# Patient Record
Sex: FEMALE | Race: WHITE | HISPANIC OR LATINO | Employment: UNEMPLOYED | URBAN - METROPOLITAN AREA
[De-identification: names, ages, dates, MRNs, and addresses within clinical notes are randomized per-mention and may not be internally consistent; named-entity substitution may affect disease eponyms.]

---

## 2018-02-27 ENCOUNTER — NON-PROVIDER VISIT (OUTPATIENT)
Dept: URGENT CARE | Facility: CLINIC | Age: 48
End: 2018-02-27

## 2018-02-27 DIAGNOSIS — Z11.1 PPD SCREENING TEST: ICD-10-CM

## 2018-02-27 PROCEDURE — 86580 TB INTRADERMAL TEST: CPT | Performed by: PHYSICIAN ASSISTANT

## 2018-03-01 ENCOUNTER — APPOINTMENT (OUTPATIENT)
Dept: RADIOLOGY | Facility: IMAGING CENTER | Age: 48
End: 2018-03-01
Attending: PHYSICIAN ASSISTANT
Payer: COMMERCIAL

## 2018-03-01 ENCOUNTER — NON-PROVIDER VISIT (OUTPATIENT)
Dept: URGENT CARE | Facility: CLINIC | Age: 48
End: 2018-03-01

## 2018-03-01 DIAGNOSIS — R76.11 POSITIVE PPD: ICD-10-CM

## 2018-03-01 DIAGNOSIS — R76.11 POSITIVE PPD: Primary | ICD-10-CM

## 2018-03-01 LAB — TB WHEAL 3D P 5 TU DIAM: NORMAL MM

## 2018-03-01 PROCEDURE — 71045 X-RAY EXAM CHEST 1 VIEW: CPT | Mod: TC | Performed by: PHYSICIAN ASSISTANT

## 2018-03-02 NOTE — PROGRESS NOTES
Noelle Hobbs is a 48 y.o. female here for a non-provider visit for PPD reading -- Step 1 of 1.  1.  Resulted in Epic under enter/edit results? Yes   2.  TB evaluation questionnaire scanned into chart and original given to patient?Yes    3. Was induration greater than 0 mm? Yes, verified by Provider: Seth Romo PA-C.    Routed to PCP? No     Xray needed to rule out TB, which came back negative.

## 2019-01-31 ENCOUNTER — GYNECOLOGY VISIT (OUTPATIENT)
Dept: OBGYN | Facility: CLINIC | Age: 49
End: 2019-01-31
Payer: COMMERCIAL

## 2019-01-31 ENCOUNTER — HOSPITAL ENCOUNTER (OUTPATIENT)
Facility: MEDICAL CENTER | Age: 49
End: 2019-01-31
Attending: OBSTETRICS & GYNECOLOGY
Payer: COMMERCIAL

## 2019-01-31 VITALS
HEIGHT: 62 IN | DIASTOLIC BLOOD PRESSURE: 68 MMHG | BODY MASS INDEX: 29.44 KG/M2 | WEIGHT: 160 LBS | SYSTOLIC BLOOD PRESSURE: 118 MMHG

## 2019-01-31 DIAGNOSIS — Z01.419 WELL WOMAN EXAM: ICD-10-CM

## 2019-01-31 DIAGNOSIS — Z12.4 ROUTINE CERVICAL SMEAR: ICD-10-CM

## 2019-01-31 PROCEDURE — 99386 PREV VISIT NEW AGE 40-64: CPT | Performed by: OBSTETRICS & GYNECOLOGY

## 2019-01-31 PROCEDURE — 87624 HPV HI-RISK TYP POOLED RSLT: CPT

## 2019-01-31 PROCEDURE — 88175 CYTOPATH C/V AUTO FLUID REDO: CPT

## 2019-01-31 NOTE — PROGRESS NOTES
"Annual examination; new patient  This patient is a 48 y.o. female     Last mammogram-many years ago    /68   Ht 1.575 m (5' 2\")   Wt 72.6 kg (160 lb)   BMI 29.26 kg/m²     Physical examination;  Breast examination- No dominant masses, No skin retraction, No axillary adenopathy    Pelvic examination;  External genitalia-No visible lesions  Vagina-No blood or discharge  Cervix-No gross lesions, Pap smear taken  Uterus-Normal size and shape,  No tenderness  Adnexa No mass or tenderness    Impression;  Normal annual    Plan;    Check PAP  Mammogram ordered    "

## 2019-02-01 ENCOUNTER — HOSPITAL ENCOUNTER (OUTPATIENT)
Dept: RADIOLOGY | Facility: MEDICAL CENTER | Age: 49
End: 2019-02-01
Attending: OBSTETRICS & GYNECOLOGY
Payer: COMMERCIAL

## 2019-02-01 DIAGNOSIS — Z01.419 WELL WOMAN EXAM: ICD-10-CM

## 2019-02-01 LAB
CYTOLOGY REG CYTOL: NORMAL
HPV HR 12 DNA CVX QL NAA+PROBE: NEGATIVE
HPV16 DNA SPEC QL NAA+PROBE: NEGATIVE
HPV18 DNA SPEC QL NAA+PROBE: NEGATIVE
SPECIMEN SOURCE: NORMAL

## 2019-02-01 PROCEDURE — 77067 SCR MAMMO BI INCL CAD: CPT

## 2019-02-04 ENCOUNTER — HOSPITAL ENCOUNTER (OUTPATIENT)
Dept: RADIOLOGY | Facility: MEDICAL CENTER | Age: 49
End: 2019-02-04

## 2019-02-12 ENCOUNTER — OFFICE VISIT (OUTPATIENT)
Dept: MEDICAL GROUP | Facility: PHYSICIAN GROUP | Age: 49
End: 2019-02-12
Payer: COMMERCIAL

## 2019-02-12 VITALS
RESPIRATION RATE: 12 BRPM | TEMPERATURE: 98.6 F | SYSTOLIC BLOOD PRESSURE: 118 MMHG | OXYGEN SATURATION: 99 % | HEART RATE: 74 BPM | WEIGHT: 158 LBS | BODY MASS INDEX: 29.08 KG/M2 | HEIGHT: 62 IN | DIASTOLIC BLOOD PRESSURE: 76 MMHG

## 2019-02-12 DIAGNOSIS — E78.5 HYPERLIPIDEMIA, UNSPECIFIED HYPERLIPIDEMIA TYPE: ICD-10-CM

## 2019-02-12 DIAGNOSIS — Z23 NEED FOR VACCINATION: ICD-10-CM

## 2019-02-12 DIAGNOSIS — R22.1 LOCALIZED SWELLING, MASS OR LUMP OF NECK: ICD-10-CM

## 2019-02-12 DIAGNOSIS — R53.1 RIGHT SIDED WEAKNESS: ICD-10-CM

## 2019-02-12 DIAGNOSIS — Z23 NEED FOR DIPHTHERIA-TETANUS-PERTUSSIS (TDAP) VACCINE: ICD-10-CM

## 2019-02-12 DIAGNOSIS — R47.81 SLURRED SPEECH: ICD-10-CM

## 2019-02-12 DIAGNOSIS — M54.2 NECK PAIN: ICD-10-CM

## 2019-02-12 DIAGNOSIS — N93.8 DUB (DYSFUNCTIONAL UTERINE BLEEDING): ICD-10-CM

## 2019-02-12 DIAGNOSIS — Z51.81 MEDICATION MONITORING ENCOUNTER: ICD-10-CM

## 2019-02-12 DIAGNOSIS — R53.83 FATIGUE, UNSPECIFIED TYPE: ICD-10-CM

## 2019-02-12 DIAGNOSIS — R73.9 HYPERGLYCEMIA: ICD-10-CM

## 2019-02-12 PROCEDURE — 90686 IIV4 VACC NO PRSV 0.5 ML IM: CPT | Performed by: FAMILY MEDICINE

## 2019-02-12 PROCEDURE — 90472 IMMUNIZATION ADMIN EACH ADD: CPT | Performed by: FAMILY MEDICINE

## 2019-02-12 PROCEDURE — 90715 TDAP VACCINE 7 YRS/> IM: CPT | Performed by: FAMILY MEDICINE

## 2019-02-12 PROCEDURE — 99205 OFFICE O/P NEW HI 60 MIN: CPT | Mod: 25 | Performed by: FAMILY MEDICINE

## 2019-02-12 PROCEDURE — 90471 IMMUNIZATION ADMIN: CPT | Performed by: FAMILY MEDICINE

## 2019-02-12 RX ORDER — IBUPROFEN 600 MG/1
600 TABLET ORAL EVERY 8 HOURS PRN
Qty: 60 TAB | Refills: 1 | Status: ON HOLD | OUTPATIENT
Start: 2019-02-12 | End: 2021-10-21

## 2019-02-12 ASSESSMENT — PATIENT HEALTH QUESTIONNAIRE - PHQ9: CLINICAL INTERPRETATION OF PHQ2 SCORE: 0
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
## 2019-02-13 ENCOUNTER — HOSPITAL ENCOUNTER (OUTPATIENT)
Dept: RADIOLOGY | Facility: MEDICAL CENTER | Age: 49
End: 2019-02-13
Attending: FAMILY MEDICINE
Payer: COMMERCIAL

## 2019-02-13 DIAGNOSIS — R47.81 SLURRED SPEECH: ICD-10-CM

## 2019-02-13 DIAGNOSIS — R22.1 LOCALIZED SWELLING, MASS OR LUMP OF NECK: ICD-10-CM

## 2019-02-13 DIAGNOSIS — R53.1 RIGHT SIDED WEAKNESS: ICD-10-CM

## 2019-02-13 PROCEDURE — 70450 CT HEAD/BRAIN W/O DYE: CPT

## 2019-03-07 ENCOUNTER — PATIENT OUTREACH (OUTPATIENT)
Dept: HEALTH INFORMATION MANAGEMENT | Facility: OTHER | Age: 49
End: 2019-03-07

## 2019-03-07 ENCOUNTER — HOSPITAL ENCOUNTER (OUTPATIENT)
Dept: RADIOLOGY | Facility: MEDICAL CENTER | Age: 49
End: 2019-03-07
Attending: FAMILY MEDICINE
Payer: COMMERCIAL

## 2019-03-07 DIAGNOSIS — R22.1 LOCALIZED SWELLING, MASS OR LUMP OF NECK: ICD-10-CM

## 2019-03-07 PROCEDURE — 70490 CT SOFT TISSUE NECK W/O DYE: CPT

## 2019-03-08 ENCOUNTER — TELEPHONE (OUTPATIENT)
Dept: MEDICAL GROUP | Facility: PHYSICIAN GROUP | Age: 49
End: 2019-03-08

## 2019-03-08 LAB
ALBUMIN SERPL-MCNC: 4 G/DL (ref 3.5–5.5)
ALBUMIN/GLOB SERPL: 1.2 {RATIO} (ref 1.2–2.2)
ALP SERPL-CCNC: 120 IU/L (ref 39–117)
ALT SERPL-CCNC: 14 IU/L (ref 0–32)
AST SERPL-CCNC: 13 IU/L (ref 0–40)
BASOPHILS # BLD AUTO: 0.1 X10E3/UL (ref 0–0.2)
BASOPHILS NFR BLD AUTO: 2 %
BILIRUB SERPL-MCNC: 0.2 MG/DL (ref 0–1.2)
BUN SERPL-MCNC: 11 MG/DL (ref 6–24)
BUN/CREAT SERPL: 18 (ref 9–23)
CALCIUM SERPL-MCNC: 9 MG/DL (ref 8.7–10.2)
CHLORIDE SERPL-SCNC: 107 MMOL/L (ref 96–106)
CHOLEST SERPL-MCNC: 163 MG/DL (ref 100–199)
CO2 SERPL-SCNC: 19 MMOL/L (ref 20–29)
CREAT SERPL-MCNC: 0.6 MG/DL (ref 0.57–1)
EOSINOPHIL # BLD AUTO: 0.2 X10E3/UL (ref 0–0.4)
EOSINOPHIL NFR BLD AUTO: 3 %
ERYTHROCYTE [DISTWIDTH] IN BLOOD BY AUTOMATED COUNT: 15.6 % (ref 12.3–15.4)
GLOBULIN SER CALC-MCNC: 3.3 G/DL (ref 1.5–4.5)
GLUCOSE SERPL-MCNC: 101 MG/DL (ref 65–99)
HBA1C MFR BLD: 5.7 % (ref 4.8–5.6)
HCT VFR BLD AUTO: 30.5 % (ref 34–46.6)
HDLC SERPL-MCNC: 53 MG/DL
HGB BLD-MCNC: 9.5 G/DL (ref 11.1–15.9)
IMM GRANULOCYTES # BLD AUTO: 0 X10E3/UL (ref 0–0.1)
IMM GRANULOCYTES NFR BLD AUTO: 0 %
IMMATURE CELLS  115398: ABNORMAL
LABORATORY COMMENT REPORT: NORMAL
LDLC SERPL CALC-MCNC: 90 MG/DL (ref 0–99)
LYMPHOCYTES # BLD AUTO: 1.5 X10E3/UL (ref 0.7–3.1)
LYMPHOCYTES NFR BLD AUTO: 20 %
MCH RBC QN AUTO: 20 PG (ref 26.6–33)
MCHC RBC AUTO-ENTMCNC: 31.1 G/DL (ref 31.5–35.7)
MCV RBC AUTO: 64 FL (ref 79–97)
MONOCYTES # BLD AUTO: 0.6 X10E3/UL (ref 0.1–0.9)
MONOCYTES NFR BLD AUTO: 7 %
MORPHOLOGY BLD-IMP: ABNORMAL
NEUTROPHILS # BLD AUTO: 5.1 X10E3/UL (ref 1.4–7)
NEUTROPHILS NFR BLD AUTO: 68 %
NRBC BLD AUTO-RTO: ABNORMAL %
PLATELET # BLD AUTO: 338 X10E3/UL (ref 150–379)
POTASSIUM SERPL-SCNC: 4.4 MMOL/L (ref 3.5–5.2)
PROT SERPL-MCNC: 7.3 G/DL (ref 6–8.5)
RBC # BLD AUTO: 4.76 X10E6/UL (ref 3.77–5.28)
SODIUM SERPL-SCNC: 140 MMOL/L (ref 134–144)
TRIGL SERPL-MCNC: 101 MG/DL (ref 0–149)
TSH SERPL DL<=0.005 MIU/L-ACNC: 1.49 UIU/ML (ref 0.45–4.5)
VLDLC SERPL CALC-MCNC: 20 MG/DL (ref 5–40)
WBC # BLD AUTO: 7.5 X10E3/UL (ref 3.4–10.8)

## 2019-03-08 NOTE — TELEPHONE ENCOUNTER
----- Message from Angel Ochoa sent at 3/7/2019  7:32 PM PST -----  Please let patient know that her CT of her neck was normal and no mass or concerns was found.  Please keep her follow-up appointment on 4/1/19 to go over her other concerns and follow-up with labs, thank you.

## 2019-04-01 ENCOUNTER — OFFICE VISIT (OUTPATIENT)
Dept: MEDICAL GROUP | Facility: PHYSICIAN GROUP | Age: 49
End: 2019-04-01
Payer: COMMERCIAL

## 2019-04-01 VITALS
WEIGHT: 159 LBS | DIASTOLIC BLOOD PRESSURE: 82 MMHG | BODY MASS INDEX: 28.17 KG/M2 | TEMPERATURE: 98.5 F | RESPIRATION RATE: 14 BRPM | SYSTOLIC BLOOD PRESSURE: 118 MMHG | HEIGHT: 63 IN | OXYGEN SATURATION: 99 % | HEART RATE: 91 BPM

## 2019-04-01 DIAGNOSIS — Z11.59 ENCOUNTER FOR HEPATITIS C SCREENING TEST FOR LOW RISK PATIENT: ICD-10-CM

## 2019-04-01 DIAGNOSIS — E55.9 VITAMIN D DEFICIENCY: ICD-10-CM

## 2019-04-01 DIAGNOSIS — F17.200 SMOKER: ICD-10-CM

## 2019-04-01 DIAGNOSIS — E53.8 VITAMIN B12 DEFICIENCY: ICD-10-CM

## 2019-04-01 DIAGNOSIS — R73.03 PREDIABETES: ICD-10-CM

## 2019-04-01 DIAGNOSIS — D64.9 ANEMIA, UNSPECIFIED TYPE: ICD-10-CM

## 2019-04-01 PROCEDURE — 99214 OFFICE O/P EST MOD 30 MIN: CPT | Performed by: FAMILY MEDICINE

## 2019-04-01 RX ORDER — LANOLIN ALCOHOL/MO/W.PET/CERES
325 CREAM (GRAM) TOPICAL
Qty: 90 TAB | Refills: 2 | Status: ON HOLD | OUTPATIENT
Start: 2019-04-01 | End: 2021-09-09

## 2019-04-01 NOTE — PATIENT INSTRUCTIONS
Complete blood count with hemoglobin 9.5 and platelet count within normal limits.  Complete metabolic panel with borderline glucose at 101 and borderline chloride at 107 and borderline CO2 at 19 but nothing concerning and CMP.  Lipid panel within normal limits with total cholesterol 163 and LDL 90.  A1c at 5.7.  Thyroid TSH within normal limits.  Last complete blood count was in January 2012 which was low at 10.6.  Her iron was low then.  She was prescribed iron 3 times a day in 2012 and her vitamin D was low than too.  Also for concern for right-sided weakness and speech disturbance we got a CT head was within normal limits and no hemorrhage or abnormal concern that he had.  CT of neck was also done due to her neck swelling sometimes and CT of the neck soft tissues without contrast was within normal limits and no bony or thyroid or lymph node issues.     Prediabetes  (Prediabetes)  ¿QUÉ ES LA PREDIABETES?  La prediabetes es la enfermedad que presenta un nivel de azúcar en la lorri (glucemia) más alto de lo normal, abbey no lo suficientemente alto mert para que le diagnostiquen diabetes tipo 2. El hecho de ser prediabético lo pone en riesgo de desarrollar diabetes tipo 2 (diabetes mellitus tipo 2). La prediabetes también se puede llamar intolerancia a la glucosa o glucosa alterada en ayunas.  Generalmente, la prediabetes no causa síntomas. El médico puede diagnosticar esta enfermedad por los análisis de lorri. Los análisis para detectar la prediabetes se pueden realizar si usted tiene sobrepeso y si presenta al menos un factor de riesgo más de prediabetes.  Entre los factores de riesgo de prediabetes, se incluyen los siguientes:  · Tener un familiar con diabetes tipo 2.  · Sobrepeso u obesidad.  · Tener más de 45 años.  · Ser descendiente de indígenas norteamericanos, afroamericanos, hispanos o latinos, o asiáticos o isleños del Pac[place].  · Tener un estilo de antoni inactivo (sedentario).  · Tener antecedentes de  diabetes gestacional o síndrome de ovario poliquístico (SOP).  · Tener niveles bajos del colesterol fabian (HDL-C) o niveles altos de grasas en la lorri (triglicéridos).  · Tener hipertensión arterial.  ¿QUÉ ES LA GLUCEMIA Y CÓMO SE MIDE?  La glucemia hace referencia a la cantidad de glucosa que tiene en el torrente sanguíneo. La glucosa proviene de los alimentos que contienen azúcar y almidón (carbohidratos) que el organismo descompone para formar glucosa. El nivel de glucemia se puede medir en mg/dl (miligramos por decilitro) o mmol/l (milimoles por litro). La glucemia puede controlarse con brooke o más de los siguientes análisis de lorri:  · Medición de la glucemia en ayunas. No se le permitirá comer (tendrá que hacer ayuno) sandie al menos 8 horas antes de que se tome ck muestra de lorri.  ¨ Un rango normal de glucemia en ayunas es de 70 a 100 mg/dl (de 3,9 a 5,6 mmol/l).  · Un análisis de lorri de A1c (hemoglobina A1c). Janki análisis proporciona información sobre el control de la glucemia sandie los últimos 2 o 3 meses.  · Prueba de tolerancia a la glucosa oral (PTGO). Esta prueba mide la glucemia dos veces:  ¨ Después del ayuno. Janki es el valor inicial.  ¨ Dos horas después de ingerir ck bebida que contiene glucosa.  Pueden diagnosticarle prediabetes en los siguientes casos:  · Si la glucemia en ayunas es de 100 a 125 mg/dl (de 5,6 a 6,9 mmol/l).  · Si el nivel de A1c es del 5,7 % al 6,4 %.  · Si el resultado de la PTGO es de 140 a 199 mg/dl (de 7,8 a 11 mmol/l).  Estos análisis de lorri se pueden repetir para confirmar el diagnóstico.  ¿QUÉ SUCEDE SI LA GLUCEMIA ES DEMASIADO SHAYNE?  El páncreas produce ck hormona (insulina) que ayuda a  la glucosa desde el torrente sanguíneo hacia las células. Cuando las células no responden de forma adecuada a la insulina que el organismo produce (resistencia a la insulina), el exceso de glucosa se acumula en la lorri en vez de dirigirse hacia las células. [place]  consecuencia, se puede desarrollar glucemia shayne (hiperglucemia), que puede causar muchas complicaciones. Janki es brooke de los síntomas de la prediabetes.  ¿QUÉ PUEDE SUCEDER SI LA GLUCEMIA PERMANECE MÁS SHAYNE DE LO NORMAL MAAME MUCHO TIEMPO?  Es peligroso tener la glucemia shayne maame mucho tiempo. Demasiada glucosa en la lorri puede dañar los nervios y los vasos sanguíneos. El daño a joslyn plazo puede provocar complicaciones de la diabetes, por ejemplo:  · Cardiopatía.  · Ictus.  · Ceguera.  · Enfermedad renal.  · Depresión.  · Sharon circulación en los pies y en las piernas, que podría llevar a la extracción quirúrgica (amputación) en casos graves.  ¿CÓMO SE PUEDE EVITAR QUE LA PREDIABETES SE CONVIERTA EN DIABETES TIPO 2?  Para prevenir la diabetes tipo 2, tome las siguientes medidas:  · Nathen actividad física.  ¨ Nathen actividad física de intensidad moderada maame al menos 30 minutos mert mínimo 5 días por semana, o tanto mert le haya indicado el médico. Podría hacer caminatas dinámicas, ciclismo o gimnasia acuática.  ¨ Pregúntele al médico qué actividades son seguras para usted. Monisha combinación de actividades puede ser la mejor opción, por ejemplo, caminar, practicar natación, andar en bicicleta y hacer entrenamiento de fuerza.  · Baje de peso mert se lo haya indicado el médico.  ¨ Bajar entre el 5 % y el 7 % del peso corporal puede revertir la resistencia a la insulina.  ¨ El médico puede determinar cuántos kilos tiene que bajar y ayudarlo a que adelgace de manera lyn.  · Siga un plan de alimentación saludable. Janki incluye consumir proteínas magras, hidratos de carbono complejos, frutas y verduras frescas, productos lácteos con bajo contenido de grasa y grasas saludables.  ¨ Siga las indicaciones del médico respecto de las restricciones para las comidas o las bebidas.  ¨ Programe monisha pedro pablo con un especialista en alimentación y nutrición (nutricionista certificado) para que lo ayude a armar un plan de  alimentación saludable adecuado para usted.  · No fume ni consuma ningún producto que contenga tabaco, lo que incluye cigarrillos, tabaco de mascar y cigarrillos electrónicos. Si necesita ayuda para dejar de fumar, consulte al médico.  · [place] los medicamentos de venta shefali y los recetados mert se lo haya indicado el médico. Es posible que le receten medicamentos que ayuden a disminuir el riesgo de tener diabetes tipo 2.  Esta información no tiene mert fin reemplazar el consejo del médico. Asegúrese de hacerle al médico cualquier pregunta que tenga.  Document Released: 02/07/2017 Document Revised: 02/07/2017 Document Reviewed: 02/07/2017  [employer] Patient Education © 2017 [employer]  Prevención de la diabetes mellitus tipo 2  (Preventing Type 2 Diabetes Mellitus)  La diabetes tipo 2 (diabetes mellitus tipo 2) es ck enfermedad a joslyn plazo (crónica) que afecta los niveles de azúcar en la lorri (glucosa). Normalmente, kc hormona llamada insulina estimula el ingreso de la glucosa en las células del cuerpo. Las células usan la glucosa para obtener energía. En la diabetes tipo 2, puede presentarse brooke de los siguientes problemas, o ambos:  · El organismo no produce la cantidad suficiente de insulina.  · El organismo no responde de manera adecuada a la insulina que produce (resistencia a la insulina).  La resistencia a la insulina o la falta de esta hormona hace que el exceso de glucosa se acumule en la lorri, en lugar de ir a las células. [place] consecuencia, se desarrolla glucemia kassidy (hiperglucemia), que puede causar muchas complicaciones. El sobrepeso o la obesidad, y llevar un estilo de antoni inactivo (sedentario) pueden aumentar el riesgo de tener diabetes. La diabetes tipo 2 se puede retardar o evitar al realizar ciertos cambios en la alimentación y en el estilo de antoni.  ¿QUÉ CAMBIOS EN LA ALIMENTACIÓN SE PUEDEN HACER?  · Consuma comidas y colaciones saludables regularmente. Lleve con usted ck  colación saludable para cuando tenga hambre entre las comidas, por ejemplo, ck fruta o un puñado de dori secos.  · Coma carne magra y proteínas con bajo contenido de grasas saturadas, mert kartik, pescado, huevos blancos y frijoles. Evite las devon procesadas.  · Coma mucha fruta y verdura, y ck cantidad importante de cereales no procesados (cereales integrales). Se recomienda que consuma lo siguiente:  ¨ De 1 ½ a 2 tazas de frutas todos los días.  ¨ De 2 ½ a 3 tazas de verduras todos los días.  ¨ De 6 onzas (170 g) a 8 onzas (227 g) de cereales integrales todos los días, mert gumaro, salvado, ruddy bulgur, arroz integral, quinua y mijo.  · Productos lácteos con bajo contenido de grasa, mert leche, yogur y queso.  · Alimentos que contengan grasas saludables, mert dori secos, aguacate, aceite de rivas y aceite de canola.  · [place] agua sandie todo el día. Evite bebidas que contengan más azúcar, mert gaseosas y té elsa.  · Siga las indicaciones del médico con respecto a las restricciones específicas para las comidas o bebidas.  · Controle la cantidad de comida que consume en un momento dado (tamaño de la porción).  ¨ Revise las etiquetas de los alimentos para conocer el tamaño de la porción.  ¨ Utilice ck balanza de cocina para pesar las cantidades de alimentos.  · [place] o cocine al vapor los alimentos en vez de freírlos. Cocine con agua o caldo en vez de aceite o manteca.  · Limite la ingesta de lo siguiente:  ¨ Sal (sodio). No consuma más de 1 cucharadita (2400 mg) de sodio por día. Si tiene alguna cardiopatía o hipertensión arterial, consuma menos de ½ o ¾ de cucharadita (1500 mg) de sodio por día.  ¨ Grasas saturadas. Es la grasa que se encuentra en estado sólido a temperatura ambiente, mert la manteca o la grasa de la carne.  ¿QUÉ CAMBIOS EN EL ESTILO DE SHAJI SE PUEDEN HACER?  Actividad   · Nathen actividad física de intensidad moderada sandie al menos 30 minutos mert mínimo 5 días por semana, o tanto mert  le haya indicado el médico.  · Pregúntele al médico qué actividades son seguras para usted. Ck combinación de actividades puede ser la mejor opción, por ejemplo, caminar, practicar natación, andar en bicicleta y hacer entrenamiento de fuerza.  · [place] de agregar la actividad física a ariza día. Por ejemplo:  ¨ Estacione en lugares que estén más alejados de lo habitual para poder caminar más. Por ejemplo, estacione en ck esquina alejada del estacionamiento cuando vaya a la oficina o a la avani de comestibles.  ¨ Dé ck caminata sandie ariza hora de almuerzo.  ¨ Utilice las escaleras en lugar del ascensor o de las escaleras mecánicas.  Pérdida de peso   · Baje de peso según se le indique. El médico puede determinar cuántos kilos tiene que bajar y ayudarlo a que adelgace de manera lyn.  · Si tiene sobrepeso u obesidad, es posible que se le indique bajar, por lo menos del 5 % al 7 % del peso corporal.  Alcohol y tabaco   · Limite el consumo de alcohol a no más de 1 medida por día si es marjan y no está embarazada, y 2 medidas por día si es hombre. Ck medida equivale a 12 onzas de cerveza, 5 onzas de vino o 1½ onzas de bebidas alcohólicas de kassidy graduación.  · No consuma ningún producto que contenga tabaco, lo que incluye cigarrillos, tabaco de mascar y cigarrillos electrónicos. Si necesita ayuda para dejar de fumar, consulte al médico.  [place] con el médico   · Contrólese el nivel sanguíneo de glucosa con frecuencia mert se lo haya indicado el médico.  · Analice los factores de riesgo y cómo puede reducir el riesgo de tener diabetes.  · Hágase las pruebas de detección mert se lo haya indicado el médico. Puede hacerse pruebas de detección de forma periódica, especialmente si presenta ciertos factores de riesgo para la diabetes tipo 2.  · Nathen ck pedro pablo con un especialista en alimentación y nutrición (nutricionista certificado). Un nutricionista certificado puede ayudarlo a preparar un plan de alimentación saludable, y  a comprender los tamaños de las porciones y las etiquetas de los alimentos.  ¿POR QUÉ ESTOS CAMBIOS SON IMPORTANTES?  · Al hacer cambios en el estilo de antoni y la alimentación, es posible prevenir o retardar la diabetes tipo 2 y los problemas de aureliano relacionados.  · Puede ser difícil reconocer los signos de la diabetes tipo 2. La mejor manera de evitar los posibles daños al organismo es arthur medidas para prevenir la enfermedad antes de presentar síntomas.  ¿QUÉ PUEDE SUCEDER SI NO SE REALIZAN CAMBIOS?  · Los niveles sanguíneos de glucosa pueden seguir aumentando. Es peligroso tener la glucemia kassidy sandie mucho tiempo. Demasiada glucosa en la lorri puede dañar los vasos sanguíneos, el corazón, los riñones, los nervios y los ojos.  · Puede desarrollar prediabetes o diabetes tipo 2. La diabetes tipo 2 puede producir muchos problemas de aureliano crónicos y complicaciones, por ejemplo:  ¨ Cardiopatía.  ¨ Ictus.  ¨ Ceguera.  ¨ Enfermedad renal.  ¨ Depresión.  ¨ Sharon circulación en los pies y en las piernas, que podría llevar a la extracción quirúrgica (amputación) en casos graves.  DÓNDE ENCONTRAR ASISTENCIA:  · Pídale al médico que le recomiende a un nutricionista certificado, a un instructor para el cuidado de la diabetes o un programa para bajar de peso.  · Busque grupos para bajar de peso locales o en línea.  · Inscríbase en un gimnasio, club de preparación física o harjeet de actividades al aire shefali, mert un club para salir a caminar.  DÓNDE ENCONTRAR MÁS INFORMACIÓN:  Para obtener más información sobre la diabetes y la prevención de la diabetes, visite los siguientes sitios web:  · Asociación Americana de la Diabetes (American Diabetes Association, ADA): www.diabetes.org  · Instituto Nacional de la Diabetes y las Enfermedades Digestivas y Renales (National [place] of Diabetes and Digestive and Kidney Diseases): www.niddk.nih.gov/health-information/diabetes  Para obtener más información sobre ck alimentación  saludable, visite los siguientes sitios web:  · Choose My Plate (MiPlato), Departamento de Agricultura de . U. (U.S. Department of Agriculture, USDA): www.choosemyplate.gov/food-groups  · Sección Dietary Guidelines (Pautas de alimentación) de la Oficina de Prevención de Enfermedades y Promoción de la [place] (Office of Disease Prevention and Health Promotion, ODPHP): www.health.gov/dietaryguidelines  Resumen  · Puede reducir el riesgo de desarrollar diabetes tipo 2 al aumentar la actividad física, comer alimentos saludables y bajar de peso, según se le indique.  · Hable con el médico sobre el riesgo de desarrollar diabetes tipo 2. Pregúntele sobre los análisis de lorri o las pruebas de detección que deba hacerse.  Esta información no tiene mert fin reemplazar el consejo del médico. Asegúrese de hacerle al médico cualquier pregunta que tenga.

## 2019-04-01 NOTE — PROGRESS NOTES
cc: Vitamin D deficiency, anemia, prediabetes, BMI 28, smoking 5 cigarettes a day    Subjective:     Noelle Hobbs is a 49 y.o. female presenting Vitamin D deficiency, anemia.    Complete blood count with hemoglobin 9.5 and platelet count within normal limits.  Complete metabolic panel with borderline glucose at 101 and borderline chloride at 107 and borderline CO2 at 19 but nothing concerning and CMP.  Lipid panel within normal limits with total cholesterol 163 and LDL 90.  A1c at 5.7.  Thyroid TSH within normal limits.  Last complete blood count was in January 2012 which was low at 10.6.  Her iron was low then.  She was prescribed iron 3 times a day in 2012 and her vitamin D was low than too.  Also for concern for right-sided weakness and speech disturbance we got a CT head was within normal limits and no hemorrhage or abnormal concern that he had.  CT of neck was also done due to her neck swelling sometimes and CT of the neck soft tissues without contrast was within normal limits and no bony or thyroid or lymph node issues.  Is not taking any iron right now but she was taking iron in the past because she had heavy menstrual cycle.  She reports her periods are now irregular as she is premenopausal.  She denies any constipation or rectal bleeding.  She takes ibuprofen about twice a week right now for her neck tension.  She denies any right-sided weakness in the body or speech issue right now.  She reports that she is putting heating pad and ice pack on her neck and it helps.  She does report that she smokes about 5 cigarettes a day and she does want to quit and is willing to try the nicotine patch.  Review of systems:     Constitutional: Negative for fever, chills and positive fatigue.   Respiratory: Negative for cough and shortness of breath, negative for exertional shortness of breath  Cardiovascular: Negative for leg swelling, negative for palpitations, negative for chest pain  Gastrointestinal: Negative for  "nausea, vomiting, abdominal pain, constipation and diarrhea.  Genitourinary: Negative for dysuria and hematuria.   Neurological: Negative for dizziness, focal weakness and headaches.   Endo/Heme/Allergies: Denies bleeding, bruising, and recurrent allergies.  Psychiatric/Behavioral: Negative for depression and anxiety.        Current Outpatient Prescriptions:   •  ferrous sulfate 325 (65 Fe) MG EC tablet, Take 1 Tab by mouth 3 times a day, with meals., Disp: 90 Tab, Rfl: 2  •  nicotine (NICODERM) 7 MG/24HR PATCH 24 HR, Apply 1 Patch to skin as directed every 24 hours., Disp: 30 Patch, Rfl: 1  •  ibuprofen (MOTRIN) 600 MG Tab, Take 1 Tab by mouth every 8 hours as needed., Disp: 60 Tab, Rfl: 1    Allergies, past medical history, past surgical history, family history, social history reviewed and updated    Objective:     Vitals: /82 (BP Location: Right arm, Patient Position: Sitting, BP Cuff Size: Adult)   Pulse 91   Temp 36.9 °C (98.5 °F) (Temporal)   Resp 14   Ht 1.588 m (5' 2.5\")   Wt 72.1 kg (159 lb)   LMP 02/25/2019 (Approximate)   SpO2 99%   BMI 28.62 kg/m²   General: Alert, pleasant, NAD  HEENT: Normocephalic.  Nontraumatic. EOMI, no icterus or pallor.  Conjunctivae and lids normal. External ears normal. Oropharynx non-erythematous, mucous membranes moist.  Neck supple.  No thyromegaly or masses palpated. No cervical or supraclavicular lymphadenopathy.  Heart: Regular rate and rhythm.  S1 and S2 normal.  No murmurs appreciated.  Respiratory: Normal respiratory effort.  Clear to auscultation bilaterally.  Musculoskeletal: Gait is normal.  Moves all extremities well.  Extremities: No leg edema.  Pedal pulses 2+ symmetric.   Psych:  Affect/mood is normal, judgement is good, memory is intact, grooming is appropriate.    Assessment/Plan:     Diagnoses and all orders for this visit:    Vitamin D deficiency  -     VITAMIN D,25 HYDROXY; Future    Anemia, unspecified type  -     FERRITIN; Future  -     " IRON/TOTAL IRON BIND; Future  -     CBC WITH DIFFERENTIAL; Future  -     ferrous sulfate 325 (65 Fe) MG EC tablet; Take 1 Tab by mouth 3 times a day, with meals.    Vitamin B12 deficiency  -     VITAMIN B12; Future    Prediabetes  -     REFERRAL TO [facility] (HIP) Services Requested: Registered Dietitian for Medical Nutrition Therapy; Reason for Visit: Overweight/Obesity    Smoker  -     nicotine (NICODERM) 7 MG/24HR PATCH 24 HR; Apply 1 Patch to skin as directed every 24 hours.    Encounter for hepatitis C screening test for low risk patient  -     HEPATITIS PANEL ACUTE(4 COMPONENTS); Future    BMI 28.0-28.9,adult  -     REFERRAL TO [facility] (HIP) Services Requested: Registered Dietitian for Medical Nutrition Therapy; Reason for Visit: Overweight/Obesity    -Over lab work Complete blood count with hemoglobin 9.5 and platelet count within normal limits.  Complete metabolic panel with borderline glucose at 101 and borderline chloride at 107 and borderline CO2 at 19 but nothing concerning and CMP.  Lipid panel within normal limits with total cholesterol 163 and LDL 90.  A1c at 5.7.  Thyroid TSH within normal limits.  Last complete blood count was in January 2012 which was low at 10.6.  Her iron was low then.  She was prescribed iron 3 times a day in 2012 and her vitamin D was low than too.  Also for concern for right-sided weakness and speech disturbance we got a CT head was within normal limits and no hemorrhage or abnormal concern that he had.  CT of neck was also done due to her neck swelling sometimes and CT of the neck soft tissues without contrast was within normal limits and no bony or thyroid or lymph node issues.  Ports she is having irregular bleeding now less often and did have heavy menstrual cycles before and is no longer taking iron so we will restart her on iron 3 times a day with meals and vitamin C.  We will get other nonfasting lab work 1-2 weeks  before your next appointment.  Will start on nicotine patches 7 mg every 24 hours and stop smoking as she smokes about 5 cigarettes a day.  Try these patches for 2-6 weeks and try to quit within this time.  We will also refer her for dietitian for her prediabetes and she is going to work on her weight and exercise and for her neck her imaging was normal and she will continue with warm or cold compress and ibuprofen as needed as she is only needing every 2 times a week and not more than that.  Never seen GI and if hemoglobin still low then will consider getting this done.  Will work on quitting smoking, her diet and exercise for prediabetes and her anemia and do lab work and return in 5 weeks.    Return in about 5 weeks (around 5/9/2019), or Prediabetes/labs/anemia/smoking, for Long, 40 min appnt.

## 2019-04-03 ENCOUNTER — TELEPHONE (OUTPATIENT)
Dept: MEDICAL GROUP | Facility: PHYSICIAN GROUP | Age: 49
End: 2019-04-03

## 2019-04-03 NOTE — TELEPHONE ENCOUNTER
1. Caller Name: Noelle                                          Call Back Number: [phone] (home)        Patient approves a detailed voicemail message: N\A    pt called and was concerned because she couldnt get Rx from her pharmacy. Called pts pharmacy and they said pt could just  Rx over the counter. pt notified

## 2019-06-13 ENCOUNTER — OFFICE VISIT (OUTPATIENT)
Dept: MEDICAL GROUP | Facility: PHYSICIAN GROUP | Age: 49
End: 2019-06-13

## 2019-06-13 VITALS
WEIGHT: 157 LBS | BODY MASS INDEX: 27.82 KG/M2 | SYSTOLIC BLOOD PRESSURE: 112 MMHG | RESPIRATION RATE: 12 BRPM | HEIGHT: 63 IN | TEMPERATURE: 98.5 F | DIASTOLIC BLOOD PRESSURE: 70 MMHG | OXYGEN SATURATION: 96 % | HEART RATE: 78 BPM

## 2019-06-13 DIAGNOSIS — Z02.9 ADMINISTRATIVE ENCOUNTER: ICD-10-CM

## 2019-06-13 DIAGNOSIS — D64.9 ANEMIA, UNSPECIFIED TYPE: ICD-10-CM

## 2019-06-13 DIAGNOSIS — F17.200 SMOKER: ICD-10-CM

## 2019-06-13 DIAGNOSIS — R73.03 PREDIABETES: ICD-10-CM

## 2019-06-13 PROCEDURE — 99214 OFFICE O/P EST MOD 30 MIN: CPT | Performed by: FAMILY MEDICINE

## 2019-06-13 NOTE — PROGRESS NOTES
cc: Fill out paperwork, BMI 28, anemia, smoking down to 2 to 3 cigarettes a day, prediabetes    Subjective:     Noelle Hobbs is a 49 y.o. female presenting she is here today to help get paperwork that she needs to adopt for children.  She is quite independent and she still has to get some lab work done for some anemia.  She does have prediabetes and we will recheck this 1 in the future and work on her with this and her weight.  She is smoking about 2 to 3 cigarettes a day.  She is sometimes using the nicotine patches.  She is taking iron 3 times a day with meals.  She is not bleeding from anywhere.  Reports she does not have medical insurance right now so she will do the lab work later on.  She is no longer getting any of the speech issues she was having before or any problems with weakness and is staying hydrated and is quite active.  She is mentally stable and physically can exert herself without any chest pain or problems breathing.    Review of systems:     Constitutional: Negative for fever, chills and negative fatigue.   HENT: Negative for sinus pressure, negative for ear pain or hearing loss  Eyes: Negative for blurriness, negative for double vision  Respiratory: Negative for cough and shortness of breath, negative for exertional shortness of breath  Cardiovascular: Negative for leg swelling, negative for palpitations, negative for chest pain  Gastrointestinal: Negative for nausea, vomiting, abdominal pain, constipation and diarrhea.  Genitourinary: Negative for dysuria and hematuria.   Skin: Negative for rash.   Neurological: Negative for dizziness, focal weakness and headaches.   Endo/Heme/Allergies: Denies bleeding, bruising, and recurrent allergies.  Psychiatric/Behavioral: Negative for depression and anxiety.      Current Outpatient Prescriptions:   •  ferrous sulfate 325 (65 Fe) MG EC tablet, Take 1 Tab by mouth 3 times a day, with meals., Disp: 90 Tab, Rfl: 2  •  nicotine (NICODERM) 7 MG/24HR  "PATCH 24 HR, Apply 1 Patch to skin as directed every 24 hours., Disp: 30 Patch, Rfl: 1  •  ibuprofen (MOTRIN) 600 MG Tab, Take 1 Tab by mouth every 8 hours as needed., Disp: 60 Tab, Rfl: 1    Allergies, past medical history, past surgical history, family history, social history reviewed and updated    Objective:     Vitals: /70 (BP Location: Left arm, Patient Position: Sitting, BP Cuff Size: Adult)   Pulse 78   Temp 36.9 °C (98.5 °F) (Temporal)   Resp 12   Ht 1.588 m (5' 2.5\")   Wt 71.2 kg (157 lb)   LMP 06/13/2019 (Exact Date)   SpO2 96%   BMI 28.26 kg/m²   General: Alert, pleasant, NAD  HEENT: Normocephalic.  Nontraumatic. EOMI, no icterus or pallor.  Conjunctivae and lids normal. External ears normal. Oropharynx non-erythematous, mucous membranes moist.  Neck supple.  No thyromegaly or masses palpated. No cervical or supraclavicular lymphadenopathy.  Heart: Regular rate and rhythm.  S1 and S2 normal.  No murmurs appreciated.  Respiratory: Normal respiratory effort.  Clear to auscultation bilaterally.  Abdomen: Non-distended, soft, non tender in all 4 quadrants.  Skin: Warm, dry, no rashes.  Musculoskeletal: Gait is normal.  Moves all extremities well.  Extremities: No leg edema.  Pedal pulses 2+ symmetric.   Psych:  Affect/mood is normal, judgement is good, memory is intact, grooming is appropriate.    Assessment/Plan:     Diagnoses and all orders for this visit:    Prediabetes    Smoker    Anemia, unspecified type    BMI 28.0-28.9,adult    Administrative encounter    -She has had all of her vaccines except the pneumonia vaccine which she will get done later when she has her health insurance back.  When she gets her health insurance back and she will go for lab work and come for her follow-up appointment.  Please look at patient instruction sheet to work on the prediabetes and take her iron 3 times a day and good job on weaning down to 2 to 3 cigarettes a day and she has 7 mg of nicotine patches as " needed to take for smoking sensation.  Lab work is in the computer and she can get this done 1 week before she sees me in the future.  She can work on weight loss about 5 pounds a month as her body mass index is 28 and we would like to be at 25 and to prevent diabetes she could use apps such as lose it, my fitness tracker or weight watchers and lose about 5 pounds a month with decreasing carbohydrates, fast food or fried food or sugary drinks and portion control and meal planning and increasing protein and fiber.  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  She is going be adopting all 4 of her daughters kids.  Her daughter has leukemia and so she is getting treatment for that and is unable to care for the kids so she is going to adopt the 4 children of her daughter.  She has good social support system and would encourage her to get additional resources from the community for respect care and work on her diet and exercise and quit smoking.    Return in about 2 months (around 8/13/2019), or labs/prediabetes/anemia/smoking cessation/BMI.

## 2020-05-13 ENCOUNTER — HOSPITAL ENCOUNTER (OUTPATIENT)
Dept: RADIOLOGY | Facility: MEDICAL CENTER | Age: 50
End: 2020-05-13
Attending: FAMILY MEDICINE

## 2020-05-13 DIAGNOSIS — D64.9 ANEMIA, UNSPECIFIED TYPE: ICD-10-CM

## 2020-05-13 DIAGNOSIS — N92.1 METRORRHAGIA: ICD-10-CM

## 2021-07-22 ENCOUNTER — HOSPITAL ENCOUNTER (OUTPATIENT)
Dept: RADIOLOGY | Facility: MEDICAL CENTER | Age: 51
End: 2021-07-22
Attending: FAMILY MEDICINE
Payer: COMMERCIAL

## 2021-07-22 DIAGNOSIS — M62.81 MUSCLE WEAKNESS (GENERALIZED): ICD-10-CM

## 2021-08-28 ENCOUNTER — APPOINTMENT (OUTPATIENT)
Dept: RADIOLOGY | Facility: MEDICAL CENTER | Age: 51
End: 2021-08-28
Attending: EMERGENCY MEDICINE
Payer: COMMERCIAL

## 2021-08-28 ENCOUNTER — HOSPITAL ENCOUNTER (EMERGENCY)
Facility: MEDICAL CENTER | Age: 51
End: 2021-08-29
Attending: EMERGENCY MEDICINE
Payer: COMMERCIAL

## 2021-08-28 DIAGNOSIS — N28.89 RENAL MASS, RIGHT: ICD-10-CM

## 2021-08-28 DIAGNOSIS — S82.831A CLOSED FRACTURE OF DISTAL END OF RIGHT FIBULA, UNSPECIFIED FRACTURE MORPHOLOGY, INITIAL ENCOUNTER: ICD-10-CM

## 2021-08-28 LAB
ALBUMIN SERPL BCP-MCNC: 3.7 G/DL (ref 3.2–4.9)
ALBUMIN/GLOB SERPL: 1.1 G/DL
ALP SERPL-CCNC: 123 U/L (ref 30–99)
ALT SERPL-CCNC: 36 U/L (ref 2–50)
ANION GAP SERPL CALC-SCNC: 12 MMOL/L (ref 7–16)
AST SERPL-CCNC: 26 U/L (ref 12–45)
BASOPHILS # BLD AUTO: 1.3 % (ref 0–1.8)
BASOPHILS # BLD: 0.12 K/UL (ref 0–0.12)
BILIRUB SERPL-MCNC: 0.3 MG/DL (ref 0.1–1.5)
BUN SERPL-MCNC: 15 MG/DL (ref 8–22)
CA-I SERPL-SCNC: 1.2 MMOL/L (ref 1.1–1.3)
CALCIUM SERPL-MCNC: 8.8 MG/DL (ref 8.5–10.5)
CHLORIDE SERPL-SCNC: 104 MMOL/L (ref 96–112)
CO2 SERPL-SCNC: 23 MMOL/L (ref 20–33)
CREAT SERPL-MCNC: 0.71 MG/DL (ref 0.5–1.4)
EOSINOPHIL # BLD AUTO: 0.19 K/UL (ref 0–0.51)
EOSINOPHIL NFR BLD: 2 % (ref 0–6.9)
ERYTHROCYTE [DISTWIDTH] IN BLOOD BY AUTOMATED COUNT: 46.4 FL (ref 35.9–50)
GLOBULIN SER CALC-MCNC: 3.3 G/DL (ref 1.9–3.5)
GLUCOSE SERPL-MCNC: 107 MG/DL (ref 65–99)
HCT VFR BLD AUTO: 41.4 % (ref 37–47)
HGB BLD-MCNC: 13.4 G/DL (ref 12–16)
IMM GRANULOCYTES # BLD AUTO: 0.02 K/UL (ref 0–0.11)
IMM GRANULOCYTES NFR BLD AUTO: 0.2 % (ref 0–0.9)
LYMPHOCYTES # BLD AUTO: 1.78 K/UL (ref 1–4.8)
LYMPHOCYTES NFR BLD: 18.7 % (ref 22–41)
MCH RBC QN AUTO: 25.7 PG (ref 27–33)
MCHC RBC AUTO-ENTMCNC: 32.4 G/DL (ref 33.6–35)
MCV RBC AUTO: 79.3 FL (ref 81.4–97.8)
MONOCYTES # BLD AUTO: 0.66 K/UL (ref 0–0.85)
MONOCYTES NFR BLD AUTO: 6.9 % (ref 0–13.4)
NEUTROPHILS # BLD AUTO: 6.77 K/UL (ref 2–7.15)
NEUTROPHILS NFR BLD: 70.9 % (ref 44–72)
NRBC # BLD AUTO: 0 K/UL
NRBC BLD-RTO: 0 /100 WBC
PLATELET # BLD AUTO: 274 K/UL (ref 164–446)
PMV BLD AUTO: 11 FL (ref 9–12.9)
POTASSIUM SERPL-SCNC: 4 MMOL/L (ref 3.6–5.5)
PROT SERPL-MCNC: 7 G/DL (ref 6–8.2)
RBC # BLD AUTO: 5.22 M/UL (ref 4.2–5.4)
SODIUM SERPL-SCNC: 139 MMOL/L (ref 135–145)
WBC # BLD AUTO: 9.5 K/UL (ref 4.8–10.8)

## 2021-08-28 PROCEDURE — 72128 CT CHEST SPINE W/O DYE: CPT

## 2021-08-28 PROCEDURE — 70450 CT HEAD/BRAIN W/O DYE: CPT

## 2021-08-28 PROCEDURE — 85025 COMPLETE CBC W/AUTO DIFF WBC: CPT

## 2021-08-28 PROCEDURE — 96374 THER/PROPH/DIAG INJ IV PUSH: CPT

## 2021-08-28 PROCEDURE — 72125 CT NECK SPINE W/O DYE: CPT

## 2021-08-28 PROCEDURE — 82330 ASSAY OF CALCIUM: CPT

## 2021-08-28 PROCEDURE — 29515 APPLICATION SHORT LEG SPLINT: CPT

## 2021-08-28 PROCEDURE — 302875 HCHG BANDAGE ACE 4 OR 6""

## 2021-08-28 PROCEDURE — 700111 HCHG RX REV CODE 636 W/ 250 OVERRIDE (IP): Performed by: EMERGENCY MEDICINE

## 2021-08-28 PROCEDURE — 72131 CT LUMBAR SPINE W/O DYE: CPT

## 2021-08-28 PROCEDURE — 73620 X-RAY EXAM OF FOOT: CPT | Mod: RT

## 2021-08-28 PROCEDURE — 73590 X-RAY EXAM OF LOWER LEG: CPT | Mod: RT

## 2021-08-28 PROCEDURE — 80053 COMPREHEN METABOLIC PANEL: CPT

## 2021-08-28 PROCEDURE — 99285 EMERGENCY DEPT VISIT HI MDM: CPT

## 2021-08-28 RX ORDER — HYDROCODONE BITARTRATE AND ACETAMINOPHEN 5; 325 MG/1; MG/1
1 TABLET ORAL EVERY 4 HOURS PRN
Qty: 20 TABLET | Refills: 0 | Status: SHIPPED | OUTPATIENT
Start: 2021-08-28 | End: 2021-08-31

## 2021-08-28 RX ADMIN — FENTANYL CITRATE 50 MCG: 50 INJECTION, SOLUTION INTRAMUSCULAR; INTRAVENOUS at 21:35

## 2021-08-28 ASSESSMENT — LIFESTYLE VARIABLES
DO YOU DRINK ALCOHOL: NO
DOES PATIENT WANT TO STOP DRINKING: NO

## 2021-08-29 VITALS
HEART RATE: 86 BPM | RESPIRATION RATE: 20 BRPM | SYSTOLIC BLOOD PRESSURE: 135 MMHG | OXYGEN SATURATION: 97 % | TEMPERATURE: 96.8 F | HEIGHT: 62 IN | WEIGHT: 150 LBS | BODY MASS INDEX: 27.6 KG/M2 | DIASTOLIC BLOOD PRESSURE: 70 MMHG

## 2021-08-29 NOTE — ED TRIAGE NOTES
Chief Complaint   Patient presents with   • Ankle Injury   • Fall     Pt via EMS for mechanical fall. States she has recently been evaluated by PCP for extremity numbness and tingling, and today was walking outside, states she couldn't tell she was on uneven surface. States she rolled onto her R ankle with pain and deformity noted on scene. Denies hitting head and LOC. Splinted in field.

## 2021-08-29 NOTE — ED NOTES
Pt discharged home with daughter and . Pt is A/O x 4, pt WC to  and assisted to car. Pt refusing to stay for PT eval, ERP is aware and spoke to patient. Crutches provided to patient. IV discontinued and gauze placed, pt in possession of belongings. Pt provided discharge education and information pertaining to medications and follow up appointments. Discussed signs and symptoms to return to there ER, patient verbalized understanding. Pt received copy of discharge instructions and verbalized understanding.

## 2021-08-29 NOTE — ED PROVIDER NOTES
ED Provider Note    CHIEF COMPLAINT  Chief Complaint   Patient presents with   • Ankle Injury   • Fall       HPI  Noelle Campoverde is a 51 y.o. female who presents to the emergency department with right leg ankle and foot pain. Past medical history significant for chronic back pain. More recently she has had evaluation due to weakness of all of her extremities as well as generalized fatigue. Unfortunately two days ago CT imaging showed a right renal mass concerning for renal cell carcinoma. No further workup has yet to be completed.    Today patient was walking with her  to the car when she felt weak as typical however she then attempted to step over a curb and then her right leg gave out causing a fall. Now with pain from the mid leg distally to the ankle and foot. She states that the entire leg does feel somewhat numb and weak. Movement however does aggravate her discomfort which is currently moderate but more severe with movement. Denies hitting head. She additionally endorses some mid neck and upper back discomfort. Denies any changes in bowel or bladder. States that she is otherwise been well. No other recent illness.    REVIEW OF SYSTEMS  See HPI for further details. All other systems are negative.     PAST MEDICAL HISTORY   has a past medical history of Chronic low back pain, Myopia, and Weight gain.    SOCIAL HISTORY  Social History     Tobacco Use   • Smoking status: Current Every Day Smoker   • Smokeless tobacco: Never Used   • Tobacco comment: 3 cigarettes/day   Substance and Sexual Activity   • Alcohol use: No   • Drug use: No   • Sexual activity: Yes     Partners: Male     Comment:        SURGICAL HISTORY   has a past surgical history that includes tubal ligation.    CURRENT MEDICATIONS  Home Medications    **Home medications have not yet been reviewed for this encounter**         ALLERGIES  Allergies   Allergen Reactions   • Latex        PHYSICAL EXAM  VITAL SIGNS: BP  "136/75   Pulse 81   Temp 36.6 °C (97.8 °F) (Temporal)   Resp 19   Ht 1.575 m (5' 2\")   Wt 68 kg (150 lb)   SpO2 90%   BMI 27.44 kg/m²  @[id number]::@   Pulse ox interpretation: I interpret this pulse ox as normal.  Constitutional: Alert in no apparent distress.  HENT: No signs of trauma, Bilateral external ears normal, Nose normal.   Eyes: Pupils are equal and reactive  Neck: Normal range of motion, No tenderness, Supple  Cardiovascular: Regular rate and rhythm, no murmurs.   Thorax & Lungs: Normal breath sounds, No respiratory distress, No wheezing, No chest tenderness.   Abdomen: Bowel sounds normal, Soft  Skin: Warm, Dry, No erythema, No rash.   Extremities: Intact distal pulses.   Right lower extremity: nontender hip, thigh, knee. Diffuse mild tenderness from mid leg to ankle and foot. Increasing pain with Jackson plantar flexion which is limited secondary to current splints which includes Ugo splint and curlex. dpp present  Musculoskeletal: Good range of motion in all major joints. No tenderness to palpation or major deformities noted.   Neurologic: Alert , Normal motor function, Normal sensory function, No focal deficits noted.   Psychiatric: Affect normal, Judgment normal, Mood normal.       DIAGNOSTIC STUDIES / PROCEDURES      LABS  Results for orders placed or performed during the hospital encounter of 08/28/21   CBC WITH DIFFERENTIAL   Result Value Ref Range    WBC 9.5 4.8 - 10.8 K/uL    RBC 5.22 4.20 - 5.40 M/uL    Hemoglobin 13.4 12.0 - 16.0 g/dL    Hematocrit 41.4 37.0 - 47.0 %    MCV 79.3 (L) 81.4 - 97.8 fL    MCH 25.7 (L) 27.0 - 33.0 pg    MCHC 32.4 (L) 33.6 - 35.0 g/dL    RDW 46.4 35.9 - 50.0 fL    Platelet Count 274 164 - 446 K/uL    MPV 11.0 9.0 - 12.9 fL    Neutrophils-Polys 70.90 44.00 - 72.00 %    Lymphocytes 18.70 (L) 22.00 - 41.00 %    Monocytes 6.90 0.00 - 13.40 %    Eosinophils 2.00 0.00 - 6.90 %    Basophils 1.30 0.00 - 1.80 %    Immature Granulocytes 0.20 0.00 - 0.90 %    Nucleated " RBC 0.00 /100 WBC    Neutrophils (Absolute) 6.77 2.00 - 7.15 K/uL    Lymphs (Absolute) 1.78 1.00 - 4.80 K/uL    Monos (Absolute) 0.66 0.00 - 0.85 K/uL    Eos (Absolute) 0.19 0.00 - 0.51 K/uL    Baso (Absolute) 0.12 0.00 - 0.12 K/uL    Immature Granulocytes (abs) 0.02 0.00 - 0.11 K/uL    NRBC (Absolute) 0.00 K/uL   COMP METABOLIC PANEL   Result Value Ref Range    Sodium 139 135 - 145 mmol/L    Potassium 4.0 3.6 - 5.5 mmol/L    Chloride 104 96 - 112 mmol/L    Co2 23 20 - 33 mmol/L    Anion Gap 12.0 7.0 - 16.0    Glucose 107 (H) 65 - 99 mg/dL    Bun 15 8 - 22 mg/dL    Creatinine 0.71 0.50 - 1.40 mg/dL    Calcium 8.8 8.5 - 10.5 mg/dL    AST(SGOT) 26 12 - 45 U/L    ALT(SGPT) 36 2 - 50 U/L    Alkaline Phosphatase 123 (H) 30 - 99 U/L    Total Bilirubin 0.3 0.1 - 1.5 mg/dL    Albumin 3.7 3.2 - 4.9 g/dL    Total Protein 7.0 6.0 - 8.2 g/dL    Globulin 3.3 1.9 - 3.5 g/dL    A-G Ratio 1.1 g/dL   IONIZED CALCIUM   Result Value Ref Range    Ionized Calcium 1.2 1.1 - 1.3 mmol/L   ESTIMATED GFR   Result Value Ref Range    GFR If African American >60 >60 mL/min/1.73 m 2    GFR If Non African American >60 >60 mL/min/1.73 m 2       RADIOLOGY  DX-FOOT-2- RIGHT   Final Result      1.  No fracture or dislocation of RIGHT foot.   2.  Oblique fracture of distal fibula with overlying soft tissue swelling.      DX-TIBIA AND FIBULA RIGHT   Final Result      1.  Mildly displaced oblique fracture distal RIGHT fibula with overlying soft tissue swelling.   2.  Slight apparent widening of medial mortise.      CT-TSPINE W/O PLUS RECONS   Final Result      1.  Mild multilevel degenerative change of thoracic spine with dextroconvex curvature.   2.  No fracture or subluxation.      CT-LSPINE W/O PLUS RECONS   Final Result      1.  No lumbar spine fracture or subluxation.   2.  Mild degenerative changes.   3.  Transitional vertebral anatomy.   4.  Probable RIGHT upper pole kidney cyst.  Recommend confirmation with follow-up nonemergent renal  ultrasound.      CT-HEAD W/O   Final Result      No acute intracranial abnormality.      CT-CSPINE WITHOUT PLUS RECONS   Final Result      1.  Straightening of cervical spine.   2.  Mild multilevel facet hypertrophy.   3.  No fracture or subluxation.          In prescribing controlled substances to this patient, I certify that I have obtained and reviewed the medical history of Noelle Campoverde. I have also made a good elena effort to obtain applicable records from other providers who have treated the patient and records did not demonstrate any increased risk of substance abuse that would prevent me from prescribing controlled substances.     I have conducted a physical exam and documented it. I have reviewed Ms. Anjel Campoverde’s prescription history as maintained by the [place] Prescription Monitoring Program.     I have assessed the patient’s risk for abuse, dependency, and addiction using the validated Opioid Risk Tool available at https://www.mdcalc.com/jfilge-gmqu-pjxh-ort-narcotic-abuse.     Given the above, I believe the benefits of controlled substance therapy outweigh the risks. The reasons for prescribing controlled substances include non-narcotic, oral analgesic alternatives have been inadequate for pain control. Accordingly, I have discussed the risk and benefits, treatment plan, and alternative therapies with the patient.         COURSE & MEDICAL DECISION MAKING  Pertinent Labs & Imaging studies reviewed. (See chart for details)  51-year-old female presented the emergency department after mechanical fall. History as above. Currently undergoing oncologic worker for renal cell cancer has most likely diagnosis of her newly found renal mass. Now with increasing weakness. Electrolytes and neural imaging as completed today is negative. Patient is been placed in a stirrup ortho glass splint and provided with crutches. She will follow-up with ortho as well as her primary care physician  oncology is currently planned      The patient will return for worsening symptoms and is stable at the time of discharge. The patient verbalizes understanding and will comply.    FINAL IMPRESSION  1. Closed fracture of distal end of right fibula, unspecified fracture morphology, initial encounter    2. Renal mass, right            Electronically signed by: Edilson James M.D., 8/28/2021 9:22 PM

## 2021-09-01 PROBLEM — S82.63XA ANKLE FRACTURE, LATERAL MALLEOLUS, CLOSED: Status: ACTIVE | Noted: 2021-09-01

## 2021-09-08 ENCOUNTER — PRE-ADMISSION TESTING (OUTPATIENT)
Dept: ADMISSIONS | Facility: MEDICAL CENTER | Age: 51
End: 2021-09-08
Payer: COMMERCIAL

## 2021-09-08 RX ORDER — HYDROCODONE BITARTRATE AND ACETAMINOPHEN 5; 325 MG/1; MG/1
1 TABLET ORAL EVERY 4 HOURS PRN
COMMUNITY
End: 2021-09-11

## 2021-09-09 ENCOUNTER — HOSPITAL ENCOUNTER (OUTPATIENT)
Facility: MEDICAL CENTER | Age: 51
End: 2021-09-09
Attending: ORTHOPAEDIC SURGERY | Admitting: ORTHOPAEDIC SURGERY
Payer: COMMERCIAL

## 2021-09-09 ENCOUNTER — ANESTHESIA EVENT (OUTPATIENT)
Dept: SURGERY | Facility: MEDICAL CENTER | Age: 51
End: 2021-09-09
Payer: COMMERCIAL

## 2021-09-09 ENCOUNTER — APPOINTMENT (OUTPATIENT)
Dept: RADIOLOGY | Facility: MEDICAL CENTER | Age: 51
End: 2021-09-09
Attending: ORTHOPAEDIC SURGERY
Payer: COMMERCIAL

## 2021-09-09 ENCOUNTER — ANESTHESIA (OUTPATIENT)
Dept: SURGERY | Facility: MEDICAL CENTER | Age: 51
End: 2021-09-09
Payer: COMMERCIAL

## 2021-09-09 VITALS
HEART RATE: 74 BPM | BODY MASS INDEX: 28.76 KG/M2 | RESPIRATION RATE: 16 BRPM | OXYGEN SATURATION: 92 % | HEIGHT: 62 IN | SYSTOLIC BLOOD PRESSURE: 141 MMHG | DIASTOLIC BLOOD PRESSURE: 87 MMHG | TEMPERATURE: 97.2 F | WEIGHT: 156.31 LBS

## 2021-09-09 DIAGNOSIS — S82.61XA CLOSED DISPLACED FRACTURE OF LATERAL MALLEOLUS OF RIGHT FIBULA, INITIAL ENCOUNTER: ICD-10-CM

## 2021-09-09 PROCEDURE — 160009 HCHG ANES TIME/MIN: Performed by: ORTHOPAEDIC SURGERY

## 2021-09-09 PROCEDURE — 160041 HCHG SURGERY MINUTES - EA ADDL 1 MIN LEVEL 4: Performed by: ORTHOPAEDIC SURGERY

## 2021-09-09 PROCEDURE — 500054 HCHG BANDAGE, ELASTIC 6: Performed by: ORTHOPAEDIC SURGERY

## 2021-09-09 PROCEDURE — C1713 ANCHOR/SCREW BN/BN,TIS/BN: HCPCS | Performed by: ORTHOPAEDIC SURGERY

## 2021-09-09 PROCEDURE — 700101 HCHG RX REV CODE 250: Performed by: ORTHOPAEDIC SURGERY

## 2021-09-09 PROCEDURE — 700111 HCHG RX REV CODE 636 W/ 250 OVERRIDE (IP): Performed by: ORTHOPAEDIC SURGERY

## 2021-09-09 PROCEDURE — 700111 HCHG RX REV CODE 636 W/ 250 OVERRIDE (IP): Performed by: ANESTHESIOLOGY

## 2021-09-09 PROCEDURE — 27792 TREATMENT OF ANKLE FRACTURE: CPT | Mod: RT | Performed by: ORTHOPAEDIC SURGERY

## 2021-09-09 PROCEDURE — 160036 HCHG PACU - EA ADDL 30 MINS PHASE I: Performed by: ORTHOPAEDIC SURGERY

## 2021-09-09 PROCEDURE — 501838 HCHG SUTURE GENERAL: Performed by: ORTHOPAEDIC SURGERY

## 2021-09-09 PROCEDURE — A6454 SELF-ADHER BAND W>=3" <5"/YD: HCPCS | Performed by: ORTHOPAEDIC SURGERY

## 2021-09-09 PROCEDURE — C9803 HOPD COVID-19 SPEC COLLECT: HCPCS

## 2021-09-09 PROCEDURE — 160002 HCHG RECOVERY MINUTES (STAT): Performed by: ORTHOPAEDIC SURGERY

## 2021-09-09 PROCEDURE — 160035 HCHG PACU - 1ST 60 MINS PHASE I: Performed by: ORTHOPAEDIC SURGERY

## 2021-09-09 PROCEDURE — 160025 RECOVERY II MINUTES (STATS): Performed by: ORTHOPAEDIC SURGERY

## 2021-09-09 PROCEDURE — 160029 HCHG SURGERY MINUTES - 1ST 30 MINS LEVEL 4: Performed by: ORTHOPAEDIC SURGERY

## 2021-09-09 PROCEDURE — A9270 NON-COVERED ITEM OR SERVICE: HCPCS | Performed by: ANESTHESIOLOGY

## 2021-09-09 PROCEDURE — 77071 MNL APPL STRS JT RADIOGRAPHY: CPT | Performed by: ORTHOPAEDIC SURGERY

## 2021-09-09 PROCEDURE — 700105 HCHG RX REV CODE 258: Performed by: ANESTHESIOLOGY

## 2021-09-09 PROCEDURE — 73610 X-RAY EXAM OF ANKLE: CPT | Mod: RT

## 2021-09-09 PROCEDURE — 160046 HCHG PACU - 1ST 60 MINS PHASE II: Performed by: ORTHOPAEDIC SURGERY

## 2021-09-09 PROCEDURE — 700102 HCHG RX REV CODE 250 W/ 637 OVERRIDE(OP): Performed by: ANESTHESIOLOGY

## 2021-09-09 PROCEDURE — 700101 HCHG RX REV CODE 250: Performed by: ANESTHESIOLOGY

## 2021-09-09 PROCEDURE — 87426 SARSCOV CORONAVIRUS AG IA: CPT

## 2021-09-09 PROCEDURE — 160048 HCHG OR STATISTICAL LEVEL 1-5: Performed by: ORTHOPAEDIC SURGERY

## 2021-09-09 PROCEDURE — 500881 HCHG PACK, EXTREMITY: Performed by: ORTHOPAEDIC SURGERY

## 2021-09-09 PROCEDURE — 700105 HCHG RX REV CODE 258: Performed by: ORTHOPAEDIC SURGERY

## 2021-09-09 DEVICE — SCREW 2.5 MM LOCKING TI X 10MM LONG (6TX8=48): Type: IMPLANTABLE DEVICE | Site: ANKLE | Status: FUNCTIONAL

## 2021-09-09 DEVICE — SCREW 3.5 MM NON-LOCKING TI X 14MM LONG (6TX8+2TX5=58): Type: IMPLANTABLE DEVICE | Site: ANKLE | Status: FUNCTIONAL

## 2021-09-09 DEVICE — WIRE 1.25MMX150MM K-WIRE (10 PACK) (6TX10=60): Type: IMPLANTABLE DEVICE | Site: ANKLE | Status: FUNCTIONAL

## 2021-09-09 DEVICE — SCREW 2.5 MM LOCKING TI X 16MM LONG (6TX8=48): Type: IMPLANTABLE DEVICE | Site: ANKLE | Status: FUNCTIONAL

## 2021-09-09 DEVICE — SCREW 3.5 MM NON-LOCKING TI X 20MM LONG (6TX8=48): Type: IMPLANTABLE DEVICE | Site: ANKLE | Status: FUNCTIONAL

## 2021-09-09 DEVICE — SCREW 3.5 MM NON-LOCKING TI X 12MM LONG (6TX8+2TX5=58): Type: IMPLANTABLE DEVICE | Site: ANKLE | Status: FUNCTIONAL

## 2021-09-09 RX ORDER — DEXAMETHASONE SODIUM PHOSPHATE 4 MG/ML
INJECTION, SOLUTION INTRA-ARTICULAR; INTRALESIONAL; INTRAMUSCULAR; INTRAVENOUS; SOFT TISSUE PRN
Status: DISCONTINUED | OUTPATIENT
Start: 2021-09-09 | End: 2021-09-09 | Stop reason: SURG

## 2021-09-09 RX ORDER — OXYCODONE HCL 5 MG/5 ML
10 SOLUTION, ORAL ORAL
Status: COMPLETED | OUTPATIENT
Start: 2021-09-09 | End: 2021-09-09

## 2021-09-09 RX ORDER — SODIUM CHLORIDE, SODIUM LACTATE, POTASSIUM CHLORIDE, CALCIUM CHLORIDE 600; 310; 30; 20 MG/100ML; MG/100ML; MG/100ML; MG/100ML
INJECTION, SOLUTION INTRAVENOUS CONTINUOUS
Status: ACTIVE | OUTPATIENT
Start: 2021-09-09 | End: 2021-09-09

## 2021-09-09 RX ORDER — DIPHENHYDRAMINE HYDROCHLORIDE 50 MG/ML
12.5 INJECTION INTRAMUSCULAR; INTRAVENOUS
Status: DISCONTINUED | OUTPATIENT
Start: 2021-09-09 | End: 2021-09-09 | Stop reason: HOSPADM

## 2021-09-09 RX ORDER — SODIUM CHLORIDE, SODIUM LACTATE, POTASSIUM CHLORIDE, CALCIUM CHLORIDE 600; 310; 30; 20 MG/100ML; MG/100ML; MG/100ML; MG/100ML
INJECTION, SOLUTION INTRAVENOUS
Status: DISCONTINUED | OUTPATIENT
Start: 2021-09-09 | End: 2021-09-09 | Stop reason: SURG

## 2021-09-09 RX ORDER — OXYCODONE AND ACETAMINOPHEN 10; 325 MG/1; MG/1
1-2 TABLET ORAL EVERY 4 HOURS PRN
Qty: 15 TABLET | Refills: 0 | Status: SHIPPED | OUTPATIENT
Start: 2021-09-09 | End: 2021-09-11

## 2021-09-09 RX ORDER — LIDOCAINE HYDROCHLORIDE 20 MG/ML
INJECTION, SOLUTION EPIDURAL; INFILTRATION; INTRACAUDAL; PERINEURAL PRN
Status: DISCONTINUED | OUTPATIENT
Start: 2021-09-09 | End: 2021-09-09 | Stop reason: SURG

## 2021-09-09 RX ORDER — SODIUM CHLORIDE 9 MG/ML
500 INJECTION, SOLUTION INTRAVENOUS
Status: DISCONTINUED | OUTPATIENT
Start: 2021-09-09 | End: 2021-09-09 | Stop reason: HOSPADM

## 2021-09-09 RX ORDER — ROPIVACAINE HYDROCHLORIDE 5 MG/ML
INJECTION, SOLUTION EPIDURAL; INFILTRATION; PERINEURAL
Status: DISCONTINUED | OUTPATIENT
Start: 2021-09-09 | End: 2021-09-09 | Stop reason: HOSPADM

## 2021-09-09 RX ORDER — ONDANSETRON 2 MG/ML
INJECTION INTRAMUSCULAR; INTRAVENOUS PRN
Status: DISCONTINUED | OUTPATIENT
Start: 2021-09-09 | End: 2021-09-09 | Stop reason: SURG

## 2021-09-09 RX ORDER — ONDANSETRON 2 MG/ML
4 INJECTION INTRAMUSCULAR; INTRAVENOUS
Status: DISCONTINUED | OUTPATIENT
Start: 2021-09-09 | End: 2021-09-09 | Stop reason: HOSPADM

## 2021-09-09 RX ORDER — HYDRALAZINE HYDROCHLORIDE 20 MG/ML
5 INJECTION INTRAMUSCULAR; INTRAVENOUS
Status: DISCONTINUED | OUTPATIENT
Start: 2021-09-09 | End: 2021-09-09 | Stop reason: HOSPADM

## 2021-09-09 RX ORDER — HYDROMORPHONE HYDROCHLORIDE 1 MG/ML
0.1 INJECTION, SOLUTION INTRAMUSCULAR; INTRAVENOUS; SUBCUTANEOUS
Status: DISCONTINUED | OUTPATIENT
Start: 2021-09-09 | End: 2021-09-09 | Stop reason: HOSPADM

## 2021-09-09 RX ORDER — MEPERIDINE HYDROCHLORIDE 25 MG/ML
6.25 INJECTION INTRAMUSCULAR; INTRAVENOUS; SUBCUTANEOUS
Status: DISCONTINUED | OUTPATIENT
Start: 2021-09-09 | End: 2021-09-09 | Stop reason: HOSPADM

## 2021-09-09 RX ORDER — LABETALOL HYDROCHLORIDE 5 MG/ML
5 INJECTION, SOLUTION INTRAVENOUS
Status: DISCONTINUED | OUTPATIENT
Start: 2021-09-09 | End: 2021-09-09 | Stop reason: HOSPADM

## 2021-09-09 RX ORDER — HYDROMORPHONE HYDROCHLORIDE 2 MG/ML
INJECTION, SOLUTION INTRAMUSCULAR; INTRAVENOUS; SUBCUTANEOUS PRN
Status: DISCONTINUED | OUTPATIENT
Start: 2021-09-09 | End: 2021-09-09 | Stop reason: SURG

## 2021-09-09 RX ORDER — HALOPERIDOL 5 MG/ML
1 INJECTION INTRAMUSCULAR
Status: DISCONTINUED | OUTPATIENT
Start: 2021-09-09 | End: 2021-09-09 | Stop reason: HOSPADM

## 2021-09-09 RX ORDER — HYDROMORPHONE HYDROCHLORIDE 1 MG/ML
0.4 INJECTION, SOLUTION INTRAMUSCULAR; INTRAVENOUS; SUBCUTANEOUS
Status: DISCONTINUED | OUTPATIENT
Start: 2021-09-09 | End: 2021-09-09 | Stop reason: HOSPADM

## 2021-09-09 RX ORDER — PHENYLEPHRINE HCL IN 0.9% NACL 0.5 MG/5ML
SYRINGE (ML) INTRAVENOUS PRN
Status: DISCONTINUED | OUTPATIENT
Start: 2021-09-09 | End: 2021-09-09 | Stop reason: SURG

## 2021-09-09 RX ORDER — LORAZEPAM 2 MG/ML
0.5 INJECTION INTRAMUSCULAR
Status: DISCONTINUED | OUTPATIENT
Start: 2021-09-09 | End: 2021-09-09 | Stop reason: HOSPADM

## 2021-09-09 RX ORDER — SODIUM CHLORIDE, SODIUM LACTATE, POTASSIUM CHLORIDE, CALCIUM CHLORIDE 600; 310; 30; 20 MG/100ML; MG/100ML; MG/100ML; MG/100ML
INJECTION, SOLUTION INTRAVENOUS CONTINUOUS
Status: DISCONTINUED | OUTPATIENT
Start: 2021-09-09 | End: 2021-09-09 | Stop reason: HOSPADM

## 2021-09-09 RX ORDER — METOPROLOL TARTRATE 1 MG/ML
1 INJECTION, SOLUTION INTRAVENOUS
Status: DISCONTINUED | OUTPATIENT
Start: 2021-09-09 | End: 2021-09-09 | Stop reason: HOSPADM

## 2021-09-09 RX ORDER — HYDROMORPHONE HYDROCHLORIDE 1 MG/ML
0.2 INJECTION, SOLUTION INTRAMUSCULAR; INTRAVENOUS; SUBCUTANEOUS
Status: DISCONTINUED | OUTPATIENT
Start: 2021-09-09 | End: 2021-09-09 | Stop reason: HOSPADM

## 2021-09-09 RX ORDER — OXYCODONE HCL 5 MG/5 ML
5 SOLUTION, ORAL ORAL
Status: COMPLETED | OUTPATIENT
Start: 2021-09-09 | End: 2021-09-09

## 2021-09-09 RX ORDER — KETOROLAC TROMETHAMINE 30 MG/ML
30 INJECTION, SOLUTION INTRAMUSCULAR; INTRAVENOUS ONCE
Status: COMPLETED | OUTPATIENT
Start: 2021-09-09 | End: 2021-09-09

## 2021-09-09 RX ORDER — CEFAZOLIN SODIUM 1 G/3ML
INJECTION, POWDER, FOR SOLUTION INTRAMUSCULAR; INTRAVENOUS PRN
Status: DISCONTINUED | OUTPATIENT
Start: 2021-09-09 | End: 2021-09-09 | Stop reason: SURG

## 2021-09-09 RX ADMIN — CEFAZOLIN 2 G: 330 INJECTION, POWDER, FOR SOLUTION INTRAMUSCULAR; INTRAVENOUS at 12:56

## 2021-09-09 RX ADMIN — LIDOCAINE HYDROCHLORIDE 40 MG: 20 INJECTION, SOLUTION EPIDURAL; INFILTRATION; INTRACAUDAL at 12:46

## 2021-09-09 RX ADMIN — Medication 100 MCG: at 12:44

## 2021-09-09 RX ADMIN — HYDROMORPHONE HYDROCHLORIDE 0.4 MG: 2 INJECTION, SOLUTION INTRAMUSCULAR; INTRAVENOUS; SUBCUTANEOUS at 12:58

## 2021-09-09 RX ADMIN — FENTANYL CITRATE 50 MCG: 50 INJECTION, SOLUTION INTRAMUSCULAR; INTRAVENOUS at 13:54

## 2021-09-09 RX ADMIN — OXYCODONE HYDROCHLORIDE 10 MG: 5 SOLUTION ORAL at 13:54

## 2021-09-09 RX ADMIN — DEXAMETHASONE SODIUM PHOSPHATE 4 MG: 4 INJECTION, SOLUTION INTRA-ARTICULAR; INTRALESIONAL; INTRAMUSCULAR; INTRAVENOUS; SOFT TISSUE at 12:53

## 2021-09-09 RX ADMIN — HYDROMORPHONE HYDROCHLORIDE 0.2 MG: 2 INJECTION, SOLUTION INTRAMUSCULAR; INTRAVENOUS; SUBCUTANEOUS at 13:04

## 2021-09-09 RX ADMIN — SODIUM CHLORIDE, POTASSIUM CHLORIDE, SODIUM LACTATE AND CALCIUM CHLORIDE: 600; 310; 30; 20 INJECTION, SOLUTION INTRAVENOUS at 11:59

## 2021-09-09 RX ADMIN — PROPOFOL 50 MG: 10 INJECTION, EMULSION INTRAVENOUS at 12:46

## 2021-09-09 RX ADMIN — FENTANYL CITRATE 50 MCG: 50 INJECTION, SOLUTION INTRAMUSCULAR; INTRAVENOUS at 14:42

## 2021-09-09 RX ADMIN — SODIUM CHLORIDE, POTASSIUM CHLORIDE, SODIUM LACTATE AND CALCIUM CHLORIDE: 600; 310; 30; 20 INJECTION, SOLUTION INTRAVENOUS at 12:40

## 2021-09-09 RX ADMIN — FENTANYL CITRATE 100 MCG: 50 INJECTION, SOLUTION INTRAMUSCULAR; INTRAVENOUS at 12:44

## 2021-09-09 RX ADMIN — ONDANSETRON 4 MG: 2 INJECTION INTRAMUSCULAR; INTRAVENOUS at 13:28

## 2021-09-09 RX ADMIN — PROPOFOL 150 MG: 10 INJECTION, EMULSION INTRAVENOUS at 12:44

## 2021-09-09 RX ADMIN — KETOROLAC TROMETHAMINE 30 MG: 30 INJECTION, SOLUTION INTRAMUSCULAR at 14:22

## 2021-09-09 RX ADMIN — LIDOCAINE HYDROCHLORIDE 0.5 ML: 10 INJECTION, SOLUTION EPIDURAL; INFILTRATION; INTRACAUDAL at 11:59

## 2021-09-09 ASSESSMENT — PAIN DESCRIPTION - PAIN TYPE
TYPE: SURGICAL PAIN
TYPE: ACUTE PAIN

## 2021-09-09 ASSESSMENT — FIBROSIS 4 INDEX: FIB4 SCORE: 0.79

## 2021-09-09 NOTE — LETTER
[facility]  [address] [place] [address]  [phone]  Patient Name: Noelle Campoverde  Surgeon Name: Alexander Castillo M.D.  [facility] [facility])  Surgery Date: 9/9/2021    SURGERY IS AT 1:00PM. PLEASE BE SURE TO CHECK IN BY 11:00AM    If you will not be at one of the below numbers please call/text the surgery scheduler at [phone]  Cell Phone: [phone]    BEFORE YOUR SURGERY  Pre Registration and/or Lab Work must be done within and no earlier than 28 days prior to your surgery date. Please call [phone] for an appointment as soon as possible.    Please refrain from smoking any substance after midnight prior to surgery. Smoking may interfere with the anesthetic and frequently produces nausea during the recovery period.    Continue taking all lifesaving medications. Including the morning of your surgery with small sip of water.    Please read the MEDICATION INSTRUCTIONS below completely.    DAY OF YOUR SURGERY  Nothing to eat or drink after midnight     Please arrive at the hospital/surgery center at the check-in time provided.     An adult will need to bring you and take you home after your surgery.     AFTER YOUR SURGERY  Post op Appointment:   Date: 9/22/2021   Time: 1:30PM   With: DR CASTILLO   Location: [address] [facility] [phone]            TIME OFF WORK  FMLA or Disability forms can be faxed directly to: [phone] or you may drop them off at [address] [facility] [phone]. Our office charges a $35.00 fee per form. Forms will be completed within 10 business days of drop off and payment received. For the status of your forms you may contact our disability office directly [phone].    MEDICATION INSTRUCTIONS  The following medications should be stopped a minimum of 10 days prior to surgery:  All over the counter, Supplements & Herbal medications    Anorectics: Phentermine (Adipex-P,  Lomaira and Suprenza), Phentermine-topiramate (Qsymia), Bupropion-naltrexone (Contrave)    Opiod Partial Agonists/Opioid Antagonists: Buprenorphine (Subocone, Belbuca, Butrans, Probuphine Implant, Sublocade), Naltrexone (ReVia, Vivitrol), Naloxone    Amphetamines: Dextroamphetamine/Amphetamine (Adderall, Mydayis), Methylphenidate Hydrochloride (Concerta, Metadate, Methylin, Ritalin)    The following medications should be stopped 5 days prior to surgery:  Blood Thinners: Any Aspirin, Aspirin products, anti-inflammatories such as ibuprofen and any blood thinners such as Coumadin and Plavix. Please consult your prescribing physician if you are on life saving blood thinners, in regards to when to stop medications prior to surgery.     The following medications should be stopped a minimum of 3 days prior to surgery:  PDE-5 inhibitors: Sildenafil (Viagra), Tadalafil (Cialis), Vardenafil (Levitra), Avanafil (Stendra)    MAO Inhibitors: Rasagiline (Azilect), Selegiline (Eldepryl, Emsam, Selapar), Isocarboxazid (Marplan), Phenelzine (Nardil)

## 2021-09-09 NOTE — OP REPORT
Date of the operation 9/9/2021    Preoperative diagnosis distal fibular fracture postop diagnosis same    Surgeon Jonathan Hazel    Complications none    Tourniquet time 1 hour    Blood loss minimal    Implants utilized OIC fibular plate and screws    Operation performed #1 open reduction internal fixation fibular fracture #2 stress radiography under anesthesia with utilization of fluoroscopy    Operation    Patient brought the operating room awake alert placed the operative table supine position the right lower extremity she is prepped and draped normal sterile routine fashion timeout appropriate extremity identified.    The tourniquet was utilized her incision over the fibula subcutaneous tissue dissected down the fracture interface using small curette in an reduction clamps were able to anatomically reduce the fracture we held it out to length with a 1.2 mm K wire into the talus.    This point we placed a posterior to anterior lag screw and then neutralized the construct with a 5 hole OIC locking plate.  6 cortices above and for 2.7 mm locking screws distal for an anatomic reduction.    This point we performed stress radiography using utilizing fluoroscopy lateral subluxation anterior posterior were unable to open the mortise I actually explored the syndesmosis which was completely intact.    Final x-rays demonstrate X alignment of the reduction and the fixation we are satisfied with the screw length and position the closure began.    Copiously irrigated with antibiotic irrigant delayed Betadine solution then we closed the is deep layers with 0 Vicryl and then a 2 oh subcu and then 3-0 nylon we injected with local sterile compression dressing and the patient was taken to the care of in stable condition no intraoperative postop complications patient tolerated procedure well and dictation

## 2021-09-09 NOTE — OR SURGEON
Immediate Post OP Note    PreOp Diagnosis: fibula fracture      PostOp Diagnosis: same      Procedure(s):  ORIF, ANKLE - Wound Class: Clean  Intraoperative stress X-rays    Surgeon(s):  Alexander Castillo M.D.  assist  Giana Hazel    Anesthesiologist/Type of Anesthesia:  Anesthesiologist: Magali Darby M.D./General    Surgical Staff:  Circulator: Sabrina Cantu, R.N.  Scrub Person: Ilya Rivera  Radiology Technologist: Teodora Lux    Specimens removed if any:  * No specimens in log *    Estimated Blood Loss: minimal    Findings: as described    Complications: none        9/9/2021 1:37 PM Alexander Castillo M.D.

## 2021-09-09 NOTE — ANESTHESIA PREPROCEDURE EVALUATION
Relevant Problems   No relevant active problems       Physical Exam    Airway   Mallampati: II  TM distance: >3 FB  Neck ROM: full       Cardiovascular - normal exam  Rhythm: regular  Rate: normal  (-) murmur     Dental - normal exam           Pulmonary - normal exam  Breath sounds clear to auscultation     Abdominal    Neurological - normal exam                 Anesthesia Plan    ASA 2       Plan - general       Airway plan will be LMA    (50 yo with new renal mass and ankle fracture - scheduled for ORIF ankle.  Surgeon to place LA around incision, )      Induction: intravenous    Postoperative Plan: Postoperative administration of opioids is intended.        Informed Consent:    Anesthetic plan and risks discussed with patient.    Use of blood products discussed with: patient whom consented to blood products.

## 2021-09-09 NOTE — OR NURSING
Received from pacu at 1523 and home at 1546.  Vss. Alert and oriented times four.  Cms checks good.  Taking po well. Ice pack in place.  Verbalized understanding of dc instructions and home.

## 2021-09-09 NOTE — LETTER
[facility]  [address] [place] [address]  [phone]    Patient Name: Noelle Campoverde  Surgeon Name: Surgeon(s):  RYAN Aldana  Surgery Facility: {[facility] OR Locations:44976}  Surgery Date: 9/9/2021    The time of your surgery is not final and may change up to and until the day of your surgery. You will be contacted 24-48 hours prior to your surgery date with your check-in and surgery time.    If you will not be at one of the below numbers please call/text the surgery scheduler at {SurgerySchedulerNumber:[id number]}  Cell Phone: [phone]    BEFORE YOUR SURGERY  Pre Registration and/or Lab Work must be done within and no earlier than 28 days prior to your surgery date. Please call {Surgery Scheduler Phone Numbers:68527} for an appointment as soon as possible.    Pre op Appointment:   Date: ***   Time: ***   Provider: ***   Location: {[facility] Pre/Post-op appointment locations:45544}  Instructions: Bring a list of all medications you are taking including the dosing and frequency.    {BEFORE YOUR SURGERY (Optional):50276}    Please refrain from smoking any substance after midnight prior to surgery. Smoking may interfere with the anesthetic and frequently produces nausea during the recovery period.    Continue taking all lifesaving medications. Including the morning of your surgery with small sip of water.    Please read the MEDICATION INSTRUCTIONS below completely.    DAY OF YOUR SURGERY  Nothing to eat or drink after midnight     Please arrive at the hospital/surgery center at the check-in time provided.     An adult will need to bring you and take you home after your surgery.     AFTER YOUR SURGERY  Post op Appointment:   Date: ***   Time: ***   With: ***   Location: {[facility] Pre/Post-op appointment locations:13066}    {AFTER YOUR SURGERY (Optional):48634}    TIME OFF WORK  FMLA or Disability forms can be faxed directly to: [phone] or you may drop them off  at {[facility] Drop [facility] paperwork at:02989}. Our office charges a $20.00 fee per form. Forms will be completed within 10 business days of drop off and payment received. For the status of your forms you may contact our disability office directly [phone].    MEDICATION INSTRUCTIONS  The following medications should be stopped a minimum of 10 days prior to surgery:  All over the counter, Supplements & Herbal medications    Anorectics: Phentermine (Adipex-P, Lomaira and Suprenza), Phentermine-topiramate (Qsymia), Bupropion-naltrexone (Contrave)    Opiod Partial Agonists/Opioid Antagonists: Buprenorphine (Subocone, Belbuca, Butrans, Probuphine Implant, Sublocade), Naltrexone (ReVia, Vivitrol), Naloxone    Amphetamines: Dextroamphetamine/Amphetamine (Adderall, Mydayis), Methylphenidate Hydrochloride (Concerta, Metadate, Methylin, Ritalin)    The following medications should be stopped 5 days prior to surgery:  Blood Thinners: Any Aspirin, Aspirin products, anti-inflammatories such as ibuprofen and any blood thinners such as Coumadin and Plavix. Please consult your prescribing physician if you are on life saving blood thinners, in regards to when to stop medications prior to surgery.     The following medications should be stopped a minimum of 3 days prior to surgery:  PDE-5 inhibitors: Sildenafil (Viagra), Tadalafil (Cialis), Vardenafil (Levitra), Avanafil (Stendra)    MAO Inhibitors: Rasagiline (Azilect), Selegiline (Eldepryl, Emsam, Selapar), Isocarboxazid (Marplan), Phenelzine (Nardil)

## 2021-09-09 NOTE — ANESTHESIA PROCEDURE NOTES
Airway    Date/Time: 9/9/2021 12:48 PM  Performed by: Magali Darby M.D.  Authorized by: Magali Darby M.D.     Location:  OR  Urgency:  Elective  Indications for Airway Management:  Anesthesia      Spontaneous Ventilation: absent    Sedation Level:  Deep  Preoxygenated: Yes    Final Airway Type:  Supraglottic airway  Final Supraglottic Airway:  Standard LMA    SGA Size:  4  Number of Attempts at Approach:  1  Ventilation Between Attempts:  BVM  Number of Other Approaches Attempted:  2   1st attempt too light - successful on 2nd attempt when anesthesia deepened

## 2021-09-09 NOTE — DISCHARGE INSTRUCTIONS
Instrucciones Para [place]  (Home Care Instructions)    ACTIVIDAD: Descanse y tome todo con mucha calma las primeras 24 horas después de ariza cirugía.  Ck persona adulta responsable debe permanecer con usted sandie ignacio periodo de tiempo.  Es normal sentirse sonoliento o sonolienta sandie esas primeras horas.  Le recomendamos que no bimal nada que requiera equilibrio, ron decisiones a mucha coordinación de ariza parte.    NO BIMAL ESTO PURANTE LAS PRIMERAS 24 HORAS:   Manejar o conducir algún vehiculo, operar maquinarias o utilizar electrodomesticos.   Beber cerveza o algún otro tipo de bebida alcohólica.   Ron decisiones importantes o firmar documentos legales.    INSTRUCCIONES ESPECIALES:    DIETA: Para evitar las nauseas, prosiga despacito con ariza dieta a medida que pueda ir tolerándola mejor, evite comidas muy condimentadas o grasosas sandie ignacio primer día.  Vaya agregando comidas más substanciadas a ariza dieta a medida que asi lo indique ariza médica.  Los bebés pueden beber leche preparada o formula, ásl janelle también leche del seno de la madre a medida que vayan teniendo hambre.  SIGA AGREGANDO LIQUIDOS Y COMIDAS CON FIBRA PARA EVITAR ESTREÑIMIENTO.    JANELLE BAÑARSE Y CAMBIAR LOS VENDAJES DE LA CIRUGIA:     MEDICAMENTOS/MEDICINAS:  Vuelva a ron zachery medicamentos diarios.  [place] los medicamentos que se le prescribe con un poco de comida.  Si no le prescribe ningún tipo de medicamento, entonces puede ron medicinas para el dolor que no contienen aspirina, si las necesita.  LAS MEDICINAS PARA EL DOLOR PUEDEN ESTREÑIRLE MUCHO.  [place] un suavizante para el excremento o materia fecal (stool softener) o un laxativo janelle por ejemplo: senokot, pericolase, o leche de magnesia, si lo necesita.    La prescripción la administro .  La ultima sosis de medicina para el dolor fue administrada  .     Se debe hacer ck consulta medica con el doctor en  , [place] para hacer la pedro pablo.    Usted debe LIAMAR A ARIZA MEDICO si tiene los siguientes  síntomas:   -   Monisha fiebre más kassidy de 101 grados Fahrenheit.   -   Un dolor incesante aún con los medicamentos, o nauseas y vómito persistente.   -   Un sangrado excesivo (lorri que traspasa los vendajes o gasas) o algúln tipo de drenaje inesperado que proviene de la henda.     -   Un color crawford exagerado o hinchazón alrededor del área en donde se le hizo incisión o missy, o un drenaje de pus o con olor agnieszka proveniente de la henda.   -    La inhabilidad de orinar o vaciar ariza vejiga en 8 horas.   -    Problemas con a respiración o brown en el pecho.    Usted debe llamar al 911 si se presentan problemas con el dolor al respirar o el pecho.  Si no se puede ponnoer en comunicación con un medica o con el centro de cirugía, usted debe ir a la estación de emergencia (emergency room) más cercana o a un centro de atención de urgencia (urgent care center).  El teléfono del medico es:     LOS SÍNTOMAS DE UN LEVE RESFRIO SON MUY NORMALES.  ADEMÁS USTED PUEDE LLEGAR A SENTIR BROWN GENERALES DE MÚSCULOS, IRRITACIÓN EN LA GARGANTA, BROWN DE BRADFORD Y/O UN POCO DE NAUSEAS.    Sie tiene alguna pregunta, llame a ariza médico.  Si ariza médico no se encuentra disponible, por favor llame al Centro de Cirugía at [phone].  el Centro está abierto de Lunes a Viernes desde las 7:00 de la manana hasta las 5:00 de la noche.  Usted también puede llamar al CENTRO DE LLAMADAS SOBRE LA ERIN o HEALTH HOTLINE.  Janki está abierto viente y cuatro horas por gina, siete batres por semana, allí podrá hablar con monisha enfermera.  Llame al [phone], o al número slime 2 [phone].    Mi firma a continuación indica que he recibido y entiendco estas instrucciones acera de los cuidados en la casa (Home Care Instructions)    Usted recibirá monisha encuesta en la correspondencia en las siguientes semanas y le pedimos que por favor tome un momento para completar ailyn encuesta y regresaría a hosotros.  Nuestro objetivó es brindarle un cuidado muy  fabian y par lo tanto apreciamos zachery coméntanos.  Muchas renu por elizabeth escogido el [facility].

## 2021-09-09 NOTE — ANESTHESIA POSTPROCEDURE EVALUATION
Patient: Noelle Campoverde    Procedure Summary     Date: 09/09/21 Room / Location: [place] [address] / SURGERY [facility]    Anesthesia Start: 1240 Anesthesia Stop: 1400    Procedure: ORIF, ANKLE (Right Ankle) Diagnosis:       Ankle fracture, lateral malleolus, closed      (right fibula fracture)    Surgeons: Alexander Castillo M.D. Responsible Provider: Magali Darby M.D.    Anesthesia Type: general ASA Status: 2          Final Anesthesia Type: general  Last vitals  BP   Blood Pressure: 136/77    Temp   36.4 °C (97.5 °F)    Pulse   78   Resp   14    SpO2   97 %      Anesthesia Post Evaluation    Patient location during evaluation: PACU  Patient participation: complete - patient participated  Level of consciousness: awake and alert    Airway patency: patent  Anesthetic complications: no  Cardiovascular status: hemodynamically stable  Respiratory status: acceptable  Hydration status: euvolemic    PONV: none          No complications documented.     Nurse Pain Score: 8 (NPRS)

## 2021-09-09 NOTE — OR NURSING
Patient AAOx4, recovering well post op. C/o right ankle pain 8/10 post op, down to 3/10 with oral and IV pain medication. VSS, afebrile. Right toes pink, warm, brisk cap refill. Splint/surgical dressing CDI. Tolerating sips of water and sprite without nausea.  updated on status and plan of care.

## 2021-09-09 NOTE — ANESTHESIA TIME REPORT
Anesthesia Start and Stop Event Times     Date Time Event    9/9/2021 1219 Ready for Procedure     1240 Anesthesia Start     1400 Anesthesia Stop        Responsible Staff  09/09/21    Name Role Begin End    Magali Darby M.D. Anesth 1240 1404        Preop Diagnosis (Free Text):  Pre-op Diagnosis     Ankle fracture, lateral malleolus, closed [S82.63XA]        Preop Diagnosis (Codes):  Diagnosis Information     Diagnosis Code(s): Ankle fracture, lateral malleolus, closed [S82.63XA]        Post op Diagnosis  Ankle fracture      Premium Reason  Non-Premium    Comments:

## 2021-09-09 NOTE — PROGRESS NOTES
Orthopaedics  Patient seen and examined  ORIF ankle  RBA all reviewed again  Answered all questions  Jonathan

## 2021-09-11 ENCOUNTER — APPOINTMENT (OUTPATIENT)
Dept: RADIOLOGY | Facility: MEDICAL CENTER | Age: 51
End: 2021-09-11
Attending: EMERGENCY MEDICINE
Payer: COMMERCIAL

## 2021-09-11 ENCOUNTER — HOSPITAL ENCOUNTER (EMERGENCY)
Facility: MEDICAL CENTER | Age: 51
End: 2021-09-11
Attending: EMERGENCY MEDICINE
Payer: COMMERCIAL

## 2021-09-11 VITALS
DIASTOLIC BLOOD PRESSURE: 85 MMHG | HEIGHT: 62 IN | TEMPERATURE: 97.7 F | RESPIRATION RATE: 17 BRPM | BODY MASS INDEX: 28.71 KG/M2 | OXYGEN SATURATION: 92 % | SYSTOLIC BLOOD PRESSURE: 141 MMHG | WEIGHT: 156 LBS | HEART RATE: 74 BPM

## 2021-09-11 DIAGNOSIS — N28.89 RENAL MASS: ICD-10-CM

## 2021-09-11 DIAGNOSIS — R31.0 GROSS HEMATURIA: ICD-10-CM

## 2021-09-11 LAB
ALBUMIN SERPL BCP-MCNC: 3.8 G/DL (ref 3.2–4.9)
ALBUMIN/GLOB SERPL: 1 G/DL
ALP SERPL-CCNC: 127 U/L (ref 30–99)
ALT SERPL-CCNC: 34 U/L (ref 2–50)
ANION GAP SERPL CALC-SCNC: 11 MMOL/L (ref 7–16)
APPEARANCE UR: CLEAR
AST SERPL-CCNC: 19 U/L (ref 12–45)
BACTERIA #/AREA URNS HPF: NEGATIVE /HPF
BASOPHILS # BLD AUTO: 1 % (ref 0–1.8)
BASOPHILS # BLD: 0.11 K/UL (ref 0–0.12)
BILIRUB SERPL-MCNC: 0.5 MG/DL (ref 0.1–1.5)
BILIRUB UR QL STRIP.AUTO: NEGATIVE
BUN SERPL-MCNC: 11 MG/DL (ref 8–22)
CALCIUM SERPL-MCNC: 8.8 MG/DL (ref 8.5–10.5)
CHLORIDE SERPL-SCNC: 105 MMOL/L (ref 96–112)
CO2 SERPL-SCNC: 23 MMOL/L (ref 20–33)
COLOR UR: ABNORMAL
CREAT SERPL-MCNC: 0.42 MG/DL (ref 0.5–1.4)
EOSINOPHIL # BLD AUTO: 0.21 K/UL (ref 0–0.51)
EOSINOPHIL NFR BLD: 1.9 % (ref 0–6.9)
EPI CELLS #/AREA URNS HPF: ABNORMAL /HPF
ERYTHROCYTE [DISTWIDTH] IN BLOOD BY AUTOMATED COUNT: 44.8 FL (ref 35.9–50)
GLOBULIN SER CALC-MCNC: 3.8 G/DL (ref 1.9–3.5)
GLUCOSE SERPL-MCNC: 90 MG/DL (ref 65–99)
GLUCOSE UR STRIP.AUTO-MCNC: NEGATIVE MG/DL
HCT VFR BLD AUTO: 45.4 % (ref 37–47)
HGB BLD-MCNC: 14.7 G/DL (ref 12–16)
HYALINE CASTS #/AREA URNS LPF: ABNORMAL /LPF
IMM GRANULOCYTES # BLD AUTO: 0.05 K/UL (ref 0–0.11)
IMM GRANULOCYTES NFR BLD AUTO: 0.4 % (ref 0–0.9)
KETONES UR STRIP.AUTO-MCNC: NEGATIVE MG/DL
LEUKOCYTE ESTERASE UR QL STRIP.AUTO: ABNORMAL
LIPASE SERPL-CCNC: 27 U/L (ref 11–82)
LYMPHOCYTES # BLD AUTO: 1.44 K/UL (ref 1–4.8)
LYMPHOCYTES NFR BLD: 12.7 % (ref 22–41)
MCH RBC QN AUTO: 25.9 PG (ref 27–33)
MCHC RBC AUTO-ENTMCNC: 32.4 G/DL (ref 33.6–35)
MCV RBC AUTO: 80.1 FL (ref 81.4–97.8)
MICRO URNS: ABNORMAL
MONOCYTES # BLD AUTO: 0.76 K/UL (ref 0–0.85)
MONOCYTES NFR BLD AUTO: 6.7 % (ref 0–13.4)
NEUTROPHILS # BLD AUTO: 8.75 K/UL (ref 2–7.15)
NEUTROPHILS NFR BLD: 77.3 % (ref 44–72)
NITRITE UR QL STRIP.AUTO: NEGATIVE
NRBC # BLD AUTO: 0 K/UL
NRBC BLD-RTO: 0 /100 WBC
PH UR STRIP.AUTO: 5.5 [PH] (ref 5–8)
PLATELET # BLD AUTO: 281 K/UL (ref 164–446)
PMV BLD AUTO: 10.6 FL (ref 9–12.9)
POTASSIUM SERPL-SCNC: 3.8 MMOL/L (ref 3.6–5.5)
PROT SERPL-MCNC: 7.6 G/DL (ref 6–8.2)
PROT UR QL STRIP: NEGATIVE MG/DL
RBC # BLD AUTO: 5.67 M/UL (ref 4.2–5.4)
RBC # URNS HPF: >150 /HPF
RBC UR QL AUTO: ABNORMAL
SODIUM SERPL-SCNC: 139 MMOL/L (ref 135–145)
SP GR UR STRIP.AUTO: 1.01
UROBILINOGEN UR STRIP.AUTO-MCNC: 0.2 MG/DL
WBC # BLD AUTO: 11.3 K/UL (ref 4.8–10.8)
WBC #/AREA URNS HPF: ABNORMAL /HPF

## 2021-09-11 PROCEDURE — 99285 EMERGENCY DEPT VISIT HI MDM: CPT

## 2021-09-11 PROCEDURE — 83690 ASSAY OF LIPASE: CPT

## 2021-09-11 PROCEDURE — 700111 HCHG RX REV CODE 636 W/ 250 OVERRIDE (IP): Performed by: EMERGENCY MEDICINE

## 2021-09-11 PROCEDURE — 81001 URINALYSIS AUTO W/SCOPE: CPT

## 2021-09-11 PROCEDURE — 85025 COMPLETE CBC W/AUTO DIFF WBC: CPT

## 2021-09-11 PROCEDURE — 74178 CT ABD&PLV WO CNTR FLWD CNTR: CPT

## 2021-09-11 PROCEDURE — 96375 TX/PRO/DX INJ NEW DRUG ADDON: CPT

## 2021-09-11 PROCEDURE — 96374 THER/PROPH/DIAG INJ IV PUSH: CPT

## 2021-09-11 PROCEDURE — 80053 COMPREHEN METABOLIC PANEL: CPT

## 2021-09-11 PROCEDURE — 700117 HCHG RX CONTRAST REV CODE 255: Performed by: EMERGENCY MEDICINE

## 2021-09-11 PROCEDURE — 74176 CT ABD & PELVIS W/O CONTRAST: CPT

## 2021-09-11 PROCEDURE — A9270 NON-COVERED ITEM OR SERVICE: HCPCS | Performed by: EMERGENCY MEDICINE

## 2021-09-11 PROCEDURE — 700102 HCHG RX REV CODE 250 W/ 637 OVERRIDE(OP): Performed by: EMERGENCY MEDICINE

## 2021-09-11 RX ORDER — OXYCODONE HYDROCHLORIDE 5 MG/1
5 TABLET ORAL ONCE
Status: COMPLETED | OUTPATIENT
Start: 2021-09-11 | End: 2021-09-11

## 2021-09-11 RX ORDER — ONDANSETRON 2 MG/ML
4 INJECTION INTRAMUSCULAR; INTRAVENOUS ONCE
Status: COMPLETED | OUTPATIENT
Start: 2021-09-11 | End: 2021-09-11

## 2021-09-11 RX ORDER — HYDROMORPHONE HYDROCHLORIDE 1 MG/ML
0.5 INJECTION, SOLUTION INTRAMUSCULAR; INTRAVENOUS; SUBCUTANEOUS
Status: CANCELLED | OUTPATIENT
Start: 2021-09-11

## 2021-09-11 RX ORDER — KETOROLAC TROMETHAMINE 30 MG/ML
30 INJECTION, SOLUTION INTRAMUSCULAR; INTRAVENOUS ONCE
Status: COMPLETED | OUTPATIENT
Start: 2021-09-11 | End: 2021-09-11

## 2021-09-11 RX ORDER — OXYCODONE HYDROCHLORIDE 5 MG/1
5 TABLET ORAL EVERY 4 HOURS PRN
Qty: 15 TABLET | Refills: 0 | Status: SHIPPED | OUTPATIENT
Start: 2021-09-11 | End: 2021-09-14

## 2021-09-11 RX ADMIN — KETOROLAC TROMETHAMINE 30 MG: 30 INJECTION, SOLUTION INTRAMUSCULAR; INTRAVENOUS at 10:09

## 2021-09-11 RX ADMIN — ONDANSETRON 4 MG: 2 INJECTION INTRAMUSCULAR; INTRAVENOUS at 10:09

## 2021-09-11 RX ADMIN — IOHEXOL 100 ML: 350 INJECTION, SOLUTION INTRAVENOUS at 11:17

## 2021-09-11 RX ADMIN — OXYCODONE 5 MG: 5 TABLET ORAL at 13:07

## 2021-09-11 ASSESSMENT — FIBROSIS 4 INDEX: FIB4 SCORE: 0.79

## 2021-09-11 NOTE — ED NOTES
Discharge instructions and follow up care discussed with patient. Patient given time to ask questions and verbalized understanding. PIV removed. Patient given 1 new prescription. Pt has scheduled f/u with urology this Wed. Patient AOx4 at discharge and ambulatory to lobby with steady gait to meet  who will drive her home.

## 2021-09-11 NOTE — ED NOTES
Pt arrives to room by wheelchair. Pt changed into gown, on monitor. Chart up for ERP. Call light in reach.

## 2021-09-11 NOTE — ED PROVIDER NOTES
"ED Provider Note    Scribed for Ole Blanco M.D. by Juventino Mayfield. 9/11/2021  9:39 AM    Primary care provider: Damaris Matias M.D.  Means of arrival: Walk-in  History obtained from: Patient  History limited by: None    CHIEF COMPLAINT  Chief Complaint   Patient presents with    Abdominal Pain     left upper [id number]    Blood in Urine     pt had ct scan showed \"right renal mass with carcinoma\"       AKI Campoverde is a 51 y.o. female who presents to the Emergency Department for evaluation of moderate left flank pain onset yesterday. She has associated nausea, hematuria, vomiting, and weakness in the upper and lower extremities. She denies any fever, or chest pain. She notes that walking greatly exacerbates her pain. She adds that she had a kidney stone a few weeks ago.    REVIEW OF SYSTEMS  Pertinent positives include left flank pain, nausea, hematuria, vomiting, and weakness in the upper and lower extremities. Pertinent negatives include no fever or chest pain.  All other systems reviewed and negative.    PAST MEDICAL HISTORY   has a past medical history of Chronic low back pain, Myopia, and Weight gain.    SURGICAL HISTORY   has a past surgical history that includes tubal ligation and ankle orif (Right, 9/9/2021).    SOCIAL HISTORY  Social History     Tobacco Use    Smoking status: Current Every Day Smoker    Smokeless tobacco: Never Used    Tobacco comment: 3 cigarettes/day   Substance Use Topics    Alcohol use: No    Drug use: No      Social History     Substance and Sexual Activity   Drug Use No       FAMILY HISTORY  Family History   Problem Relation Age of Onset    Osteoporosis Mother     Diabetes Father     Heart Disease Father        CURRENT MEDICATIONS  Home Medications       Reviewed by Cathy Zamora R.N. (Registered Nurse) on 09/11/21 at 8139  Med List Status: Complete     Medication Last Dose Status   ibuprofen (MOTRIN) 600 MG Tab 9/11/2021 Active              " "      ALLERGIES  Allergies   Allergen Reactions    Latex Rash     hives       PHYSICAL EXAM  VITAL SIGNS: /102   Pulse 73   Temp 36.4 °C (97.5 °F) (Temporal)   Resp 14   Ht 1.575 m (5' 2\")   Wt 70.8 kg (156 lb)   LMP 03/08/2021   SpO2 98%   BMI 28.53 kg/m²     Constitutional: Well developed, Well nourished, moderate distress, Non-toxic appearance. Patient complains of diffuse weakness throughout.  HENT: Normocephalic, Atraumatic, Bilateral external ears normal, Oropharynx moist, No oral exudates.   Eyes: PERRLA, EOMI, Conjunctiva normal, No discharge.   Neck: No tenderness, Supple, No stridor.   Lymphatic: No lymphadenopathy noted.   Cardiovascular: Normal heart rate, Normal rhythm.   Thorax & Lungs: Clear to auscultation bilaterally, No respiratory distress, No wheezing, No crackles.   Abdomen: Soft, No tenderness, No masses, No pulsatile masses.   Skin: Warm, Dry, No erythema, No rash.   Extremities:, No edema No cyanosis.   Musculoskeletal: Slight left CVA tenderness.  Intact distal pulses  Neurologic: Awake, alert. Moves all extremities spontaneously. Can raise her arms and legs against gravity. Has 3-4/5 muscle strength equally throughout.  Psychiatric: Affect normal, Judgment normal, Mood normal.    LABS  Results for orders placed or performed during the hospital encounter of 09/11/21   CBC WITH DIFFERENTIAL   Result Value Ref Range    WBC 11.3 (H) 4.8 - 10.8 K/uL    RBC 5.67 (H) 4.20 - 5.40 M/uL    Hemoglobin 14.7 12.0 - 16.0 g/dL    Hematocrit 45.4 37.0 - 47.0 %    MCV 80.1 (L) 81.4 - 97.8 fL    MCH 25.9 (L) 27.0 - 33.0 pg    MCHC 32.4 (L) 33.6 - 35.0 g/dL    RDW 44.8 35.9 - 50.0 fL    Platelet Count 281 164 - 446 K/uL    MPV 10.6 9.0 - 12.9 fL    Neutrophils-Polys 77.30 (H) 44.00 - 72.00 %    Lymphocytes 12.70 (L) 22.00 - 41.00 %    Monocytes 6.70 0.00 - 13.40 %    Eosinophils 1.90 0.00 - 6.90 %    Basophils 1.00 0.00 - 1.80 %    Immature Granulocytes 0.40 0.00 - 0.90 %    Nucleated RBC 0.00 " /100 WBC    Neutrophils (Absolute) 8.75 (H) 2.00 - 7.15 K/uL    Lymphs (Absolute) 1.44 1.00 - 4.80 K/uL    Monos (Absolute) 0.76 0.00 - 0.85 K/uL    Eos (Absolute) 0.21 0.00 - 0.51 K/uL    Baso (Absolute) 0.11 0.00 - 0.12 K/uL    Immature Granulocytes (abs) 0.05 0.00 - 0.11 K/uL    NRBC (Absolute) 0.00 K/uL   COMP METABOLIC PANEL   Result Value Ref Range    Sodium 139 135 - 145 mmol/L    Potassium 3.8 3.6 - 5.5 mmol/L    Chloride 105 96 - 112 mmol/L    Co2 23 20 - 33 mmol/L    Anion Gap 11.0 7.0 - 16.0    Glucose 90 65 - 99 mg/dL    Bun 11 8 - 22 mg/dL    Creatinine 0.42 (L) 0.50 - 1.40 mg/dL    Calcium 8.8 8.5 - 10.5 mg/dL    AST(SGOT) 19 12 - 45 U/L    ALT(SGPT) 34 2 - 50 U/L    Alkaline Phosphatase 127 (H) 30 - 99 U/L    Total Bilirubin 0.5 0.1 - 1.5 mg/dL    Albumin 3.8 3.2 - 4.9 g/dL    Total Protein 7.6 6.0 - 8.2 g/dL    Globulin 3.8 (H) 1.9 - 3.5 g/dL    A-G Ratio 1.0 g/dL   LIPASE   Result Value Ref Range    Lipase 27 11 - 82 U/L   URINALYSIS    Specimen: Urine   Result Value Ref Range    Color Red (A)     Character Clear     Specific Gravity 1.011 <1.035    Ph 5.5 5.0 - 8.0    Glucose Negative Negative mg/dL    Ketones Negative Negative mg/dL    Protein Negative Negative mg/dL    Bilirubin Negative Negative    Urobilinogen, Urine 0.2 Negative    Nitrite Negative Negative    Leukocyte Esterase Small (A) Negative    Occult Blood Large (A) Negative    Micro Urine Req Microscopic    ESTIMATED GFR   Result Value Ref Range    GFR If African American >60 >60 mL/min/1.73 m 2    GFR If Non African American >60 >60 mL/min/1.73 m 2   URINE MICROSCOPIC (W/UA)   Result Value Ref Range    WBC 5-10 (A) /hpf    RBC >150 (A) /hpf    Bacteria Negative None /hpf    Epithelial Cells Few /hpf    Hyaline Cast 0-2 /lpf        RADIOLOGY  CT-ABDOMEN & PELVIS UROGRAM   Final Result      1.  5.5 x 5.4 x 5.1 cm complex mass in the superior pole of the right kidney is most suggestive of a cystic renal cell carcinoma.   2.  No renal  stones or hydronephrosis.   3.  No evidence of metastatic disease in abdomen or pelvis.   4.  Hepatic steatosis.   5.  Colonic diverticulosis.            Bosniak classification:      CT-RENAL COLIC EVALUATION(A/P W/O)   Final Result      1.  No urolithiasis or hydronephrosis      2.  Colonic diverticulosis without evidence of diverticulitis      3.  Unchanged simple right renal cyst which does not have specific features requiring follow-up        The radiologist's interpretation of all radiological studies have been reviewed by me.      COURSE & MEDICAL DECISION MAKING  Pertinent Labs & Imaging studies reviewed. (See chart for details)    I reviewed the patient's medical records which showed the patient had ankle surgery 2 days ago.    8:30 AM - Ordered CBC with diff., CMP, Lipase and Urinalysis to evaluate her symptoms.    9:39 AM - Patient seen and examined at bedside. Patient will be treated with Toradol 30 mg, and Zofran 4 mg. Ordered CT-Renal colic evaluation w/o to evaluate her symptoms. The differential diagnoses include but are not limited to: Kidney stone, Renal mass, Electrolyte abnormality.    10:40 AM - Ordered CT-Abdomen and pelvis to evaluate her symptoms.    12:08 PM - Paged Urology.    12:21 PM - I discussed the patient's case and the above findings with Dr. Acuna (Urology) who advised that the patient have an outpatient follow up.    12:55 PM - Patient will be treated with Roxicodone 5 mg. I discussed my consult with Dr. Acuna and instructed her to follow up with him. Discussed discharge instructions and return precautions with the patient and they were cleared for discharge. Patient was given the opportunity to ask any further questions. She is comfortable with discharge at this time.      Decision Making:  Patient with left-sided flank pain and gross hematuria.  A stone study that did not show a stone, therefore I got a CT urogram showed a large mass on the right kidney.  Discussed case with  Dr. Acuna who will follow up with the patient as an outpatient, discussed the case with the patient, she is aware of the results, I will give the patient a prescription for some pain medicines, have the patient return with worsening symptoms.    I reviewed prescription monitoring program for patient's narcotic use before prescribing a scheduled drug.The patient will not drink alcohol nor drive with prescribed medications. The patient will return for new or worsening symptoms and is stable at the time of discharge.    The patient is referred to a primary physician for blood pressure management, diabetic screening, and for all other preventative health concerns.    In prescribing controlled substances to this patient, I certify that I have obtained and reviewed the medical history of Noelle Campoverde. I have also made a good elena effort to obtain applicable records from other providers who have treated the patient and records did not demonstrate any increased risk of substance abuse that would prevent me from prescribing controlled substances.     I have conducted a physical exam and documented it. I have reviewed Ms. Anjel Campoverde’s prescription history as maintained by the [place] Prescription Monitoring Program.     I have assessed the patient’s risk for abuse, dependency, and addiction using the validated Opioid Risk Tool available at https://www.mdcalc.com/ltfqqs-oufo-otiu-ort-narcotic-abuse.     Given the above, I believe the benefits of controlled substance therapy outweigh the risks. The reasons for prescribing controlled substances include non-narcotic, oral analgesic alternatives have been inadequate for pain control. Accordingly, I have discussed the risk and benefits, treatment plan, and alternative therapies with the patient.       DISPOSITION:  Patient will be discharged home in stable condition.    FOLLOW UP:  Jordin Acuna M.D.  [id number] [place]  [id number]  [phone]          OUTPATIENT MEDICATIONS:  Discharge Medication List as of 9/11/2021  1:36 PM        START taking these medications    Details   oxyCODONE immediate-release (ROXICODONE) 5 MG Tab Take 1 Tablet by mouth every four hours as needed for Severe Pain for up to 3 days., Disp-15 Tablet, R-0, Normal             FINAL IMPRESSION  1. Gross hematuria    2. Renal mass          IJuventino (Scribe), am scribing for, and in the presence of, Ole Blanco M.D..    Electronically signed by: Juventino Mayfield (Scribe), 9/11/2021    IOle M.D. personally performed the services described in this documentation, as scribed by Juventino Mayfield in my presence, and it is both accurate and complete.    The note accurately reflects work and decisions made by me.  Ole Blanco M.D.  9/11/2021  3:53 PM      C

## 2021-09-11 NOTE — ED TRIAGE NOTES
"Chief Complaint   Patient presents with   • Abdominal Pain     left upper [id number]   • Blood in Urine     pt had ct scan showed \"right renal mass with carcinoma\"     Pt wheeled to triage with above complaints [id number]. +nausea. Pt also recently had right lower leg surgery 9/9. Denies sob.   "
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
## 2021-09-20 ENCOUNTER — HOSPITAL ENCOUNTER (OUTPATIENT)
Facility: MEDICAL CENTER | Age: 51
End: 2021-09-20
Attending: UROLOGY | Admitting: UROLOGY
Payer: MEDICAID

## 2021-10-14 ENCOUNTER — PRE-ADMISSION TESTING (OUTPATIENT)
Dept: ADMISSIONS | Facility: MEDICAL CENTER | Age: 51
DRG: 658 | End: 2021-10-14
Attending: UROLOGY
Payer: MEDICAID

## 2021-10-14 DIAGNOSIS — Z01.812 PRE-OPERATIVE LABORATORY EXAMINATION: ICD-10-CM

## 2021-10-14 LAB
ANION GAP SERPL CALC-SCNC: 16 MMOL/L (ref 7–16)
APPEARANCE UR: ABNORMAL
APTT PPP: 28 SEC (ref 24.7–36)
BACTERIA #/AREA URNS HPF: ABNORMAL /HPF
BILIRUB UR QL STRIP.AUTO: NEGATIVE
BUN SERPL-MCNC: 15 MG/DL (ref 8–22)
CALCIUM SERPL-MCNC: 9.1 MG/DL (ref 8.4–10.2)
CHLORIDE SERPL-SCNC: 105 MMOL/L (ref 96–112)
CO2 SERPL-SCNC: 19 MMOL/L (ref 20–33)
COLOR UR: YELLOW
CREAT SERPL-MCNC: 0.37 MG/DL (ref 0.5–1.4)
EPI CELLS #/AREA URNS HPF: ABNORMAL /HPF
ERYTHROCYTE [DISTWIDTH] IN BLOOD BY AUTOMATED COUNT: 40.4 FL (ref 35.9–50)
GLUCOSE SERPL-MCNC: 99 MG/DL (ref 65–99)
GLUCOSE UR STRIP.AUTO-MCNC: NEGATIVE MG/DL
HCT VFR BLD AUTO: 47.4 % (ref 37–47)
HGB BLD-MCNC: 15.2 G/DL (ref 12–16)
KETONES UR STRIP.AUTO-MCNC: ABNORMAL MG/DL
LEUKOCYTE ESTERASE UR QL STRIP.AUTO: ABNORMAL
MCH RBC QN AUTO: 26.3 PG (ref 27–33)
MCHC RBC AUTO-ENTMCNC: 32.1 G/DL (ref 33.6–35)
MCV RBC AUTO: 82 FL (ref 81.4–97.8)
MICRO URNS: ABNORMAL
MUCOUS THREADS #/AREA URNS HPF: ABNORMAL /HPF
NITRITE UR QL STRIP.AUTO: NEGATIVE
PH UR STRIP.AUTO: 5.5 [PH] (ref 5–8)
PLATELET # BLD AUTO: 299 K/UL (ref 164–446)
PMV BLD AUTO: 11.1 FL (ref 9–12.9)
POTASSIUM SERPL-SCNC: 4.2 MMOL/L (ref 3.6–5.5)
PROT UR QL STRIP: NEGATIVE MG/DL
RBC # BLD AUTO: 5.78 M/UL (ref 4.2–5.4)
RBC UR QL AUTO: NEGATIVE
SODIUM SERPL-SCNC: 140 MMOL/L (ref 135–145)
SP GR UR STRIP.AUTO: 1.02
UNIDENT CRYS URNS QL MICRO: ABNORMAL /HPF
WBC # BLD AUTO: 10.5 K/UL (ref 4.8–10.8)
WBC #/AREA URNS HPF: ABNORMAL /HPF

## 2021-10-14 PROCEDURE — C9803 HOPD COVID-19 SPEC COLLECT: HCPCS

## 2021-10-14 PROCEDURE — U0005 INFEC AGEN DETEC AMPLI PROBE: HCPCS

## 2021-10-14 PROCEDURE — 81001 URINALYSIS AUTO W/SCOPE: CPT

## 2021-10-14 PROCEDURE — 80048 BASIC METABOLIC PNL TOTAL CA: CPT

## 2021-10-14 PROCEDURE — 36415 COLL VENOUS BLD VENIPUNCTURE: CPT

## 2021-10-14 PROCEDURE — 85730 THROMBOPLASTIN TIME PARTIAL: CPT

## 2021-10-14 PROCEDURE — 87086 URINE CULTURE/COLONY COUNT: CPT

## 2021-10-14 PROCEDURE — U0003 INFECTIOUS AGENT DETECTION BY NUCLEIC ACID (DNA OR RNA); SEVERE ACUTE RESPIRATORY SYNDROME CORONAVIRUS 2 (SARS-COV-2) (CORONAVIRUS DISEASE [COVID-19]), AMPLIFIED PROBE TECHNIQUE, MAKING USE OF HIGH THROUGHPUT TECHNOLOGIES AS DESCRIBED BY CMS-2020-01-R: HCPCS

## 2021-10-14 PROCEDURE — 85027 COMPLETE CBC AUTOMATED: CPT

## 2021-10-14 RX ORDER — ACETAMINOPHEN 325 MG/1
650 TABLET ORAL EVERY 4 HOURS PRN
Status: ON HOLD | COMMUNITY
End: 2021-11-01

## 2021-10-14 ASSESSMENT — FIBROSIS 4 INDEX: FIB4 SCORE: 0.58

## 2021-10-14 NOTE — PREPROCEDURE INSTRUCTIONS
Hx and meds reviewed, pre-op instructions given, handouts reviewed, questions answered.  Patient instructed to continue regularly prescribed medication s through day before surgery.  Instructed to discontinue all vitamins and supplements 2 week prior to DOS.  Per anesthesia protocol patient instructed to take these mediations with a sip of water on the day of surgery:  Tylenol  Anesthesia fasting guidelines reviewed with patient.  Covid testing scheduled 10/14/21.  Patient has been vaccinated for Covid.

## 2021-10-15 LAB
SARS-COV-2 RNA RESP QL NAA+PROBE: NOTDETECTED
SPECIMEN SOURCE: NORMAL

## 2021-10-17 LAB
BACTERIA UR CULT: NORMAL
SIGNIFICANT IND 70042: NORMAL
SITE SITE: NORMAL
SOURCE SOURCE: NORMAL

## 2021-10-20 ENCOUNTER — ANESTHESIA EVENT (OUTPATIENT)
Dept: SURGERY | Facility: MEDICAL CENTER | Age: 51
DRG: 658 | End: 2021-10-20
Payer: MEDICAID

## 2021-10-20 ENCOUNTER — HOSPITAL ENCOUNTER (INPATIENT)
Facility: MEDICAL CENTER | Age: 51
LOS: 1 days | DRG: 658 | End: 2021-10-21
Attending: UROLOGY | Admitting: UROLOGY
Payer: MEDICAID

## 2021-10-20 ENCOUNTER — ANESTHESIA (OUTPATIENT)
Dept: SURGERY | Facility: MEDICAL CENTER | Age: 51
DRG: 658 | End: 2021-10-20
Payer: MEDICAID

## 2021-10-20 LAB — PATHOLOGY CONSULT NOTE: NORMAL

## 2021-10-20 PROCEDURE — A9270 NON-COVERED ITEM OR SERVICE: HCPCS | Performed by: UROLOGY

## 2021-10-20 PROCEDURE — 700111 HCHG RX REV CODE 636 W/ 250 OVERRIDE (IP): Performed by: ANESTHESIOLOGY

## 2021-10-20 PROCEDURE — 160031 HCHG SURGERY MINUTES - 1ST 30 MINS LEVEL 5: Performed by: UROLOGY

## 2021-10-20 PROCEDURE — 700105 HCHG RX REV CODE 258: Performed by: UROLOGY

## 2021-10-20 PROCEDURE — 700101 HCHG RX REV CODE 250: Performed by: ANESTHESIOLOGY

## 2021-10-20 PROCEDURE — 0TT04ZZ RESECTION OF RIGHT KIDNEY, PERCUTANEOUS ENDOSCOPIC APPROACH: ICD-10-PCS | Performed by: UROLOGY

## 2021-10-20 PROCEDURE — 501838 HCHG SUTURE GENERAL: Performed by: UROLOGY

## 2021-10-20 PROCEDURE — 501571 HCHG TROCAR, SEPARATOR 12X100: Performed by: UROLOGY

## 2021-10-20 PROCEDURE — 501664 HCHG TUBING, FILTER STRYKER: Performed by: UROLOGY

## 2021-10-20 PROCEDURE — 501399 HCHG SPECIMAN BAG, ENDO CATC: Performed by: UROLOGY

## 2021-10-20 PROCEDURE — 160002 HCHG RECOVERY MINUTES (STAT): Performed by: UROLOGY

## 2021-10-20 PROCEDURE — 500002 HCHG ADHESIVE, DERMABOND: Performed by: UROLOGY

## 2021-10-20 PROCEDURE — 700102 HCHG RX REV CODE 250 W/ 637 OVERRIDE(OP): Performed by: ANESTHESIOLOGY

## 2021-10-20 PROCEDURE — 160042 HCHG SURGERY MINUTES - EA ADDL 1 MIN LEVEL 5: Performed by: UROLOGY

## 2021-10-20 PROCEDURE — 500868 HCHG NEEDLE, SURGI(VARES): Performed by: UROLOGY

## 2021-10-20 PROCEDURE — A9270 NON-COVERED ITEM OR SERVICE: HCPCS | Performed by: ANESTHESIOLOGY

## 2021-10-20 PROCEDURE — 700102 HCHG RX REV CODE 250 W/ 637 OVERRIDE(OP): Performed by: UROLOGY

## 2021-10-20 PROCEDURE — 160009 HCHG ANES TIME/MIN: Performed by: UROLOGY

## 2021-10-20 PROCEDURE — G0378 HOSPITAL OBSERVATION PER HR: HCPCS

## 2021-10-20 PROCEDURE — 88307 TISSUE EXAM BY PATHOLOGIST: CPT

## 2021-10-20 PROCEDURE — 160048 HCHG OR STATISTICAL LEVEL 1-5: Performed by: UROLOGY

## 2021-10-20 PROCEDURE — 96375 TX/PRO/DX INJ NEW DRUG ADDON: CPT

## 2021-10-20 PROCEDURE — 502714 HCHG ROBOTIC SURGERY SERVICES: Performed by: UROLOGY

## 2021-10-20 PROCEDURE — 8E0W4CZ ROBOTIC ASSISTED PROCEDURE OF TRUNK REGION, PERCUTANEOUS ENDOSCOPIC APPROACH: ICD-10-PCS | Performed by: UROLOGY

## 2021-10-20 PROCEDURE — 96374 THER/PROPH/DIAG INJ IV PUSH: CPT

## 2021-10-20 PROCEDURE — 700101 HCHG RX REV CODE 250: Performed by: UROLOGY

## 2021-10-20 PROCEDURE — 501450 HCHG STAPLES, ENDO MULTIFIRE: Performed by: UROLOGY

## 2021-10-20 PROCEDURE — 160035 HCHG PACU - 1ST 60 MINS PHASE I: Performed by: UROLOGY

## 2021-10-20 PROCEDURE — 501570 HCHG TROCAR, SEPARATOR: Performed by: UROLOGY

## 2021-10-20 RX ORDER — ACETAMINOPHEN 500 MG
1000 TABLET ORAL EVERY 6 HOURS PRN
Status: DISCONTINUED | OUTPATIENT
Start: 2021-10-25 | End: 2021-10-21 | Stop reason: HOSPADM

## 2021-10-20 RX ORDER — HYDROMORPHONE HYDROCHLORIDE 1 MG/ML
0.1 INJECTION, SOLUTION INTRAMUSCULAR; INTRAVENOUS; SUBCUTANEOUS
Status: DISCONTINUED | OUTPATIENT
Start: 2021-10-20 | End: 2021-10-20 | Stop reason: HOSPADM

## 2021-10-20 RX ORDER — HALOPERIDOL 5 MG/ML
1 INJECTION INTRAMUSCULAR EVERY 6 HOURS PRN
Status: DISCONTINUED | OUTPATIENT
Start: 2021-10-20 | End: 2021-10-21 | Stop reason: HOSPADM

## 2021-10-20 RX ORDER — PHENYLEPHRINE HYDROCHLORIDE 10 MG/ML
INJECTION, SOLUTION INTRAMUSCULAR; INTRAVENOUS; SUBCUTANEOUS PRN
Status: DISCONTINUED | OUTPATIENT
Start: 2021-10-20 | End: 2021-10-20 | Stop reason: SURG

## 2021-10-20 RX ORDER — ROCURONIUM BROMIDE 10 MG/ML
INJECTION, SOLUTION INTRAVENOUS PRN
Status: DISCONTINUED | OUTPATIENT
Start: 2021-10-20 | End: 2021-10-20 | Stop reason: SURG

## 2021-10-20 RX ORDER — BUPIVACAINE HYDROCHLORIDE AND EPINEPHRINE 5; 5 MG/ML; UG/ML
INJECTION, SOLUTION EPIDURAL; INTRACAUDAL; PERINEURAL
Status: DISCONTINUED | OUTPATIENT
Start: 2021-10-20 | End: 2021-10-20 | Stop reason: HOSPADM

## 2021-10-20 RX ORDER — OXYCODONE HCL 5 MG/5 ML
10 SOLUTION, ORAL ORAL
Status: COMPLETED | OUTPATIENT
Start: 2021-10-20 | End: 2021-10-20

## 2021-10-20 RX ORDER — OXYCODONE HYDROCHLORIDE 5 MG/1
2.5 TABLET ORAL
Status: DISCONTINUED | OUTPATIENT
Start: 2021-10-20 | End: 2021-10-21 | Stop reason: HOSPADM

## 2021-10-20 RX ORDER — OXYCODONE HYDROCHLORIDE 5 MG/1
5 TABLET ORAL
Status: DISCONTINUED | OUTPATIENT
Start: 2021-10-20 | End: 2021-10-21 | Stop reason: HOSPADM

## 2021-10-20 RX ORDER — SODIUM CHLORIDE, SODIUM LACTATE, POTASSIUM CHLORIDE, CALCIUM CHLORIDE 600; 310; 30; 20 MG/100ML; MG/100ML; MG/100ML; MG/100ML
INJECTION, SOLUTION INTRAVENOUS CONTINUOUS
Status: ACTIVE | OUTPATIENT
Start: 2021-10-20 | End: 2021-10-20

## 2021-10-20 RX ORDER — HYDROMORPHONE HYDROCHLORIDE 1 MG/ML
0.25 INJECTION, SOLUTION INTRAMUSCULAR; INTRAVENOUS; SUBCUTANEOUS
Status: DISCONTINUED | OUTPATIENT
Start: 2021-10-20 | End: 2021-10-21 | Stop reason: HOSPADM

## 2021-10-20 RX ORDER — ONDANSETRON 2 MG/ML
4 INJECTION INTRAMUSCULAR; INTRAVENOUS
Status: COMPLETED | OUTPATIENT
Start: 2021-10-20 | End: 2021-10-20

## 2021-10-20 RX ORDER — HYDROMORPHONE HYDROCHLORIDE 1 MG/ML
0.4 INJECTION, SOLUTION INTRAMUSCULAR; INTRAVENOUS; SUBCUTANEOUS
Status: DISCONTINUED | OUTPATIENT
Start: 2021-10-20 | End: 2021-10-20 | Stop reason: HOSPADM

## 2021-10-20 RX ORDER — SCOLOPAMINE TRANSDERMAL SYSTEM 1 MG/1
1 PATCH, EXTENDED RELEASE TRANSDERMAL
Status: DISCONTINUED | OUTPATIENT
Start: 2021-10-20 | End: 2021-10-21 | Stop reason: HOSPADM

## 2021-10-20 RX ORDER — DIPHENHYDRAMINE HYDROCHLORIDE 50 MG/ML
25 INJECTION INTRAMUSCULAR; INTRAVENOUS EVERY 6 HOURS PRN
Status: DISCONTINUED | OUTPATIENT
Start: 2021-10-20 | End: 2021-10-21 | Stop reason: HOSPADM

## 2021-10-20 RX ORDER — DIPHENHYDRAMINE HYDROCHLORIDE 50 MG/ML
12.5 INJECTION INTRAMUSCULAR; INTRAVENOUS
Status: DISCONTINUED | OUTPATIENT
Start: 2021-10-20 | End: 2021-10-20 | Stop reason: HOSPADM

## 2021-10-20 RX ORDER — DEXAMETHASONE SODIUM PHOSPHATE 4 MG/ML
INJECTION, SOLUTION INTRA-ARTICULAR; INTRALESIONAL; INTRAMUSCULAR; INTRAVENOUS; SOFT TISSUE PRN
Status: DISCONTINUED | OUTPATIENT
Start: 2021-10-20 | End: 2021-10-20 | Stop reason: SURG

## 2021-10-20 RX ORDER — MIDAZOLAM HYDROCHLORIDE 1 MG/ML
INJECTION INTRAMUSCULAR; INTRAVENOUS PRN
Status: DISCONTINUED | OUTPATIENT
Start: 2021-10-20 | End: 2021-10-20 | Stop reason: SURG

## 2021-10-20 RX ORDER — ACETAMINOPHEN 500 MG
1000 TABLET ORAL ONCE
Status: DISCONTINUED | OUTPATIENT
Start: 2021-10-20 | End: 2021-10-20 | Stop reason: HOSPADM

## 2021-10-20 RX ORDER — OXYCODONE HCL 5 MG/5 ML
5 SOLUTION, ORAL ORAL
Status: COMPLETED | OUTPATIENT
Start: 2021-10-20 | End: 2021-10-20

## 2021-10-20 RX ORDER — ONDANSETRON 2 MG/ML
4 INJECTION INTRAMUSCULAR; INTRAVENOUS EVERY 4 HOURS PRN
Status: DISCONTINUED | OUTPATIENT
Start: 2021-10-20 | End: 2021-10-21 | Stop reason: HOSPADM

## 2021-10-20 RX ORDER — CEFAZOLIN SODIUM 1 G/3ML
INJECTION, POWDER, FOR SOLUTION INTRAMUSCULAR; INTRAVENOUS PRN
Status: DISCONTINUED | OUTPATIENT
Start: 2021-10-20 | End: 2021-10-20 | Stop reason: SURG

## 2021-10-20 RX ORDER — DEXAMETHASONE SODIUM PHOSPHATE 4 MG/ML
4 INJECTION, SOLUTION INTRA-ARTICULAR; INTRALESIONAL; INTRAMUSCULAR; INTRAVENOUS; SOFT TISSUE
Status: DISCONTINUED | OUTPATIENT
Start: 2021-10-20 | End: 2021-10-21 | Stop reason: HOSPADM

## 2021-10-20 RX ORDER — HALOPERIDOL 5 MG/ML
1 INJECTION INTRAMUSCULAR
Status: DISCONTINUED | OUTPATIENT
Start: 2021-10-20 | End: 2021-10-20 | Stop reason: HOSPADM

## 2021-10-20 RX ORDER — ACETAMINOPHEN 500 MG
1000 TABLET ORAL EVERY 6 HOURS
Status: DISCONTINUED | OUTPATIENT
Start: 2021-10-20 | End: 2021-10-21 | Stop reason: HOSPADM

## 2021-10-20 RX ORDER — LIDOCAINE HYDROCHLORIDE 20 MG/ML
INJECTION, SOLUTION EPIDURAL; INFILTRATION; INTRACAUDAL; PERINEURAL PRN
Status: DISCONTINUED | OUTPATIENT
Start: 2021-10-20 | End: 2021-10-20 | Stop reason: SURG

## 2021-10-20 RX ORDER — HYDROMORPHONE HYDROCHLORIDE 1 MG/ML
0.2 INJECTION, SOLUTION INTRAMUSCULAR; INTRAVENOUS; SUBCUTANEOUS
Status: DISCONTINUED | OUTPATIENT
Start: 2021-10-20 | End: 2021-10-20 | Stop reason: HOSPADM

## 2021-10-20 RX ORDER — ONDANSETRON 2 MG/ML
INJECTION INTRAMUSCULAR; INTRAVENOUS PRN
Status: DISCONTINUED | OUTPATIENT
Start: 2021-10-20 | End: 2021-10-20 | Stop reason: SURG

## 2021-10-20 RX ORDER — DEXTROSE MONOHYDRATE, SODIUM CHLORIDE, AND POTASSIUM CHLORIDE 50; 1.49; 4.5 G/1000ML; G/1000ML; G/1000ML
INJECTION, SOLUTION INTRAVENOUS CONTINUOUS
Status: DISPENSED | OUTPATIENT
Start: 2021-10-20 | End: 2021-10-20

## 2021-10-20 RX ORDER — SODIUM CHLORIDE, SODIUM LACTATE, POTASSIUM CHLORIDE, CALCIUM CHLORIDE 600; 310; 30; 20 MG/100ML; MG/100ML; MG/100ML; MG/100ML
INJECTION, SOLUTION INTRAVENOUS CONTINUOUS
Status: DISCONTINUED | OUTPATIENT
Start: 2021-10-20 | End: 2021-10-20 | Stop reason: HOSPADM

## 2021-10-20 RX ADMIN — EPHEDRINE SULFATE 10 MG: 50 INJECTION INTRAMUSCULAR; INTRAVENOUS; SUBCUTANEOUS at 11:50

## 2021-10-20 RX ADMIN — FENTANYL CITRATE 50 MCG: 50 INJECTION, SOLUTION INTRAMUSCULAR; INTRAVENOUS at 13:19

## 2021-10-20 RX ADMIN — PROPOFOL 130 MG: 10 INJECTION, EMULSION INTRAVENOUS at 11:32

## 2021-10-20 RX ADMIN — ACETAMINOPHEN 1000 MG: 500 TABLET ORAL at 17:33

## 2021-10-20 RX ADMIN — FENTANYL CITRATE 100 MCG: 50 INJECTION, SOLUTION INTRAMUSCULAR; INTRAVENOUS at 12:17

## 2021-10-20 RX ADMIN — FENTANYL CITRATE 100 MCG: 50 INJECTION, SOLUTION INTRAMUSCULAR; INTRAVENOUS at 11:32

## 2021-10-20 RX ADMIN — DEXTROSE MONOHYDRATE, SODIUM CHLORIDE, AND POTASSIUM CHLORIDE: 50; 4.5; 1.49 INJECTION, SOLUTION INTRAVENOUS at 15:36

## 2021-10-20 RX ADMIN — SODIUM CHLORIDE, POTASSIUM CHLORIDE, SODIUM LACTATE AND CALCIUM CHLORIDE: 600; 310; 30; 20 INJECTION, SOLUTION INTRAVENOUS at 11:23

## 2021-10-20 RX ADMIN — OXYCODONE HYDROCHLORIDE 2.5 MG: 5 TABLET ORAL at 20:02

## 2021-10-20 RX ADMIN — ROCURONIUM BROMIDE 10 MG: 10 INJECTION, SOLUTION INTRAVENOUS at 12:17

## 2021-10-20 RX ADMIN — CEFAZOLIN 2 G: 330 INJECTION, POWDER, FOR SOLUTION INTRAMUSCULAR; INTRAVENOUS at 11:23

## 2021-10-20 RX ADMIN — EPHEDRINE SULFATE 20 MG: 50 INJECTION INTRAMUSCULAR; INTRAVENOUS; SUBCUTANEOUS at 12:13

## 2021-10-20 RX ADMIN — OXYCODONE HYDROCHLORIDE 2.5 MG: 5 TABLET ORAL at 23:30

## 2021-10-20 RX ADMIN — EPHEDRINE SULFATE 10 MG: 50 INJECTION INTRAMUSCULAR; INTRAVENOUS; SUBCUTANEOUS at 11:55

## 2021-10-20 RX ADMIN — FENTANYL CITRATE 50 MCG: 50 INJECTION, SOLUTION INTRAMUSCULAR; INTRAVENOUS at 14:09

## 2021-10-20 RX ADMIN — MIDAZOLAM HYDROCHLORIDE 2 MG: 1 INJECTION, SOLUTION INTRAMUSCULAR; INTRAVENOUS at 11:32

## 2021-10-20 RX ADMIN — PHENYLEPHRINE HYDROCHLORIDE 100 MCG: 10 INJECTION INTRAVENOUS at 12:05

## 2021-10-20 RX ADMIN — ROCURONIUM BROMIDE 40 MG: 10 INJECTION, SOLUTION INTRAVENOUS at 11:32

## 2021-10-20 RX ADMIN — SUGAMMADEX 200 MG: 100 INJECTION, SOLUTION INTRAVENOUS at 13:39

## 2021-10-20 RX ADMIN — ONDANSETRON HYDROCHLORIDE 4 MG: 2 SOLUTION INTRAMUSCULAR; INTRAVENOUS at 14:09

## 2021-10-20 RX ADMIN — ONDANSETRON 4 MG: 2 INJECTION INTRAMUSCULAR; INTRAVENOUS at 11:32

## 2021-10-20 RX ADMIN — LIDOCAINE HYDROCHLORIDE 100 MG: 20 INJECTION, SOLUTION EPIDURAL; INFILTRATION; INTRACAUDAL; PERINEURAL at 11:32

## 2021-10-20 RX ADMIN — OXYCODONE HYDROCHLORIDE 5 MG: 5 SOLUTION ORAL at 14:09

## 2021-10-20 RX ADMIN — DEXAMETHASONE SODIUM PHOSPHATE 4 MG: 4 INJECTION, SOLUTION INTRAMUSCULAR; INTRAVENOUS at 11:32

## 2021-10-20 ASSESSMENT — COGNITIVE AND FUNCTIONAL STATUS - GENERAL
WALKING IN HOSPITAL ROOM: TOTAL
MOVING FROM LYING ON BACK TO SITTING ON SIDE OF FLAT BED: UNABLE
DAILY ACTIVITIY SCORE: 12
SUGGESTED CMS G CODE MODIFIER MOBILITY: CM
EATING MEALS: A LOT
DRESSING REGULAR LOWER BODY CLOTHING: A LOT
MOBILITY SCORE: 9
HELP NEEDED FOR BATHING: A LOT
TOILETING: A LOT
PERSONAL GROOMING: A LOT
TURNING FROM BACK TO SIDE WHILE IN FLAT BAD: A LOT
MOVING TO AND FROM BED TO CHAIR: A LOT
CLIMB 3 TO 5 STEPS WITH RAILING: TOTAL
STANDING UP FROM CHAIR USING ARMS: A LOT
SUGGESTED CMS G CODE MODIFIER DAILY ACTIVITY: CL
DRESSING REGULAR UPPER BODY CLOTHING: A LOT

## 2021-10-20 ASSESSMENT — LIFESTYLE VARIABLES
HAVE PEOPLE ANNOYED YOU BY CRITICIZING YOUR DRINKING: NO
CONSUMPTION TOTAL: NEGATIVE
HAVE YOU EVER FELT YOU SHOULD CUT DOWN ON YOUR DRINKING: NO
TOTAL SCORE: 0
TOTAL SCORE: 0
EVER HAD A DRINK FIRST THING IN THE MORNING TO STEADY YOUR NERVES TO GET RID OF A HANGOVER: NO
TOTAL SCORE: 0
ON A TYPICAL DAY WHEN YOU DRINK ALCOHOL HOW MANY DRINKS DO YOU HAVE: 0
AVERAGE NUMBER OF DAYS PER WEEK YOU HAVE A DRINK CONTAINING ALCOHOL: 0
EVER FELT BAD OR GUILTY ABOUT YOUR DRINKING: NO
ALCOHOL_USE: NO
HOW MANY TIMES IN THE PAST YEAR HAVE YOU HAD 5 OR MORE DRINKS IN A DAY: 0

## 2021-10-20 ASSESSMENT — PAIN DESCRIPTION - PAIN TYPE
TYPE: ACUTE PAIN;SURGICAL PAIN
TYPE: ACUTE PAIN
TYPE: ACUTE PAIN;SURGICAL PAIN

## 2021-10-20 ASSESSMENT — PATIENT HEALTH QUESTIONNAIRE - PHQ9
SUM OF ALL RESPONSES TO PHQ9 QUESTIONS 1 AND 2: 0
2. FEELING DOWN, DEPRESSED, IRRITABLE, OR HOPELESS: NOT AT ALL
1. LITTLE INTEREST OR PLEASURE IN DOING THINGS: NOT AT ALL
SUM OF ALL RESPONSES TO PHQ9 QUESTIONS 1 AND 2: 0
1. LITTLE INTEREST OR PLEASURE IN DOING THINGS: NOT AT ALL
2. FEELING DOWN, DEPRESSED, IRRITABLE, OR HOPELESS: NOT AT ALL

## 2021-10-20 ASSESSMENT — PAIN SCALES - GENERAL: PAIN_LEVEL: 1

## 2021-10-20 ASSESSMENT — FIBROSIS 4 INDEX: FIB4 SCORE: 0.54

## 2021-10-20 NOTE — ANESTHESIA TIME REPORT
Anesthesia Start and Stop Event Times     Date Time Event    10/20/2021 1104 Ready for Procedure     1117 Anesthesia Start     1348 Anesthesia Stop        Responsible Staff  10/20/21    Name Role Begin End    Anderson Shaffer M.D. Anesth 1117 1348        Preop Diagnosis (Free Text):  Pre-op Diagnosis     RENAL MASS        Preop Diagnosis (Codes):    Premium Reason  Non-Premium    Comments:

## 2021-10-20 NOTE — OP REPORT
DATE OF SERVICE:  10/20/2021     NAME OF OPERATION:  Robot-assisted laparoscopic right radical nephrectomy.     PREOPERATIVE DIAGNOSIS:  A 5.5 cm right renal mass.     POSTOPERATIVE DIAGNOSIS:  A 5.5 cm right renal mass.     PRIMARY SURGEON:  Jay Painting MD     FIRST ASSISTANT:  Modesto Quintana MD     ANESTHESIOLOGIST:  Anderson Shaffer MD     FINDINGS:  Right upper pole renal mass, right adrenal gland spared.     INDICATIONS:  Briefly, the patient is a 51-year-old woman who had an   incidentally found 5.5 cm Bosniak IV cystic lesion of her right kidney, highly   concerning for renal cell carcinoma, enhancing.  Upon consideration of   options, she elected to undergo surgical management with nephrectomy.    Informed consent has been obtained.     OPERATION IN DETAIL:  The patient was taken to the operating room and placed   on the operating table in supine position.  After administration of general   anesthetic, a Trammell catheter was placed and she was positioned on the   operating room table with the right side up on gel bolsters and secured to the   table safely.  The abdomen and flank were prepped and draped sterilely.  A   Veress needle technique was used to gain insufflation of the abdomen at the   umbilicus.     First da Jose Manuel port was placed with placement of the camera. This demonstrated   atraumatic entry.  Remaining da Jose Manuel ports as well as an assist ports   including a 5 mm liver retraction port were placed under direct visual   inspection without incident.     The robot was then docked at the bedside.  The right colon was taken down from   its lateral sidewall attachments and reflected medially.  The duodenum was   kocherized..  Just below the lower pole of the kidney, the right gonadal vein   was identified as it traveled towards the vena cava.  This was deflected   downward and the remainder of the kidney was deflected upwards over the psoas   muscle.  Utilizing this plane between the posterior  aspect of the kidney and   the psoas muscle, this plane was further dissected sharply with cautery and   the use of Hem-o-Max clips.  Ultimately, the takeoff of the renal vein off the   vena cava was identified and dissected on both superior and inferior   surfaces.     The artery was found to be superior to the vein.  This was dissected free of   the vein and 3 Hem-o-Max clips were placed on the aorta side, one on the   kidney side and the artery was transected.  The vein was then dissected   clearly such that the vascular stapler was able to be passed across this and   divided without incident.     The plane between the kidney and the adrenal gland was developed sharply and   the adrenal gland was spared.  Ultimately, the superior attachments of the   kidney were then taken down with combination of sharp and blunt dissection.    The sidewall attachments were taken as well.  Finally, the inferior aspect of   Gerota's fascia was transected including the ureter between Hem-o-Max clips.    Once the specimen was freed, EndoCatch bag was passed through one of the   inferior ports and placed in an EndoCatch sac.     Each port was then inspected for evidence of intracorporeal bleeding after the   operative site was also inspected and found to be hemostatic.  No bleeding   was observed from any of the port sites.  The abdomen was desufflated and the   right lower quadrant port site was opened to accommodate removal of the   specimen in a muscle sparing technique.  The specimen was removed within the   sac without incident.  A looped #1 PDS suture was then used to close the   fascia.  A 3-0 Vicryl was used to close the deep tissues in a running manner.    A 4-0 Monocryl was used to close the skin here as well as of the 12 mm port   site, which also had its fascia closed with an EndoStitch placed at the   initiation of the case.  Dermabond was used to close the skin on the other   port sites.  The patient tolerated the  procedure well and was taken to   recovery room in stable condition.        ______________________________  Jay Painting MD MCM/MALISSA/СВЕТЛАНА    DD:  10/20/2021 13:47  DT:  10/20/2021 14:33    Job#:  [id number]

## 2021-10-20 NOTE — OR SURGEON
Immediate Post OP Note    PreOp Diagnosis: right renal tumor      PostOp Diagnosis: same      Procedure(s):  NEPHRECTOMY, ROBOT-ASSISTED, USING DA CORNELIUS XI - RADICAL - Wound Class: Clean    Surgeon(s):  RYAN Bruce M.D.    Anesthesiologist/Type of Anesthesia:  Anesthesiologist: Anderson Shaffer M.D./General    Surgical Staff:  Circulator: Yaquelin Charles R.N.; Jane Chávez R.N.  Relief Scrub: Clyde Man  Scrub Person: Chavez Steele    Specimens removed if any:  ID Type Source Tests Collected by Time Destination   A : RIGHT KIDNEY Tissue Kidney PATHOLOGY SPECIMEN Jay Painting M.D. 10/20/2021  1:09 PM        Estimated Blood Loss: <50mL    Findings: right upper pole mass    Complications: none        10/20/2021 1:38 PM Jay Painting M.D.

## 2021-10-20 NOTE — ANESTHESIA POSTPROCEDURE EVALUATION
Patient: Noelle Campoverde    Procedure Summary     Date: 10/20/21 Room / Location:  OR  / SURGERY [facility]    Anesthesia Start: 1117 Anesthesia Stop: 1348    Procedure: NEPHRECTOMY, ROBOT-ASSISTED, USING DA CORNELIUS XI - RADICAL (Right Flank) Diagnosis: (RENAL MASS)    Surgeons: Jay Painting M.D. Responsible Provider: Anderson Shaffer M.D.    Anesthesia Type: general ASA Status: 2          Final Anesthesia Type: general  Last vitals  BP   Blood Pressure: (P) 133/77    Temp   (P) 36.3 °C (97.3 °F)    Pulse   (P) 91   Resp   (P) 16    SpO2   (P) 99 %      Anesthesia Post Evaluation    Patient location during evaluation: PACU  Patient participation: complete - patient participated  Level of consciousness: awake and alert  Pain score: 1    Airway patency: patent  Anesthetic complications: no  Cardiovascular status: hemodynamically stable  Respiratory status: acceptable  Hydration status: euvolemic    PONV: none          There were no known complications for this encounter.     Nurse Pain Score: 0 (NPRS)

## 2021-10-20 NOTE — PROGRESS NOTES
4 Eyes Skin Assessment Completed by EVAN Cutler and EVAN Daniels.    Head WDL  Ears WDL  Nose WDL  Mouth WDL  Neck WDL  Breast/Chest Redness  Shoulder Blades WDL  Spine WDL  (R) Arm/Elbow/Hand WDL  (L) Arm/Elbow/Hand WDL  Abdomen Incision  Groin WDL  Scrotum/Coccyx/Buttocks WDL  (R) Leg Scar  (L) Leg WDL  (R) Heel/Foot/Toe WDL  (L) Heel/Foot/Toe WDL      Devices In Places Pulse Ox and SCD's      Interventions In Place NC W/Ear Foams and Pillows    Possible Skin Injury No    Pictures Uploaded Into Epic N/A  Wound Consult Placed N/A  RN Wound Prevention Protocol Ordered No

## 2021-10-20 NOTE — ANESTHESIA PROCEDURE NOTES
Airway    Date/Time: 10/20/2021 11:32 AM  Performed by: Anderson Shaffer M.D.  Authorized by: Anderson Shaffer M.D.     Location:  OR  Urgency:  Elective  Indications for Airway Management:  Anesthesia      Spontaneous Ventilation: absent    Sedation Level:  Deep  Preoxygenated: Yes    Patient Position:  Sniffing  Final Airway Type:  Endotracheal airway  Final Endotracheal Airway:  ETT  Cuffed: Yes    Technique Used for Successful ETT Placement:  Direct laryngoscopy    Insertion Site:  Oral  Blade Type:  Ni  Laryngoscope Blade/Videolaryngoscope Blade Size:  2  ETT Size (mm):  7.0  Measured from:  Teeth  ETT to Teeth (cm):  22  Placement Verified by: auscultation and capnometry    Cormack-Lehane Classification:  Grade I - full view of glottis  Number of Attempts at Approach:  1

## 2021-10-20 NOTE — OR NURSING
1343: To PACU from OR via ascencion, Patient is receiving supplemental oxygen at 6 lpm via mask. 6 lap stabs on abdomen, one w/ gauze    1348: OPA DC'd    1355: OR EVAN Ugarte replaces rosen catheter at bedside.    1400: Patient resting, able to doze off and remain roused to voice.     1405: Patient C/O Great pain, plan to medicate er MAR    1415: Patient resting, appears to be comfortable at this time. Patient will be going to room 216, bed 1, EVAN Cutler will call back for report. Daughter Corine updated by phone.     1430: Meets criteria to transfer to floor. Transferred on O2 tank @ 250

## 2021-10-20 NOTE — PROGRESS NOTES
Patient arrived to floor very drowsy, responds to voice, able to follow commands. Patient reports pain then falls back asleep. Per family, patient unable to move arms or legs. Patient able to move fingers and toes. Call light and belongings in reach.

## 2021-10-21 VITALS
SYSTOLIC BLOOD PRESSURE: 148 MMHG | BODY MASS INDEX: 27.99 KG/M2 | TEMPERATURE: 97.6 F | HEIGHT: 62 IN | OXYGEN SATURATION: 93 % | DIASTOLIC BLOOD PRESSURE: 75 MMHG | HEART RATE: 80 BPM | RESPIRATION RATE: 16 BRPM | WEIGHT: 152.12 LBS

## 2021-10-21 LAB
ANION GAP SERPL CALC-SCNC: 12 MMOL/L (ref 7–16)
BUN SERPL-MCNC: 11 MG/DL (ref 8–22)
CALCIUM SERPL-MCNC: 9.1 MG/DL (ref 8.4–10.2)
CHLORIDE SERPL-SCNC: 102 MMOL/L (ref 96–112)
CO2 SERPL-SCNC: 21 MMOL/L (ref 20–33)
CREAT SERPL-MCNC: 0.75 MG/DL (ref 0.5–1.4)
ERYTHROCYTE [DISTWIDTH] IN BLOOD BY AUTOMATED COUNT: 39.8 FL (ref 35.9–50)
GLUCOSE SERPL-MCNC: 112 MG/DL (ref 65–99)
HCT VFR BLD AUTO: 38.9 % (ref 37–47)
HGB BLD-MCNC: 13 G/DL (ref 12–16)
MCH RBC QN AUTO: 26.6 PG (ref 27–33)
MCHC RBC AUTO-ENTMCNC: 33.4 G/DL (ref 33.6–35)
MCV RBC AUTO: 79.6 FL (ref 81.4–97.8)
PLATELET # BLD AUTO: 279 K/UL (ref 164–446)
PMV BLD AUTO: 10.5 FL (ref 9–12.9)
POTASSIUM SERPL-SCNC: 4.3 MMOL/L (ref 3.6–5.5)
RBC # BLD AUTO: 4.89 M/UL (ref 4.2–5.4)
SODIUM SERPL-SCNC: 135 MMOL/L (ref 135–145)
WBC # BLD AUTO: 16 K/UL (ref 4.8–10.8)

## 2021-10-21 PROCEDURE — 770006 HCHG ROOM/CARE - MED/SURG/GYN SEMI*

## 2021-10-21 PROCEDURE — 700102 HCHG RX REV CODE 250 W/ 637 OVERRIDE(OP): Performed by: UROLOGY

## 2021-10-21 PROCEDURE — 85027 COMPLETE CBC AUTOMATED: CPT

## 2021-10-21 PROCEDURE — 94760 N-INVAS EAR/PLS OXIMETRY 1: CPT

## 2021-10-21 PROCEDURE — 80048 BASIC METABOLIC PNL TOTAL CA: CPT

## 2021-10-21 PROCEDURE — 36415 COLL VENOUS BLD VENIPUNCTURE: CPT

## 2021-10-21 PROCEDURE — A9270 NON-COVERED ITEM OR SERVICE: HCPCS | Performed by: UROLOGY

## 2021-10-21 PROCEDURE — 51798 US URINE CAPACITY MEASURE: CPT

## 2021-10-21 RX ADMIN — ACETAMINOPHEN 1000 MG: 500 TABLET ORAL at 11:50

## 2021-10-21 RX ADMIN — ACETAMINOPHEN 1000 MG: 500 TABLET ORAL at 00:29

## 2021-10-21 RX ADMIN — OXYCODONE HYDROCHLORIDE 2.5 MG: 5 TABLET ORAL at 16:01

## 2021-10-21 RX ADMIN — ACETAMINOPHEN 1000 MG: 500 TABLET ORAL at 06:34

## 2021-10-21 ASSESSMENT — PAIN DESCRIPTION - PAIN TYPE
TYPE: ACUTE PAIN
TYPE: ACUTE PAIN;SURGICAL PAIN
TYPE: ACUTE PAIN
TYPE: ACUTE PAIN
TYPE: ACUTE PAIN;SURGICAL PAIN
TYPE: ACUTE PAIN;SURGICAL PAIN

## 2021-10-21 NOTE — CARE PLAN
The patient is Stable - Low risk of patient condition declining or worsening    Shift Goals  Clinical Goals: Pain control    Progress made toward(s) clinical / shift goals:  Patient able to communicate pain needs.  Problem: Knowledge Deficit - Standard  Goal: Patient and family/care givers will demonstrate understanding of plan of care, disease process/condition, diagnostic tests and medications  Outcome: Progressing     Problem: Pain - Standard  Goal: Alleviation of pain or a reduction in pain to the patient’s comfort goal  Outcome: Progressing

## 2021-10-21 NOTE — PROGRESS NOTES
Received report from night shift RN. Patient A&Ox4, reporting 2/10 pain, declines medications at this time. Denies N/V, SOB at this time. Incisions to abdomen CDI. Fall precautions in place, call light, belongings in reach.     COVID-19 surge in effect.

## 2021-10-21 NOTE — PROGRESS NOTES
"Report and updates to EVAN Cutler.     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 Pt states only 2/10 pain and does not want any pain meds other than tylenol. Pt staed she \"feels better than yesterday\" and has fresh ice pack.     Universal fall and safety precautions in place, call light within reach, pt has no current needs. Care transferred.   "

## 2021-10-21 NOTE — PROGRESS NOTES
Patient noted to have 845 ml in bladder via bladder scan. MD notified. New ordered received an implemented.

## 2021-10-21 NOTE — DISCHARGE INSTRUCTIONS
ACTIVITY: Rest and take it easy for the first 24 hours.  A responsible adult is recommended to remain with you during that time.  It is normal to feel sleepy.  We encourage you to not do anything that requires balance, judgment or coordination.    MILD FLU-LIKE SYMPTOMS ARE NORMAL. YOU MAY EXPERIENCE GENERALIZED MUSCLE ACHES, THROAT IRRITATION, HEADACHE AND/OR SOME NAUSEA.    FOR 24 HOURS DO NOT:  Drive, operate machinery or run household appliances.  Drink beer or alcoholic beverages.   Make important decisions or sign legal documents.    SPECIAL INSTRUCTIONS:     DIET: To avoid nausea, slowly advance diet as tolerated, avoiding spicy or greasy foods for the first day.  Add more substantial food to your diet according to your physician's instructions.     INCREASE FLUIDS AND FIBER TO AVOID CONSTIPATION.    SURGICAL DRESSING/BATHING:     FOLLOW-UP APPOINTMENT:  A follow-up appointment should be arranged with your doctor, call to schedule. Follow up on Monday for voiding trial.    You should CALL YOUR PHYSICIAN if you develop:  Fever greater than 101 degrees F.  Pain not relieved by medication, or persistent nausea or vomiting.  Excessive bleeding (blood soaking through dressing) or unexpected drainage from the wound.  Extreme redness or swelling around the incision site, drainage of pus or foul smelling drainage.  Inability to urinate or empty your bladder within 8 hours.  Problems with breathing or chest pain.    You should call 911 if you develop problems with breathing or chest pain.  If you are unable to contact your doctor or surgical center, you should go to the nearest emergency room or urgent care center.  Physician's telephone #: [phone]    If any questions arise, call your doctor.  If your doctor is not available, please feel free to call the Surgical Center at [phone]. The Contact Center is open Monday through Friday 7AM to 5PM and may speak to a nurse at [phone], or toll free at  [phone].     A registered nurse may call you a few days after your surgery to see how you are doing after your procedure.    MEDICATIONS: Resume taking daily medication.  Take prescribed pain medication with food.  If no medication is prescribed, you may take non-aspirin pain medication if needed.  PAIN MEDICATION CAN BE VERY CONSTIPATING.  Take a stool softener or laxative such as senokot, pericolace, or milk of magnesia if needed.    Prescription given for N/A.  Last pain medication given at _______________________________________.    If your physician has prescribed pain medication that includes Acetaminophen (Tylenol), do not take additional Acetaminophen (Tylenol) while taking the prescribed medication.    Depression / Suicide Risk    As you are discharged from this [facility] facility, it is important to learn how to keep safe from harming yourself.    Recognize the warning signs:  · Abrupt changes in personality, positive or negative- including increase in energy   · Giving away possessions  · Change in eating patterns- significant weight changes-  positive or negative  · Change in sleeping patterns- unable to sleep or sleeping all the time   · Unwillingness or inability to communicate  · Depression  · Unusual sadness, discouragement and loneliness  · Talk of wanting to die  · Neglect of personal appearance   · Rebelliousness- reckless behavior  · Withdrawal from people/activities they love  · Confusion- inability to concentrate     If you or a loved one observes any of these behaviors or has concerns about self-harm, here's what you can do:  · Talk about it- your feelings and reasons for harming yourself  · Remove any means that you might use to hurt yourself (examples: pills, rope, extension cords, firearm)  · Get professional help from the community (Mental Health, Substance Abuse, psychological counseling)  · Do not be alone:Call your Safe Contact- someone whom you trust who will be there for  you.  · Call your local CRISIS HOTLINE 346-5959 or 586-002-6676  · Call your local Children's Mobile Crisis Response Team [place] (992) 189-5272 or www.[employer]  · Call the toll free National Suicide Prevention Hotlines   · National Suicide Prevention Lifeline 217-507-JRFU (7068)  · National [place] Line Network 800-SUICIDE (198-6816)        Indwelling Urinary Catheter Care, Adult  An indwelling urinary catheter is a thin tube that is put into your bladder. The tube helps to drain pee (urine) out of your body. The tube goes in through your urethra. Your urethra is where pee comes out of your body. Your pee will come out through the catheter, then it will go into a bag (drainage bag).  Take good care of your catheter so it will work well.  How to wear your catheter and bag  Supplies needed  · Sticky tape (adhesive tape) or a leg strap.  · Alcohol wipe or soap and water (if you use tape).  · A clean towel (if you use tape).  · Large overnight bag.  · Smaller bag (leg bag).  Wearing your catheter  Attach your catheter to your leg with tape or a leg strap.  · Make sure the catheter is not pulled tight.  · If a leg strap gets wet, take it off and put on a dry strap.  · If you use tape to hold the bag on your le. Use an alcohol wipe or soap and water to wash your skin where the tape made it sticky before.  2. Use a clean towel to pat-dry that skin.  3. Use new tape to make the bag stay on your leg.  Wearing your bags  You should have been given a large overnight bag.  · You may wear the overnight bag in the day or night.  · Always have the overnight bag lower than your bladder.  Do not let the bag touch the floor.  · Before you go to sleep, put a clean plastic bag in a wastebasket. Then hang the overnight bag inside the wastebasket.  You should also have a smaller leg bag that fits under your clothes.  · Always wear the leg bag below your knee.  · Do not wear your leg bag at night.  How to care for your  skin and catheter  Supplies needed  · A clean washcloth.  · Water and mild soap.  · A clean towel.  Caring for your skin and catheter         · Clean the skin around your catheter every day:  ? Wash your hands with soap and water.  ? Wet a clean washcloth in warm water and mild soap.  ? Clean the skin around your urethra.  ? If you are female:  ? Gently spread the folds of skin around your vagina (labia).  ? With the washcloth in your other hand, wipe the inner side of your labia on each side. Wipe from front to back.  ? If you are male:  ? Pull back any skin that covers the end of your penis (foreskin).  ? With the washcloth in your other hand, wipe your penis in small circles. Start wiping at the tip of your penis, then move away from the catheter.  ? Move the foreskin back in place, if needed.  ? With your free hand, hold the catheter close to where it goes into your body.  ? Keep holding the catheter during cleaning so it does not get pulled out.  ? With the washcloth in your other hand, clean the catheter.  ? Only wipe downward on the catheter.  ? Do not wipe upward toward your body. Doing this may push germs into your urethra and cause infection.  ? Use a clean towel to pat-dry the catheter and the skin around it. Make sure to wipe off all soap.  ? Wash your hands with soap and water.  · Shower every day. Do not take baths.  · Do not use cream, ointment, or lotion on the area where the catheter goes into your body, unless your doctor tells you to.  · Do not use powders, sprays, or lotions on your genital area.  · Check your skin around the catheter every day for signs of infection. Check for:  ? Redness, swelling, or pain.  ? Fluid or blood.  ? Warmth.  ? Pus or a bad smell.  How to empty the bag  Supplies needed  · Rubbing alcohol.  · Gauze pad or cotton ball.  · Tape or a leg strap.  Emptying the bag  Pour the pee out of your bag when it is ?-½ full, or at least 2-3 times a day. Do this for your overnight  bag and your leg bag.  1. Wash your hands with soap and water.  2. Separate (detach) the bag from your leg.  3. Hold the bag over the toilet or a clean pail. Keep the bag lower than your hips and bladder. This is so the pee (urine) does not go back into the tube.  4. Open the pour spout. It is at the bottom of the bag.  5. Empty the pee into the toilet or pail. Do not let the pour spout touch any surface.  6. Put rubbing alcohol on a gauze pad or cotton ball.  7. Use the gauze pad or cotton ball to clean the pour spout.  8. Close the pour spout.  9. Attach the bag to your leg with tape or a leg strap.  10. Wash your hands with soap and water.  Follow instructions for cleaning the drainage bag:  · From the product maker.  · As told by your doctor.  How to change the bag  Supplies needed  · Alcohol wipes.  · A clean bag.  · Tape or a leg strap.  Changing the bag  Replace your bag when it starts to leak, smell bad, or look dirty.  1. Wash your hands with soap and water.  2. Separate the dirty bag from your leg.  3. Pinch the catheter with your fingers so that pee does not spill out.  4. Separate the catheter tube from the bag tube where these tubes connect (at the connection valve). Do not let the tubes touch any surface.  5. Clean the end of the catheter tube with an alcohol wipe. Use a different alcohol wipe to clean the end of the bag tube.  6. Connect the catheter tube to the tube of the clean bag.  7. Attach the clean bag to your leg with tape or a leg strap. Do not make the bag tight on your leg.  8. Wash your hands with soap and water.  General rules    · Never pull on your catheter. Never try to take it out. Doing that can hurt you.  · Always wash your hands before and after you touch your catheter or bag. Use a mild, fragrance-free soap. If you do not have soap and water, use hand .  · Always make sure there are no twists or bends (kinks) in the catheter tube.  · Always make sure there are no leaks in  the catheter or bag.  · Drink enough fluid to keep your pee pale yellow.  · Do not take baths, swim, or use a hot tub.  · If you are female, wipe from front to back after you poop (have a bowel movement).  Contact a doctor if:  · Your pee is cloudy.  · Your pee smells worse than usual.  · Your catheter gets clogged.  · Your catheter leaks.  · Your bladder feels full.  Get help right away if:  · You have redness, swelling, or pain where the catheter goes into your body.  · You have fluid, blood, pus, or a bad smell coming from the area where the catheter goes into your body.  · Your skin feels warm where the catheter goes into your body.  · You have a fever.  · You have pain in your:  ? Belly (abdomen).  ? Legs.  ? Lower back.  ? Bladder.  · You see blood in the catheter.  · Your pee is pink or red.  · You feel sick to your stomach (nauseous).  · You throw up (vomit).  · You have chills.  · Your pee is not draining into the bag.  · Your catheter gets pulled out.  Summary  · An indwelling urinary catheter is a thin tube that is placed into the bladder to help drain pee (urine) out of the body.  · The catheter is placed into the part of the body that drains pee from the bladder (urethra).  · Taking good care of your catheter will keep it working properly and help prevent problems.  · Always wash your hands before and after touching your catheter or bag.  · Never pull on your catheter or try to take it out.  This information is not intended to replace advice given to you by your health care provider. Make sure you discuss any questions you have with your health care provider.  Document Released: 04/14/2014 Document Revised: 04/10/2020 Document Reviewed: 08/03/2018  Elsevier Patient Education © 2020 [employer]    Nefrectomía mínimamente invasiva  Minimally Invasive Nephrectomy  La nefrectomía mínimamente invasiva es un procedimiento quirúrgico para extirpar un riñón. Janki procedimiento se realiza a través de varias  incisiones pequeñas en el abdomen. Pueden hacerle ck cirugía para extirpar:  · El riñón completo y algunas estructuras circundantes (nefrectomía radical).  · Solo la parte dañada o enferma del riñón (nefrectomía parcial).  Puede necesitar esta cirugía si:  · Tiene un riñón con un daño grave por enfermedad renal, infección o cáncer.  · Nació con un riñón anormal.  · Está donando un riñón gale.  Informe al médico acerca de lo siguiente:  · Cualquier alergia que tenga.  · Todos los medicamentos que usa, incluidos vitaminas, hierbas, gotas oftálmicas, cremas y medicamentos de venta shefali.  · Cualquier problema previo que usted o algún miembro de ariza gilbert hayan tenido con los anestésicos.  · Cualquier trastorno de la lorri que tenga.  · Cirugías a las que se haya sometido.  · Cualquier afección médica que tenga.  · Si está embarazada o podría estarlo.  ¿Cuáles son los riesgos?  En general, se trata de un procedimiento seguro. Sin embargo, pueden ocurrir complicaciones, por ejemplo:  · Sangrado.  · Infección.  · Neumonía.  · Daño a otras estructuras orgánicas cerca del riñón.  · Reacciones alérgicas a los medicamentos.  Jeremiah vez haya que extirpar un hueso (generalmente parte de ck hermes) o más tejido para que el cirujano pueda tener acceso al riñón. Si esto ocurre, es posible que haya que cambiar el procedimiento mínimamente invasivo por un procedimiento abierto.  ¿Qué ocurre antes del procedimiento?  Mantenerse hidratado  Siga las instrucciones del médico acerca de mantenerse hidratado, las cuales pueden incluir lo siguiente:  · Hasta dos horas antes del procedimiento, puede beber líquidos transparentes, mert agua, jugos de fruta sin pulpa, café malik y té solo.    Restricciones en las comidas y bebidas  Siga las instrucciones del médico respecto de las restricciones de comidas o bebidas, las cuales pueden incluir lo siguiente:  · Ocho horas antes del procedimiento, deje de ingerir comidas o alimentos pesados,  mert carne, alimentos fritos o alimentos grasos.  · Seis horas antes del procedimiento, deje de ingerir comidas o alimentos livianos, mert tostadas o cereales.  · Seis horas antes del procedimiento, deje de beber leche o bebidas que contengan leche.  · Dos horas antes del procedimiento, deje de beber líquidos transparentes.  Medicamentos  Consulte al médico sobre:  · Cambiar o suspender los medicamentos que buck habitualmente. [place] es muy importante si buck medicamentos para la diabetes o anticoagulantes.  · Ron medicamentos mert aspirina e ibuprofeno. Estos medicamentos pueden tener un efecto anticoagulante en la lorri. No tome estos medicamentos a menos que el médico se lo indique.  · Usar medicamentos de venta shefali, vitaminas, hierbas y suplementos.  Indicaciones generales  · Nathen que alguien lo lleve a ariza casa desde el hospital o la clínica.  · No consuma ningún producto que contenga nicotina ni tabaco sandie al menos las 4 semanas anteriores al procedimiento. Estos productos incluyen cigarrillos, cigarrillos electrónicos y tabaco para mascar. Si necesita ayuda para dejar de fumar, consulte al médico.  · Pregúntele al médico:  ? Cómo se marcará el lugar de la cirugía.  ? Qué medidas se tomarán para evitar ck infección. Estas medidas pueden incluir:  § Rasurar el vello del lugar de la cirugía.  § Jonathan la piel con un jabón antiséptico.  § Ron antibióticos.  ¿Qué ocurre sandie el procedimiento?         Antes del comienzo de la cirugía  · Le colocarán ck vía intravenosa en ck vena.  · Le administrarán un medicamento para hacerlo dormir (anestesia general).  · Es posible que le coloquen medias ajustadas (medias de compresión) en las piernas para ayudar a la circulación de la lorri.  · Le colocarán un tubo pequeño y franz (catéter urinario) en la vejiga para drenar la orina.  · Le colocarán ck sonda a través de la nariz hasta el estómago (sonda nasogástrica) para drenar los líquidos  estomacales.  Cirugía  · Le realizarán ck pequeña incisión en el abdomen para introducir un tubo franz e iluminado (laparoscopio). Janki instrumento le permite al cirujano observar el riñón sandie el procedimiento.  · Le harán más incisiones pequeñas para introducir otros instrumentos quirúrgicos. Ck incisión puede ser levemente más lynette para extirpar el riñón.  ? En algunos casos, el cirujano realizará el procedimiento controlando herramientas que están unidas a un robot quirúrgico. [place] se denomina nefrectomía asistida por robot.  · Según el tipo de cirugía a la que se someta, se realizará alguno de los siguientes procedimientos:  ? Si le realizan ck nefrectomía parcial:  § Los vasos sanguíneos conectados al riñón se pinzarán.  § Le extirparán parte del riñón. Le cerrarán el riñón con puntos (suturas) y le quitarán la pinza en los vasos sanguíneos.  ? Si le realizan ck nefrectomía radical:  § Todos los vasos sanguíneos conectados al riñón se cerrarán y se cortarán.  § Le extirparán parte del tubo que transporta la orina desde el riñón hasta la vejiga (uréter).  § Le extirparán el riñón.  · Le retirarán todos los instrumentos quirúrgicos del cuerpo.  · Es posible que se coloque un tubo pequeño (drenaje) cerca de ck de las incisiones para drenar el líquido acumulado en la nadia de la cirugía.  · Es posible que las incisiones se cierren con suturas u otro tipo de cierre.  · Se colocará ck venda (vendaje) sobre la nadia de la incisión.  El procedimiento puede variar según el médico y el hospital.  ¿Qué ocurre después del procedimiento?  · Le controlarán la presión arterial, la frecuencia cardíaca, la frecuencia respiratoria y el nivel de oxígeno en la lorri hasta que le den el kassidy del hospital o la clínica.  · Le retirarán la sonda nasogástrica.  · Puede seguir teniendo:  ? Ck vía intravenosa hasta que pueda beber líquidos por ariza cuenta.  ? Un catéter urinario. Le controlarán la producción de orina.  ? Un  drenaje. Le quitarán el drenaje quirúrgico después de algunos días. El médico le dará instrucciones sobre cómo cuidar el drenaje en casa.  ? Analgésicos. Se administrarán a través de ck vía intravenosa (IV).  · Le recomendarán que sondra lo siguiente:  ? Levántese de la cama y camine lo antes posible.  ? Realice ejercicios de respiración, por ejemplo, toser y respirar profundamente. [place] ayudará a prevenir la neumonía.  Resumen  · La nefrectomía mínimamente invasiva es un procedimiento quirúrgico para extirpar un riñón. En algunos casos, solo se extirpa la parte dañada o enferma del riñón.  · Janki procedimiento se realiza a través de varias incisiones pequeñas en el abdomen.  · Siga las indicaciones del médico con respecto a los medicamentos y a los alimentos y las bebidas antes del procedimiento.  · Kymberly el procedimiento le administrarán un medicamento que la hará dormir (anestesia general).  Esta información no tiene mert fin reemplazar el consejo del médico. Asegúrese de hacerle al médico cualquier pregunta que tenga.  Document Released: 09/07/2016 Document Revised: 02/19/2020 Document Reviewed: 02/19/2020  [employer] Patient Education © 2020 [employer]  Discharge Instructions    Discharged to home by medical transportation with relative. Discharged via wheelchair, hospital escort: Yes.  Special equipment needed: Wheelchair    Be sure to schedule a follow-up appointment with your primary care doctor or any specialists as instructed.     Discharge Plan:   Diet Plan: Discussed  Activity Level: Discussed  Confirmed Follow up Appointment: Patient to Call and Schedule Appointment  Confirmed Symptoms Management: Discussed  Medication Reconciliation Updated: Yes  Influenza Vaccine Indication: Indicated: 9 to 64 years of age    I understand that a diet low in cholesterol, fat, and sodium is recommended for good health. Unless I have been given specific instructions below for another diet, I accept this instruction as  my diet prescription.   Other diet: Prior to arrival    Special Instructions: None    · Is patient discharged on Warfarin / Coumadin?   No     Depression / Suicide Risk    As you are discharged from this [facility] facility, it is important to learn how to keep safe from harming yourself.    Recognize the warning signs:  · Abrupt changes in personality, positive or negative- including increase in energy   · Giving away possessions  · Change in eating patterns- significant weight changes-  positive or negative  · Change in sleeping patterns- unable to sleep or sleeping all the time   · Unwillingness or inability to communicate  · Depression  · Unusual sadness, discouragement and loneliness  · Talk of wanting to die  · Neglect of personal appearance   · Rebelliousness- reckless behavior  · Withdrawal from people/activities they love  · Confusion- inability to concentrate     If you or a loved one observes any of these behaviors or has concerns about self-harm, here's what you can do:  · Talk about it- your feelings and reasons for harming yourself  · Remove any means that you might use to hurt yourself (examples: pills, rope, extension cords, firearm)  · Get professional help from the community (Mental Health, Substance Abuse, psychological counseling)  · Do not be alone:Call your Safe Contact- someone whom you trust who will be there for you.  · Call your local CRISIS HOTLINE 436-9957 or 127-494-1172  · Call your local Children's Mobile Crisis Response Team [place] (527) 004-9774 or www.[employer]  · Call the toll free National Suicide Prevention Hotlines   · National Suicide Prevention Lifeline 001-462-GPUW (7318)  · National Hope Line Network 800-SUICIDE (309-3877)

## 2021-10-21 NOTE — PROGRESS NOTES
Discharging patient home per MD orders. Patient demonstrated understanding of discharge instructions and educational materials, as well as, follow up appointments, medications, prescriptions and home care for surgical wound.  number [id number] used for discharge instructions. Patient is discharging with rosen catheter, pain is well controlled, tolerating oral medications. O2 is greater than 90%. All questions have been answered. Patient and family set up own transportation with wheelchair home. Patient has been discharged off the unit with a hospital escort.

## 2021-10-21 NOTE — CARE PLAN
The patient is Stable - Low risk of patient condition declining or worsening    Shift Goals  Clinical Goals: Pain management, rest  Patient Goals: rest, manage pain    Progress made toward(s) clinical / shift goals:  Patient able to tolerate PO pain medications.   Problem: Knowledge Deficit - Standard  Goal: Patient and family/care givers will demonstrate understanding of plan of care, disease process/condition, diagnostic tests and medications  Outcome: Progressing     Problem: Pain - Standard  Goal: Alleviation of pain or a reduction in pain to the patient’s comfort goal  Outcome: Progressing     Problem: Skin Integrity  Goal: Skin integrity is maintained or improved  Outcome: Progressing     Problem: Fall Risk  Goal: Patient will remain free from falls  Outcome: Progressing       Patient is not progressing towards the following goals: Patient unable to void, MD notified, new rosen placed.

## 2021-10-21 NOTE — DISCHARGE SUMMARY
Discharge Summary     CHIEF COMPLAINT ON ADMISSION  No chief complaint on file.     Reason for Admission  s/p RIGHT radical nephrectomy with Dr. Painting on 10/20/2021.     Admission Date  (Not on file)     CODE STATUS  Full Code     HPI & HOSPITAL COURSE  This is a 51 y.o. female here s/p RIGHT radical nephrectomy with Dr. Painting on 10/20/2021. Patient is doing better today and reports that pain is 6/10 and controlled. She has a rosen with clear yellow urine in tubing. She had minimal food last night and minimal breakfast this morning but no nausea or vomiting. She has weakness she has been dealing with for around 1 year and has difficulty walking due to this. She had recent ankle surgery and reports right ankle/calf pain with no post-operative changes. She has been weened off the oxygen. She denies fever, chills, chest pain, SOB, nausea, vomiting.      Patient likely to discharge home today as long as pain is controlled and she is able to tolerate more of a meal prior to leaving. She will have close outpatient f//u in 2 weeks with [facility] We will contact patient for this visit. Pain medicine prescribed to pharmacy through our office.      .Physical Exam  Constitutional:       Appearance: Normal appearance.   HENT:      Head: Normocephalic and atraumatic.   Eyes:      Extraocular Movements: Extraocular movements intact.   Pulmonary:      Effort: Pulmonary effort is normal. No respiratory distress.   Abdominal:      General: Abdomen is flat.      Palpations: Abdomen is soft.      Comments: Incisions are C/D/I, appropriate post-op TTP, no distention.    Genitourinary:     Comments: Rosen with clear yellow urine   Skin:     General: Skin is warm and dry.   Neurological:      General: No focal deficit present.      Mental Status: She is alert and oriented to person, place, and time.   Psychiatric:         Mood and Affect: Mood normal.         Behavior: Behavior normal.                     No notes on file      Therefore, she is discharged in good and stable condition to home with close outpatient follow-up.     The patient recovered much more quickly than anticipated on admission.     Discharge Date  10/21/2021     DISCHARGE DIAGNOSES  Active Problems:    * No active hospital problems. *  Resolved Problems:    * No resolved hospital problems. *        FOLLOW UP         Future Appointments   Date Time Provider Department Center   11/3/2021  1:15 PM [facility]   11/3/2021  1:45 PM Alexander Castillo M.D. [facility]      No follow-up provider specified.     MEDICATIONS ON DISCHARGE      Medication List       Notice    Cannot display discharge medications because the patient has not yet been admitted.            Allergies        Allergies   Allergen Reactions   • Latex Rash       hives         DIET  No orders of the defined types were placed in this encounter.        ACTIVITY  Light duty.  Weight bearing as tolerated        PROCEDURES  s/p RIGHT radical nephrectomy with Dr. Painting on 10/20/2021.     LABORATORY        Lab Results   Component Value Date     SODIUM 135 10/21/2021     POTASSIUM 4.3 10/21/2021     CHLORIDE 102 10/21/2021     CO2 21 10/21/2021     GLUCOSE 112 (H) 10/21/2021     BUN 11 10/21/2021     CREATININE 0.75 10/21/2021               Lab Results   Component Value Date     WBC 16.0 (H) 10/21/2021     HEMOGLOBIN 13.0 10/21/2021     HEMATOCRIT 38.9 10/21/2021     PLATELETCT 279 10/21/2021         Total time of the discharge process exceeds 35 minutes.    Placed into wrong encounter initially.

## 2021-10-21 NOTE — DISCHARGE SUMMARY
Discharge Summary    CHIEF COMPLAINT ON ADMISSION  No chief complaint on file.    Reason for Admission  s/p RIGHT radical nephrectomy with Dr. Painting on 10/20/2021.    Admission Date  (Not on file)    CODE STATUS  Full Code    HPI & HOSPITAL COURSE  This is a 51 y.o. female here s/p RIGHT radical nephrectomy with Dr. Painting on 10/20/2021. Patient is doing better today and reports that pain is 6/10 and controlled. She has a rosen with clear yellow urine in tubing. She had minimal food last night and minimal breakfast this morning but no nausea or vomiting. She has weakness she has been dealing with for around 1 year and has difficulty walking due to this. She had recent ankle surgery and reports right ankle/calf pain with no post-operative changes. She has been weened off the oxygen. She denies fever, chills, chest pain, SOB, nausea, vomiting.     Patient likely to discharge home today as long as pain is controlled and she is able to tolerate more of a meal prior to leaving. She will have close outpatient f//u in 2 weeks with [facility] We will contact patient for this visit. Pain medicine prescribed to pharmacy through our office.     .Physical Exam  Constitutional:       Appearance: Normal appearance.   HENT:      Head: Normocephalic and atraumatic.   Eyes:      Extraocular Movements: Extraocular movements intact.   Pulmonary:      Effort: Pulmonary effort is normal. No respiratory distress.   Abdominal:      General: Abdomen is flat.      Palpations: Abdomen is soft.      Comments: Incisions are C/D/I, appropriate post-op TTP, no distention.    Genitourinary:     Comments: Rosen with clear yellow urine   Skin:     General: Skin is warm and dry.   Neurological:      General: No focal deficit present.      Mental Status: She is alert and oriented to person, place, and time.   Psychiatric:         Mood and Affect: Mood normal.         Behavior: Behavior normal.               No notes on file    Therefore, she  is discharged in good and stable condition to home with close outpatient follow-up.    The patient recovered much more quickly than anticipated on admission.    Discharge Date  10/21/2021    DISCHARGE DIAGNOSES  Active Problems:    * No active hospital problems. *  Resolved Problems:    * No resolved hospital problems. *      FOLLOW UP  Future Appointments   Date Time Provider Department Center   11/3/2021  1:15 PM [facility]   11/3/2021  1:45 PM Alexander Castillo M.D. [facility]     No follow-up provider specified.    MEDICATIONS ON DISCHARGE     Medication List      Notice    Cannot display discharge medications because the patient has not yet been admitted.         Allergies  Allergies   Allergen Reactions   • Latex Rash     hives       DIET  No orders of the defined types were placed in this encounter.      ACTIVITY  Light duty.  Weight bearing as tolerated      PROCEDURES  s/p RIGHT radical nephrectomy with Dr. Painting on 10/20/2021.    LABORATORY  Lab Results   Component Value Date    SODIUM 135 10/21/2021    POTASSIUM 4.3 10/21/2021    CHLORIDE 102 10/21/2021    CO2 21 10/21/2021    GLUCOSE 112 (H) 10/21/2021    BUN 11 10/21/2021    CREATININE 0.75 10/21/2021        Lab Results   Component Value Date    WBC 16.0 (H) 10/21/2021    HEMOGLOBIN 13.0 10/21/2021    HEMATOCRIT 38.9 10/21/2021    PLATELETCT 279 10/21/2021        Total time of the discharge process exceeds 35 minutes.

## 2021-10-22 ENCOUNTER — HOSPITAL ENCOUNTER (INPATIENT)
Facility: MEDICAL CENTER | Age: 51
LOS: 8 days | DRG: 394 | End: 2021-11-01
Attending: EMERGENCY MEDICINE | Admitting: INTERNAL MEDICINE
Payer: MEDICAID

## 2021-10-22 ENCOUNTER — APPOINTMENT (OUTPATIENT)
Dept: RADIOLOGY | Facility: MEDICAL CENTER | Age: 51
DRG: 394 | End: 2021-10-22
Attending: EMERGENCY MEDICINE
Payer: MEDICAID

## 2021-10-22 DIAGNOSIS — G89.18 POST-OP PAIN: ICD-10-CM

## 2021-10-22 DIAGNOSIS — K56.7 ILEUS (HCC): ICD-10-CM

## 2021-10-22 DIAGNOSIS — G63 POLYNEUROPATHY DUE TO MEDICAL CONDITION (HCC): ICD-10-CM

## 2021-10-22 DIAGNOSIS — R11.2 NAUSEA AND VOMITING, INTRACTABILITY OF VOMITING NOT SPECIFIED, UNSPECIFIED VOMITING TYPE: ICD-10-CM

## 2021-10-22 DIAGNOSIS — K59.00 CONSTIPATION, UNSPECIFIED CONSTIPATION TYPE: ICD-10-CM

## 2021-10-22 DIAGNOSIS — C64.1 CLEAR CELL RENAL CELL CARCINOMA, RIGHT (HCC): ICD-10-CM

## 2021-10-22 LAB
ALBUMIN SERPL BCP-MCNC: 4.1 G/DL (ref 3.2–4.9)
ALBUMIN/GLOB SERPL: 1 G/DL
ALP SERPL-CCNC: 248 U/L (ref 30–99)
ALT SERPL-CCNC: 272 U/L (ref 2–50)
ANION GAP SERPL CALC-SCNC: 13 MMOL/L (ref 7–16)
APPEARANCE UR: CLEAR
AST SERPL-CCNC: 177 U/L (ref 12–45)
BACTERIA #/AREA URNS HPF: NEGATIVE /HPF
BASOPHILS # BLD AUTO: 0.3 % (ref 0–1.8)
BASOPHILS # BLD: 0.04 K/UL (ref 0–0.12)
BILIRUB SERPL-MCNC: 0.7 MG/DL (ref 0.1–1.5)
BILIRUB UR QL STRIP.AUTO: NEGATIVE
BUN SERPL-MCNC: 11 MG/DL (ref 8–22)
CALCIUM SERPL-MCNC: 9.5 MG/DL (ref 8.5–10.5)
CHLORIDE SERPL-SCNC: 98 MMOL/L (ref 96–112)
CO2 SERPL-SCNC: 22 MMOL/L (ref 20–33)
COLOR UR: YELLOW
CREAT SERPL-MCNC: 0.64 MG/DL (ref 0.5–1.4)
EOSINOPHIL # BLD AUTO: 0.27 K/UL (ref 0–0.51)
EOSINOPHIL NFR BLD: 1.8 % (ref 0–6.9)
EPI CELLS #/AREA URNS HPF: NEGATIVE /HPF
ERYTHROCYTE [DISTWIDTH] IN BLOOD BY AUTOMATED COUNT: 39.4 FL (ref 35.9–50)
GLOBULIN SER CALC-MCNC: 4.1 G/DL (ref 1.9–3.5)
GLUCOSE SERPL-MCNC: 146 MG/DL (ref 65–99)
GLUCOSE UR STRIP.AUTO-MCNC: NEGATIVE MG/DL
HCT VFR BLD AUTO: 44 % (ref 37–47)
HGB BLD-MCNC: 15.1 G/DL (ref 12–16)
HYALINE CASTS #/AREA URNS LPF: ABNORMAL /LPF
IMM GRANULOCYTES # BLD AUTO: 0.1 K/UL (ref 0–0.11)
IMM GRANULOCYTES NFR BLD AUTO: 0.7 % (ref 0–0.9)
KETONES UR STRIP.AUTO-MCNC: ABNORMAL MG/DL
LEUKOCYTE ESTERASE UR QL STRIP.AUTO: NEGATIVE
LIPASE SERPL-CCNC: 18 U/L (ref 11–82)
LYMPHOCYTES # BLD AUTO: 1 K/UL (ref 1–4.8)
LYMPHOCYTES NFR BLD: 6.8 % (ref 22–41)
MCH RBC QN AUTO: 27.2 PG (ref 27–33)
MCHC RBC AUTO-ENTMCNC: 34.3 G/DL (ref 33.6–35)
MCV RBC AUTO: 79.3 FL (ref 81.4–97.8)
MICRO URNS: ABNORMAL
MONOCYTES # BLD AUTO: 0.67 K/UL (ref 0–0.85)
MONOCYTES NFR BLD AUTO: 4.6 % (ref 0–13.4)
NEUTROPHILS # BLD AUTO: 12.53 K/UL (ref 2–7.15)
NEUTROPHILS NFR BLD: 85.8 % (ref 44–72)
NITRITE UR QL STRIP.AUTO: NEGATIVE
NRBC # BLD AUTO: 0 K/UL
NRBC BLD-RTO: 0 /100 WBC
PH UR STRIP.AUTO: 6.5 [PH] (ref 5–8)
PLATELET # BLD AUTO: 298 K/UL (ref 164–446)
PMV BLD AUTO: 10.4 FL (ref 9–12.9)
POTASSIUM SERPL-SCNC: 3.8 MMOL/L (ref 3.6–5.5)
PROT SERPL-MCNC: 8.2 G/DL (ref 6–8.2)
PROT UR QL STRIP: 30 MG/DL
RBC # BLD AUTO: 5.55 M/UL (ref 4.2–5.4)
RBC # URNS HPF: ABNORMAL /HPF
RBC UR QL AUTO: ABNORMAL
SODIUM SERPL-SCNC: 133 MMOL/L (ref 135–145)
SP GR UR STRIP.AUTO: 1.01
UROBILINOGEN UR STRIP.AUTO-MCNC: 0.2 MG/DL
WBC # BLD AUTO: 14.6 K/UL (ref 4.8–10.8)
WBC #/AREA URNS HPF: ABNORMAL /HPF

## 2021-10-22 PROCEDURE — 96374 THER/PROPH/DIAG INJ IV PUSH: CPT

## 2021-10-22 PROCEDURE — 80053 COMPREHEN METABOLIC PANEL: CPT

## 2021-10-22 PROCEDURE — 86335 IMMUNFIX E-PHORSIS/URINE/CSF: CPT

## 2021-10-22 PROCEDURE — 81001 URINALYSIS AUTO W/SCOPE: CPT

## 2021-10-22 PROCEDURE — 84156 ASSAY OF PROTEIN URINE: CPT

## 2021-10-22 PROCEDURE — 83690 ASSAY OF LIPASE: CPT

## 2021-10-22 PROCEDURE — 84166 PROTEIN E-PHORESIS/URINE/CSF: CPT

## 2021-10-22 PROCEDURE — 700111 HCHG RX REV CODE 636 W/ 250 OVERRIDE (IP): Performed by: EMERGENCY MEDICINE

## 2021-10-22 PROCEDURE — 96375 TX/PRO/DX INJ NEW DRUG ADDON: CPT

## 2021-10-22 PROCEDURE — 85025 COMPLETE CBC W/AUTO DIFF WBC: CPT

## 2021-10-22 PROCEDURE — 99285 EMERGENCY DEPT VISIT HI MDM: CPT

## 2021-10-22 PROCEDURE — 83735 ASSAY OF MAGNESIUM: CPT

## 2021-10-22 PROCEDURE — 700105 HCHG RX REV CODE 258: Performed by: EMERGENCY MEDICINE

## 2021-10-22 RX ORDER — ONDANSETRON 2 MG/ML
4 INJECTION INTRAMUSCULAR; INTRAVENOUS ONCE
Status: COMPLETED | OUTPATIENT
Start: 2021-10-22 | End: 2021-10-22

## 2021-10-22 RX ORDER — SODIUM CHLORIDE, SODIUM LACTATE, POTASSIUM CHLORIDE, CALCIUM CHLORIDE 600; 310; 30; 20 MG/100ML; MG/100ML; MG/100ML; MG/100ML
1000 INJECTION, SOLUTION INTRAVENOUS ONCE
Status: COMPLETED | OUTPATIENT
Start: 2021-10-22 | End: 2021-10-23

## 2021-10-22 RX ORDER — MORPHINE SULFATE 4 MG/ML
4 INJECTION, SOLUTION INTRAMUSCULAR; INTRAVENOUS ONCE
Status: COMPLETED | OUTPATIENT
Start: 2021-10-22 | End: 2021-10-22

## 2021-10-22 RX ADMIN — SODIUM CHLORIDE, POTASSIUM CHLORIDE, SODIUM LACTATE AND CALCIUM CHLORIDE 1000 ML: 600; 310; 30; 20 INJECTION, SOLUTION INTRAVENOUS at 23:00

## 2021-10-22 RX ADMIN — ONDANSETRON 4 MG: 2 INJECTION INTRAMUSCULAR; INTRAVENOUS at 23:08

## 2021-10-22 RX ADMIN — MORPHINE SULFATE 4 MG: 4 INJECTION INTRAVENOUS at 23:08

## 2021-10-22 ASSESSMENT — LIFESTYLE VARIABLES
HAVE YOU EVER FELT YOU SHOULD CUT DOWN ON YOUR DRINKING: NO
HAVE PEOPLE ANNOYED YOU BY CRITICIZING YOUR DRINKING: NO
ON A TYPICAL DAY WHEN YOU DRINK ALCOHOL HOW MANY DRINKS DO YOU HAVE: 0
TOTAL SCORE: 0
CONSUMPTION TOTAL: NEGATIVE
EVER HAD A DRINK FIRST THING IN THE MORNING TO STEADY YOUR NERVES TO GET RID OF A HANGOVER: NO
HOW MANY TIMES IN THE PAST YEAR HAVE YOU HAD 5 OR MORE DRINKS IN A DAY: 0
TOTAL SCORE: 0
TOTAL SCORE: 0
DO YOU DRINK ALCOHOL: NO
AVERAGE NUMBER OF DAYS PER WEEK YOU HAVE A DRINK CONTAINING ALCOHOL: 0
EVER FELT BAD OR GUILTY ABOUT YOUR DRINKING: NO

## 2021-10-22 ASSESSMENT — FIBROSIS 4 INDEX: FIB4 SCORE: 0.58

## 2021-10-22 ASSESSMENT — PAIN DESCRIPTION - PAIN TYPE: TYPE: ACUTE PAIN

## 2021-10-23 ENCOUNTER — APPOINTMENT (OUTPATIENT)
Dept: RADIOLOGY | Facility: MEDICAL CENTER | Age: 51
DRG: 394 | End: 2021-10-23
Attending: HOSPITALIST
Payer: MEDICAID

## 2021-10-23 ENCOUNTER — APPOINTMENT (OUTPATIENT)
Dept: RADIOLOGY | Facility: MEDICAL CENTER | Age: 51
DRG: 394 | End: 2021-10-23
Attending: EMERGENCY MEDICINE
Payer: MEDICAID

## 2021-10-23 PROBLEM — K56.7 ILEUS (HCC): Status: ACTIVE | Noted: 2021-10-23

## 2021-10-23 PROBLEM — R79.89 LFT ELEVATION: Status: ACTIVE | Noted: 2021-10-23

## 2021-10-23 PROBLEM — Z90.5 H/O RIGHT RADICAL NEPHRECTOMY: Status: ACTIVE | Noted: 2021-10-23

## 2021-10-23 LAB
CRP SERPL HS-MCNC: 4.94 MG/DL (ref 0–0.75)
ERYTHROCYTE [SEDIMENTATION RATE] IN BLOOD BY WESTERGREN METHOD: 28 MM/HOUR (ref 0–25)
HAV IGM SERPL QL IA: NORMAL
HBV CORE IGM SER QL: NORMAL
HBV SURFACE AG SER QL: NORMAL
HCV AB SER QL: NORMAL
HIV 1+2 AB+HIV1 P24 AG SERPL QL IA: NORMAL
MAGNESIUM SERPL-MCNC: 2.3 MG/DL (ref 1.5–2.5)
TREPONEMA PALLIDUM IGG+IGM AB [PRESENCE] IN SERUM OR PLASMA BY IMMUNOASSAY: NORMAL
TSH SERPL DL<=0.005 MIU/L-ACNC: 0.9 UIU/ML (ref 0.38–5.33)
VIT B12 SERPL-MCNC: 1197 PG/ML (ref 211–911)

## 2021-10-23 PROCEDURE — 96376 TX/PRO/DX INJ SAME DRUG ADON: CPT

## 2021-10-23 PROCEDURE — 72156 MRI NECK SPINE W/O & W/DYE: CPT

## 2021-10-23 PROCEDURE — 700102 HCHG RX REV CODE 250 W/ 637 OVERRIDE(OP): Performed by: HOSPITALIST

## 2021-10-23 PROCEDURE — 86780 TREPONEMA PALLIDUM: CPT

## 2021-10-23 PROCEDURE — 96366 THER/PROPH/DIAG IV INF ADDON: CPT

## 2021-10-23 PROCEDURE — 700102 HCHG RX REV CODE 250 W/ 637 OVERRIDE(OP): Performed by: INTERNAL MEDICINE

## 2021-10-23 PROCEDURE — 72158 MRI LUMBAR SPINE W/O & W/DYE: CPT

## 2021-10-23 PROCEDURE — 99245 OFF/OP CONSLTJ NEW/EST HI 55: CPT | Performed by: STUDENT IN AN ORGANIZED HEALTH CARE EDUCATION/TRAINING PROGRAM

## 2021-10-23 PROCEDURE — G0378 HOSPITAL OBSERVATION PER HR: HCPCS

## 2021-10-23 PROCEDURE — 700111 HCHG RX REV CODE 636 W/ 250 OVERRIDE (IP): Performed by: STUDENT IN AN ORGANIZED HEALTH CARE EDUCATION/TRAINING PROGRAM

## 2021-10-23 PROCEDURE — A9270 NON-COVERED ITEM OR SERVICE: HCPCS | Performed by: INTERNAL MEDICINE

## 2021-10-23 PROCEDURE — A9576 INJ PROHANCE MULTIPACK: HCPCS | Performed by: HOSPITALIST

## 2021-10-23 PROCEDURE — 96372 THER/PROPH/DIAG INJ SC/IM: CPT

## 2021-10-23 PROCEDURE — 82784 ASSAY IGA/IGD/IGG/IGM EACH: CPT

## 2021-10-23 PROCEDURE — 72157 MRI CHEST SPINE W/O & W/DYE: CPT

## 2021-10-23 PROCEDURE — 700105 HCHG RX REV CODE 258: Performed by: INTERNAL MEDICINE

## 2021-10-23 PROCEDURE — A9270 NON-COVERED ITEM OR SERVICE: HCPCS | Performed by: HOSPITALIST

## 2021-10-23 PROCEDURE — 86140 C-REACTIVE PROTEIN: CPT

## 2021-10-23 PROCEDURE — 70553 MRI BRAIN STEM W/O & W/DYE: CPT

## 2021-10-23 PROCEDURE — 84443 ASSAY THYROID STIM HORMONE: CPT

## 2021-10-23 PROCEDURE — 74018 RADEX ABDOMEN 1 VIEW: CPT

## 2021-10-23 PROCEDURE — 87389 HIV-1 AG W/HIV-1&-2 AB AG IA: CPT

## 2021-10-23 PROCEDURE — 80074 ACUTE HEPATITIS PANEL: CPT

## 2021-10-23 PROCEDURE — 700117 HCHG RX CONTRAST REV CODE 255: Performed by: HOSPITALIST

## 2021-10-23 PROCEDURE — 82607 VITAMIN B-12: CPT

## 2021-10-23 PROCEDURE — 99220 PR INITIAL OBSERVATION CARE,LEVL III: CPT | Performed by: INTERNAL MEDICINE

## 2021-10-23 PROCEDURE — 700111 HCHG RX REV CODE 636 W/ 250 OVERRIDE (IP): Performed by: INTERNAL MEDICINE

## 2021-10-23 PROCEDURE — 85652 RBC SED RATE AUTOMATED: CPT

## 2021-10-23 PROCEDURE — 96365 THER/PROPH/DIAG IV INF INIT: CPT

## 2021-10-23 RX ORDER — PROCHLORPERAZINE EDISYLATE 5 MG/ML
5-10 INJECTION INTRAMUSCULAR; INTRAVENOUS EVERY 4 HOURS PRN
Status: DISCONTINUED | OUTPATIENT
Start: 2021-10-23 | End: 2021-11-01 | Stop reason: HOSPADM

## 2021-10-23 RX ORDER — PROMETHAZINE HYDROCHLORIDE 25 MG/1
12.5-25 TABLET ORAL EVERY 4 HOURS PRN
Status: DISCONTINUED | OUTPATIENT
Start: 2021-10-23 | End: 2021-11-01 | Stop reason: HOSPADM

## 2021-10-23 RX ORDER — HEPARIN SODIUM 5000 [USP'U]/ML
5000 INJECTION, SOLUTION INTRAVENOUS; SUBCUTANEOUS EVERY 8 HOURS
Status: DISCONTINUED | OUTPATIENT
Start: 2021-10-23 | End: 2021-10-25

## 2021-10-23 RX ORDER — BISACODYL 10 MG
10 SUPPOSITORY, RECTAL RECTAL
Status: DISCONTINUED | OUTPATIENT
Start: 2021-10-23 | End: 2021-10-25

## 2021-10-23 RX ORDER — LABETALOL HYDROCHLORIDE 5 MG/ML
10 INJECTION, SOLUTION INTRAVENOUS EVERY 4 HOURS PRN
Status: DISCONTINUED | OUTPATIENT
Start: 2021-10-23 | End: 2021-11-01 | Stop reason: HOSPADM

## 2021-10-23 RX ORDER — ONDANSETRON 4 MG/1
4 TABLET, ORALLY DISINTEGRATING ORAL EVERY 6 HOURS PRN
Qty: 10 TABLET | Refills: 0 | Status: SHIPPED | OUTPATIENT
Start: 2021-10-23 | End: 2022-03-24

## 2021-10-23 RX ORDER — SODIUM CHLORIDE 9 MG/ML
INJECTION, SOLUTION INTRAVENOUS CONTINUOUS
Status: ACTIVE | OUTPATIENT
Start: 2021-10-23 | End: 2021-10-23

## 2021-10-23 RX ORDER — AMOXICILLIN 250 MG
2 CAPSULE ORAL 2 TIMES DAILY
Status: DISCONTINUED | OUTPATIENT
Start: 2021-10-23 | End: 2021-10-25

## 2021-10-23 RX ORDER — PROMETHAZINE HYDROCHLORIDE 25 MG/1
12.5-25 SUPPOSITORY RECTAL EVERY 4 HOURS PRN
Status: DISCONTINUED | OUTPATIENT
Start: 2021-10-23 | End: 2021-11-01 | Stop reason: HOSPADM

## 2021-10-23 RX ORDER — ENALAPRILAT 1.25 MG/ML
1.25 INJECTION INTRAVENOUS EVERY 6 HOURS PRN
Status: DISCONTINUED | OUTPATIENT
Start: 2021-10-23 | End: 2021-11-01 | Stop reason: HOSPADM

## 2021-10-23 RX ORDER — LACTULOSE 10 G/15ML
300 SOLUTION ORAL ONCE
Status: COMPLETED | OUTPATIENT
Start: 2021-10-23 | End: 2021-10-23

## 2021-10-23 RX ORDER — ONDANSETRON 4 MG/1
4 TABLET, ORALLY DISINTEGRATING ORAL EVERY 4 HOURS PRN
Status: DISCONTINUED | OUTPATIENT
Start: 2021-10-23 | End: 2021-11-01 | Stop reason: HOSPADM

## 2021-10-23 RX ORDER — ONDANSETRON 2 MG/ML
4 INJECTION INTRAMUSCULAR; INTRAVENOUS EVERY 4 HOURS PRN
Status: DISCONTINUED | OUTPATIENT
Start: 2021-10-23 | End: 2021-11-01 | Stop reason: HOSPADM

## 2021-10-23 RX ORDER — POLYETHYLENE GLYCOL 3350 17 G/17G
1 POWDER, FOR SOLUTION ORAL
Status: DISCONTINUED | OUTPATIENT
Start: 2021-10-23 | End: 2021-10-25

## 2021-10-23 RX ORDER — CLONIDINE HYDROCHLORIDE 0.1 MG/1
0.1 TABLET ORAL EVERY 6 HOURS PRN
Status: DISCONTINUED | OUTPATIENT
Start: 2021-10-23 | End: 2021-11-01 | Stop reason: HOSPADM

## 2021-10-23 RX ORDER — ACETAMINOPHEN 325 MG/1
650 TABLET ORAL EVERY 4 HOURS PRN
Status: DISCONTINUED | OUTPATIENT
Start: 2021-10-23 | End: 2021-11-01

## 2021-10-23 RX ADMIN — HEPARIN SODIUM 5000 UNITS: 5000 INJECTION, SOLUTION INTRAVENOUS; SUBCUTANEOUS at 06:02

## 2021-10-23 RX ADMIN — ACETAMINOPHEN 650 MG: 325 TABLET ORAL at 19:41

## 2021-10-23 RX ADMIN — ONDANSETRON 4 MG: 2 INJECTION INTRAMUSCULAR; INTRAVENOUS at 08:12

## 2021-10-23 RX ADMIN — GADOTERIDOL 15 ML: 279.3 INJECTION, SOLUTION INTRAVENOUS at 17:10

## 2021-10-23 RX ADMIN — IMMUNE GLOBULIN INFUSION (HUMAN) 35 G: 100 INJECTION, SOLUTION INTRAVENOUS; SUBCUTANEOUS at 12:34

## 2021-10-23 RX ADMIN — LACTULOSE 300 ML: 10 SOLUTION ORAL at 04:08

## 2021-10-23 RX ADMIN — HEPARIN SODIUM 5000 UNITS: 5000 INJECTION, SOLUTION INTRAVENOUS; SUBCUTANEOUS at 22:07

## 2021-10-23 RX ADMIN — SODIUM CHLORIDE: 9 INJECTION, SOLUTION INTRAVENOUS at 04:28

## 2021-10-23 RX ADMIN — HEPARIN SODIUM 5000 UNITS: 5000 INJECTION, SOLUTION INTRAVENOUS; SUBCUTANEOUS at 14:54

## 2021-10-23 ASSESSMENT — ENCOUNTER SYMPTOMS
DOUBLE VISION: 0
STRIDOR: 0
ABDOMINAL PAIN: 1
DEPRESSION: 0
DIZZINESS: 0
CONSTIPATION: 1
LOSS OF CONSCIOUSNESS: 0
VOMITING: 1
CHILLS: 0
INSOMNIA: 0
SORE THROAT: 0
FEVER: 0
NECK PAIN: 0
NAUSEA: 1
HEMOPTYSIS: 0
MYALGIAS: 0
BRUISES/BLEEDS EASILY: 0
WEIGHT LOSS: 0
BLURRED VISION: 0
COUGH: 0
PALPITATIONS: 0
SHORTNESS OF BREATH: 0
HEADACHES: 0

## 2021-10-23 ASSESSMENT — PATIENT HEALTH QUESTIONNAIRE - PHQ9
SUM OF ALL RESPONSES TO PHQ9 QUESTIONS 1 AND 2: 0
2. FEELING DOWN, DEPRESSED, IRRITABLE, OR HOPELESS: NOT AT ALL
1. LITTLE INTEREST OR PLEASURE IN DOING THINGS: NOT AT ALL

## 2021-10-23 ASSESSMENT — FIBROSIS 4 INDEX
FIB4 SCORE: 1.8
FIB4 SCORE: 1.8

## 2021-10-23 ASSESSMENT — PAIN DESCRIPTION - PAIN TYPE
TYPE: ACUTE PAIN
TYPE: ACUTE PAIN

## 2021-10-23 ASSESSMENT — PAIN SCALES - WONG BAKER: WONGBAKER_NUMERICALRESPONSE: DOESN'T HURT AT ALL

## 2021-10-23 NOTE — PROGRESS NOTES
Received from [facility], [facility],  unable to walk, uses wheelchair at home. Denies pain or sob. Call light within reach. Needs attended. Plan of care discussed and understood.

## 2021-10-23 NOTE — ED TRIAGE NOTES
Noelle Campoverde  51 y.o.  Chief Complaint   Patient presents with   • Abdominal Pain   • N/V   • Post-Op Complications     Patient to triage via EMS for above complaints. D/C from hospital yesterday after having R kidney removed on Wednesday. Reports unable to keep anything down since then, unable to keep down pain medications. Reports pain to abdomen and R flank/back. Incisions appear well healing and intact. Pt has urinary catheter in place upon arrival, draining clear urine. Pt denies recent fevers.    Given 4mg ODT zofran by EMS PTA. States did not improve sx. Emesis bag provided. Pt not vomiting at this time.    Triage process explained to patient, apologized for wait time, and returned to lobby.  Pt informed to notify staff of any change in condition. NAD at this time.

## 2021-10-23 NOTE — ASSESSMENT & PLAN NOTE
S/p  right radical nephrectomy  Failed TOV 10/21, rosen in place, repeat TOV today 10/25   follow-up urology consult Dr. Barboza.  Urology signed off.

## 2021-10-23 NOTE — PROGRESS NOTES
Assumed patient care, no nausea. Complain abdominal pain. Will keep NPO and discuss with MD plan of care. Resting well.

## 2021-10-23 NOTE — CONSULTS
Neurology Initial Consult H&P  Neurohospitalist Service, [facility]    Referring Physician: Bashir Perales M.D.    Chief Complaint   Patient presents with   • Abdominal Pain   • N/V   • Post-Op Complications       HPI: Noelle Campoverde is a 51 y.o. woman presenting for whom neurology has been consulted for progressive weakness over the past 3 to 4 months.  The patient is [place]ian-speaking and her daughter is at bedside to provide additional history and translation.  She reports that 3 to 4 months ago she began to feel some pain and cramping around her right knee.  She states that she occasionally felt weak but overall was still functioning well.  The symptoms progressed to include numbness in both feet that slowly progressed up both of her legs.  She began to have falls and needed to use a cane to get around due to ongoing weakness in her lower extremities.    Later, the symptoms progressed to include her hands, which when she complained that her hands became numb in all digits distal to the wrist.  She began to drop items and have weakness in her  strength to the point where she cannot make a fist.  This weakness then progressed up her arms and now she is weak at the elbows and shoulders as well.  Overall, she progressed from needing a cane to using a wheelchair full-time.    Her daughter initially states that she saw a neurologist and had an MRI of her entire brain and spine which led to diagnosis of renal cancer.  At that point it seems that the medical focus shifted to the cancer and she recently had a nephrectomy 3 days ago.  On additional history taking, it seems that maybe the patient's primary care ordered the studies instead of a neurologist, and they may have actually been CT scans instead of MRIs.  The patient and her family are not certain on these details.    She denies any confusion, headaches, radiating neck pains, fasciculations, muscle twitches, atrophy,  difficulty swallowing, difficulty speaking or forming words, significant fatigability of symptoms throughout the day, fluctuation of symptoms throughout day or night, family history of related symptoms, hyperalgesia of the hands or feet.        Review of systems: In addition to what is detailed in the HPI above, all other systems reviewed and are negative.    Past Medical History:    has a past medical history of Chronic low back pain, Myopia, Renal disorder, and Weight gain. She also has no past medical history of Anxiety, Breast cancer (HCC), or Depression.    FHx:  family history includes Diabetes in her father; Heart Disease in her father; Osteoporosis in her mother.    SHx:   reports that she has quit smoking. She has never used smokeless tobacco. She reports that she does not drink alcohol and does not use drugs.    Allergies:  Allergies   Allergen Reactions   • Latex Rash     hives       Medications:    Current Facility-Administered Medications:   •  senna-docusate (PERICOLACE or SENOKOT S) 8.6-50 MG per tablet 2 Tablet, 2 Tablet, Oral, BID **AND** polyethylene glycol/lytes (MIRALAX) PACKET 1 Packet, 1 Packet, Oral, QDAY PRN **AND** magnesium hydroxide (MILK OF MAGNESIA) suspension 30 mL, 30 mL, Oral, QDAY PRN **AND** bisacodyl (DULCOLAX) suppository 10 mg, 10 mg, Rectal, QDAY PRN, Modesto Kaur M.D.  •  heparin injection 5,000 Units, 5,000 Units, Subcutaneous, Q8HRS, Modesto Kaur M.D., 5,000 Units at 10/23/21 0602  •  cloNIDine (CATAPRES) tablet 0.1 mg, 0.1 mg, Oral, Q6HRS PRN, Modesto Kaur M.D.  •  enalaprilat (Vasotec) injection 1.25 mg 1 mL, 1.25 mg, Intravenous, Q6HRS PRN, Modesto Kaur M.D.  •  labetalol (NORMODYNE/TRANDATE) injection 10 mg, 10 mg, Intravenous, Q4HRS PRN, Modesto Kaur M.D.  •  ondansetron (ZOFRAN) syringe/vial injection 4 mg, 4 mg, Intravenous, Q4HRS PRN, Modesto Kuar M.D., 4 mg at 10/23/21 0812  •  ondansetron (ZOFRAN ODT) dispertab 4 mg, 4 mg, Oral, Q4HRS PRN, Modesto ROSE  "RYAN Kaur  •  promethazine (PHENERGAN) tablet 12.5-25 mg, 12.5-25 mg, Oral, Q4HRS PRN, Modesto Kaur M.D.  •  promethazine (PHENERGAN) suppository 12.5-25 mg, 12.5-25 mg, Rectal, Q4HRS PRN, Modesto Kaur M.D.  •  prochlorperazine (COMPAZINE) injection 5-10 mg, 5-10 mg, Intravenous, Q4HRS PRN, Modesto Kaur M.D.    Physical Examination:     General: Patient is awake and in no acute distress  Eye: Examination of optic disks not indicated at this time given acuity of consult  Neck: There is normal range of motion  CV: regular rate   Extremities:  clear, dry, intact, without peripheral edema    NEUROLOGICAL EXAM:     /86   Pulse 80   Temp 37.1 °C (98.7 °F) (Temporal)   Resp 18   Ht 1.575 m (5' 2\")   Wt 69.1 kg (152 lb 5.4 oz)   LMP 03/08/2021   SpO2 94%   BMI 27.86 kg/m²       Mental status: Awake, alert and fully oriented  Speech and language: Speech is clear and fluent. The patient is able to name and repeat, and follow commands  Cranial nerve exam: Pupils are equal, round and reactive to light bilaterally. Visual fields are full. There is no nystagmus. Extraocular muscles are intact, but she reports some mild difficulty keeping her eyes open when she looks straight up.  No significant lid lag is noted. Face is symmetric. Sensation in the face is reduced to pinprick on the right. Palate elevates symmetrically. Tongue is midline without obvious atrophy or fasciculations.  Motor exam: Power 4 -/5 at the bilateral shoulders, 2/5 at bilateral elbows with flexion and extension, and 2/5 at bilateral wrist and  strength.  2/5 at bilateral hips and knees, 1/5 at bilateral ankles.  Tone is normal without adventitious movement, fasciculations, or obvious atrophy.    Sensory exam: Globally reduced to all sensory modalities.  There does appear to be a sensory level about 1 inch above the hip bilaterally to pinprick.  Deep tendon reflexes: Reflexes uniformly absent with the exception of bilateral " brachioradialis 1/4. Toes down-going bilaterally.  Coordination: No ataxia out of proportion to weakness on bilateral finger-to-nose testing  Gait: Unable to assess due to patient condition      Objective Data:    Labs:  No results found for: PROTHROMBTM, INR   Lab Results   Component Value Date/Time    WBC 14.6 (H) 10/22/2021 09:28 PM    RBC 5.55 (H) 10/22/2021 09:28 PM    HEMOGLOBIN 15.1 10/22/2021 09:28 PM    HEMATOCRIT 44.0 10/22/2021 09:28 PM    MCV 79.3 (L) 10/22/2021 09:28 PM    MCH 27.2 10/22/2021 09:28 PM    MCHC 34.3 10/22/2021 09:28 PM    MPV 10.4 10/22/2021 09:28 PM    NEUTSPOLYS 85.80 (H) 10/22/2021 09:28 PM    LYMPHOCYTES 6.80 (L) 10/22/2021 09:28 PM    MONOCYTES 4.60 10/22/2021 09:28 PM    EOSINOPHILS 1.80 10/22/2021 09:28 PM    BASOPHILS 0.30 10/22/2021 09:28 PM    HYPOCHROMIA 1+ 01/18/2012 08:48 AM      Lab Results   Component Value Date/Time    SODIUM 133 (L) 10/22/2021 09:28 PM    POTASSIUM 3.8 10/22/2021 09:28 PM    CHLORIDE 98 10/22/2021 09:28 PM    CO2 22 10/22/2021 09:28 PM    GLUCOSE 146 (H) 10/22/2021 09:28 PM    BUN 11 10/22/2021 09:28 PM    CREATININE 0.64 10/22/2021 09:28 PM    BUNCREATRAT 18 03/07/2019 07:34 AM      Lab Results   Component Value Date/Time    CHOLSTRLTOT 163 03/07/2019 07:34 AM    CHOLSTRLTOT 140 01/18/2012 08:48 AM    LDL 90 03/07/2019 07:34 AM    LDL 82 01/18/2012 08:48 AM    HDL 53 03/07/2019 07:34 AM    HDL 50 01/18/2012 08:48 AM    TRIGLYCERIDE 101 03/07/2019 07:34 AM    TRIGLYCERIDE 41 01/18/2012 08:48 AM       Lab Results   Component Value Date/Time    ALKPHOSPHAT 248 (H) 10/22/2021 09:28 PM    ASTSGOT 177 (H) 10/22/2021 09:28 PM    ALTSGPT 272 (H) 10/22/2021 09:28 PM    TBILIRUBIN 0.7 10/22/2021 09:28 PM        Imaging/Testing:    I interpreted and/or reviewed the patient's neuroimaging    [id number] VIEW   Final Result         1.  Nonspecific bowel gas pattern.      MR-CERVICAL SPINE-WITH & W/O    (Results Pending)   MR-THORACIC SPINE-WITH & W/O    (Results  Pending)   MR-LUMBAR SPINE-WITH & W/O    (Results Pending)   MR-BRAIN-WITH & W/O    (Results Pending)       Assessment and Plan:    Noelle Campoverde is a 51 y.o. presenting for whom neurology has been consulted for chronic, progressive, symmetric length dependent weakness and numbness affecting bilateral arms and legs for 3 to 4 months.  There are no obvious bulbar signs or symptoms, muscle atrophy, fasciculations, or radicular symptoms.  However, the patient does seem to have a sensory level where pinprick changes just above the level of her hip bilaterally, but otherwise nothing seems to point to spinal pathology.  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    Problem list:  1.  Progressive weakness  2.  Neuropathy  3.  Renal cancer      Plan:  -Given the suspicion for CIDP or paraneoplastic polyneuropathy, I recommend trying IVIG for 3 to 5 days.  The patient's other medical issues are expected to keep her in the hospital no more than 24 to 48 hours, so we will expedite IVIG and give 0.7 g/kg daily for 3 days, for a total of 2.1 g/kg after a normal IgA level (I have ordered this).   -The patient does need follow-up with neurology in the outpatient setting.  She will also need an EMG/NCS.  I will attempt to facilitate an inpatient EMG on Monday, but it is unlikely that our services can provide this.  -MRI brain, C-spine, T-spine, and L-spine with and without contrast is reasonable provided that the studies have not already been done.  Given the patient's sensory level and the severity of the symptoms, I think an extensive and comprehensive work-up is justified.  -I recommend some serum labs as listed below: TSH, AST/ALT, CPK, vitamin B12, thiamine, folate, lead, mercury, serum paraneoplastic panel.    The evaluation of the patient, and recommended management, was discussed with  the resident staff.     Rommel Beebe D.O.  Neurohospitalist, Acute Care Services

## 2021-10-23 NOTE — ASSESSMENT & PLAN NOTE
Improving  KUB on admission  CT Abd pelvis 10/24: diffuse wall thickening in the mid-distal small bowel in the mid abdomen as well as the cecum could relate to postop changes, infection/inflammation or ischemia, no obvious occlusion, air fluid levels throughout, likely ileus, recent R nephrectomy with small amount of fluid in the R renal fossa  - tolerating PO, CLD-->FLD, advance as tolerated  - passing gas and stool  10/26 advance to GI soft  10/27 tolerated GI soft diet well, will advance to regular diet

## 2021-10-23 NOTE — PROGRESS NOTES
Got Loose BM, did bed bath. Patient un able to help around. Weak BLE. Two person assist. Un able to ambulate, got help at home.

## 2021-10-23 NOTE — PROGRESS NOTES
Just admitted.    LFTs are elevated most likely related to the Tylenol usage as she admits to avoiding narcotics as they are making her nauseous.    She states that over the last several months she has had progressive weakness now she is wheelchair-bound    I have ordered MRI with and without contrast of the brain and entire spine at this time.    She may need a lumbar puncture.    We will check RPR HIV vitamin B12, TSH, hepatitis panel    Patient was constipated on arrival but she is had multiple bowel movements.  Her abdomen still remains distended.    PT OT has been ordered.    I have consulted neurology Dr. Carney    Transfer orders have been made as patient is not appropriate for the CDU
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
## 2021-10-23 NOTE — ED PROVIDER NOTES
ED Provider Note        Primary care provider: Damaris Matias M.D.    I verified that the patient was wearing a mask and I was wearing appropriate PPE every time I entered the room. The patient's mask was on the patient at all times during my encounter except for a brief view of the oropharynx.      CHIEF COMPLAINT  Chief Complaint   Patient presents with   • Abdominal Pain   • N/V   • Post-Op Complications       HPI  Noelle Campoverde is a 51 y.o. female who presents to the Emergency Department with chief complaint of nausea and vomiting.  Patient was recently discharged from [facility] day prior to presentation following a right nephrectomy on 10/20/2021. She was discharged with oxycodone. Per the patient, the morning of presentation began having intractable nausea with vomiting and was unable to keep any of her medications down which prompted her to come in.  Patient also had a recent ankle ORIF on 9/9/2021, shares she had been taking opiates for that pain in the past as well.  Estimates that her last bowel movement was around 4 days prior to presentation on 10/19/2021.   Patient did receive zofran with EMS, which she says improved her symptoms.  Denies fevers or chills. No hematemesis. Denies any urinary symptoms with rosen in place.     Pain is currently at a 5 out of 10 made worse by any attempted oral intake.    REVIEW OF SYSTEMS  10 systems reviewed and otherwise negative, pertinent positives and negatives listed in the history of present illness.    PAST MEDICAL HISTORY   has a past medical history of Chronic low back pain, Myopia, Renal disorder, and Weight gain.    SURGICAL HISTORY   has a past surgical history that includes tubal ligation; ankle orif (Right, 9/9/2021); and nephrectomy robotic xi (Right, 10/20/2021).    SOCIAL HISTORY  Social History     Tobacco Use   • Smoking status: Former Smoker   • Smokeless tobacco: Never Used   • Tobacco comment: 3 cigarettes/day   Vaping  "Use   • Vaping Use: Never used   Substance Use Topics   • Alcohol use: No   • Drug use: No      Social History     Substance and Sexual Activity   Drug Use No       FAMILY HISTORY  Non-Contributory    CURRENT MEDICATIONS  Home Medications     Reviewed by Shakir Butler (Pharmacy [facility]) on 10/23/21 at 0203  Med List Status: Complete   Medication Last Dose Status   acetaminophen (TYLENOL) 325 MG Tab PRN Active                ALLERGIES  Allergies   Allergen Reactions   • Latex Rash     hives       PHYSICAL EXAM  VITAL SIGNS: /80   Pulse 85   Temp 36.6 °C (97.8 °F) (Temporal)   Resp 17   Ht 1.575 m (5' 2\")   Wt 69.1 kg (152 lb 5.4 oz)   LMP 03/08/2021   SpO2 90%   BMI 27.86 kg/m²   Pulse ox interpretation: I interpret this pulse ox as normal.  Constitutional: Alert and oriented x 3, in mild distress  HEENT: Atraumatic normocephalic, pupils are equal round, extraocular movements are intact. The nares is clear, external ears are normal, mouth shows moist mucous membranes  Neck: no obvious JVD or tracheal deviation  Cardiovascular: Regular rate and rhythm no murmur rub or gallop   Thorax & Lungs: No respiratory distress, no wheezes rales or rhonchi, No chest tenderness.   GI: Diffusely tender to palpation, positive bowel sounds, appears mildly distended, multiple laparoscopic surgery wounds present which all appear to be well healing with no erythema or discharge from the wounds, no peritoneal signs  : Trammell in place, draining clear yellow urine  Rectal: Deferred  Skin: Warm dry no obvious acute rash or lesion  Musculoskeletal: Moving all extremities with normal range strength, no acute  deformity  Neurologic: Cranial nerves III through XII are grossly intact, no sensory deficit, no cerebellar dysfunction   Psychiatric: Appropriate affect for situation at this time      DIAGNOSTIC STUDIES / PROCEDURES  LABS      Results for orders placed or performed during the hospital encounter of 10/22/21   CBC " WITH DIFFERENTIAL   Result Value Ref Range    WBC 14.6 (H) 4.8 - 10.8 K/uL    RBC 5.55 (H) 4.20 - 5.40 M/uL    Hemoglobin 15.1 12.0 - 16.0 g/dL    Hematocrit 44.0 37.0 - 47.0 %    MCV 79.3 (L) 81.4 - 97.8 fL    MCH 27.2 27.0 - 33.0 pg    MCHC 34.3 33.6 - 35.0 g/dL    RDW 39.4 35.9 - 50.0 fL    Platelet Count 298 164 - 446 K/uL    MPV 10.4 9.0 - 12.9 fL    Neutrophils-Polys 85.80 (H) 44.00 - 72.00 %    Lymphocytes 6.80 (L) 22.00 - 41.00 %    Monocytes 4.60 0.00 - 13.40 %    Eosinophils 1.80 0.00 - 6.90 %    Basophils 0.30 0.00 - 1.80 %    Immature Granulocytes 0.70 0.00 - 0.90 %    Nucleated RBC 0.00 /100 WBC    Neutrophils (Absolute) 12.53 (H) 2.00 - 7.15 K/uL    Lymphs (Absolute) 1.00 1.00 - 4.80 K/uL    Monos (Absolute) 0.67 0.00 - 0.85 K/uL    Eos (Absolute) 0.27 0.00 - 0.51 K/uL    Baso (Absolute) 0.04 0.00 - 0.12 K/uL    Immature Granulocytes (abs) 0.10 0.00 - 0.11 K/uL    NRBC (Absolute) 0.00 K/uL   COMP METABOLIC PANEL   Result Value Ref Range    Sodium 133 (L) 135 - 145 mmol/L    Potassium 3.8 3.6 - 5.5 mmol/L    Chloride 98 96 - 112 mmol/L    Co2 22 20 - 33 mmol/L    Anion Gap 13.0 7.0 - 16.0    Glucose 146 (H) 65 - 99 mg/dL    Bun 11 8 - 22 mg/dL    Creatinine 0.64 0.50 - 1.40 mg/dL    Calcium 9.5 8.5 - 10.5 mg/dL    AST(SGOT) 177 (H) 12 - 45 U/L    ALT(SGPT) 272 (H) 2 - 50 U/L    Alkaline Phosphatase 248 (H) 30 - 99 U/L    Total Bilirubin 0.7 0.1 - 1.5 mg/dL    Albumin 4.1 3.2 - 4.9 g/dL    Total Protein 8.2 6.0 - 8.2 g/dL    Globulin 4.1 (H) 1.9 - 3.5 g/dL    A-G Ratio 1.0 g/dL   LIPASE   Result Value Ref Range    Lipase 18 11 - 82 U/L   URINALYSIS    Specimen: Urine, Trammell Cath   Result Value Ref Range    Color Yellow     Character Clear     Specific Gravity 1.012 <1.035    Ph 6.5 5.0 - 8.0    Glucose Negative Negative mg/dL    Ketones Trace (A) Negative mg/dL    Protein 30 (A) Negative mg/dL    Bilirubin Negative Negative    Urobilinogen, Urine 0.2 Negative    Nitrite Negative Negative    Leukocyte  Esterase Negative Negative    Occult Blood Moderate (A) Negative    Micro Urine Req Microscopic    ESTIMATED GFR   Result Value Ref Range    GFR If African American >60 >60 mL/min/1.73 m 2    GFR If Non African American >60 >60 mL/min/1.73 m 2   URINE MICROSCOPIC (W/UA)   Result Value Ref Range    WBC 0-2 /hpf    RBC 20-50 (A) /hpf    Bacteria Negative None /hpf    Epithelial Cells Negative /hpf    Hyaline Cast 0-2 /lpf   Magnesium   Result Value Ref Range    Magnesium 2.3 1.5 - 2.5 mg/dL       All labs reviewed by me.      RADIOLOGY  [id number] VIEW   Final Result         1.  Nonspecific bowel gas pattern.            COURSE & MEDICAL DECISION MAKING  Pertinent Labs & Imaging studies reviewed. (See chart for details)    10:49 PM - Patient seen and examined at bedside.     01:05 AM - Patient re-evaluated; reports nausea has improved since getting Zofran, no further episodes of emesis, reports pain is tolerable    Around 01:45 AM -Patient was offered some food for PO challenge. After eating was endorsing recurrence of her prior abdominal pain.    02:15 AM Discussed case with patient's urologist Dr. Painting, we are in agreement the patient has postoperative ileus at this time.  Will be kept in the hospital for IV hydration and for observation and return of bowel function.     Discussed case with hospitalist, Dr. Kaur, who graciously agrees to admit this patient.      Patient was given IV fluids based on clinical fluid status and history of emesis, oral hydration was not attempted due to insufficiency for hydration.    Medical Decision Making:     Patient's complaints of intractable nausea and vomiting in the context of recent abdominal surgery and recent opiate use raise concern for possible bowel obstruction. However patient also reports her last BM as 4 days prior to presentation, which was before the surgery and has bowel sounds present which indicates her symptoms may alternatively be due to ileus secondary  "to high stool burden.   Will order KUB to rule out obstruction. Ordering Zofran for nausea control, morphine for pain control in context of recent abdominal surgery. IVF for fluid repletion.     KUB indicates nonspecific bowel gas, making true obstruction less likely. Suspect her symptoms are secondary to high stool burden/ileus.    Patient has failed PO challenge even after antiemetics, will seek inpatient admission for management of her intractable nausea and vomiting as well as stool burden.       /80   Pulse 85   Temp 36.6 °C (97.8 °F) (Temporal)   Resp 17   Ht 1.575 m (5' 2\")   Wt 69.1 kg (152 lb 5.4 oz)   LMP 03/08/2021   SpO2 90%   BMI 27.86 kg/m²     Jay Painting M.D.  [address]  [facility] [address]  [phone]    Schedule an appointment as soon as possible for a visit       [facility], Emergency Dept  [address] [facility] [address] [phone]    in 12-24 hours if symptoms persist, immediately If symptoms worsen, or if you develop any other symptoms or concerns      Current Discharge Medication List      START taking these medications    Details   ondansetron (ZOFRAN ODT) 4 MG TABLET DISPERSIBLE Take 1 Tablet by mouth every 6 hours as needed.  Qty: 10 Tablet, Refills: 0    Associated Diagnoses: Nausea and vomiting, intractability of vomiting not specified, unspecified vomiting type             FINAL IMPRESSION  1. Nausea and vomiting, intractability of vomiting not specified, unspecified vomiting type Active   2. Post-op pain Active   3. Constipation, unspecified constipation type        This dictation has been created using voice recognition software and/or scribes. The accuracy of the dictation is limited by the abilities of the software and the expertise of the scribes. I expect there may be some errors of grammar and possibly content. I made every attempt to manually correct the errors within my dictation. However, errors related to voice " recognition software and/or scribes may still exist and should be interpreted within the appropriate context.

## 2021-10-23 NOTE — H&P
Hospital Medicine History & Physical Note    Date of Service  10/23/2021    Primary Care Physician  Damaris Matias M.D.    Consultants  urology    Specialist Names: Dr. Barboza    Code Status  Full Code    Chief Complaint  Chief Complaint   Patient presents with   • Abdominal Pain   • N/V   • Post-Op Complications       History of Presenting Illness  Noelle Campoverde is a 51 y.o. female who presented postop day 3 from radical right nephrectomy for renal mass 10/22/2021 with abdominal pain, distention, nausea and vomiting and intolerance of p.o.  In the emergency department she is found to have leukocytosis and ileus by abdominal x-ray.  Urologist Dr. Barboza is consulted and she is referred to the hospitalist for admission.  At bedside she denies fever chills admits nausea and vomiting as well as constipation without bowel movement for 5 days but is still passing flatus.    I discussed the plan of care with patient.    Review of Systems  Review of Systems   Constitutional: Negative for chills, fever, malaise/fatigue and weight loss.   HENT: Negative for sore throat and tinnitus.    Eyes: Negative for blurred vision and double vision.   Respiratory: Negative for cough, hemoptysis and stridor.    Cardiovascular: Negative for chest pain and palpitations.   Gastrointestinal: Positive for abdominal pain, nausea and vomiting.   Genitourinary: Negative for dysuria and urgency.   Musculoskeletal: Negative for myalgias and neck pain.   Skin: Negative for itching and rash.   Neurological: Negative for dizziness and headaches.   Endo/Heme/Allergies: Does not bruise/bleed easily.   Psychiatric/Behavioral: Negative for depression. The patient does not have insomnia.        Past Medical History   has a past medical history of Chronic low back pain, Myopia, Renal disorder, and Weight gain.    Surgical History   has a past surgical history that includes tubal ligation; ankle orif (Right, 9/9/2021); and  nephrectomy robotic xi (Right, 10/20/2021).     Family History  family history includes Diabetes in her father; Heart Disease in her father; Osteoporosis in her mother.   Family history reviewed with patient. There is no family history that is pertinent to the chief complaint.     Social History   reports that she has quit smoking. She has never used smokeless tobacco. She reports that she does not drink alcohol and does not use drugs.    Allergies  Allergies   Allergen Reactions   • Latex Rash     hives       Medications  Prior to Admission Medications   Prescriptions Last Dose Informant Patient Reported? Taking?   acetaminophen (TYLENOL) 325 MG Tab PRN at PRN  Yes No   Sig: Take 650 mg by mouth every four hours as needed.      Facility-Administered Medications: None       Physical Exam  Temp:  [37.1 °C (98.7 °F)] 37.1 °C (98.7 °F)  Pulse:  [86-93] 93  Resp:  [15-19] 17  BP: (117-143)/(74-97) 124/76  SpO2:  [89 %-96 %] 90 %  Blood Pressure: 137/88   Temperature: 37.1 °C (98.7 °F)   Pulse: 88   Respiration: 17   Pulse Oximetry: 96 %       Physical Exam  Vitals and nursing note reviewed.   Constitutional:       General: She is not in acute distress.     Appearance: Normal appearance. She is normal weight. She is not toxic-appearing.   HENT:      Head: Normocephalic and atraumatic.      Nose: Nose normal. No congestion or rhinorrhea.      Mouth/Throat:      Mouth: Mucous membranes are moist.      Pharynx: Oropharynx is clear.   Eyes:      Extraocular Movements: Extraocular movements intact.      Conjunctiva/sclera: Conjunctivae normal.      Pupils: Pupils are equal, round, and reactive to light.   Neck:      Vascular: No carotid bruit.   Cardiovascular:      Rate and Rhythm: Normal rate and regular rhythm.      Pulses: Normal pulses.      Heart sounds: Normal heart sounds. No murmur heard.   No gallop.    Pulmonary:      Effort: No respiratory distress.      Breath sounds: Normal breath sounds. No wheezing or rales.    Abdominal:      General: There is distension.      Palpations: Abdomen is soft. There is no mass.      Tenderness: There is abdominal tenderness. There is no right CVA tenderness, left CVA tenderness, guarding or rebound.      Hernia: No hernia is present.      Comments: Hypoactive bowel sounds, no superficial erythema, surgical site appears clean and dry without dehiscence   Musculoskeletal:         General: No tenderness or signs of injury.      Cervical back: Normal range of motion and neck supple. No muscular tenderness.   Lymphadenopathy:      Cervical: No cervical adenopathy.   Skin:     Capillary Refill: Capillary refill takes less than 2 seconds.      Coloration: Skin is not jaundiced or pale.      Findings: No bruising.   Neurological:      General: No focal deficit present.      Mental Status: She is alert and oriented to person, place, and time. Mental status is at baseline.      Cranial Nerves: No cranial nerve deficit.      Motor: No weakness.      Coordination: Coordination normal.   Psychiatric:         Mood and Affect: Mood normal.         Thought Content: Thought content normal.         Judgment: Judgment normal.         Laboratory:  Recent Labs     10/21/21  0401 10/22/21  2128   WBC 16.0* 14.6*   RBC 4.89 5.55*   HEMOGLOBIN 13.0 15.1   HEMATOCRIT 38.9 44.0   MCV 79.6* 79.3*   MCH 26.6* 27.2   MCHC 33.4* 34.3   RDW 39.8 39.4   PLATELETCT 279 298   MPV 10.5 10.4     Recent Labs     10/21/21  0401 10/22/21  2128   SODIUM 135 133*   POTASSIUM 4.3 3.8   CHLORIDE 102 98   CO2 21 22   GLUCOSE 112* 146*   BUN 11 11   CREATININE 0.75 0.64   CALCIUM 9.1 9.5     Recent Labs     10/21/21  0401 10/22/21  2128   ALTSGPT  --  272*   ASTSGOT  --  177*   ALKPHOSPHAT  --  248*   TBILIRUBIN  --  0.7   LIPASE  --  18   GLUCOSE 112* 146*         No results for input(s): NTPROBNP in the last 72 hours.      No results for input(s): TROPONINT in the last 72 hours.    Imaging:  [id number] VIEW   Final Result          1.  Nonspecific bowel gas pattern.          X-Ray:  I have personally reviewed the images and compared with prior images.    Assessment/Plan:  I anticipate this patient will require at least two midnights for appropriate medical management, necessitating inpatient admission.    H/O right radical nephrectomy  Assessment & Plan  Postop day 3 right radical nephrectomy, consider abdominal CT, follow-up urology consult Dr. Barboza.    Ileus (HCC)  Assessment & Plan  Postoperative ileus complicated by constipation but passing flatus.  Intolerant of p.o., symptomatic and supportive care, trial lactulose enema, follow-up magnesium      LFT elevation  Assessment & Plan  Uncertain cause, recent anesthesia?, follow-up repeat LFTs      VTE prophylaxis: SCDs/TEDs

## 2021-10-23 NOTE — ASSESSMENT & PLAN NOTE
Improving  Uncertain cause, recent anesthesia? Vs tylenol use at home  Recent CT Abd showed only hepatic steatosis  Hepatitis panel negative   follow-up repeat LFTs

## 2021-10-24 ENCOUNTER — APPOINTMENT (OUTPATIENT)
Dept: RADIOLOGY | Facility: MEDICAL CENTER | Age: 51
DRG: 394 | End: 2021-10-24
Attending: PHYSICIAN ASSISTANT
Payer: MEDICAID

## 2021-10-24 PROBLEM — E87.6 HYPOKALEMIA: Status: ACTIVE | Noted: 2021-10-24

## 2021-10-24 PROBLEM — C64.1 CLEAR CELL RENAL CELL CARCINOMA, RIGHT (HCC): Status: ACTIVE | Noted: 2021-10-24

## 2021-10-24 PROBLEM — G63 POLYNEUROPATHY DUE TO MEDICAL CONDITION (HCC): Status: ACTIVE | Noted: 2021-10-24

## 2021-10-24 LAB
ALBUMIN SERPL BCP-MCNC: 3.5 G/DL (ref 3.2–4.9)
ALBUMIN/GLOB SERPL: 0.9 G/DL
ALP SERPL-CCNC: 243 U/L (ref 30–99)
ALT SERPL-CCNC: 222 U/L (ref 2–50)
ANION GAP SERPL CALC-SCNC: 9 MMOL/L (ref 7–16)
AST SERPL-CCNC: 78 U/L (ref 12–45)
BASOPHILS # BLD AUTO: 0.4 % (ref 0–1.8)
BASOPHILS # BLD: 0.05 K/UL (ref 0–0.12)
BILIRUB SERPL-MCNC: 0.5 MG/DL (ref 0.1–1.5)
BUN SERPL-MCNC: 12 MG/DL (ref 8–22)
CALCIUM SERPL-MCNC: 9 MG/DL (ref 8.5–10.5)
CHLORIDE SERPL-SCNC: 99 MMOL/L (ref 96–112)
CO2 SERPL-SCNC: 25 MMOL/L (ref 20–33)
CREAT SERPL-MCNC: 0.7 MG/DL (ref 0.5–1.4)
EOSINOPHIL # BLD AUTO: 0.26 K/UL (ref 0–0.51)
EOSINOPHIL NFR BLD: 2.3 % (ref 0–6.9)
ERYTHROCYTE [DISTWIDTH] IN BLOOD BY AUTOMATED COUNT: 40.1 FL (ref 35.9–50)
GLOBULIN SER CALC-MCNC: 4 G/DL (ref 1.9–3.5)
GLUCOSE SERPL-MCNC: 112 MG/DL (ref 65–99)
HCT VFR BLD AUTO: 40.5 % (ref 37–47)
HGB BLD-MCNC: 13.6 G/DL (ref 12–16)
IMM GRANULOCYTES # BLD AUTO: 0.04 K/UL (ref 0–0.11)
IMM GRANULOCYTES NFR BLD AUTO: 0.4 % (ref 0–0.9)
LYMPHOCYTES # BLD AUTO: 1.01 K/UL (ref 1–4.8)
LYMPHOCYTES NFR BLD: 9.1 % (ref 22–41)
MCH RBC QN AUTO: 26.9 PG (ref 27–33)
MCHC RBC AUTO-ENTMCNC: 33.6 G/DL (ref 33.6–35)
MCV RBC AUTO: 80.2 FL (ref 81.4–97.8)
MONOCYTES # BLD AUTO: 0.63 K/UL (ref 0–0.85)
MONOCYTES NFR BLD AUTO: 5.7 % (ref 0–13.4)
NEUTROPHILS # BLD AUTO: 9.14 K/UL (ref 2–7.15)
NEUTROPHILS NFR BLD: 82.1 % (ref 44–72)
NRBC # BLD AUTO: 0 K/UL
NRBC BLD-RTO: 0 /100 WBC
PLATELET # BLD AUTO: 293 K/UL (ref 164–446)
PMV BLD AUTO: 10.6 FL (ref 9–12.9)
POTASSIUM SERPL-SCNC: 3.5 MMOL/L (ref 3.6–5.5)
PROT SERPL-MCNC: 7.5 G/DL (ref 6–8.2)
RBC # BLD AUTO: 5.05 M/UL (ref 4.2–5.4)
SODIUM SERPL-SCNC: 133 MMOL/L (ref 135–145)
WBC # BLD AUTO: 11.1 K/UL (ref 4.8–10.8)

## 2021-10-24 PROCEDURE — 36415 COLL VENOUS BLD VENIPUNCTURE: CPT

## 2021-10-24 PROCEDURE — 96372 THER/PROPH/DIAG INJ SC/IM: CPT

## 2021-10-24 PROCEDURE — 700111 HCHG RX REV CODE 636 W/ 250 OVERRIDE (IP): Performed by: STUDENT IN AN ORGANIZED HEALTH CARE EDUCATION/TRAINING PROGRAM

## 2021-10-24 PROCEDURE — 700105 HCHG RX REV CODE 258: Performed by: INTERNAL MEDICINE

## 2021-10-24 PROCEDURE — 700102 HCHG RX REV CODE 250 W/ 637 OVERRIDE(OP): Performed by: HOSPITALIST

## 2021-10-24 PROCEDURE — 700102 HCHG RX REV CODE 250 W/ 637 OVERRIDE(OP): Performed by: INTERNAL MEDICINE

## 2021-10-24 PROCEDURE — 74177 CT ABD & PELVIS W/CONTRAST: CPT

## 2021-10-24 PROCEDURE — 770001 HCHG ROOM/CARE - MED/SURG/GYN PRIV*

## 2021-10-24 PROCEDURE — 700111 HCHG RX REV CODE 636 W/ 250 OVERRIDE (IP): Performed by: INTERNAL MEDICINE

## 2021-10-24 PROCEDURE — 85025 COMPLETE CBC W/AUTO DIFF WBC: CPT

## 2021-10-24 PROCEDURE — 80053 COMPREHEN METABOLIC PANEL: CPT

## 2021-10-24 PROCEDURE — 96376 TX/PRO/DX INJ SAME DRUG ADON: CPT

## 2021-10-24 PROCEDURE — 97166 OT EVAL MOD COMPLEX 45 MIN: CPT

## 2021-10-24 PROCEDURE — A9270 NON-COVERED ITEM OR SERVICE: HCPCS | Performed by: HOSPITALIST

## 2021-10-24 PROCEDURE — A9270 NON-COVERED ITEM OR SERVICE: HCPCS | Performed by: INTERNAL MEDICINE

## 2021-10-24 PROCEDURE — 99233 SBSQ HOSP IP/OBS HIGH 50: CPT | Performed by: STUDENT IN AN ORGANIZED HEALTH CARE EDUCATION/TRAINING PROGRAM

## 2021-10-24 PROCEDURE — 99233 SBSQ HOSP IP/OBS HIGH 50: CPT | Performed by: INTERNAL MEDICINE

## 2021-10-24 PROCEDURE — 700117 HCHG RX CONTRAST REV CODE 255: Performed by: PHYSICIAN ASSISTANT

## 2021-10-24 PROCEDURE — 97163 PT EVAL HIGH COMPLEX 45 MIN: CPT

## 2021-10-24 RX ORDER — MORPHINE SULFATE 4 MG/ML
1 INJECTION, SOLUTION INTRAMUSCULAR; INTRAVENOUS
Status: DISCONTINUED | OUTPATIENT
Start: 2021-10-24 | End: 2021-11-01 | Stop reason: HOSPADM

## 2021-10-24 RX ORDER — SODIUM CHLORIDE 9 MG/ML
INJECTION, SOLUTION INTRAVENOUS CONTINUOUS
Status: ACTIVE | OUTPATIENT
Start: 2021-10-24 | End: 2021-10-25

## 2021-10-24 RX ADMIN — HEPARIN SODIUM 5000 UNITS: 5000 INJECTION, SOLUTION INTRAVENOUS; SUBCUTANEOUS at 14:38

## 2021-10-24 RX ADMIN — SODIUM CHLORIDE: 9 INJECTION, SOLUTION INTRAVENOUS at 16:47

## 2021-10-24 RX ADMIN — HEPARIN SODIUM 5000 UNITS: 5000 INJECTION, SOLUTION INTRAVENOUS; SUBCUTANEOUS at 22:04

## 2021-10-24 RX ADMIN — HEPARIN SODIUM 5000 UNITS: 5000 INJECTION, SOLUTION INTRAVENOUS; SUBCUTANEOUS at 05:56

## 2021-10-24 RX ADMIN — IOHEXOL 75 ML: 350 INJECTION, SOLUTION INTRAVENOUS at 16:05

## 2021-10-24 RX ADMIN — DOCUSATE SODIUM 50 MG AND SENNOSIDES 8.6 MG 2 TABLET: 8.6; 5 TABLET, FILM COATED ORAL at 16:46

## 2021-10-24 RX ADMIN — ACETAMINOPHEN 650 MG: 325 TABLET ORAL at 22:04

## 2021-10-24 RX ADMIN — IMMUNE GLOBULIN INFUSION (HUMAN) 35 G: 100 INJECTION, SOLUTION INTRAVENOUS; SUBCUTANEOUS at 05:42

## 2021-10-24 RX ADMIN — ACETAMINOPHEN 650 MG: 325 TABLET ORAL at 01:42

## 2021-10-24 RX ADMIN — MORPHINE SULFATE 1 MG: 4 INJECTION INTRAVENOUS at 01:53

## 2021-10-24 ASSESSMENT — COGNITIVE AND FUNCTIONAL STATUS - GENERAL
DRESSING REGULAR LOWER BODY CLOTHING: TOTAL
DRESSING REGULAR UPPER BODY CLOTHING: A LOT
TURNING FROM BACK TO SIDE WHILE IN FLAT BAD: UNABLE
MOBILITY SCORE: 6
CLIMB 3 TO 5 STEPS WITH RAILING: TOTAL
MOVING FROM LYING ON BACK TO SITTING ON SIDE OF FLAT BED: UNABLE
EATING MEALS: A LOT
PERSONAL GROOMING: A LOT
SUGGESTED CMS G CODE MODIFIER DAILY ACTIVITY: CL
HELP NEEDED FOR BATHING: TOTAL
STANDING UP FROM CHAIR USING ARMS: TOTAL
WALKING IN HOSPITAL ROOM: TOTAL
DAILY ACTIVITIY SCORE: 9
SUGGESTED CMS G CODE MODIFIER MOBILITY: CN
MOVING TO AND FROM BED TO CHAIR: UNABLE
TOILETING: TOTAL

## 2021-10-24 ASSESSMENT — PAIN DESCRIPTION - PAIN TYPE
TYPE: ACUTE PAIN

## 2021-10-24 ASSESSMENT — ENCOUNTER SYMPTOMS
ABDOMINAL PAIN: 1
DIARRHEA: 1
FEVER: 0
CHILLS: 0
BLURRED VISION: 0
NAUSEA: 0
VOMITING: 0
SHORTNESS OF BREATH: 0
WEAKNESS: 1
DOUBLE VISION: 0

## 2021-10-24 ASSESSMENT — ACTIVITIES OF DAILY LIVING (ADL): TOILETING: REQUIRES ASSIST

## 2021-10-24 ASSESSMENT — GAIT ASSESSMENTS: GAIT LEVEL OF ASSIST: UNABLE TO PARTICIPATE

## 2021-10-24 NOTE — CARE PLAN
The patient is Stable - Low risk of patient condition declining or worsening    Shift Goals  Clinical Goals: Maintain neuro status  Patient Goals: Eat  Family Goals: NA    Progress made toward(s) clinical / shift goals:  Patient family at bedside. Helping patient eat and other ADLs.     Patient is not progressing towards the following goals:      Problem: Pain - Standard  Goal: Alleviation of pain or a reduction in pain to the patient’s comfort goal  Outcome: Not Progressing  Note: Patient having constant pain to back

## 2021-10-24 NOTE — HOSPITAL COURSE
51-year-old female with a history of renal mass status post radical right nephrectomy on 10/20/2021 admitted with abdominal pain and nausea vomiting.  In the emergency department she was found to have leukocytosis and ileus by abdominal x-ray.  She was given enema with subsequent bowel movement.  Trammell was still in place and she failed TOB on 10/21.  Urology was consulted.  Patient was also noted to have progressive bilateral upper and lower extremity weakness.  Neurology was consulted.  She underwent MRI brain and spine which was negative for acute findings.  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  She was started on IVIG x3 days.  She had normal TSH and vitamin B12.  Hospital course c/b transaminitis thought to be d/t increased tylenol intake.  Lipase normal. Hepatitis panel negative.  HIV negative.

## 2021-10-24 NOTE — PROGRESS NOTES
Hospital Medicine Daily Progress Note    Date of Service  10/24/2021    Chief Complaint  Noelle Campoverde is a 51 y.o. female admitted 10/22/2021 with abdominal pain, nausea/vomiting    Hospital Course  51-year-old female with a history of renal mass status post radical right nephrectomy on 10/20/2021 admitted with abdominal pain and nausea vomiting.  In the emergency department she was found to have leukocytosis and ileus by abdominal x-ray.  She was given enema with subsequent bowel movement.  Trammell was still in place and she failed TOB on 10/21.  Urology was consulted.  Patient was also noted to have progressive bilateral upper and lower extremity weakness.  Neurology was consulted.  She underwent MRI brain and spine which was negative for acute findings.  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  She was started on IVIG x3 days.  She had normal TSH and vitamin B12.  Hospital course c/b transaminitis thought to be d/t increased tylenol intake.  Lipase normal. Hepatitis panel negative.  HIV negative.       Interval Problem Update  - No acute overnight events  - Afebrile  - Tolerating IVIG  - Having diarrhea, passing gas, tolerating CLD  - Working with PT/OT    I have personally seen and examined the patient at bedside. I discussed the plan of care with patient, family and bedside RN.    Consultants/Specialty  neurology and urology    Code Status  Full Code    Disposition  Patient is not medically cleared.   Anticipate discharge to to skilled nursing facility vs rehab  I have placed the appropriate orders for post-discharge needs.    Review of Systems  Review of Systems   Constitutional: Negative for chills and fever.   HENT: Negative for nosebleeds.    Eyes: Negative for blurred vision and double vision.   Respiratory: Negative for shortness of breath.    Cardiovascular: Negative for chest pain and leg swelling.    Gastrointestinal: Positive for abdominal pain and diarrhea. Negative for nausea and vomiting.   Genitourinary: Negative for hematuria.   Musculoskeletal: Positive for joint pain.   Skin: Negative for rash.   Neurological: Positive for weakness.        Physical Exam  Temp:  [36.6 °C (97.8 °F)-37.3 °C (99.1 °F)] 36.6 °C (97.8 °F)  Pulse:  [75-86] 76  Resp:  [16-18] 16  BP: (106-141)/(68-91) 106/68  SpO2:  [96 %-99 %] 98 %    Physical Exam  Vitals and nursing note reviewed.   Constitutional:       General: She is not in acute distress.     Appearance: She is not ill-appearing, toxic-appearing or diaphoretic.   HENT:      Head: Normocephalic and atraumatic.      Nose: Nose normal.      Mouth/Throat:      Mouth: Mucous membranes are moist.   Eyes:      General: No scleral icterus.     Conjunctiva/sclera: Conjunctivae normal.   Cardiovascular:      Rate and Rhythm: Normal rate and regular rhythm.      Heart sounds: No murmur heard.   No friction rub. No gallop.    Pulmonary:      Effort: Pulmonary effort is normal.      Breath sounds: Normal breath sounds.   Abdominal:      General: Bowel sounds are decreased. There is distension.      Palpations: Abdomen is soft.      Tenderness: There is abdominal tenderness in the right upper quadrant and right lower quadrant. There is no guarding or rebound.      Comments: Lap incision sites healing well c/d/i   Musculoskeletal:      Cervical back: Normal range of motion.      Right lower leg: No edema.      Left lower leg: No edema.   Skin:     Coloration: Skin is not jaundiced.      Findings: No rash.   Neurological:      Mental Status: She is alert and oriented to person, place, and time.      Motor: Weakness (b/l UE and LE weakness) present.   Psychiatric:         Mood and Affect: Mood normal.         Behavior: Behavior normal.         Fluids    Intake/Output Summary (Last 24 hours) at 10/24/2021 1119  Last data filed at 10/24/2021 0624  Gross per 24 hour   Intake --   Output  600 ml   Net -600 ml       Laboratory  Recent Labs     10/22/21  2128 10/24/21  0235   WBC 14.6* 11.1*   RBC 5.55* 5.05   HEMOGLOBIN 15.1 13.6   HEMATOCRIT 44.0 40.5   MCV 79.3* 80.2*   MCH 27.2 26.9*   MCHC 34.3 33.6   RDW 39.4 40.1   PLATELETCT 298 293   MPV 10.4 10.6     Recent Labs     10/22/21  2128 10/24/21  0235   SODIUM 133* 133*   POTASSIUM 3.8 3.5*   CHLORIDE 98 99   CO2 22 25   GLUCOSE 146* 112*   BUN 11 12   CREATININE 0.64 0.70   CALCIUM 9.5 9.0                   Imaging  MR-BRAIN-WITH & W/O   Final Result      1.  No acute intracranial abnormality. No abnormal enhancement to suggest a neoplastic process.   2.  Mild white matter disease likely representing microvascular ischemic change, though this can be seen with gliosis or demyelinating disease.      MR-CERVICAL SPINE-WITH & W/O   Final Result      1.  No acute abnormality of the cervical spine. No abnormal enhancement to suggest neoplastic process.   2.  Mild multilevel degenerative changes as above.      MR-LUMBAR SPINE-WITH & W/O   Final Result      Negative for significant disc disease, canal stenosis or evidence of root impingement. No abnormal enhancement visualized.      MR-THORACIC SPINE-WITH & W/O   Final Result      MRI of the thoracic spine without and with contrast within normal limits.      [id number] VIEW   Final Result         1.  Nonspecific bowel gas pattern.           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 Vs tylenol use at home  Recent CT Abd showed only hepatic steatosis  Hepatitis panel negative   follow-up repeat LFTs    H/O right radical nephrectomy  Assessment & Plan  Postop day 3 right radical nephrectomy  Failed TOV 10/21, rosen in place   follow-up urology consult Dr. Barboza.    Ileus (HCC)  Assessment & Plan  Postoperative ileus complicated by constipation but passing flatus.  Intolerant of p.o., symptomatic and supportive care, trial lactulose enema, follow-up magnesium  - tolerating PO, CLD, advance as tolerated  - passing gas         VTE prophylaxis: heparin ppx    I have performed a physical exam and reviewed and updated ROS and Plan today (10/24/2021). In review of yesterday's note (10/23/2021), there are no changes except as documented above.

## 2021-10-24 NOTE — PROGRESS NOTES
4 Eyes Skin Assessment Completed by EVAN Venegas and EVAN Watt.    Head WDL  Ears WDL  Nose WDL  Mouth WDL  Neck WDL  Breast/Chest Incision  Shoulder Blades WDL  Spine WDL  (R) Arm/Elbow/Hand WDL  (L) Arm/Elbow/Hand WDL  Abdomen Incision  Groin WDL  Scrotum/Coccyx/Buttocks Blanching  (R) Leg WDL  (L) Leg WDL  (R) Heel/Foot/Toe WDL  (L) Heel/Foot/Toe WDL          Devices In Places Nasal Cannula      Interventions In Place NC W/Ear Foams, Pillows, Q2 Turns and Barrier Cream    Possible Skin Injury No    Pictures Uploaded Into Epic N/A  Wound Consult Placed N/A  RN Wound Prevention Protocol Ordered No

## 2021-10-24 NOTE — PROGRESS NOTES
".  [facility] [place] Progress Note    Service: [facility] [place]  Patient's Name: Noelle Campoverde  MRN: [id number]  Admit Date:10/22/2021  Today's Date: 10/24/2021   Room #: S190/02      Identification:  Noelle Campoverde is a 51 y.o. female who is s/p RIGHT radical nephrectomy with Dr. Painting 10/20/2021. She failed her TOV 10/21 with PVR >800ml. She was discharged 10/21 and returned to ED 10/22 with abdominal pain, distention, nausea, vomiting. Patient with post-op ileus.    Subjective/ROS:   Her pain at rest is a 2-3/10 but with any liquids she has significant generalized abdominal pain and reports tenesmus and rectal incontinence each time. She is passing gas easily. She has a rosen draining clear yellow urine. She reports no improvement of neurologic symptoms thus far with the IVIG. She denies fever, chills, chest pain, SOB.     Physical Exam:  Current Vitals:   /68   Pulse 76   Temp 36.6 °C (97.8 °F) (Temporal)   Resp 16   Ht 1.575 m (5' 2\")   Wt 70.4 kg (155 lb 3.3 oz)   LMP 03/08/2021   SpO2 98%   BMI 28.39 kg/m²     10/22 1900 - 10/24 0659  In: 1000 [I.V.:1000]  Out: 1150 [Urine:1150]    GEN : NAD, A&O X4   RES:  no acute respiratory distress  ABD: Distention improved from yesterday, still significantly tender generally, left CVA TTP. No right CVA TTP.   :   Rosen draining clear yellow urine.     Labs:   Recent Labs     10/22/21  2128 10/24/21  0235   SODIUM 133* 133*   POTASSIUM 3.8 3.5*   CHLORIDE 98 99   CO2 22 25   GLUCOSE 146* 112*   BUN 11 12   CREATININE 0.64 0.70   CALCIUM 9.5 9.0     Recent Labs     10/22/21  2128 10/24/21  0235   WBC 14.6* 11.1*   RBC 5.55* 5.05   HEMOGLOBIN 15.1 13.6   HEMATOCRIT 44.0 40.5   MCV 79.3* 80.2*   MCH 27.2 26.9*   MCHC 34.3 33.6   RDW 39.4 40.1   PLATELETCT 298 293   MPV 10.4 10.6     Lab Results   Component Value Date/Time    GLUCOSE 112 (H) 10/24/2021 02:35 AM    GLUCOSE 146 (H) 10/22/2021 09:28 PM    GLUCOSE 112 (H) 10/21/2021 " 04:01 AM    GLUCOSE 99 10/14/2021 03:25 PM       Assessment/Plan  Noelle Campoverde is a 51 y.o. female who is s/p RIGHT radical nephrectomy with Dr. Painting 10/20/2021. She failed her TOV 10/21 with PVR >800ml. She was discharged 10/21 and returned to ED 10/22 with abdominal pain, distention, nausea, vomiting. Patient with post-op ileus.    · Appreciate hospitalist management of ileus. Patient with flatus, she is having rectal incontinence with liquid intake.   · Will keep rosen during her hospital stay, she previously failed her TOV with PVR >800 on 10/21. We will plan for another TOV prior to her discharge. Will need to PVR to see if retention has resolved, if not then replace rosen and can discharge with this in place.   · She is having flatus, enema given this AM with formed loose stool. Will monitor for increasing flatus and spontaneous BM.   · Rosen to remain at this time, will attempt another TOV Monday as inpatient. Failed TOV with PVR >800 10/21, retention may have neurologic component.   · Urology signing off. We appreciate management of ileus by hospitalist. Please let me know of any questions or issues. We will have her see Dr. Painting as outpatient for follow up and will call patient regarding this.          KADIE Seo.-C.   [id number] [place] [id number]   [phone]

## 2021-10-24 NOTE — PROGRESS NOTES
Neurology Progress Note  Neurohospitalist Service, [facility]    Referring Physician: Navya Beebe M.D.      Interval History: No acute events overnight.  I spoke with the patient and her family again at bedside.  She is just finished receiving the second dose of IVIG.  There is 1 dose remaining.  She states that she does not feel any difference or improvement.    Review of systems: In addition to what is detailed in the HPI and/or updated in the interval history, all other systems reviewed and are negative.    Past Medical History, Past Surgical History and Social History reviewed and unchanged from prior    Medications:    Current Facility-Administered Medications:   •  morphine (pf) 4 mg/mL injection 1 mg, 1 mg, Intravenous, Q3HRS PRN, Sharif Shrestha D.O., 1 mg at 10/24/21 0153  •  senna-docusate (PERICOLACE or SENOKOT S) 8.6-50 MG per tablet 2 Tablet, 2 Tablet, Oral, BID **AND** polyethylene glycol/lytes (MIRALAX) PACKET 1 Packet, 1 Packet, Oral, QDAY PRN **AND** magnesium hydroxide (MILK OF MAGNESIA) suspension 30 mL, 30 mL, Oral, QDAY PRN **AND** bisacodyl (DULCOLAX) suppository 10 mg, 10 mg, Rectal, QDAY PRN, Modesto Kaur M.D.  •  heparin injection 5,000 Units, 5,000 Units, Subcutaneous, Q8HRS, Modesto Kaur M.D., 5,000 Units at 10/24/21 0556  •  cloNIDine (CATAPRES) tablet 0.1 mg, 0.1 mg, Oral, Q6HRS PRN, Modesto Kaur M.D.  •  enalaprilat (Vasotec) injection 1.25 mg 1 mL, 1.25 mg, Intravenous, Q6HRS PRN, Modesto Kaur M.D.  •  labetalol (NORMODYNE/TRANDATE) injection 10 mg, 10 mg, Intravenous, Q4HRS PRN, Modesto Kaur M.D.  •  ondansetron (ZOFRAN) syringe/vial injection 4 mg, 4 mg, Intravenous, Q4HRS PRN, Modseto Kaur M.D., 4 mg at 10/23/21 0812  •  ondansetron (ZOFRAN ODT) dispertab 4 mg, 4 mg, Oral, Q4HRS PRN, Modesto Kaur M.D.  •  promethazine (PHENERGAN) tablet 12.5-25 mg, 12.5-25 mg, Oral, Q4HRS PRN, Modesto Kaur M.D.  •  promethazine (PHENERGAN)  "suppository 12.5-25 mg, 12.5-25 mg, Rectal, Q4HRS PRN, Modesto Kaur M.D.  •  prochlorperazine (COMPAZINE) injection 5-10 mg, 5-10 mg, Intravenous, Q4HRS PRN, Modesto Kaur M.D.  •  immune globulin (Gammagard) 35 g in empty bag 350 mL IVIG, 0.7 g/kg (Ideal), Intravenous, DAILY, Rommel Beebe D.O., Last Rate: 140 mL/hr at 10/24/21 0542, 35 g at 10/24/21 0542  •  acetaminophen (Tylenol) tablet 650 mg, 650 mg, Oral, Q4HRS PRN, Bashir Perales M.D., 650 mg at 10/24/21 0142    Physical Examination:   /68   Pulse 76   Temp 36.6 °C (97.8 °F) (Temporal)   Resp 16   Ht 1.575 m (5' 2\")   Wt 70.4 kg (155 lb 3.3 oz)   LMP 03/08/2021   SpO2 98%   BMI 28.39 kg/m²       General: Patient is awake and in no acute distress  Eye: Examination of optic disks not indicated at this time given acuity of consult  Neck: There is normal range of motion  CV: regular rate   Extremities:  clear, dry, intact, without peripheral edema     NEUROLOGICAL EXAM:      /86   Pulse 80   Temp 37.1 °C (98.7 °F) (Temporal)   Resp 18   Ht 1.575 m (5' 2\")   Wt 69.1 kg (152 lb 5.4 oz)   LMP 03/08/2021   SpO2 94%   BMI 27.86 kg/m²         Mental status: Awake, alert and fully oriented  Speech and language: Speech is clear and fluent. The patient is able to name and repeat, and follow commands  Cranial nerve exam: Pupils are equal, round and reactive to light bilaterally. Visual fields are full. There is no nystagmus. Extraocular muscles are intact, but she reports some mild difficulty keeping her eyes open when she looks straight up.  No significant lid lag is noted. Face is symmetric. Sensation in the face is reduced to pinprick on the right. Palate elevates symmetrically. Tongue is midline without obvious atrophy or fasciculations.  Motor exam: Power 4 -/5 at the bilateral shoulders, 2/5 at bilateral elbows with flexion and extension, and 2/5 at bilateral wrist and  strength.  2/5 at bilateral hips and knees, 1/5 at " bilateral ankles.  Tone is normal without adventitious movement, fasciculations, or obvious atrophy.    Sensory exam: Globally reduced to all sensory modalities.  There does appear to be a sensory level about 1 inch above the hip bilaterally to pinprick.  Deep tendon reflexes: Reflexes uniformly absent with the exception of bilateral brachioradialis 1/4. Toes down-going bilaterally.  Coordination: No ataxia out of proportion to weakness on bilateral finger-to-nose testing  Gait: Unable to assess due to patient condition    Objective Data:    Labs:  No results found for: PROTHROMBTM, INR   Lab Results   Component Value Date/Time    WBC 11.1 (H) 10/24/2021 02:35 AM    RBC 5.05 10/24/2021 02:35 AM    HEMOGLOBIN 13.6 10/24/2021 02:35 AM    HEMATOCRIT 40.5 10/24/2021 02:35 AM    MCV 80.2 (L) 10/24/2021 02:35 AM    MCH 26.9 (L) 10/24/2021 02:35 AM    MCHC 33.6 10/24/2021 02:35 AM    MPV 10.6 10/24/2021 02:35 AM    NEUTSPOLYS 82.10 (H) 10/24/2021 02:35 AM    LYMPHOCYTES 9.10 (L) 10/24/2021 02:35 AM    MONOCYTES 5.70 10/24/2021 02:35 AM    EOSINOPHILS 2.30 10/24/2021 02:35 AM    BASOPHILS 0.40 10/24/2021 02:35 AM    HYPOCHROMIA 1+ 01/18/2012 08:48 AM      Lab Results   Component Value Date/Time    SODIUM 133 (L) 10/24/2021 02:35 AM    POTASSIUM 3.5 (L) 10/24/2021 02:35 AM    CHLORIDE 99 10/24/2021 02:35 AM    CO2 25 10/24/2021 02:35 AM    GLUCOSE 112 (H) 10/24/2021 02:35 AM    BUN 12 10/24/2021 02:35 AM    CREATININE 0.70 10/24/2021 02:35 AM    BUNCREATRAT 18 03/07/2019 07:34 AM      Lab Results   Component Value Date/Time    CHOLSTRLTOT 163 03/07/2019 07:34 AM    CHOLSTRLTOT 140 01/18/2012 08:48 AM    LDL 90 03/07/2019 07:34 AM    LDL 82 01/18/2012 08:48 AM    HDL 53 03/07/2019 07:34 AM    HDL 50 01/18/2012 08:48 AM    TRIGLYCERIDE 101 03/07/2019 07:34 AM    TRIGLYCERIDE 41 01/18/2012 08:48 AM       Lab Results   Component Value Date/Time    ALKPHOSPHAT 243 (H) 10/24/2021 02:35 AM    ASTSGOT 78 (H) 10/24/2021 02:35 AM     ALTSGPT 222 (H) 10/24/2021 02:35 AM    TBILIRUBIN 0.5 10/24/2021 02:35 AM        Imaging/Testing:    I interpreted and/or reviewed the patient's neuroimaging    MR-BRAIN-WITH & W/O   Final Result      1.  No acute intracranial abnormality. No abnormal enhancement to suggest a neoplastic process.   2.  Mild white matter disease likely representing microvascular ischemic change, though this can be seen with gliosis or demyelinating disease.      MR-CERVICAL SPINE-WITH & W/O   Final Result      1.  No acute abnormality of the cervical spine. No abnormal enhancement to suggest neoplastic process.   2.  Mild multilevel degenerative changes as above.      MR-LUMBAR SPINE-WITH & W/O   Final Result      Negative for significant disc disease, canal stenosis or evidence of root impingement. No abnormal enhancement visualized.      MR-THORACIC SPINE-WITH & W/O   Final Result      MRI of the thoracic spine without and with contrast within normal limits.      [id number] VIEW   Final Result         1.  Nonspecific bowel gas pattern.          Assessment and Plan:    Noelle Campoverde is a 51 y.o. presenting for whom neurology has been consulted for chronic, progressive, symmetric length dependent weakness and numbness affecting bilateral arms and legs for 3 to 4 months.  There are no obvious bulbar signs or symptoms, muscle atrophy, fasciculations, or radicular symptoms.  However, the patient does seem to have a sensory level where pinprick changes just above the level of her hip bilaterally, but otherwise nothing seems to point to spinal pathology.  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  She is currently day 2/3 of IVIG with no significant improvement.     Problem list:  1.  Progressive weakness  2.  Neuropathy  3.  Renal cancer      Plan:-My suspicion remains for CIDP versus  paraneoplastic polyneuropathy.  Continue IVIG for 1 more dose for total of 3 days of 0.7 g/kg daily equaling 2.1 g/kg in total.  -I explained to the patient and her family at length that given the duration of her symptoms prior to this admission, it is likely that the IVIG may not help or may take several days to weeks to begin to show benefit.  -The most important components after tomorrow will be early rehabilitation with PT/OT, and timely follow-up in the neurology clinic for EMG/NCS.  -Following more diagnostic work-up in neuromuscular clinic, it may be reasonable to start immunomodulatory therapy, long-term corticosteroids, or schedule frequent infusions of IVIG or plasmapheresis.  -I reviewed the MRI brain, C-spine, T-spine, and L-spine without contrast.  The studies are within normal limits.  -Again, I would check thyroid studies, vitamin B12, CPK, thiamine, folate, lead, mercury, paraneoplastic panel in the serum, SPEP/UPEP.    I will defer to the primary team for management of her postoperative and GI issues.  If the patient is safe for discharge with her wheelchair and physical therapy, I would follow through with the above plan following day 3 of IVIG.  Please call neurology if any new questions or concerns arise.    The evaluation of the patient, and recommended management, was discussed with the resident staff. I have performed a physical exam and reviewed and updated ROS and Plan today (10/24/2021).     Rommel Beebe D.O.  Neurohospitalist, Acute Care Services

## 2021-10-24 NOTE — ASSESSMENT & PLAN NOTE
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  Physiatry consulted, patient considered not a ideal candidate for inpatient rehab.  Recommended to consider SNF placement.

## 2021-10-25 LAB
ALBUMIN SERPL BCP-MCNC: 3.5 G/DL (ref 3.2–4.9)
ALBUMIN/GLOB SERPL: 0.8 G/DL
ALP SERPL-CCNC: 210 U/L (ref 30–99)
ALT SERPL-CCNC: 149 U/L (ref 2–50)
ANION GAP SERPL CALC-SCNC: 13 MMOL/L (ref 7–16)
AST SERPL-CCNC: 43 U/L (ref 12–45)
BILIRUB SERPL-MCNC: 0.7 MG/DL (ref 0.1–1.5)
BUN SERPL-MCNC: 12 MG/DL (ref 8–22)
CALCIUM SERPL-MCNC: 8.8 MG/DL (ref 8.5–10.5)
CHLORIDE SERPL-SCNC: 101 MMOL/L (ref 96–112)
CK SERPL-CCNC: 21 U/L (ref 0–154)
CO2 SERPL-SCNC: 21 MMOL/L (ref 20–33)
CREAT SERPL-MCNC: 0.72 MG/DL (ref 0.5–1.4)
ERYTHROCYTE [DISTWIDTH] IN BLOOD BY AUTOMATED COUNT: 40 FL (ref 35.9–50)
FOLATE SERPL-MCNC: 14.3 NG/ML
GLOBULIN SER CALC-MCNC: 4.3 G/DL (ref 1.9–3.5)
GLUCOSE SERPL-MCNC: 94 MG/DL (ref 65–99)
HCT VFR BLD AUTO: 39.3 % (ref 37–47)
HGB BLD-MCNC: 13.3 G/DL (ref 12–16)
MAGNESIUM SERPL-MCNC: 2.2 MG/DL (ref 1.5–2.5)
MCH RBC QN AUTO: 26.8 PG (ref 27–33)
MCHC RBC AUTO-ENTMCNC: 33.8 G/DL (ref 33.6–35)
MCV RBC AUTO: 79.1 FL (ref 81.4–97.8)
PLATELET # BLD AUTO: 323 K/UL (ref 164–446)
PMV BLD AUTO: 10.4 FL (ref 9–12.9)
POTASSIUM SERPL-SCNC: 3.4 MMOL/L (ref 3.6–5.5)
PROT SERPL-MCNC: 7.8 G/DL (ref 6–8.2)
RBC # BLD AUTO: 4.97 M/UL (ref 4.2–5.4)
SODIUM SERPL-SCNC: 135 MMOL/L (ref 135–145)
WBC # BLD AUTO: 11.5 K/UL (ref 4.8–10.8)

## 2021-10-25 PROCEDURE — 83825 ASSAY OF MERCURY: CPT

## 2021-10-25 PROCEDURE — 99232 SBSQ HOSP IP/OBS MODERATE 35: CPT | Performed by: INTERNAL MEDICINE

## 2021-10-25 PROCEDURE — 84425 ASSAY OF VITAMIN B-1: CPT

## 2021-10-25 PROCEDURE — 86255 FLUORESCENT ANTIBODY SCREEN: CPT | Mod: 91

## 2021-10-25 PROCEDURE — 700111 HCHG RX REV CODE 636 W/ 250 OVERRIDE (IP): Performed by: INTERNAL MEDICINE

## 2021-10-25 PROCEDURE — 84182 PROTEIN WESTERN BLOT TEST: CPT

## 2021-10-25 PROCEDURE — A9270 NON-COVERED ITEM OR SERVICE: HCPCS | Performed by: HOSPITALIST

## 2021-10-25 PROCEDURE — 700102 HCHG RX REV CODE 250 W/ 637 OVERRIDE(OP): Performed by: INTERNAL MEDICINE

## 2021-10-25 PROCEDURE — 85027 COMPLETE CBC AUTOMATED: CPT

## 2021-10-25 PROCEDURE — 51798 US URINE CAPACITY MEASURE: CPT

## 2021-10-25 PROCEDURE — 700102 HCHG RX REV CODE 250 W/ 637 OVERRIDE(OP): Performed by: HOSPITALIST

## 2021-10-25 PROCEDURE — 700111 HCHG RX REV CODE 636 W/ 250 OVERRIDE (IP): Performed by: STUDENT IN AN ORGANIZED HEALTH CARE EDUCATION/TRAINING PROGRAM

## 2021-10-25 PROCEDURE — 770001 HCHG ROOM/CARE - MED/SURG/GYN PRIV*

## 2021-10-25 PROCEDURE — 83735 ASSAY OF MAGNESIUM: CPT

## 2021-10-25 PROCEDURE — 80053 COMPREHEN METABOLIC PANEL: CPT

## 2021-10-25 PROCEDURE — 84165 PROTEIN E-PHORESIS SERUM: CPT

## 2021-10-25 PROCEDURE — 84155 ASSAY OF PROTEIN SERUM: CPT

## 2021-10-25 PROCEDURE — A9270 NON-COVERED ITEM OR SERVICE: HCPCS | Performed by: INTERNAL MEDICINE

## 2021-10-25 PROCEDURE — 82746 ASSAY OF FOLIC ACID SERUM: CPT

## 2021-10-25 PROCEDURE — 82550 ASSAY OF CK (CPK): CPT

## 2021-10-25 PROCEDURE — 36415 COLL VENOUS BLD VENIPUNCTURE: CPT

## 2021-10-25 PROCEDURE — 83655 ASSAY OF LEAD: CPT

## 2021-10-25 RX ORDER — AMOXICILLIN 250 MG
2 CAPSULE ORAL 2 TIMES DAILY
Status: DISCONTINUED | OUTPATIENT
Start: 2021-10-25 | End: 2021-11-01 | Stop reason: HOSPADM

## 2021-10-25 RX ORDER — POTASSIUM CHLORIDE 20 MEQ/1
40 TABLET, EXTENDED RELEASE ORAL ONCE
Status: COMPLETED | OUTPATIENT
Start: 2021-10-25 | End: 2021-10-25

## 2021-10-25 RX ORDER — BISACODYL 10 MG
10 SUPPOSITORY, RECTAL RECTAL
Status: DISCONTINUED | OUTPATIENT
Start: 2021-10-25 | End: 2021-11-01 | Stop reason: HOSPADM

## 2021-10-25 RX ORDER — POLYETHYLENE GLYCOL 3350 17 G/17G
1 POWDER, FOR SOLUTION ORAL DAILY
Status: DISCONTINUED | OUTPATIENT
Start: 2021-10-25 | End: 2021-11-01 | Stop reason: HOSPADM

## 2021-10-25 RX ADMIN — IMMUNE GLOBULIN INFUSION (HUMAN) 35 G: 100 INJECTION, SOLUTION INTRAVENOUS; SUBCUTANEOUS at 06:06

## 2021-10-25 RX ADMIN — POTASSIUM CHLORIDE 40 MEQ: 1500 TABLET, EXTENDED RELEASE ORAL at 08:29

## 2021-10-25 RX ADMIN — ACETAMINOPHEN 650 MG: 325 TABLET ORAL at 22:49

## 2021-10-25 RX ADMIN — ACETAMINOPHEN 650 MG: 325 TABLET ORAL at 16:34

## 2021-10-25 RX ADMIN — ACETAMINOPHEN 650 MG: 325 TABLET ORAL at 04:45

## 2021-10-25 RX ADMIN — HEPARIN SODIUM 5000 UNITS: 5000 INJECTION, SOLUTION INTRAVENOUS; SUBCUTANEOUS at 04:45

## 2021-10-25 RX ADMIN — ENOXAPARIN SODIUM 40 MG: 40 INJECTION SUBCUTANEOUS at 14:13

## 2021-10-25 RX ADMIN — MINERAL OIL, PETROLATUM 1 APPLICATION: 425; 573 OINTMENT OPHTHALMIC at 09:29

## 2021-10-25 ASSESSMENT — ENCOUNTER SYMPTOMS
ABDOMINAL PAIN: 1
DIARRHEA: 1
BLURRED VISION: 0
SHORTNESS OF BREATH: 0
VOMITING: 0
FEVER: 0
NAUSEA: 0
WEAKNESS: 1
CHILLS: 0
DOUBLE VISION: 0

## 2021-10-25 ASSESSMENT — PAIN DESCRIPTION - PAIN TYPE
TYPE: ACUTE PAIN

## 2021-10-25 NOTE — THERAPY
"Occupational Therapy   Initial Evaluation     Patient Name: Noelle Campoverde  Age:  51 y.o., Sex:  female  Medical Record #: [id number]  Today's Date: 10/24/2021       Precautions: Fall Risk  Comments: R ankle fracture 9/2021, need to clarify WB precautions    Assessment  Patient is 51 y.o. female with a history of renal mass status post radical right nephrectomy on 10/20/202.  Pt was admitted 3 days later with abdominal pain and nausea, vomiting.  Patient was also noted to have progressive bilateral upper and lower extremity weakness.  Neurology was consulted.  She underwent MRI brain and spine which was negative for acute findings.  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  Pt has been started on IVIG with minimal improvement.  Pt also had a fall in Sept & sustained a right ankle fx & is s/p ORIF.  No WB precautions noted in chart.  Pt & daughter report that for the past 4-5 months pt has been progressively getting weaker.  Family carry her up/down flight of stairs into their apt.  Family has also been assisting with all ADL's as pt has very limited use of either BUE or BLE?  Unclear why pt did not seek any tx for this weakness sooner?  Pt may benefit from Post Acute services to maximize her independence with ADL's & regain strength & function in her extremities.    Plan    Recommend Occupational Therapy 3 times per week until therapy goals are met for the following treatments:  Adaptive Equipment, Neuro Re-Education / Balance, Self Care/Activities of Daily Living, Therapeutic Activities and Therapeutic Exercises.    DC Equipment Recommendations: Unable to determine at this time  Discharge Recommendations: Recommend post-acute placement for additional occupational therapy services prior to discharge home     Subjective    \"My  is wonderful, he helps me with everything\"     Objective       10/24/21 1321   Prior Living Situation "   Prior Services Continuous (24 Hour) Care Giving Family   Housing / Facility 2 Story Apartment / Condo   Steps Into Home 14   Steps In Home 0   Elevator No   Bathroom Set up Bathtub / Shower Combination   Equipment Owned Wheelchair   Lives with - Patient's Self Care Capacity Spouse;Child Less than 18 Years of Age   Comments daughter reports pt will be moving into a new home that is more accessible   Cognition    Comments Pt has an odd affect surrounding her current functional impairments & askd little to no questions on her recovery.  Pt appeared content with her family caring for her despite complete loss of her independence?   Active ROM Upper Body   Active ROM Upper Body  X   Comments Pt's AROM limited by significant weakness in BUE L>R   Strength Upper Body   Upper Body Strength  X   Rt Shoulder Flexion Strength 2+ (P+)   Rt Elbow Flexion Strength 3- (F-)   Rt Wrist Flexion Strength 2+ (P+)   Rt Wrist Extension Strength 2- (P-)   Right  Impaired   Lt Shoulder Flexion Strength 2 (P)   Lt Elbow Flexion Strength 2+ (P+)   Lt Wrist Flexion Strength 2 (P)   Lt Wrist Extension Strength 2- (P-)   Left  Impaired   Sensation Upper Body   Rt Upper Extremity Light Touch Impaired   Lt Upper Extremity Light Touch Impaired   Coordination Upper Body   Fine Motor Coordination significant impairments BUE   Gross Motor Coordination impaired BUE   Balance Assessment   Sitting Balance (Static) Fair   Sitting Balance (Dynamic) Fair -   Weight Shift Sitting Fair   Bed Mobility    Supine to Sit Maximal Assist   Sit to Supine Maximal Assist   Scooting Maximal Assist   Rolling Maximal Assist to Rt.   ADL Assessment   Eating Maximal Assist   Grooming Maximal Assist;Seated   Bathing Maximal Assist   Upper Body Dressing Maximal Assist   Lower Body Dressing Maximal Assist   Toileting Maximal Assist   Comments pt reports her family has been assisting her with all ADL for months   Functional Mobility   Sit to Stand Unable to  Participate   Bed, Chair, Wheelchair Transfer Unable to Participate   Patient / Family Goals   Patient / Family Goal #1 To have less burning pain in my legs   Short Term Goals   Short Term Goal # 1 Pt will complete AROM to BUE daily   Short Term Goal # 2 Pt will use AE to assist with self feeding & grooming tasks

## 2021-10-25 NOTE — THERAPY
Physical Therapy   Initial Evaluation     Patient Name: Noelle Campoverde  Age:  51 y.o., Sex:  female  Medical Record #: [id number]  Today's Date: 10/24/2021     Precautions  Precautions: Fall Risk  Comments: R ankle fracture 9/2021, need to clarify WB precautions    Assessment  Patient is 51 y.o. female that presented to acute with complaints of abdominal pain, distention, and nausea and vomiting. Patient also presented with distal to proximal progressive weakness over the last 3-4 months, chart indicates patient did not previously seek medical attention for this however she suffered a R ankle fracture as a result of falling due to weakness in 9/2021 and she was receiving outpatient PT following surgical intervention. She is s/p R radial nephrectomy for renal mass during current acute stay. She presented to PT with functional weakness, impaired balance and coordination, and decreased activity tolerance which are limiting her ability to safely perform mobility at PLOF. She required max A for bed mobility but was able to maintain dynamic sitting balance at EOB. Standing deferred given profound weakness and concern regarding unclear RLE WB precautions. Family was assisting patient with mobility prior to admit and per report is capable of continuing, however if she shows functional improvement she may benefit from post acute placement to progress strength and independence. Will follow.    Plan    Recommend Physical Therapy 3 times per week until therapy goals are met for the following treatments:  Bed Mobility, Community Re-integration, Equipment, Gait Training, Manual Therapy, Neuro Re-Education / Balance, Self Care/Home Evaluation, Stair Training, Therapeutic Activities and Therapeutic Exercises    DC Equipment Recommendations: Unable to determine at this time  Discharge Recommendations: Recommend post-acute placement for additional physical therapy services prior to discharge home (if showing  "improvement, family also able to provide assist)       Subjective    \"I'm on vacation.\"  \"Two people carry her up or down the stairs.\" (daughter, when asked how patient accessed 2 story apartment)     Objective       10/24/21 1344   Total Time Spent   Total Time Spent (Mins) 31   Charge Group   PT Evaluation PT Evaluation High   Initial Contact Note    Initial Contact Note Order Received and Verified, Physical Therapy Evaluation in Progress with Full Report to Follow.   Precautions   Precautions Fall Risk   Comments R ankle fracture 9/2021, need to clarify WB precautions   Vitals   O2 (LPM) 0   O2 Delivery Device None - Room Air   Pain 0 - 10 Group   Therapist Pain Assessment   (no pain complaint during session)   Prior Living Situation   Prior Services Continuous (24 Hour) Care Giving Family   Housing / Facility 2 Story Apartment / Condo   Steps Into Home   (FOS)   Elevator No   Equipment Owned Wheelchair   Lives with - Patient's Self Care Capacity Significant Other;Adult Children   Comments Patient lives with  and has adopted (?) her daughter's 4 children (8, 14, 17, 19 YO). Per daughter patient's spouse and grandson lift her for transfers and assist with self cares. Daughter reported patient will be staying at another family member's home while family moves to 1 story apartment   Prior Level of Functional Mobility   Bed Mobility Required Assist   Transfer Status Required Assist   Ambulation Dependent   Assistive Devices Used Wheelchair   Wheelchair Dependent   Stairs Dependent   Comments onset of weakness 3-4 months PTA   History of Falls   History of Falls Yes   Cognition    Cognition / Consciousness X   Level of Consciousness Alert   Comments pleasant, cooperative. strangely appears unemotional surrounding circumstances   Passive ROM Lower Body   Passive ROM Lower Body WDL   Active ROM Lower Body    Active ROM Lower Body  X   Comments no DF/PF, limited knee and hip flexion   Strength Lower Body   Lower " Body Strength  X   Comments as above, 0/5 B DF, 2/5 B knee and hip flexion   Sensation Lower Body   Lower Extremity Sensation   X   Comments patient reported diminished light touch BLE   Lower Body Muscle Tone   Lower Body Muscle Tone  Not Tested   Neurological Concerns   Neurological Concerns Yes   Comments given profound distal to proximal progressive weakness   Coordination Lower Body    Coordination Lower Body  X   Comments grossly limited   Balance Assessment   Sitting Balance (Static) Fair   Sitting Balance (Dynamic) Fair -   Weight Shift Sitting Fair   Comments no LOB sitting EOB but minimal challenges outside of midline, standing deferred given profound weakness   Gait Analysis   Gait Level Of Assist Unable to Participate   Bed Mobility    Supine to Sit Maximal Assist   Sit to Supine Maximal Assist   Scooting Maximal Assist   Rolling Maximum Assist to Lt.   Functional Mobility   Sit to Stand Unable to Participate   Bed, Chair, Wheelchair Transfer Unable to Participate   How much difficulty does the patient currently have...   Turning over in bed (including adjusting bedclothes, sheets and blankets)? 1   Sitting down on and standing up from a chair with arms (e.g., wheelchair, bedside commode, etc.) 1   Moving from lying on back to sitting on the side of the bed? 1   How much help from another person does the patient currently need...   Moving to and from a bed to a chair (including a wheelchair)? 1   Need to walk in a hospital room? 1   Climbing 3-5 steps with a railing? 1   6 clicks Mobility Score 6   Activity Tolerance   Sitting in Chair unable   Sitting Edge of Bed 10 min   Standing unable   Comments limited by weakness   Edema / Skin Assessment   Edema / Skin  Not Assessed   Patient / Family Goals    Patient / Family Goal #1 none stated   Short Term Goals    Short Term Goal # 1 Patient will move supine<>sitting EOB with min A within 6tx in order to get in/out of bed   Short Term Goal # 2 Patient will  perform squat pivot transfer with mod A within 6tx in order to achieve functional transfer   Short Term Goal # 3 Patient will self propel WC 50ft with min A within 6tx in order to access environment   Education Group   Education Provided Role of Physical Therapist   Role of Physical Therapist Patient Response Patient;Family;Acceptance;Explanation;Verbal Demonstration   Problem List    Problems Impaired Bed Mobility;Impaired Transfers;Impaired Ambulation;Functional ROM Deficit;Functional Strength Deficit;Impaired Balance;Impaired Coordination;Decreased Activity Tolerance;Safety Awareness Deficits / Cognition   Anticipated Discharge Equipment and Recommendations   DC Equipment Recommendations Unable to determine at this time   Discharge Recommendations Recommend post-acute placement for additional physical therapy services prior to discharge home  (if showing improvement, family also able to provide assist)   Interdisciplinary Plan of Care Collaboration   IDT Collaboration with  Nursing;Occupational Therapist;Family / Caregiver   Patient Position at End of Therapy In Bed;Chair Alarm On;Call Light within Reach;Tray Table within Reach;Phone within Reach;Family / Friend in Room   Collaboration Comments RN aware of visit, response   Session Information   Date / Session Number  10/24-1 (1/3, 10/30)   Priority   (.)

## 2021-10-25 NOTE — DISCHARGE PLANNING
[facility] Transitional Care Coordination    Referral from:  Dr. 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    D/C support: TBD     Physiatry consultation pended per protocol.     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  Would appreciate updated PT/OT s/p last does of IVIG today once appropriate.  Waiting on additional information to determine appropriateness for acute inpatient rehabilitation. Will continue to follow.      Thank you for the referral.

## 2021-10-25 NOTE — PROGRESS NOTES
".  [facility] Progress Note    Service: [facility]  Patient's Name: Noelle Campoverde  MRN: [id number]  Admit Date:10/22/2021  Today's Date: 10/24/2021   Room #: S190/02      Identification:  Noelle Campoverde is a 51 y.o. female who is s/p RIGHT radical nephrectomy with Dr. Painting 10/20/2021. She failed her TOV 10/21 with PVR >800ml. She was discharged 10/21 and returned to ED 10/22 with abdominal pain, distention, nausea, vomiting. Patient with post-op ileus.    Subjective/ROS:   Pt reports some pain in her legs (managed by neurology) but is otherwise doing well. Denies flank pain, fever, chills, hematuria. She has a rosen draining clear yellow urine. Awaiting rehab placement.    Physical Exam:  Current Vitals:   /88   Pulse 83   Temp 36.4 °C (97.5 °F) (Temporal)   Resp 18   Ht 1.575 m (5' 2\")   Wt 70.4 kg (155 lb 3.3 oz)   LMP 03/08/2021   SpO2 95%   BMI 28.39 kg/m²     10/23 1900 - 10/25 0659  In: -   Out: 1000 [Urine:1000]    GEN : NAD, A&O X4   RES:  no acute respiratory distress  ABD: No flank pain  :   Rosen draining clear yellow urine.     Labs:   Recent Labs     10/22/21  2128 10/24/21  0235 10/25/21  0229   SODIUM 133* 133* 135   POTASSIUM 3.8 3.5* 3.4*   CHLORIDE 98 99 101   CO2 22 25 21   GLUCOSE 146* 112* 94   BUN 11 12 12   CREATININE 0.64 0.70 0.72   CALCIUM 9.5 9.0 8.8     Recent Labs     10/22/21  2128 10/24/21  0235 10/25/21  0229   WBC 14.6* 11.1* 11.5*   RBC 5.55* 5.05 4.97   HEMOGLOBIN 15.1 13.6 13.3   HEMATOCRIT 44.0 40.5 39.3   MCV 79.3* 80.2* 79.1*   MCH 27.2 26.9* 26.8*   MCHC 34.3 33.6 33.8   RDW 39.4 40.1 40.0   PLATELETCT 298 293 323   MPV 10.4 10.6 10.4     Lab Results   Component Value Date/Time    GLUCOSE 94 10/25/2021 02:29 AM    GLUCOSE 112 (H) 10/24/2021 02:35 AM    GLUCOSE 146 (H) 10/22/2021 09:28 PM    GLUCOSE 112 (H) 10/21/2021 04:01 AM       Assessment/Plan  Noelle Campoverde is a 51 y.o. female who is s/p RIGHT " radical nephrectomy with Dr. Painting 10/20/2021. She failed her TOV 10/21 with PVR >800ml. She was discharged 10/21 and returned to ED 10/22 with abdominal pain, distention, nausea, vomiting. Patient with post-op ileus.    · Appreciate hospitalist management of ileus.   · We will plan for another TOV today. Will need to PVR to see if retention has resolved, if not then replace rosen.   · Agree with rehab placement to follow after discharge from hospital  · Urology will continue to follow. We appreciate management of ileus by hospitalist. Please let me know of any questions or issues. We will have her see Dr. Painting as outpatient for follow up and will call patient regarding this.          Oswaldo Mckinney, P.A.-C.   [address] [place] [address]   [phone]

## 2021-10-25 NOTE — PROGRESS NOTES
Trammell catheter removed at 1300. 250 mL of sterile water put in to catheter prior to removal. Pt pre void bladder scan was 319mL. Pt post void bladder scan was 0mL.

## 2021-10-25 NOTE — CARE PLAN
The patient is Stable - Low risk of patient condition declining or worsening    Shift Goals  Clinical Goals: Maintain neuro status and work on safe discharge.   Patient Goals: Eat and go home.   Family Goals: RAULITO    Progress made toward(s) clinical / shift goals:  Neuro remains intact at this time. Pt diet upgraded to full liquid and tolerated well.     Patient is not progressing towards the following goals:

## 2021-10-25 NOTE — PROGRESS NOTES
Hospital Medicine Daily Progress Note    Date of Service  10/25/2021    Chief Complaint  Noelle Campoverde is a 51 y.o. female admitted 10/22/2021 with abdominal pain, nausea/vomiting    Hospital Course  51-year-old female with a history of renal mass status post radical right nephrectomy on 10/20/2021 admitted with abdominal pain and nausea vomiting.  In the emergency department she was found to have leukocytosis and ileus by abdominal x-ray.  She was given enema with subsequent bowel movement.  Trammell was still in place and she failed TOB on 10/21.  Urology was consulted.  Patient was also noted to have progressive bilateral upper and lower extremity weakness.  Neurology was consulted.  She underwent MRI brain and spine which was negative for acute findings.  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  She was started on IVIG x3 days.  She had normal TSH and vitamin B12.  Hospital course c/b transaminitis thought to be d/t increased tylenol intake.  Lipase normal. Hepatitis panel negative.  HIV negative.       Interval Problem Update  - No acute overnight events  - continues to have loose stool, tolerating full liquid diet, no n/v  - TOV today per urology  - no change in weakness after IVIG  - rehab evaluation  - CT abd yesterday showed ileus, no obvious obstruction, post-op changes    I have personally seen and examined the patient at bedside. I discussed the plan of care with patient, family, bedside RN, charge RN,  and pharmacy.    Consultants/Specialty  neurology and urology    Code Status  Full Code    Disposition  Patient is medically cleared.   Anticipate discharge to to skilled nursing facility vs rehab   I have placed the appropriate orders for post-discharge needs.    Review of Systems  Review of Systems   Constitutional: Negative for chills and fever.   HENT: Negative for nosebleeds.    Eyes: Negative for  blurred vision and double vision.   Respiratory: Negative for shortness of breath.    Cardiovascular: Negative for chest pain and leg swelling.   Gastrointestinal: Positive for abdominal pain and diarrhea. Negative for nausea and vomiting.   Genitourinary: Negative for hematuria.   Musculoskeletal: Positive for joint pain (R ankle).   Skin: Negative for rash.   Neurological: Positive for weakness.        Physical Exam  Temp:  [36.4 °C (97.5 °F)-36.8 °C (98.3 °F)] 36.4 °C (97.5 °F)  Pulse:  [74-85] 83  Resp:  [16-18] 18  BP: (130-140)/(83-88) 131/88  SpO2:  [94 %-98 %] 95 %    Physical Exam  Vitals and nursing note reviewed.   Constitutional:       General: She is not in acute distress.     Appearance: She is not ill-appearing, toxic-appearing or diaphoretic.   HENT:      Head: Normocephalic and atraumatic.      Nose: Nose normal.      Mouth/Throat:      Mouth: Mucous membranes are moist.   Eyes:      General: No scleral icterus.     Conjunctiva/sclera: Conjunctivae normal.   Cardiovascular:      Rate and Rhythm: Normal rate and regular rhythm.      Heart sounds: No murmur heard.   No friction rub. No gallop.    Pulmonary:      Effort: Pulmonary effort is normal.      Breath sounds: Normal breath sounds.   Abdominal:      General: Bowel sounds are normal. There is distension.      Palpations: Abdomen is soft.      Tenderness: There is abdominal tenderness in the right upper quadrant and right lower quadrant. There is no guarding or rebound.      Comments: Lap incision sites healing well c/d/i   Genitourinary:     Comments: Trammell in place  Musculoskeletal:      Cervical back: Normal range of motion.      Right lower leg: No edema.      Left lower leg: No edema.   Skin:     Coloration: Skin is not jaundiced.      Findings: No rash.   Neurological:      Mental Status: She is alert and oriented to person, place, and time.      Motor: Weakness (b/l UE and LE weakness) present.   Psychiatric:         Mood and Affect: Mood  normal.         Behavior: Behavior normal.         Fluids    Intake/Output Summary (Last 24 hours) at 10/25/2021 1452  Last data filed at 10/25/2021 0400  Gross per 24 hour   Intake --   Output 400 ml   Net -400 ml       Laboratory  Recent Labs     10/22/21  2128 10/24/21  0235 10/25/21  0229   WBC 14.6* 11.1* 11.5*   RBC 5.55* 5.05 4.97   HEMOGLOBIN 15.1 13.6 13.3   HEMATOCRIT 44.0 40.5 39.3   MCV 79.3* 80.2* 79.1*   MCH 27.2 26.9* 26.8*   MCHC 34.3 33.6 33.8   RDW 39.4 40.1 40.0   PLATELETCT 298 293 323   MPV 10.4 10.6 10.4     Recent Labs     10/22/21  2128 10/24/21  0235 10/25/21  0229   SODIUM 133* 133* 135   POTASSIUM 3.8 3.5* 3.4*   CHLORIDE 98 99 101   CO2 22 25 21   GLUCOSE 146* 112* 94   BUN 11 12 12   CREATININE 0.64 0.70 0.72   CALCIUM 9.5 9.0 8.8              Recent Labs     10/22/21  2128 10/24/21  0235 10/25/21  0229   ASTSGOT 177* 78* 43   ALTSGPT 272* 222* 149*   TBILIRUBIN 0.7 0.5 0.7   GLOBULIN 4.1* 4.0* 4.3*             Imaging  CT-ABDOMEN-PELVIS WITH   Final Result         1. Diffuse wall thickening in the mid to distal small bowel in the mid abdomen as well as the cecum could relate to reactive change postoperative, infection/inflammation or ischemia. No obvious occlusion is seen.   2. Air-fluid levels throughout the mildly distended small bowel and colon, likely ileus   3. Recent right nephrectomy with small amount fluid in the right renal fossa.      MR-BRAIN-WITH & W/O   Final Result      1.  No acute intracranial abnormality. No abnormal enhancement to suggest a neoplastic process.   2.  Mild white matter disease likely representing microvascular ischemic change, though this can be seen with gliosis or demyelinating disease.      MR-CERVICAL SPINE-WITH & W/O   Final Result      1.  No acute abnormality of the cervical spine. No abnormal enhancement to suggest neoplastic process.   2.  Mild multilevel degenerative changes as above.      MR-LUMBAR SPINE-WITH & W/O   Final Result      Negative  for significant disc disease, canal stenosis or evidence of root impingement. No abnormal enhancement visualized.      MR-THORACIC SPINE-WITH & W/O   Final Result      MRI of the thoracic spine without and with contrast within normal limits.      MI-FMIDNFD-2 VIEW   Final Result         1.  Nonspecific bowel gas pattern.           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 Vs tylenol use at home  Recent CT Abd showed only hepatic steatosis  Hepatitis panel negative   follow-up repeat LFTs    H/O right radical nephrectomy  Assessment & Plan  S/p  right radical nephrectomy  Failed TOV 10/21, rosen in place, repeat TOV today 10/25   follow-up urology consult Dr. Barboza.    Ileus (HCC)  Assessment & Plan  Improving  KUB on admission  CT Abd pelvis 10/24: diffuse wall thickening in the mid-distal small bowel in the mid abdomen as well as the cecum could relate to postop changes, infection/inflammation or ischemia, no obvious occlusion, air fluid levels throughout, likely ileus, recent R nephrectomy with small amount of fluid in the R renal fossa  - tolerating PO, CLD-->FLD, advance as tolerated  - passing gas and stool         VTE prophylaxis: heparin ppx    I have performed a physical exam and reviewed and updated ROS and Plan today (10/25/2021). In review of yesterday's note (10/24/2021), there are no changes except as documented  above.

## 2021-10-25 NOTE — CARE PLAN
The patient is Stable - Low risk of patient condition declining or worsening    Shift Goals  Clinical Goals: maintain neuro status  Patient Goals: eat solid food, rest  Family Goals: RAULITO    Progress made toward(s) clinical / shift goals: Pt remains A&O x4. Q4 neuros in place. Pt offered oral intake throughout shift. Hourly rounding continues.     Patient is not progressing towards the following goals:    Problem: Pain - Standard  Goal: Alleviation of pain or a reduction in pain to the patient’s comfort goal  Outcome: Progressing  Pt experiencing pain to back and stomach. Medicated per MAR. Nonpharmalogical methods such as heat pack and repositioning used.

## 2021-10-26 LAB
ANION GAP SERPL CALC-SCNC: 10 MMOL/L (ref 7–16)
BUN SERPL-MCNC: 11 MG/DL (ref 8–22)
CALCIUM SERPL-MCNC: 8.7 MG/DL (ref 8.5–10.5)
CHLORIDE SERPL-SCNC: 102 MMOL/L (ref 96–112)
CO2 SERPL-SCNC: 21 MMOL/L (ref 20–33)
CREAT SERPL-MCNC: 0.5 MG/DL (ref 0.5–1.4)
GLUCOSE SERPL-MCNC: 99 MG/DL (ref 65–99)
IGA SERPL-MCNC: 397 MG/DL (ref 68–408)
POTASSIUM SERPL-SCNC: 3.8 MMOL/L (ref 3.6–5.5)
SODIUM SERPL-SCNC: 133 MMOL/L (ref 135–145)

## 2021-10-26 PROCEDURE — 99232 SBSQ HOSP IP/OBS MODERATE 35: CPT | Performed by: INTERNAL MEDICINE

## 2021-10-26 PROCEDURE — 700102 HCHG RX REV CODE 250 W/ 637 OVERRIDE(OP): Performed by: INTERNAL MEDICINE

## 2021-10-26 PROCEDURE — 700111 HCHG RX REV CODE 636 W/ 250 OVERRIDE (IP): Performed by: INTERNAL MEDICINE

## 2021-10-26 PROCEDURE — 97530 THERAPEUTIC ACTIVITIES: CPT | Mod: CQ

## 2021-10-26 PROCEDURE — A9270 NON-COVERED ITEM OR SERVICE: HCPCS | Performed by: HOSPITALIST

## 2021-10-26 PROCEDURE — 80048 BASIC METABOLIC PNL TOTAL CA: CPT

## 2021-10-26 PROCEDURE — A9270 NON-COVERED ITEM OR SERVICE: HCPCS | Performed by: INTERNAL MEDICINE

## 2021-10-26 PROCEDURE — 700102 HCHG RX REV CODE 250 W/ 637 OVERRIDE(OP): Performed by: HOSPITALIST

## 2021-10-26 PROCEDURE — 97535 SELF CARE MNGMENT TRAINING: CPT

## 2021-10-26 PROCEDURE — 36415 COLL VENOUS BLD VENIPUNCTURE: CPT

## 2021-10-26 PROCEDURE — 770001 HCHG ROOM/CARE - MED/SURG/GYN PRIV*

## 2021-10-26 RX ADMIN — PSYLLIUM HUSK 1 PACKET: 3.4 POWDER ORAL at 06:00

## 2021-10-26 RX ADMIN — DOCUSATE SODIUM 50 MG AND SENNOSIDES 8.6 MG 2 TABLET: 8.6; 5 TABLET, FILM COATED ORAL at 17:45

## 2021-10-26 RX ADMIN — ENOXAPARIN SODIUM 40 MG: 40 INJECTION SUBCUTANEOUS at 04:19

## 2021-10-26 RX ADMIN — ACETAMINOPHEN 650 MG: 325 TABLET ORAL at 04:18

## 2021-10-26 RX ADMIN — ACETAMINOPHEN 650 MG: 325 TABLET ORAL at 12:13

## 2021-10-26 ASSESSMENT — ENCOUNTER SYMPTOMS
SHORTNESS OF BREATH: 0
DOUBLE VISION: 0
FEVER: 0
VOMITING: 0
WEAKNESS: 1
NAUSEA: 0
DIARRHEA: 1
CHILLS: 0
BLURRED VISION: 0
ABDOMINAL PAIN: 1

## 2021-10-26 ASSESSMENT — COGNITIVE AND FUNCTIONAL STATUS - GENERAL
PERSONAL GROOMING: A LOT
MOBILITY SCORE: 6
DRESSING REGULAR LOWER BODY CLOTHING: TOTAL
CLIMB 3 TO 5 STEPS WITH RAILING: TOTAL
MOVING FROM LYING ON BACK TO SITTING ON SIDE OF FLAT BED: UNABLE
TOILETING: TOTAL
DRESSING REGULAR UPPER BODY CLOTHING: A LOT
WALKING IN HOSPITAL ROOM: TOTAL
SUGGESTED CMS G CODE MODIFIER MOBILITY: CN
STANDING UP FROM CHAIR USING ARMS: TOTAL
MOVING TO AND FROM BED TO CHAIR: UNABLE
EATING MEALS: A LOT
SUGGESTED CMS G CODE MODIFIER DAILY ACTIVITY: CL
TURNING FROM BACK TO SIDE WHILE IN FLAT BAD: UNABLE
HELP NEEDED FOR BATHING: TOTAL
DAILY ACTIVITIY SCORE: 9

## 2021-10-26 ASSESSMENT — PAIN DESCRIPTION - PAIN TYPE: TYPE: ACUTE PAIN

## 2021-10-26 ASSESSMENT — GAIT ASSESSMENTS: GAIT LEVEL OF ASSIST: UNABLE TO PARTICIPATE

## 2021-10-26 NOTE — PROGRESS NOTES
Hospital Medicine Daily Progress Note    Date of Service  10/26/2021    Chief Complaint  Noelle Campoverde is a 51 y.o. female admitted 10/22/2021 with abdominal pain, nausea/vomiting    Hospital Course  51-year-old female with a history of renal mass status post radical right nephrectomy on 10/20/2021 admitted with abdominal pain and nausea vomiting.  In the emergency department she was found to have leukocytosis and ileus by abdominal x-ray.  She was given enema with subsequent bowel movement.  Trammell was still in place and she failed TOB on 10/21.  Urology was consulted.  Patient was also noted to have progressive bilateral upper and lower extremity weakness.  Neurology was consulted.  She underwent MRI brain and spine which was negative for acute findings.  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  She was started on IVIG x3 days.  She had normal TSH and vitamin B12.  Hospital course c/b transaminitis thought to be d/t increased tylenol intake.  Lipase normal. Hepatitis panel negative.  HIV negative.       Interval Problem Update  10/25- No acute overnight events  - continues to have loose stool, tolerating full liquid diet, no n/v  - TOV today per urology  - no change in weakness after IVIG  - rehab evaluation  - CT abd yesterday showed ileus, no obvious obstruction, post-op changes  10/26  Patient tolerates full liquid diet.  Denies abdominal pain, nausea.  Requested to advance diet.  Passing gases.  Last BM yesterday.  Will advance to GI soft diet  No improvement in motor function.        I have personally seen and examined the patient at bedside. I discussed the plan of care with patient, family, bedside RN, charge RN,  and pharmacy.    Consultants/Specialty  neurology and urology    Code Status  Full Code    Disposition  Patient is medically cleared.   Anticipate discharge to to skilled nursing facility vs rehab    I have placed the appropriate orders for post-discharge needs.    Review of Systems  Review of Systems   Constitutional: Negative for chills and fever.   HENT: Negative for nosebleeds.    Eyes: Negative for blurred vision and double vision.   Respiratory: Negative for shortness of breath.    Cardiovascular: Negative for chest pain and leg swelling.   Gastrointestinal: Positive for abdominal pain and diarrhea. Negative for nausea and vomiting.   Genitourinary: Negative for hematuria.   Musculoskeletal: Positive for joint pain (R ankle).   Skin: Negative for rash.   Neurological: Positive for weakness.        Physical Exam  Temp:  [36.6 °C (97.8 °F)-37.4 °C (99.4 °F)] 36.6 °C (97.8 °F)  Pulse:  [80-90] 80  Resp:  [17-18] 17  BP: (126-152)/(82-95) 139/82  SpO2:  [94 %-95 %] 95 %    Physical Exam  Vitals and nursing note reviewed.   Constitutional:       General: She is not in acute distress.     Appearance: She is not ill-appearing, toxic-appearing or diaphoretic.   HENT:      Head: Normocephalic and atraumatic.      Nose: Nose normal.      Mouth/Throat:      Mouth: Mucous membranes are moist.   Eyes:      General: No scleral icterus.     Conjunctiva/sclera: Conjunctivae normal.   Cardiovascular:      Rate and Rhythm: Normal rate and regular rhythm.      Heart sounds: No murmur heard.   No friction rub. No gallop.    Pulmonary:      Effort: Pulmonary effort is normal.      Breath sounds: Normal breath sounds.   Abdominal:      General: Bowel sounds are normal. There is distension.      Palpations: Abdomen is soft.      Tenderness: There is abdominal tenderness in the right upper quadrant and right lower quadrant. There is no guarding or rebound.      Comments: Lap incision sites healing well c/d/i   Genitourinary:     Comments: Trammell in place  Musculoskeletal:      Cervical back: Normal range of motion.      Right lower leg: No edema.      Left lower leg: No edema.   Skin:     Coloration: Skin is not jaundiced.       Findings: No rash.   Neurological:      Mental Status: She is alert and oriented to person, place, and time.      Motor: Weakness (b/l UE and LE weakness) present.   Psychiatric:         Mood and Affect: Mood normal.         Behavior: Behavior normal.         Fluids  No intake or output data in the 24 hours ending 10/26/21 1654    Laboratory  Recent Labs     10/24/21  0235 10/25/21  0229   WBC 11.1* 11.5*   RBC 5.05 4.97   HEMOGLOBIN 13.6 13.3   HEMATOCRIT 40.5 39.3   MCV 80.2* 79.1*   MCH 26.9* 26.8*   MCHC 33.6 33.8   RDW 40.1 40.0   PLATELETCT 293 323   MPV 10.6 10.4     Recent Labs     10/24/21  0235 10/25/21  0229 10/26/21  0236   SODIUM 133* 135 133*   POTASSIUM 3.5* 3.4* 3.8   CHLORIDE 99 101 102   CO2 25 21 21   GLUCOSE 112* 94 99   BUN 12 12 11   CREATININE 0.70 0.72 0.50   CALCIUM 9.0 8.8 8.7              Recent Labs     10/22/21  2128 10/24/21  0235 10/25/21  0229   ASTSGOT 177* 78* 43   ALTSGPT 272* 222* 149*   TBILIRUBIN 0.7 0.5 0.7   GLOBULIN 4.1* 4.0* 4.3*             Imaging  CT-ABDOMEN-PELVIS WITH   Final Result         1. Diffuse wall thickening in the mid to distal small bowel in the mid abdomen as well as the cecum could relate to reactive change postoperative, infection/inflammation or ischemia. No obvious occlusion is seen.   2. Air-fluid levels throughout the mildly distended small bowel and colon, likely ileus   3. Recent right nephrectomy with small amount fluid in the right renal fossa.      MR-BRAIN-WITH & W/O   Final Result      1.  No acute intracranial abnormality. No abnormal enhancement to suggest a neoplastic process.   2.  Mild white matter disease likely representing microvascular ischemic change, though this can be seen with gliosis or demyelinating disease.      MR-CERVICAL SPINE-WITH & W/O   Final Result      1.  No acute abnormality of the cervical spine. No abnormal enhancement to suggest neoplastic process.   2.  Mild multilevel degenerative changes as above.      MR-LUMBAR  SPINE-WITH & W/O   Final Result      Negative for significant disc disease, canal stenosis or evidence of root impingement. No abnormal enhancement visualized.      MR-THORACIC SPINE-WITH & W/O   Final Result      MRI of the thoracic spine without and with contrast within normal limits.      [id number] VIEW   Final Result         1.  Nonspecific bowel gas pattern.           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  Placement for rehab anticipated.    LFT elevation  Assessment & Plan  Improving  Uncertain cause, recent anesthesia? Vs tylenol use at home  Recent CT Abd showed only hepatic steatosis  Hepatitis panel negative   follow-up repeat LFTs    H/O right radical nephrectomy  Assessment & Plan  S/p  right radical nephrectomy  Failed TOV 10/21, rosen in place, repeat TOV today 10/25   follow-up urology consult Dr. Barboza.  Urology signed off.    Ileus (HCC)  Assessment & Plan  Improving  KUB on admission  CT Abd pelvis 10/24: diffuse wall thickening in the mid-distal small bowel in the mid abdomen as well as the cecum could relate to postop changes, infection/inflammation or ischemia, no obvious occlusion, air fluid levels throughout, likely ileus, recent R nephrectomy with small amount of fluid in the R renal fossa  - tolerating PO, CLD-->FLD, advance as tolerated  - passing gas and stool  10/26 advance to GI soft       VTE prophylaxis: heparin ppx    I have performed a  physical exam and reviewed and updated ROS and Plan today (10/26/2021). In review of yesterday's note (10/25/2021), there are no changes except as documented above.

## 2021-10-26 NOTE — CARE PLAN
The patient is Stable - Low risk of patient condition declining or worsening    Shift Goals  Clinical Goals: maintain neuro status  Patient Goals: discharge  Family Goals: RAULITO    Progress made toward(s) clinical / shift goals:  Pt remains alert and oriented x4. Use of arms remains consistent, use of legs remains minimal. Pt able to express needs with no interpretation services. Pt eager to get better to go home    Patient is not progressing towards the following goals:

## 2021-10-26 NOTE — PROGRESS NOTES
".  [facility] Progress Note    Service: [facility]  Patient's Name: Noelle Campoverde  MRN: [id number]  Admit Date:10/22/2021  Today's Date: 10/24/2021   Room #: S190/02      Identification:  Noelle Campoverde is a 51 y.o. female who is s/p RIGHT radical nephrectomy with Dr. Painting 10/20/2021. She failed her TOV 10/21 with PVR >800ml. She was discharged 10/21 and returned to ED 10/22 with abdominal pain, distention, nausea, vomiting. Patient with post-op ileus.    Subjective/ROS:   Pt seen and examined on rounds this am, alert and in bed in NAD. Denies flank pain, fever, chills, hematuria. Reports she has been urinating \"great\" since her Rosen was removed yesterday, denies dysuria, urgency, GH, frequency. Awaiting rehab placement.    Physical Exam:  Current Vitals:   /82   Pulse 80   Temp 36.6 °C (97.8 °F) (Temporal)   Resp 17   Ht 1.575 m (5' 2\")   Wt 70.4 kg (155 lb 3.3 oz)   LMP 03/08/2021   SpO2 95%   BMI 28.39 kg/m²     10/24 1900 - 10/26 0659  In: 180 [P.O.:180]  Out: 400 [Urine:400]    GEN : NAD, A&O X4   RES:  no acute respiratory distress  ABD: No flank pain  :   No rosen     Labs:   Recent Labs     10/24/21  0235 10/25/21  0229 10/26/21  0236   SODIUM 133* 135 133*   POTASSIUM 3.5* 3.4* 3.8   CHLORIDE 99 101 102   CO2 25 21 21   GLUCOSE 112* 94 99   BUN 12 12 11   CREATININE 0.70 0.72 0.50   CALCIUM 9.0 8.8 8.7     Recent Labs     10/24/21  0235 10/25/21  0229   WBC 11.1* 11.5*   RBC 5.05 4.97   HEMOGLOBIN 13.6 13.3   HEMATOCRIT 40.5 39.3   MCV 80.2* 79.1*   MCH 26.9* 26.8*   MCHC 33.6 33.8   RDW 40.1 40.0   PLATELETCT 293 323   MPV 10.6 10.4     Lab Results   Component Value Date/Time    GLUCOSE 99 10/26/2021 02:36 AM    GLUCOSE 94 10/25/2021 02:29 AM    GLUCOSE 112 (H) 10/24/2021 02:35 AM    GLUCOSE 146 (H) 10/22/2021 09:28 PM       Assessment/Plan  Noelle Hobbs Campoverde is a 51 y.o. female who is s/p RIGHT radical nephrectomy with Dr." Garima 10/20/2021. She failed her TOV 10/21 with PVR >800ml. She was discharged 10/21 and returned to ED 10/22 with abdominal pain, distention, nausea, vomiting. Patient with post-op ileus. TOV successful 10/25 and catheter removed.     · Appreciate hospitalist management of ileus.   · Agree with rehab placement to follow after discharge from hospital  · [facility] will call patient to set up appointment for her to see Dr. Painting as outpatient for follow up.  · Urology signing off, please call with questions          Oswaldo Mckinney P.A.-C.   [address] [place] [id number]   [phone]      Case discussed with patient, Dr. Painting

## 2021-10-27 ENCOUNTER — HOSPITAL ENCOUNTER (INPATIENT)
Facility: REHABILITATION | Age: 51
End: 2021-10-27
Attending: PHYSICAL MEDICINE & REHABILITATION | Admitting: PHYSICAL MEDICINE & REHABILITATION
Payer: MEDICAID

## 2021-10-27 LAB
ALBUMIN SERPL BCP-MCNC: 3.2 G/DL (ref 3.2–4.9)
ALBUMIN/GLOB SERPL: 0.6 G/DL
ALP SERPL-CCNC: 207 U/L (ref 30–99)
ALT SERPL-CCNC: 129 U/L (ref 2–50)
ANION GAP SERPL CALC-SCNC: 11 MMOL/L (ref 7–16)
AST SERPL-CCNC: 70 U/L (ref 12–45)
BASOPHILS # BLD AUTO: 0.5 % (ref 0–1.8)
BASOPHILS # BLD: 0.06 K/UL (ref 0–0.12)
BILIRUB SERPL-MCNC: 1.3 MG/DL (ref 0.1–1.5)
BUN SERPL-MCNC: 11 MG/DL (ref 8–22)
CALCIUM SERPL-MCNC: 8.7 MG/DL (ref 8.5–10.5)
CHLORIDE SERPL-SCNC: 102 MMOL/L (ref 96–112)
CO2 SERPL-SCNC: 21 MMOL/L (ref 20–33)
CREAT SERPL-MCNC: 0.61 MG/DL (ref 0.5–1.4)
EOSINOPHIL # BLD AUTO: 0.51 K/UL (ref 0–0.51)
EOSINOPHIL NFR BLD: 4.1 % (ref 0–6.9)
ERYTHROCYTE [DISTWIDTH] IN BLOOD BY AUTOMATED COUNT: 38.5 FL (ref 35.9–50)
GLOBULIN SER CALC-MCNC: 5 G/DL (ref 1.9–3.5)
GLUCOSE SERPL-MCNC: 101 MG/DL (ref 65–99)
HCT VFR BLD AUTO: 35 % (ref 37–47)
HGB BLD-MCNC: 12.1 G/DL (ref 12–16)
IMM GRANULOCYTES # BLD AUTO: 0.07 K/UL (ref 0–0.11)
IMM GRANULOCYTES NFR BLD AUTO: 0.6 % (ref 0–0.9)
LEAD BLDV-MCNC: <2 UG/DL
LYMPHOCYTES # BLD AUTO: 1.22 K/UL (ref 1–4.8)
LYMPHOCYTES NFR BLD: 9.9 % (ref 22–41)
MAGNESIUM SERPL-MCNC: 1.9 MG/DL (ref 1.5–2.5)
MCH RBC QN AUTO: 26.4 PG (ref 27–33)
MCHC RBC AUTO-ENTMCNC: 34.6 G/DL (ref 33.6–35)
MCV RBC AUTO: 76.3 FL (ref 81.4–97.8)
MERCURY BLD-MCNC: <2.5 UG/L
MONOCYTES # BLD AUTO: 0.75 K/UL (ref 0–0.85)
MONOCYTES NFR BLD AUTO: 6.1 % (ref 0–13.4)
NEUTROPHILS # BLD AUTO: 9.75 K/UL (ref 2–7.15)
NEUTROPHILS NFR BLD: 78.8 % (ref 44–72)
NRBC # BLD AUTO: 0 K/UL
NRBC BLD-RTO: 0 /100 WBC
PHOSPHATE SERPL-MCNC: 3.1 MG/DL (ref 2.5–4.5)
PLATELET # BLD AUTO: 302 K/UL (ref 164–446)
PMV BLD AUTO: 10.2 FL (ref 9–12.9)
POTASSIUM SERPL-SCNC: 3.7 MMOL/L (ref 3.6–5.5)
PROT SERPL-MCNC: 8.2 G/DL (ref 6–8.2)
RBC # BLD AUTO: 4.59 M/UL (ref 4.2–5.4)
SODIUM SERPL-SCNC: 134 MMOL/L (ref 135–145)
WBC # BLD AUTO: 12.4 K/UL (ref 4.8–10.8)

## 2021-10-27 PROCEDURE — 99232 SBSQ HOSP IP/OBS MODERATE 35: CPT | Performed by: INTERNAL MEDICINE

## 2021-10-27 PROCEDURE — 36415 COLL VENOUS BLD VENIPUNCTURE: CPT

## 2021-10-27 PROCEDURE — 700102 HCHG RX REV CODE 250 W/ 637 OVERRIDE(OP): Performed by: INTERNAL MEDICINE

## 2021-10-27 PROCEDURE — 83735 ASSAY OF MAGNESIUM: CPT

## 2021-10-27 PROCEDURE — 85025 COMPLETE CBC W/AUTO DIFF WBC: CPT

## 2021-10-27 PROCEDURE — 700111 HCHG RX REV CODE 636 W/ 250 OVERRIDE (IP): Performed by: INTERNAL MEDICINE

## 2021-10-27 PROCEDURE — 700102 HCHG RX REV CODE 250 W/ 637 OVERRIDE(OP): Performed by: HOSPITALIST

## 2021-10-27 PROCEDURE — 770001 HCHG ROOM/CARE - MED/SURG/GYN PRIV*

## 2021-10-27 PROCEDURE — 84100 ASSAY OF PHOSPHORUS: CPT

## 2021-10-27 PROCEDURE — 80053 COMPREHEN METABOLIC PANEL: CPT

## 2021-10-27 PROCEDURE — A9270 NON-COVERED ITEM OR SERVICE: HCPCS | Performed by: HOSPITALIST

## 2021-10-27 PROCEDURE — A9270 NON-COVERED ITEM OR SERVICE: HCPCS | Performed by: INTERNAL MEDICINE

## 2021-10-27 RX ADMIN — ENOXAPARIN SODIUM 40 MG: 40 INJECTION SUBCUTANEOUS at 04:38

## 2021-10-27 RX ADMIN — ACETAMINOPHEN 650 MG: 325 TABLET ORAL at 01:27

## 2021-10-27 RX ADMIN — DOCUSATE SODIUM 50 MG AND SENNOSIDES 8.6 MG 2 TABLET: 8.6; 5 TABLET, FILM COATED ORAL at 18:08

## 2021-10-27 ASSESSMENT — ENCOUNTER SYMPTOMS
VOMITING: 0
BLURRED VISION: 0
FEVER: 0
WEAKNESS: 1
DOUBLE VISION: 0
CHILLS: 0
ABDOMINAL PAIN: 1
SHORTNESS OF BREATH: 0
NAUSEA: 0
DIARRHEA: 1

## 2021-10-27 ASSESSMENT — PAIN DESCRIPTION - PAIN TYPE
TYPE: ACUTE PAIN
TYPE: ACUTE PAIN

## 2021-10-27 NOTE — PROGRESS NOTES
Hospital Medicine Daily Progress Note    Date of Service  10/27/2021    Chief Complaint  Noelle Campoverde is a 51 y.o. female admitted 10/22/2021 with abdominal pain, nausea/vomiting    Hospital Course  51-year-old female with a history of renal mass status post radical right nephrectomy on 10/20/2021 admitted with abdominal pain and nausea vomiting.  In the emergency department she was found to have leukocytosis and ileus by abdominal x-ray.  She was given enema with subsequent bowel movement.  Trammell was still in place and she failed TOB on 10/21.  Urology was consulted.  Patient was also noted to have progressive bilateral upper and lower extremity weakness.  Neurology was consulted.  She underwent MRI brain and spine which was negative for acute findings.  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  She was started on IVIG x3 days.  She had normal TSH and vitamin B12.  Hospital course c/b transaminitis thought to be d/t increased tylenol intake.  Lipase normal. Hepatitis panel negative.  HIV negative.       Interval Problem Update  10/25- No acute overnight events  - continues to have loose stool, tolerating full liquid diet, no n/v  - TOV today per urology  - no change in weakness after IVIG  - rehab evaluation  - CT abd yesterday showed ileus, no obvious obstruction, post-op changes  10/26  Patient tolerates full liquid diet.  Denies abdominal pain, nausea.  Requested to advance diet.  Passing gases.  Last BM yesterday.  Will advance to GI soft diet  No improvement in motor function.    10/27  Patient tolerated GI soft diet  She states that she feels well.  No significant changes in lower extremity weakness.  I discussed POC with the patient and her family in details at bedside.  Rehab consulted and patient was deemed not a good candidate for inpatient rehab, recommended SNF.    I have personally seen and examined the patient at  bedside. I discussed the plan of care with patient, family, bedside RN, charge RN,  and pharmacy.    Consultants/Specialty  neurology and urology    Code Status  Full Code    Disposition  Patient is medically cleared.   Anticipate discharge to to skilled nursing facility vs rehab   I have placed the appropriate orders for post-discharge needs.    Review of Systems  Review of Systems   Constitutional: Negative for chills and fever.   HENT: Negative for nosebleeds.    Eyes: Negative for blurred vision and double vision.   Respiratory: Negative for shortness of breath.    Cardiovascular: Negative for chest pain and leg swelling.   Gastrointestinal: Positive for abdominal pain and diarrhea. Negative for nausea and vomiting.   Genitourinary: Negative for hematuria.   Musculoskeletal: Positive for joint pain (R ankle).   Skin: Negative for rash.   Neurological: Positive for weakness.        Physical Exam  Temp:  [36.3 °C (97.4 °F)-36.9 °C (98.4 °F)] 36.9 °C (98.4 °F)  Pulse:  [76-85] 80  Resp:  [17-18] 17  BP: (127-149)/(84-92) 127/84  SpO2:  [95 %-99 %] 99 %    Physical Exam  Vitals and nursing note reviewed.   Constitutional:       General: She is not in acute distress.     Appearance: She is not ill-appearing, toxic-appearing or diaphoretic.   HENT:      Head: Normocephalic and atraumatic.      Nose: Nose normal.      Mouth/Throat:      Mouth: Mucous membranes are moist.   Eyes:      General: No scleral icterus.     Conjunctiva/sclera: Conjunctivae normal.   Cardiovascular:      Rate and Rhythm: Normal rate and regular rhythm.      Heart sounds: No murmur heard.   No friction rub. No gallop.    Pulmonary:      Effort: Pulmonary effort is normal.      Breath sounds: Normal breath sounds.   Abdominal:      General: Bowel sounds are normal. There is distension.      Palpations: Abdomen is soft.      Tenderness: There is abdominal tenderness in the right upper quadrant and right lower quadrant. There is no  guarding or rebound.      Comments: Lap incision sites healing well c/d/i   Genitourinary:     Comments: Trammell in place  Musculoskeletal:      Cervical back: Normal range of motion.      Right lower leg: No edema.      Left lower leg: No edema.   Skin:     Coloration: Skin is not jaundiced.      Findings: No rash.   Neurological:      Mental Status: She is alert and oriented to person, place, and time.      Motor: Weakness (b/l UE and LE weakness) present.   Psychiatric:         Mood and Affect: Mood normal.         Behavior: Behavior normal.         Fluids    Intake/Output Summary (Last 24 hours) at 10/27/2021 1558  Last data filed at 10/26/2021 2046  Gross per 24 hour   Intake 120 ml   Output --   Net 120 ml       Laboratory  Recent Labs     10/25/21  0229 10/27/21  0231   WBC 11.5* 12.4*   RBC 4.97 4.59   HEMOGLOBIN 13.3 12.1   HEMATOCRIT 39.3 35.0*   MCV 79.1* 76.3*   MCH 26.8* 26.4*   MCHC 33.8 34.6   RDW 40.0 38.5   PLATELETCT 323 302   MPV 10.4 10.2     Recent Labs     10/25/21  0229 10/26/21  0236 10/27/21  0231   SODIUM 135 133* 134*   POTASSIUM 3.4* 3.8 3.7   CHLORIDE 101 102 102   CO2 21 21 21   GLUCOSE 94 99 101*   BUN 12 11 11   CREATININE 0.72 0.50 0.61   CALCIUM 8.8 8.7 8.7              Recent Labs     10/22/21  2128 10/24/21  0235 10/25/21  0229 10/27/21  0231   ASTSGOT 177* 78* 43 70*   ALTSGPT 272* 222* 149* 129*   TBILIRUBIN 0.7 0.5 0.7 1.3   GLOBULIN 4.1* 4.0* 4.3* 5.0*             Imaging  CT-ABDOMEN-PELVIS WITH   Final Result         1. Diffuse wall thickening in the mid to distal small bowel in the mid abdomen as well as the cecum could relate to reactive change postoperative, infection/inflammation or ischemia. No obvious occlusion is seen.   2. Air-fluid levels throughout the mildly distended small bowel and colon, likely ileus   3. Recent right nephrectomy with small amount fluid in the right renal fossa.      MR-BRAIN-WITH & W/O   Final Result      1.  No acute intracranial abnormality. No  abnormal enhancement to suggest a neoplastic process.   2.  Mild white matter disease likely representing microvascular ischemic change, though this can be seen with gliosis or demyelinating disease.      MR-CERVICAL SPINE-WITH & W/O   Final Result      1.  No acute abnormality of the cervical spine. No abnormal enhancement to suggest neoplastic process.   2.  Mild multilevel degenerative changes as above.      MR-LUMBAR SPINE-WITH & W/O   Final Result      Negative for significant disc disease, canal stenosis or evidence of root impingement. No abnormal enhancement visualized.      MR-THORACIC SPINE-WITH & W/O   Final Result      MRI of the thoracic spine without and with contrast within normal limits.      [id number] VIEW   Final Result         1.  Nonspecific bowel gas pattern.           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  Physiatry consulted, patient considered not a ideal candidate for inpatient rehab.  Recommended to consider SNF placement.    LFT elevation  Assessment & Plan  Improving  Uncertain cause, recent anesthesia? Vs tylenol use at home  Recent CT Abd showed only hepatic steatosis  Hepatitis panel negative   follow-up repeat LFTs    H/O right radical nephrectomy  Assessment & Plan  S/p  right radical nephrectomy  Failed TOV 10/21, rosen in place, repeat TOV today 10/25   follow-up urology consult Dr. Barboza.  Urology  signed off.    Ileus (HCC)  Assessment & Plan  Improving  KUB on admission  CT Abd pelvis 10/24: diffuse wall thickening in the mid-distal small bowel in the mid abdomen as well as the cecum could relate to postop changes, infection/inflammation or ischemia, no obvious occlusion, air fluid levels throughout, likely ileus, recent R nephrectomy with small amount of fluid in the R renal fossa  - tolerating PO, CLD-->FLD, advance as tolerated  - passing gas and stool  10/26 advance to GI soft  10/27 tolerated GI soft diet well, will advance to regular diet         VTE prophylaxis: heparin ppx    I have performed a physical exam and reviewed and updated ROS and Plan today (10/27/2021). In review of yesterday's note (10/26/2021), there are no changes except as documented above.

## 2021-10-27 NOTE — THERAPY
Physical Therapy   Daily Treatment     Patient Name: Noelle Campoverde  Age:  51 y.o., Sex:  female  Medical Record #: [id number]  Today's Date: 10/27/2021     Precautions  Precautions: Fall Risk;Toe Touch Weight Bearing Right Lower Extremity  Comments: per [facility], pt TTW on R ankle fx 9/2021.Katie discussed with dr champion assistant georgina     Assessment    Pt pleasant presents with self limiting behaviors and decreased volition for mobility. Daughter present pt understanding english but occasionally talking to daughter in [place]. Pt required maxA to reach EOB and step by step cues for sequencing. Performed stand pivot transfer 2x with maxA. Pt with poor compliance on WB at this time. Did confirm with with [facility] on phone regarding weight bearing status. Pt will benefit from post acute placement at this time.     Plan    Continue current treatment plan.    DC Equipment Recommendations: Unable to determine at this time  Discharge Recommendations: Recommend post-acute placement for additional physical therapy services prior to discharge home         10/26/21 1211   Other Treatments   Other Treatments Provided Called and discussed with josh assistant regarding pt is TTWB on RLE at this time    Balance   Sitting Balance (Static) Fair   Sitting Balance (Dynamic) Fair -   Standing Balance (Static) Trace   Standing Balance (Dynamic) Trace   Weight Shift Sitting Fair   Weight Shift Standing Absent   Comments max vc for wb status    Gait Analysis   Gait Level Of Assist Unable to Participate   Bed Mobility    Supine to Sit Maximal Assist   Sit to Supine Maximal Assist   Scooting Maximal Assist   Rolling Moderate Assist to Lt.;Moderate Assist to Rt.   Skilled Intervention Verbal Cuing   Comments step by step cues required assist   Functional Mobility   Sit to Stand Maximal Assist   Bed, Chair, Wheelchair Transfer Maximal Assist   Skilled Intervention Verbal Cuing;Tactile Cuing   Comments pt with poor volition.  MaxA for stand pivot 2x not compliant with TTWB at this time.    Short Term Goals    Short Term Goal # 1 Patient will move supine<>sitting EOB with min A within 6tx in order to get in/out of bed   Goal Outcome # 1 goal not met   Short Term Goal # 2 Patient will perform squat pivot transfer with mod A within 6tx in order to achieve functional transfer   Goal Outcome # 2 Goal not met   Short Term Goal # 3 Patient will self propel WC 50ft with min A within 6tx in order to access environment   Goal Outcome # 3 Goal not met

## 2021-10-27 NOTE — THERAPY
"Occupational Therapy  Daily Treatment     Patient Name: Noelle Campoverde  Age:  51 y.o., Sex:  female  Medical Record #: [id number]  Today's Date: 10/27/2021     Precautions: Fall Risk, Toe Touch Weight Bearing Right Lower Extremity  Comments: per [facility], pt TTW on R ankle fx 9/2021    Assessment    Pt remains limited by low volition, weakness, impaired balance, and self limiting behavior impacting self cares and mobility. Per pt and pt's dtr, pt has been receiving assist at home from family for most self cares. Pt required max A to BSC today, total A with LB dressing, mod A with G/H and max verbal cues for encouragement to mobilize. Will continue to follow for skilled OT.     Plan    Continue current treatment plan.    DC Equipment Recommendations: (P) Unable to determine at this time  Discharge Recommendations: (P) Recommend post-acute placement for additional occupational therapy services prior to discharge home       10/26/21 1205   Precautions   Precautions Fall Risk;Toe Touch Weight Bearing Right Lower Extremity   Comments Per [facility], pt TTWB for R ankle fx 9/2021   Cognition    Cognition / Consciousness X   Level of Consciousness Alert   Safety Awareness Impaired   New Learning Impaired   Comments Pt demonstrating self limiting behaviors with limited volition to initate tasks. Pt reports her family helps her with \"everything\" at home   Balance   Sitting Balance (Static) Fair   Sitting Balance (Dynamic) Fair -   Standing Balance (Static) Trace   Standing Balance (Dynamic) Trace   Weight Shift Sitting Fair   Weight Shift Standing Absent   Skilled Intervention Verbal Cuing;Tactile Cuing;Postural Facilitation;Sequencing;Compensatory Strategies   Comments max verbal cues and encouragement    Bed Mobility    Supine to Sit Maximal Assist   Sit to Supine Maximal Assist   Scooting Maximal Assist   Skilled Intervention Verbal Cuing   Activities of Daily Living   Grooming Moderate Assist;Seated "   Lower Body Dressing Total Assist   Toileting Maximal Assist  (on BSC)   Skilled Intervention Verbal Cuing;Tactile Cuing;Sequencing;Compensatory Strategies   Comments cues to use RUE during wiping on BSC. Reported inability to grasp, but able to initate wipe with max encouragement   How much help from another person does the patient currently need...   Putting on and taking off regular lower body clothing? 1   Bathing (including washing, rinsing, and drying)? 1   Toileting, which includes using a toilet, bedpan, or urinal? 1   Putting on and taking off regular upper body clothing? 2   Taking care of personal grooming such as brushing teeth? 2   Eating meals? 2   6 Clicks Daily Activity Score 9   Functional Mobility   Sit to Stand Maximal Assist   Toilet Transfers Maximal Assist  (to [facility])   Transfer Method Squat Pivot   Skilled Intervention Verbal Cuing;Tactile Cuing;Sequencing;Postural Facilitation   Activity Tolerance   Sitting in Chair 7 min (on BSC)   Sitting Edge of Bed 10 min   Standing 10 sec   Patient / Family Goals   Patient / Family Goal #1 To have less burning pain in my legs   Short Term Goals   Short Term Goal # 1 Pt will complete AROM to BUE daily   Goal Outcome # 1 Goal not met   Short Term Goal # 2 Pt will use AE to assist with self feeding & grooming tasks   Goal Outcome # 2 Progressing slower than expected   Short Term Goal # 3 Pt will txf to BSC with mod A   Education Group   Education Provided Transfers   Role of Occupational Therapist Patient Response Patient;Family;Acceptance;Explanation;Verbal Demonstration   Transfers Patient Response Patient;Acceptance;Explanation   Anticipated Discharge Equipment and Recommendations   DC Equipment Recommendations Unable to determine at this time   Discharge Recommendations Recommend post-acute placement for additional occupational therapy services prior to discharge home   Interdisciplinary Plan of Care Collaboration   IDT Collaboration with  Nursing;Family  / Caregiver;Physical Therapist Assistant (PTA)   Patient Position at End of Therapy In Bed;Bed Alarm On;Call Light within Reach;Tray Table within Reach;Phone within Reach   Collaboration Comments OT tx and recs   Session Information   Date / Session Number  10/27 2 (2/3, 10/30)   Priority 2
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
## 2021-10-27 NOTE — CARE PLAN
"The patient is Stable - Low risk of patient condition declining or worsening    Shift Goals  Clinical Goals: comfort, maintain neuro status  Patient Goals: discharge  Family Goals: \"get answers tomorrow\"    Progress made toward(s) clinical / shift goals:  Pt A&Ox4  Medicated per MAR for pain management, nonpharmalogical methods used such as repositioning (pillows in use) and heat packs.     Patient is not progressing towards the following goals:      "

## 2021-10-28 LAB
ALBUMIN SERPL ELPH-MCNC: 2.68 G/DL (ref 3.75–5.01)
ALPHA1 GLOB SERPL ELPH-MCNC: 0.37 G/DL (ref 0.19–0.46)
ALPHA2 GLOB SERPL ELPH-MCNC: 1.04 G/DL (ref 0.48–1.05)
B-GLOBULIN SERPL ELPH-MCNC: 0.89 G/DL (ref 0.48–1.1)
GAMMA GLOB SERPL ELPH-MCNC: 2.61 G/DL (ref 0.62–1.51)
INTERPRETATION SERPL IFE-IMP: ABNORMAL
MONOCLON BAND OBS SERPL: ABNORMAL
PATHOLOGY STUDY: ABNORMAL
PROT SERPL-MCNC: 7.6 G/DL (ref 6.3–8.2)
TEST NAME 95000: NORMAL

## 2021-10-28 PROCEDURE — A9270 NON-COVERED ITEM OR SERVICE: HCPCS | Performed by: INTERNAL MEDICINE

## 2021-10-28 PROCEDURE — 99232 SBSQ HOSP IP/OBS MODERATE 35: CPT | Performed by: STUDENT IN AN ORGANIZED HEALTH CARE EDUCATION/TRAINING PROGRAM

## 2021-10-28 PROCEDURE — A9270 NON-COVERED ITEM OR SERVICE: HCPCS | Performed by: HOSPITALIST

## 2021-10-28 PROCEDURE — 770001 HCHG ROOM/CARE - MED/SURG/GYN PRIV*

## 2021-10-28 PROCEDURE — 700111 HCHG RX REV CODE 636 W/ 250 OVERRIDE (IP): Performed by: INTERNAL MEDICINE

## 2021-10-28 PROCEDURE — 700102 HCHG RX REV CODE 250 W/ 637 OVERRIDE(OP): Performed by: INTERNAL MEDICINE

## 2021-10-28 PROCEDURE — 700102 HCHG RX REV CODE 250 W/ 637 OVERRIDE(OP): Performed by: HOSPITALIST

## 2021-10-28 RX ADMIN — ACETAMINOPHEN 650 MG: 325 TABLET ORAL at 05:25

## 2021-10-28 RX ADMIN — ENOXAPARIN SODIUM 40 MG: 40 INJECTION SUBCUTANEOUS at 05:26

## 2021-10-28 RX ADMIN — PSYLLIUM HUSK 1 PACKET: 3.4 POWDER ORAL at 05:25

## 2021-10-28 RX ADMIN — DOCUSATE SODIUM 50 MG AND SENNOSIDES 8.6 MG 2 TABLET: 8.6; 5 TABLET, FILM COATED ORAL at 05:25

## 2021-10-28 ASSESSMENT — PAIN DESCRIPTION - PAIN TYPE: TYPE: ACUTE PAIN

## 2021-10-28 ASSESSMENT — ENCOUNTER SYMPTOMS
SHORTNESS OF BREATH: 0
ABDOMINAL PAIN: 1
BLURRED VISION: 0
VOMITING: 0
NAUSEA: 0
WEAKNESS: 1
DIARRHEA: 1
DOUBLE VISION: 0
FEVER: 0
CHILLS: 0

## 2021-10-28 NOTE — DISCHARGE PLANNING
Received Choice form at [phone]  Agency/Facility Name: [facility]  Referral sent per Choice form @ [phone]

## 2021-10-28 NOTE — DISCHARGE PLANNING
Please review the consult from Dr. Martinez regarding post acute recommendations.  TCC will no longer follow.  Please reach out to myself @ [phone] with any questions.  Msg placed to Heather KAPOOR.

## 2021-10-28 NOTE — PROGRESS NOTES
Hospital Medicine Daily Progress Note    Date of Service  10/28/2021    Chief Complaint  Noelle Campoverde is a 51 y.o. female admitted 10/22/2021 with abdominal pain, nausea/vomiting    Hospital Course  51-year-old female with a history of renal mass status post radical right nephrectomy on 10/20/2021 admitted with abdominal pain and nausea vomiting.  In the emergency department she was found to have leukocytosis and ileus by abdominal x-ray.  She was given enema with subsequent bowel movement.  Trammell was still in place and she failed TOB on 10/21.  Urology was consulted.  Patient was also noted to have progressive bilateral upper and lower extremity weakness.  Neurology was consulted.  She underwent MRI brain and spine which was negative for acute findings.  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  She was started on IVIG x3 days.  She had normal TSH and vitamin B12.  Hospital course c/b transaminitis thought to be d/t increased tylenol intake.  Lipase normal. Hepatitis panel negative.  HIV negative.       Interval Problem Update  10/25- No acute overnight events  - continues to have loose stool, tolerating full liquid diet, no n/v  - TOV today per urology  - no change in weakness after IVIG  - rehab evaluation  - CT abd yesterday showed ileus, no obvious obstruction, post-op changes  10/26  Patient tolerates full liquid diet.  Denies abdominal pain, nausea.  Requested to advance diet.  Passing gases.  Last BM yesterday.  Will advance to GI soft diet  No improvement in motor function.    10/27  Patient tolerated GI soft diet  She states that she feels well.  No significant changes in lower extremity weakness.  I discussed POC with the patient and her family in details at bedside.  Rehab consulted and patient was deemed not a good candidate for inpatient rehab, recommended SNF.    10/28: discussed with patient and family at  bedside. Still BLE weakness, unable to lift. Continue PT/OT. CM to follow with SNF placement.     I have personally seen and examined the patient at bedside. I discussed the plan of care with patient, family, bedside RN, charge RN,  and pharmacy.    Consultants/Specialty  neurology and urology    Code Status  Full Code    Disposition  Patient is medically cleared.   Anticipate discharge to to skilled nursing facility vs rehab   I have placed the appropriate orders for post-discharge needs.    Review of Systems  Review of Systems   Constitutional: Negative for chills and fever.   HENT: Negative for nosebleeds.    Eyes: Negative for blurred vision and double vision.   Respiratory: Negative for shortness of breath.    Cardiovascular: Negative for chest pain and leg swelling.   Gastrointestinal: Positive for abdominal pain and diarrhea. Negative for nausea and vomiting.   Genitourinary: Negative for hematuria.   Musculoskeletal: Positive for joint pain (R ankle).   Skin: Negative for rash.   Neurological: Positive for weakness.        Physical Exam  Temp:  [36.6 °C (97.8 °F)-36.9 °C (98.4 °F)] 36.6 °C (97.8 °F)  Pulse:  [77-86] 77  Resp:  [16-17] 16  BP: (126-138)/(82-86) 126/84  SpO2:  [95 %-99 %] 97 %    Physical Exam  Vitals and nursing note reviewed.   Constitutional:       General: She is not in acute distress.     Appearance: She is not ill-appearing, toxic-appearing or diaphoretic.   HENT:      Head: Normocephalic and atraumatic.      Nose: Nose normal.      Mouth/Throat:      Mouth: Mucous membranes are moist.   Eyes:      General: No scleral icterus.     Conjunctiva/sclera: Conjunctivae normal.   Cardiovascular:      Rate and Rhythm: Normal rate and regular rhythm.      Heart sounds: No murmur heard.   No friction rub. No gallop.    Pulmonary:      Effort: Pulmonary effort is normal.      Breath sounds: Normal breath sounds.   Abdominal:      General: Bowel sounds are normal. There is distension.       Palpations: Abdomen is soft.      Tenderness: There is abdominal tenderness in the right upper quadrant and right lower quadrant. There is no guarding or rebound.      Comments: Lap incision sites healing well c/d/i   Genitourinary:     Comments: Trammell in place  Musculoskeletal:      Cervical back: Normal range of motion.      Right lower leg: No edema.      Left lower leg: No edema.   Skin:     Coloration: Skin is not jaundiced.      Findings: No rash.   Neurological:      Mental Status: She is alert and oriented to person, place, and time.      Motor: Weakness (b/l UE and LE weakness) present.   Psychiatric:         Mood and Affect: Mood normal.         Behavior: Behavior normal.         Fluids    Intake/Output Summary (Last 24 hours) at 10/28/2021 1455  Last data filed at 10/28/2021 0900  Gross per 24 hour   Intake 120 ml   Output --   Net 120 ml       Laboratory  Recent Labs     10/27/21  0231   WBC 12.4*   RBC 4.59   HEMOGLOBIN 12.1   HEMATOCRIT 35.0*   MCV 76.3*   MCH 26.4*   MCHC 34.6   RDW 38.5   PLATELETCT 302   MPV 10.2     Recent Labs     10/26/21  0236 10/27/21  0231   SODIUM 133* 134*   POTASSIUM 3.8 3.7   CHLORIDE 102 102   CO2 21 21   GLUCOSE 99 101*   BUN 11 11   CREATININE 0.50 0.61   CALCIUM 8.7 8.7              Recent Labs     10/22/21  2128 10/24/21  0235 10/25/21  0229 10/27/21  0231   ASTSGOT 177* 78* 43 70*   ALTSGPT 272* 222* 149* 129*   TBILIRUBIN 0.7 0.5 0.7 1.3   GLOBULIN 4.1* 4.0* 4.3* 5.0*             Imaging  CT-ABDOMEN-PELVIS WITH   Final Result         1. Diffuse wall thickening in the mid to distal small bowel in the mid abdomen as well as the cecum could relate to reactive change postoperative, infection/inflammation or ischemia. No obvious occlusion is seen.   2. Air-fluid levels throughout the mildly distended small bowel and colon, likely ileus   3. Recent right nephrectomy with small amount fluid in the right renal fossa.      MR-BRAIN-WITH & W/O   Final Result      1.  No  acute intracranial abnormality. No abnormal enhancement to suggest a neoplastic process.   2.  Mild white matter disease likely representing microvascular ischemic change, though this can be seen with gliosis or demyelinating disease.      MR-CERVICAL SPINE-WITH & W/O   Final Result      1.  No acute abnormality of the cervical spine. No abnormal enhancement to suggest neoplastic process.   2.  Mild multilevel degenerative changes as above.      MR-LUMBAR SPINE-WITH & W/O   Final Result      Negative for significant disc disease, canal stenosis or evidence of root impingement. No abnormal enhancement visualized.      MR-THORACIC SPINE-WITH & W/O   Final Result      MRI of the thoracic spine without and with contrast within normal limits.      [id number] VIEW   Final Result         1.  Nonspecific bowel gas pattern.           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  Physiatry consulted, patient considered not a ideal candidate for inpatient rehab.  Recommended to consider SNF placement.    LFT elevation  Assessment & Plan  Improving  Uncertain cause, recent anesthesia? Vs tylenol use at home  Recent CT Abd showed only hepatic steatosis  Hepatitis panel negative   follow-up repeat LFTs    H/O right radical nephrectomy  Assessment & Plan  S/p  right radical nephrectomy  Failed TOV 10/21, rosen in place, repeat TOV today 10/25   follow-up urology  consult Dr. Barboza.  Urology signed off.    Ileus (HCC)  Assessment & Plan  Improving  KUB on admission  CT Abd pelvis 10/24: diffuse wall thickening in the mid-distal small bowel in the mid abdomen as well as the cecum could relate to postop changes, infection/inflammation or ischemia, no obvious occlusion, air fluid levels throughout, likely ileus, recent R nephrectomy with small amount of fluid in the R renal fossa  - tolerating PO, CLD-->FLD, advance as tolerated  - passing gas and stool  10/26 advance to GI soft  10/27 tolerated GI soft diet well, will advance to regular diet         VTE prophylaxis: heparin ppx    I have performed a physical exam and reviewed and updated ROS and Plan today (10/28/2021). In review of yesterday's note (10/27/2021), there are no changes except as documented above.

## 2021-10-28 NOTE — DISCHARGE PLANNING
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    Pt sitting at side of bed and daughter Anaya sitting next to her.  RN CM spoke with patient, her daughter Derik on 10-25-21 and again today.      Pt was living in a second floor apartment in [place].  There new [facility] [address].  This duplex is one story and handicap accessible.  Pt stated she was independent with ADLs and IADLs up until 3 months ago.  Then progressively needed cane and a walker.  She lives with her spouse, Jarvis, their adopted grandkids ages 17, 15, 14 and 8.  Jarvis works part-time, 8 hours a day.  They are concerned with paying their bills. Donald works and would like to be patients paid caregiver in future..    Explained acute rehab and SNF for rehab.  I informed them that physiatrist is recommending SNF.  Pt agreeable. Choice form faxed to [facility] for [facility] and [facility]. Provided information on Medicaid personal care services, social security disability, assistance for utilities and rent.      Barriers to Discharge:    SNF acceptance    Plan:    F/U on referrals.    Care Transition Team Assessment    Information Source  Orientation Level: Oriented X4  Information Given By: Patient, Relative  Who is responsible for making decisions for patient? : Patient    Readmission Evaluation  Is this a readmission?: Yes - unplanned readmission    Elopement Risk  Legal Hold: No  Ambulatory or Self Mobile in Wheelchair: No-Not an Elopement Risk  Elopement Risk: Not at Risk for Elopement    Interdisciplinary Discharge Planning  Lives with - Patient's Self Care Capacity: Significant Other, Adult Children  Housing / Facility: 2 Story Apartment / [facility]  Prior Services: Continuous (24 Hour) Care Giving Family    Discharge Preparedness  What is your plan after discharge?: Uncertain - pending  medical team collaboration  What are your discharge supports?: Child, Spouse, Other (comment)  Prior Functional Level: Ambulatory, Independent with Activities of Daily Living, Independent with Medication Management, Uses Cane  Difficulity with ADLs: Bathing, Brushing teeth, Dressing, Eating, Toileting, Walking  Difficulity with IADLs: Cooking, Driving, Keeping track of finances, Laundry, Managing medication, Shopping, Using the telephone or computer    Functional Assesment  Prior Functional Level: Ambulatory, Independent with Activities of Daily Living, Independent with Medication Management, Uses Cane    Finances  Financial Barriers to Discharge: No  Prescription Coverage: Yes    Vision / Hearing Impairment  Right Eye Vision: Impaired, Wears Glasses  Left Eye Vision: Impaired, Wears Glasses         Advance Directive  Advance Directive?: None    Domestic Abuse  Have you ever been the victim of abuse or violence?: No         Discharge Risks or Barriers  Discharge risks or barriers?: No  Patient risk factors: Cognitive / sensory / physical deficit    Anticipated Discharge Information  Discharge Disposition: D/T to SNF with Medicare cert in anticipation of skilled care (03)  Discharge Contact Phone Number: [phone]

## 2021-10-28 NOTE — CARE PLAN
The patient is Stable - Low risk of patient condition declining or worsening    Shift Goals  Clinical Goals: increase mobility  Patient Goals: discharge  Family Goals: RAULITO    Progress made toward(s) clinical / shift goals:  Patient A+Ox4, able to communicate needs, denies pain at this time. Patient able to use call light appropriately. Daughter at bedside, discussed plan of care. Mobilization encouraged.   Bed locked and in low position, bed alarm on, call light and personal belongings within reach. Hourly rounding continues.      Patient is not progressing towards the following goals:      Problem: Self Care  Goal: Patient will have the ability to perform ADLs independently or with assistance (bathe, groom, dress, toilet and feed)  10/28/2021 1247 by Magali Dean, R.N.  Outcome: Not Progressing  Note: Patient requiring much teaching and encouragement to perform all ADLs  10/28/2021 1247 by Magali Dean, R.N.  Outcome: Progressing     Problem: Bowel Elimination  Goal: Establish and maintain regular bowel function  10/28/2021 1247 by Magali Dean, R.N.  Outcome: Not Progressing  Note: Patient with incontinence, loose stools  10/28/2021 1247 by Magali Dean, R.N.  Outcome: Progressing     Problem: Mobility  Goal: Patient's capacity to carry out activities will improve  10/28/2021 1247 by Magali Dean, R.N.  Outcome: Not Progressing  Note: Encouraging mobility, patient wanting to wait for PT  10/28/2021 1247 by Magali Dean, R.N.  Outcome: Progressing

## 2021-10-29 PROCEDURE — 97530 THERAPEUTIC ACTIVITIES: CPT | Mod: CQ

## 2021-10-29 PROCEDURE — 99232 SBSQ HOSP IP/OBS MODERATE 35: CPT | Performed by: STUDENT IN AN ORGANIZED HEALTH CARE EDUCATION/TRAINING PROGRAM

## 2021-10-29 PROCEDURE — 700111 HCHG RX REV CODE 636 W/ 250 OVERRIDE (IP): Performed by: INTERNAL MEDICINE

## 2021-10-29 PROCEDURE — 97535 SELF CARE MNGMENT TRAINING: CPT | Mod: CO

## 2021-10-29 PROCEDURE — 770001 HCHG ROOM/CARE - MED/SURG/GYN PRIV*

## 2021-10-29 RX ADMIN — ENOXAPARIN SODIUM 40 MG: 40 INJECTION SUBCUTANEOUS at 04:27

## 2021-10-29 ASSESSMENT — GAIT ASSESSMENTS: GAIT LEVEL OF ASSIST: UNABLE TO PARTICIPATE

## 2021-10-29 ASSESSMENT — COGNITIVE AND FUNCTIONAL STATUS - GENERAL
STANDING UP FROM CHAIR USING ARMS: TOTAL
TOILETING: TOTAL
WALKING IN HOSPITAL ROOM: TOTAL
MOVING TO AND FROM BED TO CHAIR: UNABLE
MOVING FROM LYING ON BACK TO SITTING ON SIDE OF FLAT BED: UNABLE
DAILY ACTIVITIY SCORE: 9
CLIMB 3 TO 5 STEPS WITH RAILING: TOTAL
PERSONAL GROOMING: A LOT
DRESSING REGULAR UPPER BODY CLOTHING: A LOT
SUGGESTED CMS G CODE MODIFIER MOBILITY: CN
MOBILITY SCORE: 6
DRESSING REGULAR LOWER BODY CLOTHING: TOTAL
TURNING FROM BACK TO SIDE WHILE IN FLAT BAD: UNABLE
SUGGESTED CMS G CODE MODIFIER DAILY ACTIVITY: CL
HELP NEEDED FOR BATHING: TOTAL
EATING MEALS: A LOT

## 2021-10-29 ASSESSMENT — ENCOUNTER SYMPTOMS
SHORTNESS OF BREATH: 0
VOMITING: 0
ABDOMINAL PAIN: 1
CHILLS: 0
DOUBLE VISION: 0
NAUSEA: 0
FEVER: 0
BLURRED VISION: 0
DIARRHEA: 1
WEAKNESS: 1

## 2021-10-29 NOTE — DISCHARGE PLANNING
Agency/Facility Name: [facility]  Outcome: Left Vmail regarding bed availability     1015  [facility]/Facility Name: [facility]  Outcome: Left Vmail regarding referral    1024  Agency/Facility Name: [facility]  Spoke To: Gissell  Outcome: Per Gissell she is going to see who has a bed available today     1132  Agency/Facility Name: [facility]  Spoke To: Sohan  Outcome: Per sohan she will review the referral      1320  Agency/Facility Name: [facility]  Spoke To: Sohan  Outcome: Per sohan they are going to submit for auth

## 2021-10-29 NOTE — THERAPY
Occupational Therapy  Daily Treatment     Patient Name: Noelle Campoverde  Age:  51 y.o., Sex:  female  Medical Record #: [id number]  Today's Date: 10/29/2021       Precautions: Fall Risk, Toe Touch Weight Bearing Right Lower Extremity      Assessment    Pt seen for OT tx. Continues to be limited by decreased activity tolerance, balance deficits and WB restrictions impacting ability to complete ADLs and ADL transfers independently. Supv supine > sit, max A sit > stand w/ mod cues to maintain TTWB through RLE. Pt able to verbalize importance of maintaining precautions. Able to complete full ROM of BUEs. Will continue to benefit from OT services while in house.     Plan    Continue current treatment plan.    DC Equipment Recommendations: Unable to determine at this time  Discharge Recommendations: Recommend post-acute placement for additional occupational therapy services prior to discharge home       10/29/21 1514   Cognition    Cognition / Consciousness X   Safety Awareness Impaired   New Learning Impaired   Active ROM Upper Body   Active ROM Upper Body  WDL   Strength Upper Body   Upper Body Strength  X   Comments generalized weakness BUEs   Balance   Sitting Balance (Static) Fair   Sitting Balance (Dynamic) Fair -   Standing Balance (Static) Trace   Standing Balance (Dynamic) Trace   Weight Shift Sitting Fair   Weight Shift Standing Absent   Bed Mobility    Supine to Sit Supervised   Sit to Supine Supervised   Scooting Minimal Assist   Activities of Daily Living   Upper Body Dressing Moderate Assist   Lower Body Dressing Maximal Assist   Toileting Maximal Assist   Functional Mobility   Sit to Stand Maximal Assist   Bed, Chair, Wheelchair Transfer Maximal Assist   Toilet Transfers Maximal Assist   Short Term Goals   Short Term Goal # 1 Pt will complete AROM to BUE daily   Goal Outcome # 1 Goal met   Short Term Goal # 2 Pt will use AE to assist with self feeding & grooming tasks   Goal Outcome # 2  Progressing as expected   Short Term Goal # 3 Pt will txf to BSC with mod A   Goal Outcome # 3 Goal not met   Anticipated Discharge Equipment and Recommendations   DC Equipment Recommendations Unable to determine at this time   Discharge Recommendations Recommend post-acute placement for additional occupational therapy services prior to discharge home

## 2021-10-29 NOTE — THERAPY
Physical Therapy   Daily Treatment     Patient Name: Noelle Campoverde  Age:  51 y.o., Sex:  female  Medical Record #: [id number]  Today's Date: 10/29/2021     Precautions  Precautions: Fall Risk;Toe Touch Weight Bearing Right Lower Extremity    Assessment    Pt pleasant and agreeable to therapy session. Continues to be limited by balance, strength and difficulty maintaining TTWB on RLE at this time. She was able to reach eOB with spv and increased volition of movement. STS transfer practice to improve stand pivot transfers. She required maxA for STS with fww, vc to use LLE and BUE for support. Pt will benefit from continued acute skilled therapy at this time.     Plan    Continue current treatment plan.    DC Equipment Recommendations: Unable to determine at this time  Discharge Recommendations: Recommend post-acute placement for additional physical therapy services prior to discharge home         10/29/21 8119   Other Treatments   Other Treatments Provided Discussion on TTWB and importance of maintaining    Balance   Sitting Balance (Static) Fair   Sitting Balance (Dynamic) Fair -   Standing Balance (Static) Trace +   Standing Balance (Dynamic) Trace   Weight Shift Sitting Fair   Weight Shift Standing Absent   Comments assist for standing balance, poor maintaining of TTWB on RLE    Gait Analysis   Gait Level Of Assist Unable to Participate   Comments difficulty maintaining TTWB    Bed Mobility    Supine to Sit Supervised   Sit to Supine Supervised   Scooting Minimal Assist   Functional Mobility   Sit to Stand Maximal Assist   Comments STS 3x with maxA, vc for using BUE and proper LLE placement. Pt with poor complaince of TTWB on RLE.    Short Term Goals    Short Term Goal # 1 Patient will move supine<>sitting EOB with min A within 6tx in order to get in/out of bed   Goal Outcome # 1 goal not met   Short Term Goal # 2 Patient will perform squat pivot transfer with mod A within 6tx in order to achieve  functional transfer   Goal Outcome # 2 Goal not met   Short Term Goal # 3 Patient will self propel WC 50ft with min A within 6tx in order to access environment   Goal Outcome # 3 Goal not met

## 2021-10-29 NOTE — CARE PLAN
The patient is Stable - Low risk of patient condition declining or worsening    Shift Goals  Clinical Goals: Mobility/ rest  Patient Goals: Rest  Family Goals: Rest    Progress made toward(s) clinical / shift goals:  Patient is able to move herself in bed without help. She can turn side to side. Patient is without pain and is resting comfortably.    Patient is not progressing towards the following goals: Patient used bed pan, when asked it she would like to clean herself she said she could not do it. Yet she is able to move around in her bed.      Problem: Self Care  Goal: Patient will have the ability to perform ADLs independently or with assistance (bathe, groom, dress, toilet and feed)  Outcome: Not Progressing

## 2021-10-29 NOTE — PROGRESS NOTES
Hospital Medicine Daily Progress Note    Date of Service  10/29/2021    Chief Complaint  Noelle Campoverde is a 51 y.o. female admitted 10/22/2021 with abdominal pain, nausea/vomiting    Hospital Course  51-year-old female with a history of renal mass status post radical right nephrectomy on 10/20/2021 admitted with abdominal pain and nausea vomiting.  In the emergency department she was found to have leukocytosis and ileus by abdominal x-ray.  She was given enema with subsequent bowel movement.  Trammell was still in place and she failed TOB on 10/21.  Urology was consulted.  Patient was also noted to have progressive bilateral upper and lower extremity weakness.  Neurology was consulted.  She underwent MRI brain and spine which was negative for acute findings.  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  She was started on IVIG x3 days.  She had normal TSH and vitamin B12.  Hospital course c/b transaminitis thought to be d/t increased tylenol intake.  Lipase normal. Hepatitis panel negative.  HIV negative.       Interval Problem Update  10/25- No acute overnight events  - continues to have loose stool, tolerating full liquid diet, no n/v  - TOV today per urology  - no change in weakness after IVIG  - rehab evaluation  - CT abd yesterday showed ileus, no obvious obstruction, post-op changes  10/26  Patient tolerates full liquid diet.  Denies abdominal pain, nausea.  Requested to advance diet.  Passing gases.  Last BM yesterday.  Will advance to GI soft diet  No improvement in motor function.    10/27  Patient tolerated GI soft diet  She states that she feels well.  No significant changes in lower extremity weakness.  I discussed POC with the patient and her family in details at bedside.  Rehab consulted and patient was deemed not a good candidate for inpatient rehab, recommended SNF.    10/28: discussed with patient and family at  bedside. Still BLE weakness, unable to lift. Continue PT/OT. CM to follow with SNF placement.     10/29: discussed with patient and family at bedside. Await bed availability at SNF. No acute overnight events.    I have personally seen and examined the patient at bedside. I discussed the plan of care with patient, family, bedside RN, charge RN,  and pharmacy.    Consultants/Specialty  neurology and urology    Code Status  Full Code    Disposition  Patient is medically cleared.   Anticipate discharge to to skilled nursing facility vs rehab   I have placed the appropriate orders for post-discharge needs.    Review of Systems  Review of Systems   Constitutional: Negative for chills and fever.   HENT: Negative for nosebleeds.    Eyes: Negative for blurred vision and double vision.   Respiratory: Negative for shortness of breath.    Cardiovascular: Negative for chest pain and leg swelling.   Gastrointestinal: Positive for abdominal pain and diarrhea. Negative for nausea and vomiting.   Genitourinary: Negative for hematuria.   Musculoskeletal: Positive for joint pain (R ankle).   Skin: Negative for rash.   Neurological: Positive for weakness.        Physical Exam  Temp:  [36.5 °C (97.7 °F)-37.1 °C (98.7 °F)] 36.7 °C (98 °F)  Pulse:  [80-90] 81  Resp:  [16-18] 17  BP: (131-139)/(77-84) 137/78  SpO2:  [92 %-98 %] 92 %    Physical Exam  Vitals and nursing note reviewed.   Constitutional:       General: She is not in acute distress.     Appearance: She is not ill-appearing, toxic-appearing or diaphoretic.   HENT:      Head: Normocephalic and atraumatic.      Nose: Nose normal.      Mouth/Throat:      Mouth: Mucous membranes are moist.   Eyes:      General: No scleral icterus.     Conjunctiva/sclera: Conjunctivae normal.   Cardiovascular:      Rate and Rhythm: Normal rate and regular rhythm.      Heart sounds: No murmur heard.   No friction rub. No gallop.    Pulmonary:      Effort: Pulmonary effort is normal.       Breath sounds: Normal breath sounds.   Abdominal:      General: Bowel sounds are normal. There is distension.      Palpations: Abdomen is soft.      Tenderness: There is abdominal tenderness in the right upper quadrant and right lower quadrant. There is no guarding or rebound.      Comments: Lap incision sites healing well c/d/i   Genitourinary:     Comments: Trammell in place  Musculoskeletal:      Cervical back: Normal range of motion.      Right lower leg: No edema.      Left lower leg: No edema.   Skin:     Coloration: Skin is not jaundiced.      Findings: No rash.   Neurological:      Mental Status: She is alert and oriented to person, place, and time.      Motor: Weakness (b/l UE and LE weakness) present.   Psychiatric:         Mood and Affect: Mood normal.         Behavior: Behavior normal.         Fluids    Intake/Output Summary (Last 24 hours) at 10/29/2021 1427  Last data filed at 10/28/2021 2000  Gross per 24 hour   Intake 120 ml   Output --   Net 120 ml       Laboratory  Recent Labs     10/27/21  0231   WBC 12.4*   RBC 4.59   HEMOGLOBIN 12.1   HEMATOCRIT 35.0*   MCV 76.3*   MCH 26.4*   MCHC 34.6   RDW 38.5   PLATELETCT 302   MPV 10.2     Recent Labs     10/27/21  0231   SODIUM 134*   POTASSIUM 3.7   CHLORIDE 102   CO2 21   GLUCOSE 101*   BUN 11   CREATININE 0.61   CALCIUM 8.7              Recent Labs     10/22/21  2128 10/24/21  0235 10/25/21  0229 10/27/21  0231   ASTSGOT 177* 78* 43 70*   ALTSGPT 272* 222* 149* 129*   TBILIRUBIN 0.7 0.5 0.7 1.3   GLOBULIN 4.1* 4.0* 4.3* 5.0*             Imaging  CT-ABDOMEN-PELVIS WITH   Final Result         1. Diffuse wall thickening in the mid to distal small bowel in the mid abdomen as well as the cecum could relate to reactive change postoperative, infection/inflammation or ischemia. No obvious occlusion is seen.   2. Air-fluid levels throughout the mildly distended small bowel and colon, likely ileus   3. Recent right nephrectomy with small amount fluid in the right  renal fossa.      MR-BRAIN-WITH & W/O   Final Result      1.  No acute intracranial abnormality. No abnormal enhancement to suggest a neoplastic process.   2.  Mild white matter disease likely representing microvascular ischemic change, though this can be seen with gliosis or demyelinating disease.      MR-CERVICAL SPINE-WITH & W/O   Final Result      1.  No acute abnormality of the cervical spine. No abnormal enhancement to suggest neoplastic process.   2.  Mild multilevel degenerative changes as above.      MR-LUMBAR SPINE-WITH & W/O   Final Result      Negative for significant disc disease, canal stenosis or evidence of root impingement. No abnormal enhancement visualized.      MR-THORACIC SPINE-WITH & W/O   Final Result      MRI of the thoracic spine without and with contrast within normal limits.      OF-[id number] VIEW   Final Result         1.  Nonspecific bowel gas pattern.           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  Physiatry consulted, patient considered not a ideal candidate for inpatient rehab.  Recommended to consider SNF placement.    LFT elevation  Assessment & Plan  Improving  Uncertain cause, recent anesthesia? Vs tylenol use at home  Recent CT Abd showed only hepatic steatosis  Hepatitis panel negative   follow-up repeat LFTs    H/O right radical nephrectomy  Assessment & Plan  S/p  right radical nephrectomy  Failed TOV  10/21, rosen in place, repeat TOV today 10/25   follow-up urology consult Dr. Barboza.  Urology signed off.    Ileus (HCC)  Assessment & Plan  Improving  KUB on admission  CT Abd pelvis 10/24: diffuse wall thickening in the mid-distal small bowel in the mid abdomen as well as the cecum could relate to postop changes, infection/inflammation or ischemia, no obvious occlusion, air fluid levels throughout, likely ileus, recent R nephrectomy with small amount of fluid in the R renal fossa  - tolerating PO, CLD-->FLD, advance as tolerated  - passing gas and stool  10/26 advance to GI soft  10/27 tolerated GI soft diet well, will advance to regular diet         VTE prophylaxis: heparin ppx    I have performed a physical exam and reviewed and updated ROS and Plan today (10/29/2021). In review of yesterday's note (10/28/2021), there are no changes except as documented above.

## 2021-10-30 ENCOUNTER — PATIENT OUTREACH (OUTPATIENT)
Dept: HEALTH INFORMATION MANAGEMENT | Facility: OTHER | Age: 51
End: 2021-10-30

## 2021-10-30 LAB
ALBUMIN SERPL BCP-MCNC: 3.5 G/DL (ref 3.2–4.9)
ALBUMIN/GLOB SERPL: 0.7 G/DL
ALP SERPL-CCNC: 207 U/L (ref 30–99)
ALT SERPL-CCNC: 124 U/L (ref 2–50)
ANION GAP SERPL CALC-SCNC: 12 MMOL/L (ref 7–16)
ANISOCYTOSIS BLD QL SMEAR: ABNORMAL
AST SERPL-CCNC: 51 U/L (ref 12–45)
BASOPHILS # BLD AUTO: 2.6 % (ref 0–1.8)
BASOPHILS # BLD: 0.33 K/UL (ref 0–0.12)
BILIRUB SERPL-MCNC: 1 MG/DL (ref 0.1–1.5)
BUN SERPL-MCNC: 18 MG/DL (ref 8–22)
CALCIUM SERPL-MCNC: 9.2 MG/DL (ref 8.5–10.5)
CHLORIDE SERPL-SCNC: 104 MMOL/L (ref 96–112)
CO2 SERPL-SCNC: 21 MMOL/L (ref 20–33)
CREAT SERPL-MCNC: 0.78 MG/DL (ref 0.5–1.4)
EOSINOPHIL # BLD AUTO: 0.33 K/UL (ref 0–0.51)
EOSINOPHIL NFR BLD: 2.6 % (ref 0–6.9)
ERYTHROCYTE [DISTWIDTH] IN BLOOD BY AUTOMATED COUNT: 41.3 FL (ref 35.9–50)
GLOBULIN SER CALC-MCNC: 4.8 G/DL (ref 1.9–3.5)
GLUCOSE SERPL-MCNC: 100 MG/DL (ref 65–99)
HCT VFR BLD AUTO: 33.6 % (ref 37–47)
HGB BLD-MCNC: 11.7 G/DL (ref 12–16)
LYMPHOCYTES # BLD AUTO: 2.19 K/UL (ref 1–4.8)
LYMPHOCYTES NFR BLD: 17.1 % (ref 22–41)
MANUAL DIFF BLD: NORMAL
MCH RBC QN AUTO: 27.4 PG (ref 27–33)
MCHC RBC AUTO-ENTMCNC: 34.8 G/DL (ref 33.6–35)
MCV RBC AUTO: 78.7 FL (ref 81.4–97.8)
MICROCYTES BLD QL SMEAR: ABNORMAL
MONOCYTES # BLD AUTO: 0.44 K/UL (ref 0–0.85)
MONOCYTES NFR BLD AUTO: 3.4 % (ref 0–13.4)
MORPHOLOGY BLD-IMP: NORMAL
NEUTROPHILS # BLD AUTO: 9.51 K/UL (ref 2–7.15)
NEUTROPHILS NFR BLD: 74.3 % (ref 44–72)
NRBC # BLD AUTO: 0 K/UL
NRBC BLD-RTO: 0 /100 WBC
PLATELET # BLD AUTO: 319 K/UL (ref 164–446)
PLATELET BLD QL SMEAR: NORMAL
PMV BLD AUTO: 11.1 FL (ref 9–12.9)
POTASSIUM SERPL-SCNC: 4.2 MMOL/L (ref 3.6–5.5)
PROT SERPL-MCNC: 8.3 G/DL (ref 6–8.2)
RBC # BLD AUTO: 4.27 M/UL (ref 4.2–5.4)
RBC BLD AUTO: PRESENT
SODIUM SERPL-SCNC: 137 MMOL/L (ref 135–145)
VIT B1 BLD-MCNC: 119 NMOL/L (ref 70–180)
WBC # BLD AUTO: 12.8 K/UL (ref 4.8–10.8)

## 2021-10-30 PROCEDURE — 36415 COLL VENOUS BLD VENIPUNCTURE: CPT

## 2021-10-30 PROCEDURE — 700111 HCHG RX REV CODE 636 W/ 250 OVERRIDE (IP): Performed by: INTERNAL MEDICINE

## 2021-10-30 PROCEDURE — 85007 BL SMEAR W/DIFF WBC COUNT: CPT

## 2021-10-30 PROCEDURE — 85027 COMPLETE CBC AUTOMATED: CPT

## 2021-10-30 PROCEDURE — 770001 HCHG ROOM/CARE - MED/SURG/GYN PRIV*

## 2021-10-30 PROCEDURE — 99232 SBSQ HOSP IP/OBS MODERATE 35: CPT | Performed by: STUDENT IN AN ORGANIZED HEALTH CARE EDUCATION/TRAINING PROGRAM

## 2021-10-30 PROCEDURE — 80053 COMPREHEN METABOLIC PANEL: CPT

## 2021-10-30 RX ADMIN — ENOXAPARIN SODIUM 40 MG: 40 INJECTION SUBCUTANEOUS at 04:17

## 2021-10-30 ASSESSMENT — ENCOUNTER SYMPTOMS
SHORTNESS OF BREATH: 0
VOMITING: 0
CHILLS: 0
BLURRED VISION: 0
NAUSEA: 0
FEVER: 0
WEAKNESS: 1
DOUBLE VISION: 0
ABDOMINAL PAIN: 1
DIARRHEA: 1

## 2021-10-30 NOTE — CARE PLAN
The patient is Stable - Low risk of patient condition declining or worsening    Shift Goals  Clinical Goals: Neuro checks  Patient Goals: Monitor incision site  Family Goals: Monitor incision.    Patient is progressing towards the following goals:     Problem: Urinary Elimination  Goal: Establish and maintain regular urinary output  Outcome: Progressing  Patient is able to void      Problem: Bowel Elimination  Goal: Establish and maintain regular bowel function  Outcome: Progressing  Patient is able to defecate    Patient is not progressing towards the following goals:    Problem: Mobility  Goal: Patient's capacity to carry out activities will improve  Outcome: Not Progressing  Patient requires x2 assist to pivot to commode

## 2021-10-30 NOTE — PROGRESS NOTES
Assumed care of pt at 1900. Pt alert and oriented. Denies pain or discomfort.  Bed low & locked, alarm on. Call light in reach. Pt's  is at bedside.

## 2021-10-30 NOTE — DISCHARGE PLANNING
Agency/Facility Name: [facility]  Outcome: Left voice message for Pino in admissions regarding insurance authorization status, requested a call back.

## 2021-10-30 NOTE — CARE PLAN
The patient is Stable - Low risk of patient condition declining or worsening    Shift Goals  Clinical Goals: Repositioning  Patient Goals: Shower  Family Goals: Rest    Progress made toward(s) clinical / shift goals:  Patient understands the importance of repositioning. Patient tolerates well. Patient educated on skin breakdown.    Patient is not progressing towards the following goals: Patient is unable to perform ADLs by herself. Requires help but is willing to participate in what she can.  was at bedside and helped with shower.      Problem: Self Care  Goal: Patient will have the ability to perform ADLs independently or with assistance (bathe, groom, dress, toilet and feed)  Outcome: Not Progressing     Problem: Mobility  Goal: Patient's capacity to carry out activities will improve  Outcome: Not Progressing

## 2021-10-30 NOTE — PROGRESS NOTES
Hospital Medicine Daily Progress Note    Date of Service  10/30/2021    Chief Complaint  Noelle Campoverde is a 51 y.o. female admitted 10/22/2021 with abdominal pain, nausea/vomiting    Hospital Course  51-year-old female with a history of renal mass status post radical right nephrectomy on 10/20/2021 admitted with abdominal pain and nausea vomiting.  In the emergency department she was found to have leukocytosis and ileus by abdominal x-ray.  She was given enema with subsequent bowel movement.  Trammell was still in place and she failed TOB on 10/21.  Urology was consulted.  Patient was also noted to have progressive bilateral upper and lower extremity weakness.  Neurology was consulted.  She underwent MRI brain and spine which was negative for acute findings.  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  She was started on IVIG x3 days.  She had normal TSH and vitamin B12.  Hospital course c/b transaminitis thought to be d/t increased tylenol intake.  Lipase normal. Hepatitis panel negative.  HIV negative.       Interval Problem Update  10/25- No acute overnight events  - continues to have loose stool, tolerating full liquid diet, no n/v  - TOV today per urology  - no change in weakness after IVIG  - rehab evaluation  - CT abd yesterday showed ileus, no obvious obstruction, post-op changes  10/26  Patient tolerates full liquid diet.  Denies abdominal pain, nausea.  Requested to advance diet.  Passing gases.  Last BM yesterday.  Will advance to GI soft diet  No improvement in motor function.    10/27  Patient tolerated GI soft diet  She states that she feels well.  No significant changes in lower extremity weakness.  I discussed POC with the patient and her family in details at bedside.  Rehab consulted and patient was deemed not a good candidate for inpatient rehab, recommended SNF.    10/28: discussed with patient and family at  bedside. Still BLE weakness, unable to lift. Continue PT/OT. CM to follow with SNF placement.     10/29: discussed with patient and family at bedside. Await bed availability at SNF. No acute overnight events.    10/30: no acute overnight events. Tolerating diet. Await SNF bed. Still BLE weakness.     I have personally seen and examined the patient at bedside. I discussed the plan of care with patient, family, bedside RN, charge RN,  and pharmacy.    Consultants/Specialty  neurology and urology    Code Status  Full Code    Disposition  Patient is medically cleared.   Anticipate discharge to to skilled nursing facility vs rehab   I have placed the appropriate orders for post-discharge needs.    Review of Systems  Review of Systems   Constitutional: Negative for chills and fever.   HENT: Negative for nosebleeds.    Eyes: Negative for blurred vision and double vision.   Respiratory: Negative for shortness of breath.    Cardiovascular: Negative for chest pain and leg swelling.   Gastrointestinal: Positive for abdominal pain and diarrhea. Negative for nausea and vomiting.   Genitourinary: Negative for hematuria.   Musculoskeletal: Positive for joint pain (R ankle).   Skin: Negative for rash.   Neurological: Positive for weakness.        Physical Exam  Temp:  [36.1 °C (96.9 °F)-37.3 °C (99.2 °F)] 36.2 °C (97.1 °F)  Pulse:  [] 82  Resp:  [16-18] 16  BP: (110-141)/(78-84) 110/78  SpO2:  [92 %-99 %] 98 %    Physical Exam  Vitals and nursing note reviewed.   Constitutional:       General: She is not in acute distress.     Appearance: She is not ill-appearing, toxic-appearing or diaphoretic.   HENT:      Head: Normocephalic and atraumatic.      Nose: Nose normal.      Mouth/Throat:      Mouth: Mucous membranes are moist.   Eyes:      General: No scleral icterus.     Conjunctiva/sclera: Conjunctivae normal.   Cardiovascular:      Rate and Rhythm: Normal rate and regular rhythm.      Heart sounds: No murmur  heard.   No friction rub. No gallop.    Pulmonary:      Effort: Pulmonary effort is normal.      Breath sounds: Normal breath sounds.   Abdominal:      General: Bowel sounds are normal. There is distension.      Palpations: Abdomen is soft.      Tenderness: There is abdominal tenderness in the right upper quadrant and right lower quadrant. There is no guarding or rebound.      Comments: Lap incision sites healing well c/d/i   Genitourinary:     Comments: Trammell in place  Musculoskeletal:      Cervical back: Normal range of motion.      Right lower leg: No edema.      Left lower leg: No edema.   Skin:     Coloration: Skin is not jaundiced.      Findings: No rash.   Neurological:      Mental Status: She is alert and oriented to person, place, and time.      Motor: Weakness (b/l UE and LE weakness) present.   Psychiatric:         Mood and Affect: Mood normal.         Behavior: Behavior normal.         Fluids    Intake/Output Summary (Last 24 hours) at 10/30/2021 1323  Last data filed at 10/30/2021 0800  Gross per 24 hour   Intake 600 ml   Output --   Net 600 ml       Laboratory  Recent Labs     10/30/21  0205   WBC 12.8*   RBC 4.27   HEMOGLOBIN 11.7*   HEMATOCRIT 33.6*   MCV 78.7*   MCH 27.4   MCHC 34.8   RDW 41.3   PLATELETCT 319   MPV 11.1     Recent Labs     10/30/21  0205   SODIUM 137   POTASSIUM 4.2   CHLORIDE 104   CO2 21   GLUCOSE 100*   BUN 18   CREATININE 0.78   CALCIUM 9.2              Recent Labs     10/24/21  0235 10/25/21  0229 10/27/21  0231 10/30/21  0205   ASTSGOT 78* 43 70* 51*   ALTSGPT 222* 149* 129* 124*   TBILIRUBIN 0.5 0.7 1.3 1.0   GLOBULIN 4.0* 4.3* 5.0* 4.8*             Imaging  CT-ABDOMEN-PELVIS WITH   Final Result         1. Diffuse wall thickening in the mid to distal small bowel in the mid abdomen as well as the cecum could relate to reactive change postoperative, infection/inflammation or ischemia. No obvious occlusion is seen.   2. Air-fluid levels throughout the mildly distended small  bowel and colon, likely ileus   3. Recent right nephrectomy with small amount fluid in the right renal fossa.      MR-BRAIN-WITH & W/O   Final Result      1.  No acute intracranial abnormality. No abnormal enhancement to suggest a neoplastic process.   2.  Mild white matter disease likely representing microvascular ischemic change, though this can be seen with gliosis or demyelinating disease.      MR-CERVICAL SPINE-WITH & W/O   Final Result      1.  No acute abnormality of the cervical spine. No abnormal enhancement to suggest neoplastic process.   2.  Mild multilevel degenerative changes as above.      MR-LUMBAR SPINE-WITH & W/O   Final Result      Negative for significant disc disease, canal stenosis or evidence of root impingement. No abnormal enhancement visualized.      MR-THORACIC SPINE-WITH & W/O   Final Result      MRI of the thoracic spine without and with contrast within normal limits.      [id number] VIEW   Final Result         1.  Nonspecific bowel gas pattern.           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  Physiatry consulted, patient considered not a ideal candidate for inpatient rehab.  Recommended to consider SNF placement.    LFT elevation  Assessment & Plan  Improving  Uncertain cause, recent anesthesia? Vs tylenol use at home  Recent CT Abd showed only hepatic steatosis  Hepatitis panel negative   follow-up repeat  LFTs    H/O right radical nephrectomy  Assessment & Plan  S/p  right radical nephrectomy  Failed TOV 10/21, rosen in place, repeat TOV today 10/25   follow-up urology consult Dr. Barboza.  Urology signed off.    Ileus (HCC)  Assessment & Plan  Improving  KUB on admission  CT Abd pelvis 10/24: diffuse wall thickening in the mid-distal small bowel in the mid abdomen as well as the cecum could relate to postop changes, infection/inflammation or ischemia, no obvious occlusion, air fluid levels throughout, likely ileus, recent R nephrectomy with small amount of fluid in the R renal fossa  - tolerating PO, CLD-->FLD, advance as tolerated  - passing gas and stool  10/26 advance to GI soft  10/27 tolerated GI soft diet well, will advance to regular diet         VTE prophylaxis: heparin ppx    I have performed a physical exam and reviewed and updated ROS and Plan today (10/30/2021). In review of yesterday's note (10/29/2021), there are no changes except as documented above.

## 2021-10-31 LAB
ALBUMIN 24H MFR UR ELPH: 31.2 %
ALPHA1 GLOB 24H MFR UR ELPH: 12.1 %
ALPHA2 GLOB 24H MFR UR ELPH: 18.8 %
B-GLOBULIN 24H MFR UR ELPH: 21.3 %
COLLECT DURATION TIME SPEC: NORMAL HRS
EER MONOCLONAL PROTEIN STUDY, 24 HOUR U Q5964: NORMAL
GAMMA GLOB 24H MFR UR ELPH: 16.6 %
INTERPRETATION UR IFE-IMP: NORMAL
M PROTEIN 24H MFR UR ELPH: 0 %
M PROTEIN 24H UR ELPH-MRATE: NORMAL MG/24 HRS
PROT 24H UR-MRATE: NORMAL MG/D (ref 40–150)
PROT UR-MCNC: 32 MG/DL
SPECIMEN VOL ?TM UR: NORMAL ML

## 2021-10-31 PROCEDURE — 99232 SBSQ HOSP IP/OBS MODERATE 35: CPT | Performed by: STUDENT IN AN ORGANIZED HEALTH CARE EDUCATION/TRAINING PROGRAM

## 2021-10-31 PROCEDURE — 700102 HCHG RX REV CODE 250 W/ 637 OVERRIDE(OP): Performed by: HOSPITALIST

## 2021-10-31 PROCEDURE — A9270 NON-COVERED ITEM OR SERVICE: HCPCS | Performed by: HOSPITALIST

## 2021-10-31 PROCEDURE — A9270 NON-COVERED ITEM OR SERVICE: HCPCS | Performed by: INTERNAL MEDICINE

## 2021-10-31 PROCEDURE — 770001 HCHG ROOM/CARE - MED/SURG/GYN PRIV*

## 2021-10-31 PROCEDURE — 700111 HCHG RX REV CODE 636 W/ 250 OVERRIDE (IP): Performed by: INTERNAL MEDICINE

## 2021-10-31 PROCEDURE — 700102 HCHG RX REV CODE 250 W/ 637 OVERRIDE(OP): Performed by: INTERNAL MEDICINE

## 2021-10-31 RX ADMIN — PSYLLIUM HUSK 1 PACKET: 3.4 POWDER ORAL at 04:43

## 2021-10-31 RX ADMIN — ACETAMINOPHEN 650 MG: 325 TABLET ORAL at 11:28

## 2021-10-31 RX ADMIN — ENOXAPARIN SODIUM 40 MG: 40 INJECTION SUBCUTANEOUS at 04:44

## 2021-10-31 RX ADMIN — POLYETHYLENE GLYCOL 3350 1 PACKET: 17 POWDER, FOR SOLUTION ORAL at 04:44

## 2021-10-31 RX ADMIN — DOCUSATE SODIUM 50 MG AND SENNOSIDES 8.6 MG 2 TABLET: 8.6; 5 TABLET, FILM COATED ORAL at 04:43

## 2021-10-31 ASSESSMENT — ENCOUNTER SYMPTOMS
WEAKNESS: 1
BLURRED VISION: 0
FEVER: 0
DOUBLE VISION: 0
SHORTNESS OF BREATH: 0
VOMITING: 0
ABDOMINAL PAIN: 1
NAUSEA: 0
CHILLS: 0
DIARRHEA: 1

## 2021-10-31 ASSESSMENT — PAIN DESCRIPTION - PAIN TYPE: TYPE: ACUTE PAIN

## 2021-10-31 NOTE — PROGRESS NOTES
Hospital Medicine Daily Progress Note    Date of Service  10/31/2021    Chief Complaint  Noelle Campoverde is a 51 y.o. female admitted 10/22/2021 with abdominal pain, nausea/vomiting    Hospital Course  51-year-old female with a history of renal mass status post radical right nephrectomy on 10/20/2021 admitted with abdominal pain and nausea vomiting.  In the emergency department she was found to have leukocytosis and ileus by abdominal x-ray.  She was given enema with subsequent bowel movement.  Trammell was still in place and she failed TOB on 10/21.  Urology was consulted.  Patient was also noted to have progressive bilateral upper and lower extremity weakness.  Neurology was consulted.  She underwent MRI brain and spine which was negative for acute findings.  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  She was started on IVIG x3 days.  She had normal TSH and vitamin B12.  Hospital course c/b transaminitis thought to be d/t increased tylenol intake.  Lipase normal. Hepatitis panel negative.  HIV negative.       Interval Problem Update  No acute overnight events  Abdominal surgical scar healed well  Dressing clean  Tolerating po diet  Await SNF placement    I have personally seen and examined the patient at bedside. I discussed the plan of care with patient, family, bedside RN, charge RN,  and pharmacy.    Consultants/Specialty  neurology and urology    Code Status  Full Code    Disposition  Patient is medically cleared.   Anticipate discharge to to skilled nursing facility vs rehab   I have placed the appropriate orders for post-discharge needs.    Review of Systems  Review of Systems   Constitutional: Negative for chills and fever.   HENT: Negative for nosebleeds.    Eyes: Negative for blurred vision and double vision.   Respiratory: Negative for shortness of breath.    Cardiovascular: Negative for chest pain and leg  swelling.   Gastrointestinal: Positive for abdominal pain and diarrhea. Negative for nausea and vomiting.   Genitourinary: Negative for hematuria.   Musculoskeletal: Positive for joint pain (R ankle).   Skin: Negative for rash.   Neurological: Positive for weakness.        Physical Exam  Temp:  [35.8 °C (96.5 °F)-36.8 °C (98.3 °F)] 35.8 °C (96.5 °F)  Pulse:  [67-98] 67  Resp:  [17-18] 17  BP: (103-117)/(73-80) 103/74  SpO2:  [92 %-98 %] 92 %    Physical Exam  Vitals and nursing note reviewed.   Constitutional:       General: She is not in acute distress.     Appearance: She is not ill-appearing, toxic-appearing or diaphoretic.   HENT:      Head: Normocephalic and atraumatic.      Nose: Nose normal.      Mouth/Throat:      Mouth: Mucous membranes are moist.   Eyes:      General: No scleral icterus.     Conjunctiva/sclera: Conjunctivae normal.   Cardiovascular:      Rate and Rhythm: Normal rate and regular rhythm.      Heart sounds: No murmur heard.   No friction rub. No gallop.    Pulmonary:      Effort: Pulmonary effort is normal.      Breath sounds: Normal breath sounds.   Abdominal:      General: Bowel sounds are normal. There is distension.      Palpations: Abdomen is soft.      Tenderness: There is abdominal tenderness in the right upper quadrant and right lower quadrant. There is no guarding or rebound.      Comments: Lap incision sites healing well c/d/i   Musculoskeletal:      Cervical back: Normal range of motion.      Right lower leg: No edema.      Left lower leg: No edema.   Skin:     Coloration: Skin is not jaundiced.      Findings: No rash.   Neurological:      Mental Status: She is alert and oriented to person, place, and time.      Motor: Weakness (b/l UE and LE weakness) present.   Psychiatric:         Mood and Affect: Mood normal.         Behavior: Behavior normal.         Fluids  No intake or output data in the 24 hours ending 10/31/21 1309    Laboratory  Recent Labs     10/30/21  0205   WBC 12.8*    RBC 4.27   HEMOGLOBIN 11.7*   HEMATOCRIT 33.6*   MCV 78.7*   MCH 27.4   MCHC 34.8   RDW 41.3   PLATELETCT 319   MPV 11.1     Recent Labs     10/30/21  0205   SODIUM 137   POTASSIUM 4.2   CHLORIDE 104   CO2 21   GLUCOSE 100*   BUN 18   CREATININE 0.78   CALCIUM 9.2              Recent Labs     10/25/21  0229 10/27/21  0231 10/30/21  0205   ASTSGOT 43 70* 51*   ALTSGPT 149* 129* 124*   TBILIRUBIN 0.7 1.3 1.0   GLOBULIN 4.3* 5.0* 4.8*             Imaging  CT-ABDOMEN-PELVIS WITH   Final Result         1. Diffuse wall thickening in the mid to distal small bowel in the mid abdomen as well as the cecum could relate to reactive change postoperative, infection/inflammation or ischemia. No obvious occlusion is seen.   2. Air-fluid levels throughout the mildly distended small bowel and colon, likely ileus   3. Recent right nephrectomy with small amount fluid in the right renal fossa.      MR-BRAIN-WITH & W/O   Final Result      1.  No acute intracranial abnormality. No abnormal enhancement to suggest a neoplastic process.   2.  Mild white matter disease likely representing microvascular ischemic change, though this can be seen with gliosis or demyelinating disease.      MR-CERVICAL SPINE-WITH & W/O   Final Result      1.  No acute abnormality of the cervical spine. No abnormal enhancement to suggest neoplastic process.   2.  Mild multilevel degenerative changes as above.      MR-LUMBAR SPINE-WITH & W/O   Final Result      Negative for significant disc disease, canal stenosis or evidence of root impingement. No abnormal enhancement visualized.      MR-THORACIC SPINE-WITH & W/O   Final Result      MRI of the thoracic spine without and with contrast within normal limits.      [id number] VIEW   Final Result         1.  Nonspecific bowel gas pattern.           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  Physiatry consulted, patient considered not a ideal candidate for inpatient rehab.  Recommended to consider SNF placement.    Clear cell renal cell carcinoma, right (HCC)  Assessment & Plan  S/p radical nephrectomy 10/20/21  Follow-up with urology as outpatient    Hypokalemia  Assessment & Plan  S/p po supplementation    LFT elevation  Assessment & Plan  Improving  Uncertain cause, recent anesthesia? Vs tylenol use at home  Recent CT Abd showed only hepatic steatosis  Hepatitis panel negative   follow-up repeat LFTs    H/O right radical nephrectomy  Assessment & Plan  S/p  right radical nephrectomy  Failed TOV 10/21, rosen in place, repeat TOV today 10/25   follow-up urology consult Dr. Barboza.  Urology signed off.    Ileus (HCC)  Assessment & Plan  Improving  KUB on admission  CT Abd pelvis 10/24: diffuse wall thickening in the mid-distal small bowel in the mid abdomen as well as the cecum could relate to postop changes, infection/inflammation or ischemia, no obvious occlusion, air fluid levels throughout, likely ileus, recent R nephrectomy with small amount of fluid in the R renal fossa  - tolerating PO, CLD-->FLD, advance as tolerated  - passing gas and stool  10/26 advance to GI soft  10/27 tolerated GI soft diet well, will advance to regular diet         VTE prophylaxis: enoxaparin ppx    I have performed a physical exam and reviewed and updated ROS and Plan today (10/31/2021). In review of yesterday's note (10/30/2021), there are no changes except as documented above.

## 2021-10-31 NOTE — PROGRESS NOTES
1345: patient transferring to [id number], report called to EVAN Pichardo. All personal belongings packed, awaiting transport.

## 2021-11-01 VITALS
TEMPERATURE: 99 F | HEART RATE: 95 BPM | WEIGHT: 155.2 LBS | RESPIRATION RATE: 18 BRPM | OXYGEN SATURATION: 98 % | HEIGHT: 62 IN | BODY MASS INDEX: 28.56 KG/M2 | SYSTOLIC BLOOD PRESSURE: 113 MMHG | DIASTOLIC BLOOD PRESSURE: 75 MMHG

## 2021-11-01 LAB
ALBUMIN SERPL BCP-MCNC: 3.7 G/DL (ref 3.2–4.9)
ALBUMIN/GLOB SERPL: 0.9 G/DL
ALP SERPL-CCNC: 164 U/L (ref 30–99)
ALT SERPL-CCNC: 77 U/L (ref 2–50)
ANION GAP SERPL CALC-SCNC: 12 MMOL/L (ref 7–16)
AST SERPL-CCNC: 26 U/L (ref 12–45)
BASOPHILS # BLD AUTO: 1.3 % (ref 0–1.8)
BASOPHILS # BLD: 0.18 K/UL (ref 0–0.12)
BILIRUB SERPL-MCNC: 0.6 MG/DL (ref 0.1–1.5)
BUN SERPL-MCNC: 14 MG/DL (ref 8–22)
CALCIUM SERPL-MCNC: 9.4 MG/DL (ref 8.5–10.5)
CHLORIDE SERPL-SCNC: 106 MMOL/L (ref 96–112)
CO2 SERPL-SCNC: 20 MMOL/L (ref 20–33)
CREAT SERPL-MCNC: 0.67 MG/DL (ref 0.5–1.4)
EOSINOPHIL # BLD AUTO: 1 K/UL (ref 0–0.51)
EOSINOPHIL NFR BLD: 7.4 % (ref 0–6.9)
ERYTHROCYTE [DISTWIDTH] IN BLOOD BY AUTOMATED COUNT: 43.4 FL (ref 35.9–50)
GLOBULIN SER CALC-MCNC: 4.2 G/DL (ref 1.9–3.5)
GLUCOSE SERPL-MCNC: 104 MG/DL (ref 65–99)
HCT VFR BLD AUTO: 32.6 % (ref 37–47)
HGB BLD-MCNC: 11.2 G/DL (ref 12–16)
IMM GRANULOCYTES # BLD AUTO: 0.12 K/UL (ref 0–0.11)
IMM GRANULOCYTES NFR BLD AUTO: 0.9 % (ref 0–0.9)
LYMPHOCYTES # BLD AUTO: 2.47 K/UL (ref 1–4.8)
LYMPHOCYTES NFR BLD: 18.2 % (ref 22–41)
MAGNESIUM SERPL-MCNC: 2.2 MG/DL (ref 1.5–2.5)
MCH RBC QN AUTO: 27.3 PG (ref 27–33)
MCHC RBC AUTO-ENTMCNC: 34.4 G/DL (ref 33.6–35)
MCV RBC AUTO: 79.5 FL (ref 81.4–97.8)
MONOCYTES # BLD AUTO: 0.83 K/UL (ref 0–0.85)
MONOCYTES NFR BLD AUTO: 6.1 % (ref 0–13.4)
NEUTROPHILS # BLD AUTO: 8.94 K/UL (ref 2–7.15)
NEUTROPHILS NFR BLD: 66.1 % (ref 44–72)
NRBC # BLD AUTO: 0 K/UL
NRBC BLD-RTO: 0 /100 WBC
PLATELET # BLD AUTO: 332 K/UL (ref 164–446)
PMV BLD AUTO: 10.6 FL (ref 9–12.9)
POTASSIUM SERPL-SCNC: 4.5 MMOL/L (ref 3.6–5.5)
PROT SERPL-MCNC: 7.9 G/DL (ref 6–8.2)
RBC # BLD AUTO: 4.1 M/UL (ref 4.2–5.4)
SODIUM SERPL-SCNC: 138 MMOL/L (ref 135–145)
WBC # BLD AUTO: 13.5 K/UL (ref 4.8–10.8)

## 2021-11-01 PROCEDURE — 83735 ASSAY OF MAGNESIUM: CPT

## 2021-11-01 PROCEDURE — 700111 HCHG RX REV CODE 636 W/ 250 OVERRIDE (IP): Performed by: INTERNAL MEDICINE

## 2021-11-01 PROCEDURE — 36415 COLL VENOUS BLD VENIPUNCTURE: CPT

## 2021-11-01 PROCEDURE — 85025 COMPLETE CBC W/AUTO DIFF WBC: CPT

## 2021-11-01 PROCEDURE — 700102 HCHG RX REV CODE 250 W/ 637 OVERRIDE(OP): Performed by: INTERNAL MEDICINE

## 2021-11-01 PROCEDURE — 80053 COMPREHEN METABOLIC PANEL: CPT

## 2021-11-01 PROCEDURE — 99239 HOSP IP/OBS DSCHRG MGMT >30: CPT | Performed by: FAMILY MEDICINE

## 2021-11-01 PROCEDURE — A9270 NON-COVERED ITEM OR SERVICE: HCPCS | Performed by: INTERNAL MEDICINE

## 2021-11-01 RX ORDER — AMOXICILLIN 250 MG
2 CAPSULE ORAL 2 TIMES DAILY
Qty: 30 TABLET | Refills: 0 | Status: SHIPPED
Start: 2021-11-01 | End: 2022-03-24

## 2021-11-01 RX ORDER — ONDANSETRON 4 MG/1
4 TABLET, ORALLY DISINTEGRATING ORAL EVERY 4 HOURS PRN
Qty: 10 TABLET | Refills: 0 | Status: SHIPPED
Start: 2021-11-01 | End: 2022-03-24

## 2021-11-01 RX ORDER — ACETAMINOPHEN 500 MG
500-1000 TABLET ORAL EVERY 6 HOURS PRN
Qty: 30 TABLET | Refills: 0 | Status: SHIPPED
Start: 2021-11-01 | End: 2022-03-24

## 2021-11-01 RX ORDER — POLYETHYLENE GLYCOL 3350 17 G/17G
17 POWDER, FOR SOLUTION ORAL DAILY
Refills: 3 | Status: SHIPPED
Start: 2021-11-02 | End: 2022-03-24

## 2021-11-01 RX ADMIN — DOCUSATE SODIUM 50 MG AND SENNOSIDES 8.6 MG 2 TABLET: 8.6; 5 TABLET, FILM COATED ORAL at 05:50

## 2021-11-01 RX ADMIN — ENOXAPARIN SODIUM 40 MG: 40 INJECTION SUBCUTANEOUS at 05:50

## 2021-11-01 NOTE — PROGRESS NOTES
2 RN skin with Shae.   Skin intact aside from surgical incisions. R ankle with incision. Mild edema noted. Heels pink, blanching dry. No areas of concern.

## 2021-11-01 NOTE — DISCHARGE INSTRUCTIONS
Discharge Instructions    Discharged to other by medical transportation with escort. Discharged via wheelchair, hospital escort: Yes.  Special equipment needed: Not Applicable    Be sure to schedule a follow-up appointment with your primary care doctor or any specialists as instructed.     Discharge Plan:   Influenza Vaccine Indication: Indicated: 9 to 64 years of age    I understand that a diet low in cholesterol, fat, and sodium is recommended for good health. Unless I have been given specific instructions below for another diet, I accept this instruction as my diet prescription.   Other diet: regular    Special Instructions: None    · Is patient discharged on Warfarin / Coumadin?   No     Depression / Suicide Risk    As you are discharged from this [facility] facility, it is important to learn how to keep safe from harming yourself.    Recognize the warning signs:  · Abrupt changes in personality, positive or negative- including increase in energy   · Giving away possessions  · Change in eating patterns- significant weight changes-  positive or negative  · Change in sleeping patterns- unable to sleep or sleeping all the time   · Unwillingness or inability to communicate  · Depression  · Unusual sadness, discouragement and loneliness  · Talk of wanting to die  · Neglect of personal appearance   · Rebelliousness- reckless behavior  · Withdrawal from people/activities they love  · Confusion- inability to concentrate     If you or a loved one observes any of these behaviors or has concerns about self-harm, here's what you can do:  · Talk about it- your feelings and reasons for harming yourself  · Remove any means that you might use to hurt yourself (examples: pills, rope, extension cords, firearm)  · Get professional help from the community (Mental Health, Substance Abuse, psychological counseling)  · Do not be alone:Call your Safe Contact- someone whom you trust who will be there for you.  · Call your local  CRISIS HOTLINE 940-1070 or 377-978-4474  · Call your local Children's Mobile Crisis Response Team [place] (487) 719-1684 or www.Domainindex.com  · Call the toll free National Suicide Prevention Hotlines   · National Suicide Prevention Lifeline 766-479-LULT (7548)  · National [employer] Line Network 800-SUICIDE (501-1186)      Íleo  Ileus    El íleo es ck afección que ocurre cuando los intestinos mike de funcionar correctamente. Los intestinos son órganos huecos que digieren los alimentos después de que salen del estómago. Estos órganos son tubos musculares largos que conectan el estómago con el recto. Cuando se produce el íleo, las contracciones musculares que hacen que los alimentos se muevan a través de los intestinos no ocurren mert lo harían normalmente.  Si los intestinos mike de funcionar, los alimentos no pueden pasar para ser digeridos. Esta afección es un problema grave que normalmente requiere hospitalización. Puede causar síntomas tales mert náuseas, dolor abdominal y meteorismo. El íleo puede durar desde unas horas hasta algunos días.  ¿Cuáles son las causas?  Esta afección puede ser causada por lo siguiente:  · Cirugía en el abdomen.  · Infección o inflamación en el abdomen. [place] incluye la inflamación de la membrana que cubre el abdomen (peritonitis).  · Infección o inflamación en otras partes del cuerpo, mert neumonía o pancreatitis.  · El paso de cálculos biliares o cálculos renales.  · Daño a los nervios o vasos sanguíneos que van a los intestinos.  · Acumulación de lorri dentro de la cavidad abdominal.  · Desequilibrio en las sales presentes en la lorri (electrolitos).  · Lesión en el cerebro o la médula willis.  · Medicamentos. Muchos medicamentos, entre ellos, los analgésicos potentes, pueden causar íleo o agravarlo.  Si los intestinos mike de funcionar debido a ck obstrucción, se trata de ck afección diferente que se denomina obstrucción intestinal.  ¿Cuáles son los signos o  síntomas?  Los síntomas de esta afección incluyen:  · Meteorismo abdominal.  · Dolor o molestias en el abdomen.  · Falta de apetito.  · Náuseas y vómitos.  · Ausencia de los ruidos intestinales normales, mert “gruñidos” en el estómago.  ¿Cómo se diagnostica?  Esta afección puede diagnosticarse en función de lo siguiente:  · Un examen físico y antecedentes médicos.  · Radiografías o ck exploración por tomografía computarizada (TC) del abdomen.  También se pueden hacer otros estudios para ayudar a encontrar la causa de la afección.  ¿Cómo se trata?  El tratamiento para esta afección puede incluir lo siguiente:  · Dejar descansar los intestinos hasta que comiencen a funcionar nuevamente. [place] generalmente se logra mediante lo siguiente:  ? Interrupción de la ingesta de alimentos y bebidas. Se le administrarán líquidos a través de ck vía intravenosa para evitar la deshidratación.  ? Colocación de un tubo pequeño (sonda nasogástrica o sonda gástrica) que se introduce por la nariz hasta llegar al estómago. El tubo se conecta a un dispositivo de succión y mantiene el estómago vacío. [place] permite que los intestinos descansen y ayuda a reducir las náuseas y los vómitos.  · Corrección del desequilibrio de electrolitos mediante la administración de suplementos en el líquido intravenoso.  · Interrupción de los medicamentos que podrían agravar el íleo.  · Tratamiento de cualquier afección que podría elizabeth causado el íleo.  Siga estas indicaciones en ariza casa:  Comida y bebida    · Siga las indicaciones del médico acerca de lo siguiente:  ? Qué comer y beber. Es posible que le indiquen comenzar ck dieta suave. Con el paso del tiempo, podrá reanudar gradualmente ck dieta saludable más normal.  ? Cuánto comer y beber. Debe ingerir comidas pequeñas con frecuencia y dejar de comer cuando se sienta satisfecho.  · Evite arthur alcohol.  Indicaciones generales  · [place] los medicamentos de venta shefali y los recetados solamente mert se  lo haya indicado el médico.  · Nathen reposo metr se lo haya indicado el médico.  · Evite estar sentado sandie largos períodos sin moverse. [place] y camine un poco cada 1 a 2 horas. Pida ayuda si se siente débil o inestable.  · Concurra a todas las visitas de seguimiento mert se lo haya indicado el médico. [place] es importante.  Comuníquese con un médico si:  · Tiene náuseas, vómitos o malestar abdominal.  · Tiene fiebre.  Solicite ayuda de inmediato si:  · Tiene dolor abdominal intenso o meteorismo.  · No puede comer ni beber sin vomitar.  Resumen  · El íleo es ck afección que ocurre cuando los intestinos mike de funcionar correctamente.  · Cuando se produce el íleo, las contracciones musculares que hacen que los alimentos se muevan a través de los intestinos no ocurren mert lo harían normalmente.  · El íleo puede causar síntomas tales mert náuseas, dolor abdominal y meteorismo.  · El tratamiento puede incluir la administración de líquidos intravenosos (i.v.) y la colocación de ck sonda nasogástrica para mantener el estómago vacío hasta que los intestinos comiencen a funcionar nuevamente.  Esta información no tiene mert fin reemplazar el consejo del médico. Asegúrese de hacerle al médico cualquier pregunta que tenga.  Document Released: 04/05/2010 Document Revised: 05/24/2019 Document Reviewed: 05/24/2019  Elsevier Patient Education © 2020 Elsevier Inc.

## 2021-11-01 NOTE — DISCHARGE PLANNING
DC Transport Scheduled    Received request at: 1131    Transport Company Scheduled:   Spoke with Lori at [facility] to schedule transport with [facility].  [facility] Trip #: [id number]    Scheduled Date: 11/01/2021  Scheduled Time: 1430    Destination: [facility]    Notified care team of scheduled transport via Voalte.

## 2021-11-01 NOTE — CARE PLAN
The patient is Stable - Low risk of patient condition declining or worsening    Shift Goals  Clinical Goals: ambulation  Patient Goals: change dressing  Family Goals: N/A    Progress made toward(s) clinical / shift goals:    Problem: Pain - Standard  Goal: Alleviation of pain or a reduction in pain to the patient’s comfort goal  Outcome: Progressing     Problem: Knowledge Deficit - Standard  Goal: Patient and family/care givers will demonstrate understanding of plan of care, disease process/condition, diagnostic tests and medications  Outcome: Progressing     Problem: Skin Integrity  Goal: Skin integrity is maintained or improved  Outcome: Progressing     Problem: Fall Risk  Goal: Patient will remain free from falls  Outcome: Progressing       Patient is not progressing towards the following goals:

## 2021-11-01 NOTE — PROGRESS NOTES
PIV found with no occlusive dressing, just tape on pigtail. Removed. No signs of redness or irritation. Pt tolerated well.

## 2021-11-01 NOTE — DISCHARGE SUMMARY
Discharge Summary    CHIEF COMPLAINT ON ADMISSION  Chief Complaint   Patient presents with   • Abdominal Pain   • N/V   • Post-Op Complications       Reason for Admission  Abdominal Pain; Post-Op Complication    CODE STATUS  Full Code    HPI & HOSPITAL COURSE  This is a 51 y.o. female here with ileus.  Patient with recent diagnosis clear-cell renal carcinoma, and underwent right radical nephrectomy.  Patient was placed on the bowel protocol, IVF hydration.  Her ileus resolved, her diet was slowly advanced, she is now tolerating a regular diet.  Patient also was noted to have profound generalized weakness.  Neurology was consulted on the case.  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  She was given IV immunoglobulin for a total of 3 days.  There was some improvement of her weakness.  Physical therapy and outpatient PT came to evaluate her and they recommended continued rehabitation a skilled nursing facility.  Urology and Neurology have cleared her for discharge.      Therefore, she is discharged in good and stable condition to skilled nursing facility.    The patient met 2-midnight criteria for an inpatient stay at the time of discharge.      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 *      FOLLOW UP  No future appointments.  Jay Painting M.D.  [address]  [facility] [address]  [phone]    Schedule an appointment as soon as possible for a visit       [facility], Emergency Dept  [address] [place] [id number] [phone]    in 12-24 hours if  symptoms persist, immediately If symptoms worsen, or if you develop any other symptoms or concerns    Damaris Matias M.D.  [address] [facility] [address] [phone]    Call  Please call your primary care provider to schedule a hospital follow up. Thank you.       MEDICATIONS ON DISCHARGE     Medication List      START taking these medications      Instructions   enoxaparin 40 MG/0.4ML Soln inj  Start taking on: November 2, 2021  Commonly known as: LOVENOX   Inject 40 mg under the skin every day.  Dose: 40 mg     * ondansetron 4 MG Tbdp  Commonly known as: Zofran ODT   Take 1 Tablet by mouth every 6 hours as needed.  Dose: 4 mg     * ondansetron 4 MG Tbdp  Commonly known as: ZOFRAN ODT   Take 1 Tablet by mouth every four hours as needed for Nausea.  Dose: 4 mg     polyethylene glycol/lytes 17 g Pack  Start taking on: November 2, 2021  Commonly known as: MIRALAX   Take 1 Packet by mouth every day.  Dose: 17 g     senna-docusate 8.6-50 MG Tabs  Commonly known as: PERICOLACE or SENOKOT S   Take 2 Tablets by mouth 2 times a day.  Dose: 2 Tablet         * This list has 2 medication(s) that are the same as other medications prescribed for you. Read the directions carefully, and ask your doctor or other care provider to review them with you.            CHANGE how you take these medications      Instructions   acetaminophen 500 MG Tabs  What changed:   · medication strength  · how much to take  · when to take this  · reasons to take this  Commonly known as: TYLENOL   Take 1-2 Tablets by mouth every 6 hours as needed for Mild Pain or Moderate Pain.  Dose: 500-1,000 mg            Allergies  Allergies   Allergen Reactions   • Latex Rash     hives       DIET  Orders Placed This Encounter   Procedures   • Diet Order Diet: Regular     Standing Status:   Standing     Number of Occurrences:   1     Order Specific Question:   Diet:     Answer:   Regular [1]       ACTIVITY  As tolerated and directed by skilled  nursing.  Weight bearing as tolerated    LINES, DRAINS, AND WOUNDS  This is an automated list. Peripheral IVs will be removed prior to discharge.       Wound 09/09/21 Incision Foot Right xero, cast padding,splint, ace (Active)       Wound 10/20/21 Incision Abdomen Right dermabond (Active)                  MENTAL STATUS ON TRANSFER  Alert oriented x4          CONSULTATIONS  Urology - [facility]  Neurology - [facility]    PROCEDURES  None    LABORATORY  Lab Results   Component Value Date    SODIUM 138 11/01/2021    POTASSIUM 4.5 11/01/2021    CHLORIDE 106 11/01/2021    CO2 20 11/01/2021    GLUCOSE 104 (H) 11/01/2021    BUN 14 11/01/2021    CREATININE 0.67 11/01/2021        Lab Results   Component Value Date    WBC 13.5 (H) 11/01/2021    HEMOGLOBIN 11.2 (L) 11/01/2021    HEMATOCRIT 32.6 (L) 11/01/2021    PLATELETCT 332 11/01/2021        Total time of the discharge process exceeds 40 minutes.

## 2021-11-01 NOTE — DISCHARGE PLANNING
Agency/Facility Name: [facility]   Spoke To: Pino  Outcome: Per Pino, there will be a bed avail for this Pt at 1400 today, but no transport avail     RN CM notified

## 2021-11-01 NOTE — PROGRESS NOTES
Report received. Assumed care. Pt in bed awake. A/O x4. VSS. Responds appropriately. Denies pain, denies SOB on RA. Assessment complete. Noted surgical incisions, R ankle incision, CDI. Extremity weakness in all 4 extremities, pt reports baseline. Discussed POC, pt verbalizes understanding. Explained importance of calling before getting OOB. Call light and belongings within reach. Bed in the lowest position. Treaded socks in place. Hourly rounding in progress. Will continue to monitor.    COVID-19 surge in effect.

## 2021-11-01 NOTE — DISCHARGE PLANNING
Care Transition Team Discharge Planning    Anticipates discharge disposition:  • SNF    Action:  • Pt was discussed in rounds today and per Dr Maravilla,Pt is medically clear .   • Requested Robson LOPEZ to follow up with Admissions at [facility]. Pt has been accepted at 14:00 .  • Will set up transport and will prepare Cobra/transfer packet.  • Informed Dr Maravilla and EVAN Pichardo of this update.  • Per EVAN Pichardo, Pt can discharge by wheelchair Van.   • This RN CM faxed a [facility] request form and face sheet to Irving Killian and sent a message to Won Dye via voalte.     This RN CM prepared Cobra/tarnsfer packet . Cobra was signed by Dr Maravilla and obtained Pt's verbal consent.    This RN CM informed Corine Pt's daughter of this update.    Per Irving Dye Pt's transport is all set at 14:30 to [facility] today.    Barriers to Discharge:  • None    Plan:  • CM to continue to assist Pt with discharge as needed

## 2021-11-11 NOTE — DOCUMENTATION QUERY
[facility]                                                                       Query Response Note      PATIENT:               FERNANDEZ MORROW  ACCT #:                  [id number]  MRN:                     [id number]  :                      1970  ADMIT DATE:       10/20/2021 8:55 AM  DISCH DATE:        10/21/2021 4:25 PM  RESPONDING  PROVIDER #:        [id number]           QUERY TEXT:    Your help is needed in clarifying of the findings of clear cell renal cell carcinoma documented in the pathology report. 10/20/21    Per coding guidelines, coders cannot code diagnosis from the pathology report without the Attending Physician's documentation of the diagnosis. Based on clinical findings, risk factors and treatment, can this diagnosis be further specified?    NOTE:  If an appropriate response is not listed below, please respond with a new note.        The patient's Clinical Indicators include:  Documentation in post op note #right renal tumor. 10/20/21  Documentation in op report has #right renal mass. 10/20/21  Documentation in pathology lab report has #clear cell renal cell carcinoma. 10/20/21    Treatment and monitoring:  Nephrectomy-Radical    [email or web address]  Options provided:   -- Agree with pathology findings of clear cell renal cell carcinoma   -- Disagree with pathology findings of clear cell renal cell carcinoma   -- Unable to determine      Query created by: Zehra Hartman on 11/3/2021 10:12 AM    RESPONSE TEXT:    Agree with pathology findings of clear cell renal cell carcinoma          Electronically signed by:  NICOLAS AVENDANO MD 2021 11:57 AM

## 2022-02-07 ENCOUNTER — TELEPHONE (OUTPATIENT)
Dept: SCHEDULING | Facility: IMAGING CENTER | Age: 52
End: 2022-02-07

## 2022-02-09 ENCOUNTER — HOSPITAL ENCOUNTER (OUTPATIENT)
Dept: LAB | Facility: MEDICAL CENTER | Age: 52
End: 2022-02-09
Attending: PSYCHIATRY & NEUROLOGY
Payer: MEDICAID

## 2022-02-09 ENCOUNTER — HOSPITAL ENCOUNTER (OUTPATIENT)
Dept: RADIOLOGY | Facility: MEDICAL CENTER | Age: 52
End: 2022-02-09
Attending: UROLOGY
Payer: MEDICAID

## 2022-02-09 ENCOUNTER — HOSPITAL ENCOUNTER (OUTPATIENT)
Dept: LAB | Facility: MEDICAL CENTER | Age: 52
End: 2022-02-09
Attending: UROLOGY
Payer: MEDICAID

## 2022-02-09 DIAGNOSIS — C64.1 MALIGNANT NEOPLASM OF RIGHT KIDNEY, EXCEPT RENAL PELVIS (HCC): ICD-10-CM

## 2022-02-09 LAB
ANION GAP SERPL CALC-SCNC: 16 MMOL/L (ref 7–16)
APTT PPP: 25.5 SEC (ref 24.7–36)
BASOPHILS # BLD AUTO: 0.2 % (ref 0–1.8)
BASOPHILS # BLD: 0.04 K/UL (ref 0–0.12)
BUN SERPL-MCNC: 24 MG/DL (ref 8–22)
CALCIUM SERPL-MCNC: 9.5 MG/DL (ref 8.5–10.5)
CHLORIDE SERPL-SCNC: 99 MMOL/L (ref 96–112)
CO2 SERPL-SCNC: 24 MMOL/L (ref 20–33)
CREAT SERPL-MCNC: 1.11 MG/DL (ref 0.5–1.4)
CRP SERPL HS-MCNC: 0.46 MG/DL (ref 0–0.75)
EOSINOPHIL # BLD AUTO: 0 K/UL (ref 0–0.51)
EOSINOPHIL NFR BLD: 0 % (ref 0–6.9)
ERYTHROCYTE [DISTWIDTH] IN BLOOD BY AUTOMATED COUNT: 42.5 FL (ref 35.9–50)
ERYTHROCYTE [SEDIMENTATION RATE] IN BLOOD BY WESTERGREN METHOD: 19 MM/HOUR (ref 0–25)
GLUCOSE SERPL-MCNC: 163 MG/DL (ref 65–99)
HCT VFR BLD AUTO: 41.7 % (ref 37–47)
HGB BLD-MCNC: 13.7 G/DL (ref 12–16)
IMM GRANULOCYTES # BLD AUTO: 0.14 K/UL (ref 0–0.11)
IMM GRANULOCYTES NFR BLD AUTO: 0.8 % (ref 0–0.9)
INR PPP: 0.91 (ref 0.87–1.13)
LYMPHOCYTES # BLD AUTO: 0.52 K/UL (ref 1–4.8)
LYMPHOCYTES NFR BLD: 2.8 % (ref 22–41)
MCH RBC QN AUTO: 27.5 PG (ref 27–33)
MCHC RBC AUTO-ENTMCNC: 32.9 G/DL (ref 33.6–35)
MCV RBC AUTO: 83.6 FL (ref 81.4–97.8)
MONOCYTES # BLD AUTO: 0.27 K/UL (ref 0–0.85)
MONOCYTES NFR BLD AUTO: 1.5 % (ref 0–13.4)
NEUTROPHILS # BLD AUTO: 17.41 K/UL (ref 2–7.15)
NEUTROPHILS NFR BLD: 94.7 % (ref 44–72)
NRBC # BLD AUTO: 0 K/UL
NRBC BLD-RTO: 0 /100 WBC
PLATELET # BLD AUTO: 298 K/UL (ref 164–446)
PMV BLD AUTO: 10.8 FL (ref 9–12.9)
POTASSIUM SERPL-SCNC: 3.9 MMOL/L (ref 3.6–5.5)
PROTHROMBIN TIME: 12 SEC (ref 12–14.6)
RBC # BLD AUTO: 4.99 M/UL (ref 4.2–5.4)
RHEUMATOID FACT SER IA-ACNC: 11 IU/ML (ref 0–14)
SODIUM SERPL-SCNC: 139 MMOL/L (ref 135–145)
THYROPEROXIDASE AB SERPL-ACNC: <9 IU/ML (ref 0–9)
TSH SERPL DL<=0.005 MIU/L-ACNC: 0.56 UIU/ML (ref 0.38–5.33)
VIT B12 SERPL-MCNC: 982 PG/ML (ref 211–911)
WBC # BLD AUTO: 18.4 K/UL (ref 4.8–10.8)

## 2022-02-09 PROCEDURE — 85025 COMPLETE CBC W/AUTO DIFF WBC: CPT

## 2022-02-09 PROCEDURE — 71046 X-RAY EXAM CHEST 2 VIEWS: CPT

## 2022-02-09 PROCEDURE — 85730 THROMBOPLASTIN TIME PARTIAL: CPT

## 2022-02-09 PROCEDURE — 36415 COLL VENOUS BLD VENIPUNCTURE: CPT

## 2022-02-09 PROCEDURE — 83521 IG LIGHT CHAINS FREE EACH: CPT | Mod: 91

## 2022-02-09 PROCEDURE — 82784 ASSAY IGA/IGD/IGG/IGM EACH: CPT

## 2022-02-09 PROCEDURE — 86376 MICROSOMAL ANTIBODY EACH: CPT

## 2022-02-09 PROCEDURE — 83516 IMMUNOASSAY NONANTIBODY: CPT | Mod: 91

## 2022-02-09 PROCEDURE — 86038 ANTINUCLEAR ANTIBODIES: CPT

## 2022-02-09 PROCEDURE — 86431 RHEUMATOID FACTOR QUANT: CPT

## 2022-02-09 PROCEDURE — 86235 NUCLEAR ANTIGEN ANTIBODY: CPT | Mod: 91

## 2022-02-09 PROCEDURE — 86800 THYROGLOBULIN ANTIBODY: CPT

## 2022-02-09 PROCEDURE — 86334 IMMUNOFIX E-PHORESIS SERUM: CPT

## 2022-02-09 PROCEDURE — 85652 RBC SED RATE AUTOMATED: CPT

## 2022-02-09 PROCEDURE — 84155 ASSAY OF PROTEIN SERUM: CPT

## 2022-02-09 PROCEDURE — 80048 BASIC METABOLIC PNL TOTAL CA: CPT

## 2022-02-09 PROCEDURE — 84165 PROTEIN E-PHORESIS SERUM: CPT

## 2022-02-09 PROCEDURE — 85610 PROTHROMBIN TIME: CPT

## 2022-02-09 PROCEDURE — 86140 C-REACTIVE PROTEIN: CPT

## 2022-02-09 PROCEDURE — 84443 ASSAY THYROID STIM HORMONE: CPT

## 2022-02-09 PROCEDURE — 82607 VITAMIN B-12: CPT

## 2022-02-11 LAB
ENA SS-B IGG SER IA-ACNC: 0 AU/ML (ref 0–40)
NUCLEAR IGG SER QL IA: NORMAL
SSA52 R0ENA AB IGG Q0420: 0 AU/ML (ref 0–40)
SSA60 R0ENA AB IGG Q0419: 0 AU/ML (ref 0–40)

## 2022-02-12 LAB
ALBUMIN SERPL ELPH-MCNC: 3.62 G/DL (ref 3.75–5.01)
ALPHA1 GLOB SERPL ELPH-MCNC: 0.29 G/DL (ref 0.19–0.46)
ALPHA2 GLOB SERPL ELPH-MCNC: 0.93 G/DL (ref 0.48–1.05)
B-GLOBULIN SERPL ELPH-MCNC: 0.92 G/DL (ref 0.48–1.1)
EER MONOCLONAL PROTEIN AND FLC, SERUM Q5224: ABNORMAL
GAMMA GLOB SERPL ELPH-MCNC: 1.14 G/DL (ref 0.62–1.51)
IGA SERPL-MCNC: 374 MG/DL (ref 68–408)
IGG SERPL-MCNC: 1209 MG/DL (ref 768–1632)
IGM SERPL-MCNC: 164 MG/DL (ref 35–263)
INTERPRETATION SERPL IFE-IMP: ABNORMAL
INTERPRETATION SERPL IFE-IMP: ABNORMAL
KAPPA LC FREE SER-MCNC: 20.21 MG/L (ref 3.3–19.4)
KAPPA LC FREE/LAMBDA FREE SER NEPH: 1.03 {RATIO} (ref 0.26–1.65)
LAMBDA LC FREE SERPL-MCNC: 19.57 MG/L (ref 5.71–26.3)
PROT SERPL-MCNC: 6.9 G/DL (ref 6.3–8.2)

## 2022-02-13 LAB — THYROGLOB AB SERPL-ACNC: <0.9 IU/ML (ref 0–4)

## 2022-02-14 LAB
GD1A GANGL AB SER IA-ACNC: 47 IV (ref 0–50)
GD1B GANGL AB SER IA-ACNC: 7 IV (ref 0–50)
GM1 ASIALO AB SER IA-ACNC: 81 IV (ref 0–50)
GM1 GANGL IGG+IGM SER QL IA: 23 IV (ref 0–50)
GM2 GANGL IGG+IGM SER QL IA: 8 IV (ref 0–50)
GQ1B GANGL AB SER-ACNC: 9 IV (ref 0–50)
MAG IGM SER IA-ACNC: <1000 TU (ref 0–999)

## 2022-02-22 ENCOUNTER — HOSPITAL ENCOUNTER (OUTPATIENT)
Dept: RADIOLOGY | Facility: MEDICAL CENTER | Age: 52
End: 2022-02-22
Attending: UROLOGY
Payer: MEDICAID

## 2022-02-22 DIAGNOSIS — C64.1 MALIGNANT NEOPLASM OF RIGHT KIDNEY, EXCEPT RENAL PELVIS (HCC): ICD-10-CM

## 2022-02-22 PROCEDURE — 700117 HCHG RX CONTRAST REV CODE 255: Performed by: UROLOGY

## 2022-02-22 PROCEDURE — 74177 CT ABD & PELVIS W/CONTRAST: CPT

## 2022-02-22 RX ADMIN — IOHEXOL 25 ML: 240 INJECTION, SOLUTION INTRATHECAL; INTRAVASCULAR; INTRAVENOUS; ORAL at 15:18

## 2022-02-22 RX ADMIN — IOHEXOL 75 ML: 350 INJECTION, SOLUTION INTRAVENOUS at 15:13

## 2022-02-24 ENCOUNTER — HOSPITAL ENCOUNTER (OUTPATIENT)
Facility: MEDICAL CENTER | Age: 52
End: 2022-02-24
Attending: PSYCHIATRY & NEUROLOGY
Payer: MEDICAID

## 2022-02-24 ENCOUNTER — HOSPITAL ENCOUNTER (OUTPATIENT)
Dept: RADIOLOGY | Facility: MEDICAL CENTER | Age: 52
End: 2022-02-24
Attending: PSYCHIATRY & NEUROLOGY
Payer: MEDICAID

## 2022-02-24 DIAGNOSIS — G61.81 CHRONIC INFLAMMATORY DEMYELINATING POLYNEURITIS (HCC): ICD-10-CM

## 2022-02-24 LAB
BURR CELLS/RBC NFR CSF MANUAL: 0 %
CLARITY CSF: CLEAR
COLOR CSF: COLORLESS
COLOR SPUN CSF: COLORLESS
GLUCOSE CSF-MCNC: 90 MG/DL (ref 40–80)
LYMPHOCYTES NFR CSF: 69 %
MONONUC CELLS NFR CSF: 30 %
NEUTROPHILS NFR CSF: 1 %
PROT CSF-MCNC: 48 MG/DL (ref 15–45)
RBC # CSF: 2 CELLS/UL
SPECIMEN VOL CSF: 4 ML
TUBE # CSF: 2
TUBE # CSF: 2
WBC # CSF: 3 CELLS/UL (ref 0–10)

## 2022-02-24 PROCEDURE — 89051 BODY FLUID CELL COUNT: CPT

## 2022-02-24 PROCEDURE — 83873 ASSAY OF CSF PROTEIN: CPT

## 2022-02-24 PROCEDURE — 77003 FLUOROGUIDE FOR SPINE INJECT: CPT

## 2022-02-24 PROCEDURE — 82945 GLUCOSE OTHER FLUID: CPT

## 2022-02-24 PROCEDURE — 83916 OLIGOCLONAL BANDS: CPT | Mod: 91

## 2022-02-24 PROCEDURE — 84157 ASSAY OF PROTEIN OTHER: CPT

## 2022-02-27 LAB
MBP CSF-MCNC: 2.11 NG/ML (ref 0–5.5)
OLIGOCLONAL BANDS CSF ELPH-IMP: NORMAL
OLIGOCLONAL BANDS CSF IEF: NORMAL BANDS (ref 0–1)
OLIGOCLONAL BANDS.IT SER+CSF QL: NEGATIVE

## 2022-03-07 ENCOUNTER — HOSPITAL ENCOUNTER (OUTPATIENT)
Facility: MEDICAL CENTER | Age: 52
End: 2022-03-07
Payer: MEDICAID

## 2022-03-07 PROCEDURE — 87186 SC STD MICRODIL/AGAR DIL: CPT

## 2022-03-07 PROCEDURE — 87077 CULTURE AEROBIC IDENTIFY: CPT

## 2022-03-07 PROCEDURE — 87086 URINE CULTURE/COLONY COUNT: CPT

## 2022-03-07 SDOH — ECONOMIC STABILITY: HOUSING INSECURITY: IN THE LAST 12 MONTHS, HOW MANY PLACES HAVE YOU LIVED?: 2

## 2022-03-07 SDOH — ECONOMIC STABILITY: INCOME INSECURITY: IN THE LAST 12 MONTHS, WAS THERE A TIME WHEN YOU WERE NOT ABLE TO PAY THE MORTGAGE OR RENT ON TIME?: NO

## 2022-03-07 SDOH — ECONOMIC STABILITY: INCOME INSECURITY: HOW HARD IS IT FOR YOU TO PAY FOR THE VERY BASICS LIKE FOOD, HOUSING, MEDICAL CARE, AND HEATING?: NOT VERY HARD

## 2022-03-07 SDOH — ECONOMIC STABILITY: HOUSING INSECURITY
IN THE LAST 12 MONTHS, WAS THERE A TIME WHEN YOU DID NOT HAVE A STEADY PLACE TO SLEEP OR SLEPT IN A SHELTER (INCLUDING NOW)?: NO

## 2022-03-07 SDOH — ECONOMIC STABILITY: FOOD INSECURITY: WITHIN THE PAST 12 MONTHS, THE FOOD YOU BOUGHT JUST DIDN'T LAST AND YOU DIDN'T HAVE MONEY TO GET MORE.: NEVER TRUE

## 2022-03-07 SDOH — HEALTH STABILITY: PHYSICAL HEALTH: ON AVERAGE, HOW MANY DAYS PER WEEK DO YOU ENGAGE IN MODERATE TO STRENUOUS EXERCISE (LIKE A BRISK WALK)?: 0 DAYS

## 2022-03-07 SDOH — ECONOMIC STABILITY: TRANSPORTATION INSECURITY
IN THE PAST 12 MONTHS, HAS LACK OF TRANSPORTATION KEPT YOU FROM MEETINGS, WORK, OR FROM GETTING THINGS NEEDED FOR DAILY LIVING?: NO

## 2022-03-07 SDOH — ECONOMIC STABILITY: TRANSPORTATION INSECURITY
IN THE PAST 12 MONTHS, HAS THE LACK OF TRANSPORTATION KEPT YOU FROM MEDICAL APPOINTMENTS OR FROM GETTING MEDICATIONS?: NO

## 2022-03-07 SDOH — ECONOMIC STABILITY: FOOD INSECURITY: WITHIN THE PAST 12 MONTHS, YOU WORRIED THAT YOUR FOOD WOULD RUN OUT BEFORE YOU GOT MONEY TO BUY MORE.: NEVER TRUE

## 2022-03-07 SDOH — HEALTH STABILITY: PHYSICAL HEALTH: ON AVERAGE, HOW MANY MINUTES DO YOU ENGAGE IN EXERCISE AT THIS LEVEL?: 0 MIN

## 2022-03-07 ASSESSMENT — SOCIAL DETERMINANTS OF HEALTH (SDOH)
IN A TYPICAL WEEK, HOW MANY TIMES DO YOU TALK ON THE PHONE WITH FAMILY, FRIENDS, OR NEIGHBORS?: MORE THAN THREE TIMES A WEEK
HOW OFTEN DO YOU ATTENT MEETINGS OF THE CLUB OR ORGANIZATION YOU BELONG TO?: NEVER
HOW OFTEN DO YOU GET TOGETHER WITH FRIENDS OR RELATIVES?: MORE THAN THREE TIMES A WEEK
DO YOU BELONG TO ANY CLUBS OR ORGANIZATIONS SUCH AS CHURCH GROUPS UNIONS, FRATERNAL OR ATHLETIC GROUPS, OR SCHOOL GROUPS?: NO
HOW OFTEN DO YOU ATTEND CHURCH OR RELIGIOUS SERVICES?: NEVER

## 2022-03-07 ASSESSMENT — LIFESTYLE VARIABLES
HOW OFTEN DO YOU HAVE A DRINK CONTAINING ALCOHOL: NEVER
HOW MANY STANDARD DRINKS CONTAINING ALCOHOL DO YOU HAVE ON A TYPICAL DAY: PATIENT DECLINED
HOW OFTEN DO YOU HAVE SIX OR MORE DRINKS ON ONE OCCASION: NEVER

## 2022-03-10 LAB
BACTERIA UR CULT: ABNORMAL
SIGNIFICANT IND 70042: ABNORMAL
SITE SITE: ABNORMAL
SOURCE SOURCE: ABNORMAL

## 2022-03-24 ENCOUNTER — OFFICE VISIT (OUTPATIENT)
Dept: MEDICAL GROUP | Facility: MEDICAL CENTER | Age: 52
End: 2022-03-24
Attending: NURSE PRACTITIONER
Payer: MEDICAID

## 2022-03-24 VITALS
SYSTOLIC BLOOD PRESSURE: 138 MMHG | DIASTOLIC BLOOD PRESSURE: 96 MMHG | HEIGHT: 62 IN | BODY MASS INDEX: 28.39 KG/M2 | OXYGEN SATURATION: 99 % | RESPIRATION RATE: 16 BRPM | HEART RATE: 116 BPM | TEMPERATURE: 97.1 F

## 2022-03-24 DIAGNOSIS — Z13.21 ENCOUNTER FOR VITAMIN DEFICIENCY SCREENING: ICD-10-CM

## 2022-03-24 DIAGNOSIS — Z76.89 ENCOUNTER TO ESTABLISH CARE: ICD-10-CM

## 2022-03-24 DIAGNOSIS — Z13.6 SCREENING FOR CARDIOVASCULAR CONDITION: ICD-10-CM

## 2022-03-24 DIAGNOSIS — C64.1 CLEAR CELL RENAL CELL CARCINOMA, RIGHT (HCC): ICD-10-CM

## 2022-03-24 DIAGNOSIS — Z13.228 SCREENING FOR METABOLIC DISORDER: ICD-10-CM

## 2022-03-24 DIAGNOSIS — G61.81 CIDP (CHRONIC INFLAMMATORY DEMYELINATING POLYNEUROPATHY) (HCC): ICD-10-CM

## 2022-03-24 PROBLEM — G62.9 NEUROPATHY: Status: ACTIVE | Noted: 2021-10-24

## 2022-03-24 PROBLEM — N39.46 MIXED STRESS AND URGE URINARY INCONTINENCE: Status: ACTIVE | Noted: 2021-09-14

## 2022-03-24 PROBLEM — N28.89 RENAL MASS: Status: ACTIVE | Noted: 2021-09-14

## 2022-03-24 PROCEDURE — 99214 OFFICE O/P EST MOD 30 MIN: CPT | Performed by: NURSE PRACTITIONER

## 2022-03-24 PROCEDURE — 99203 OFFICE O/P NEW LOW 30 MIN: CPT | Performed by: NURSE PRACTITIONER

## 2022-03-24 PROCEDURE — 99213 OFFICE O/P EST LOW 20 MIN: CPT | Performed by: NURSE PRACTITIONER

## 2022-03-24 RX ORDER — GABAPENTIN 100 MG/1
200 CAPSULE ORAL 3 TIMES DAILY
COMMUNITY
End: 2023-12-12

## 2022-03-24 RX ORDER — PREDNISONE 50 MG/1
50 TABLET ORAL DAILY
COMMUNITY
End: 2023-04-18

## 2022-03-24 SDOH — ECONOMIC STABILITY: INCOME INSECURITY: HOW HARD IS IT FOR YOU TO PAY FOR THE VERY BASICS LIKE FOOD, HOUSING, MEDICAL CARE, AND HEATING?: NOT VERY HARD

## 2022-03-24 SDOH — ECONOMIC STABILITY: FOOD INSECURITY: WITHIN THE PAST 12 MONTHS, THE FOOD YOU BOUGHT JUST DIDN'T LAST AND YOU DIDN'T HAVE MONEY TO GET MORE.: NEVER TRUE

## 2022-03-24 SDOH — ECONOMIC STABILITY: FOOD INSECURITY: WITHIN THE PAST 12 MONTHS, YOU WORRIED THAT YOUR FOOD WOULD RUN OUT BEFORE YOU GOT MONEY TO BUY MORE.: NEVER TRUE

## 2022-03-24 SDOH — HEALTH STABILITY: PHYSICAL HEALTH: ON AVERAGE, HOW MANY MINUTES DO YOU ENGAGE IN EXERCISE AT THIS LEVEL?: 0 MIN

## 2022-03-24 SDOH — ECONOMIC STABILITY: HOUSING INSECURITY: IN THE LAST 12 MONTHS, HOW MANY PLACES HAVE YOU LIVED?: 2

## 2022-03-24 SDOH — HEALTH STABILITY: MENTAL HEALTH
STRESS IS WHEN SOMEONE FEELS TENSE, NERVOUS, ANXIOUS, OR CAN'T SLEEP AT NIGHT BECAUSE THEIR MIND IS TROUBLED. HOW STRESSED ARE YOU?: TO SOME EXTENT

## 2022-03-24 SDOH — HEALTH STABILITY: PHYSICAL HEALTH: ON AVERAGE, HOW MANY DAYS PER WEEK DO YOU ENGAGE IN MODERATE TO STRENUOUS EXERCISE (LIKE A BRISK WALK)?: 0 DAYS

## 2022-03-24 SDOH — ECONOMIC STABILITY: INCOME INSECURITY: IN THE LAST 12 MONTHS, WAS THERE A TIME WHEN YOU WERE NOT ABLE TO PAY THE MORTGAGE OR RENT ON TIME?: NO

## 2022-03-24 SDOH — ECONOMIC STABILITY: TRANSPORTATION INSECURITY
IN THE PAST 12 MONTHS, HAS LACK OF RELIABLE TRANSPORTATION KEPT YOU FROM MEDICAL APPOINTMENTS, MEETINGS, WORK OR FROM GETTING THINGS NEEDED FOR DAILY LIVING?: NO

## 2022-03-24 ASSESSMENT — ENCOUNTER SYMPTOMS
PALPITATIONS: 0
DIARRHEA: 0
FEVER: 0
CHILLS: 0
CONSTIPATION: 0
SENSORY CHANGE: 1
COUGH: 0
BLOOD IN STOOL: 0
WHEEZING: 0
WEAKNESS: 1
ABDOMINAL PAIN: 0
WEIGHT LOSS: 0
SHORTNESS OF BREATH: 0

## 2022-03-24 ASSESSMENT — SOCIAL DETERMINANTS OF HEALTH (SDOH)
HOW MANY DRINKS CONTAINING ALCOHOL DO YOU HAVE ON A TYPICAL DAY WHEN YOU ARE DRINKING: PATIENT DECLINED
HOW OFTEN DO YOU ATTENT MEETINGS OF THE CLUB OR ORGANIZATION YOU BELONG TO?: NEVER
HOW OFTEN DO YOU ATTENT MEETINGS OF THE CLUB OR ORGANIZATION YOU BELONG TO?: NEVER
HOW OFTEN DO YOU ATTEND CHURCH OR RELIGIOUS SERVICES?: NEVER
HOW OFTEN DO YOU ATTEND CHURCH OR RELIGIOUS SERVICES?: NEVER
WITHIN THE PAST 12 MONTHS, YOU WORRIED THAT YOUR FOOD WOULD RUN OUT BEFORE YOU GOT THE MONEY TO BUY MORE: NEVER TRUE
IN A TYPICAL WEEK, HOW MANY TIMES DO YOU TALK ON THE PHONE WITH FAMILY, FRIENDS, OR NEIGHBORS?: MORE THAN THREE TIMES A WEEK
HOW OFTEN DO YOU GET TOGETHER WITH FRIENDS OR RELATIVES?: MORE THAN THREE TIMES A WEEK
HOW OFTEN DO YOU HAVE SIX OR MORE DRINKS ON ONE OCCASION: NEVER
HOW OFTEN DO YOU GET TOGETHER WITH FRIENDS OR RELATIVES?: MORE THAN THREE TIMES A WEEK
HOW OFTEN DO YOU HAVE A DRINK CONTAINING ALCOHOL: NEVER
DO YOU BELONG TO ANY CLUBS OR ORGANIZATIONS SUCH AS CHURCH GROUPS UNIONS, FRATERNAL OR ATHLETIC GROUPS, OR SCHOOL GROUPS?: NO
HOW HARD IS IT FOR YOU TO PAY FOR THE VERY BASICS LIKE FOOD, HOUSING, MEDICAL CARE, AND HEATING?: NOT VERY HARD
DO YOU BELONG TO ANY CLUBS OR ORGANIZATIONS SUCH AS CHURCH GROUPS UNIONS, FRATERNAL OR ATHLETIC GROUPS, OR SCHOOL GROUPS?: NO
IN A TYPICAL WEEK, HOW MANY TIMES DO YOU TALK ON THE PHONE WITH FAMILY, FRIENDS, OR NEIGHBORS?: MORE THAN THREE TIMES A WEEK

## 2022-03-24 ASSESSMENT — LIFESTYLE VARIABLES
HOW MANY STANDARD DRINKS CONTAINING ALCOHOL DO YOU HAVE ON A TYPICAL DAY: PATIENT DECLINED
HOW OFTEN DO YOU HAVE SIX OR MORE DRINKS ON ONE OCCASION: NEVER
HOW OFTEN DO YOU HAVE A DRINK CONTAINING ALCOHOL: NEVER

## 2022-03-24 ASSESSMENT — PATIENT HEALTH QUESTIONNAIRE - PHQ9: CLINICAL INTERPRETATION OF PHQ2 SCORE: 0

## 2022-03-25 NOTE — ASSESSMENT & PLAN NOTE
Started with numbness in her left foot about 2 years ago.  She did not think much of it.  The right foot started to be numb, the left leg, and right leg.  Now her arms are numb.  She was dx several months ago.  She sees neuro- DrHugh Phoenix.       She is not able to bear weight or hold anything.  She uses a wheelchair.  She has trouble with ADLs.    She got 3 rounds of IVIG inpatient at .  She sees PEng 4/8 and will discuss further treatment.

## 2022-03-25 NOTE — PROGRESS NOTES
Chief Complaint   Patient presents with   • Establish Care       Subjective:     HPI:   Noelle Campoverde is a 52 y.o. female here to discuss the evaluation and management of:        Encounter to establish care  Prior PCP: [facility]    Other Providers:  Urology- [facility] [place] [place]  Neurology- Dr. Alban Borja    Clear cell renal cell carcinoma, right (HCC)  Dx 7/21- right nephrectomy 10/20/21.      CIDP (chronic inflammatory demyelinating polyneuropathy) (HCC)  Started with numbness in her left foot about 2 years ago.  She did not think much of it.  The right foot started to be numb, the left leg, and right leg.  Now her arms are numb.  She was dx several months ago.  She sees neuro- Dr. Phoenix.       She is not able to bear weight or hold anything.  She uses a wheelchair.  She has trouble with ADLs.    She got 3 rounds of IVIG inpatient at dx.  She sees PEng 4/8 and will discuss further treatment.       ROS  Review of Systems   Constitutional: Negative for chills, fever, malaise/fatigue and weight loss.   Respiratory: Negative for cough, shortness of breath and wheezing.    Cardiovascular: Negative for chest pain, palpitations and leg swelling.   Gastrointestinal: Negative for abdominal pain, blood in stool, constipation and diarrhea.   Neurological: Positive for sensory change and weakness.         Allergies   Allergen Reactions   • Latex Rash     hives       Current medicines (including changes today)  Current Outpatient Medications   Medication Sig Dispense Refill   • gabapentin (NEURONTIN) 100 MG Cap Take 200 mg by mouth 3 times a day.     • predniSONE (DELTASONE) 50 MG Tab Take 50 mg by mouth every day.       No current facility-administered medications for this visit.       Social History     Tobacco Use   • Smoking status: Current Some Day Smoker     Types: Cigarettes   • Smokeless tobacco: Never Used   • Tobacco comment: 3 cigarettes/day   Vaping Use   • Vaping Use: Never used   Substance Use Topics  "  • Alcohol use: No   • Drug use: No       Patient Active Problem List    Diagnosis Date Noted   • Encounter to establish care 03/24/2022   • CIDP (chronic inflammatory demyelinating polyneuropathy) ([facility]) 03/24/2022   • Polyneuropathy due to medical condition ([facility]) 10/24/2021   • Clear cell renal cell carcinoma, right ([facility]) 10/24/2021   • Hypokalemia 10/24/2021   • Neuropathy 10/24/2021   • Ileus ([facility]) 10/23/2021   • H/O right radical nephrectomy 10/23/2021   • LFT elevation 10/23/2021   • Mixed stress and urge urinary incontinence 09/14/2021   • Renal mass 09/14/2021   • Ankle fracture, lateral malleolus, closed 09/01/2021   • Administrative encounter 06/13/2019   • Vitamin D deficiency 04/01/2019   • Anemia 04/01/2019   • Vitamin B12 deficiency 04/01/2019   • Prediabetes 04/01/2019   • Smoker 04/01/2019   • Encounter for hepatitis C screening test for low risk patient 04/01/2019   • Neck pain 02/12/2019   • BMI 28.0-28.9,adult 02/12/2019       Family History   Problem Relation Age of Onset   • Osteoporosis Mother    • Alzheimer's Disease Mother    • Arthritis Mother    • Diabetes Father    • Heart Disease Father    • Stroke Father         TIA   • Cancer Other         one nephew leukemia, the other liver ca          Objective:     /96 (BP Location: Left arm, Patient Position: Sitting, BP Cuff Size: Large adult)   Pulse (!) 116   Temp 36.2 °C (97.1 °F) (Temporal)   Resp 16   Ht 1.575 m (5' 2\")   SpO2 99%  Body mass index is 28.39 kg/m².    Physical Exam:  Physical Exam  Constitutional:       General: She is not in acute distress.  HENT:      Head: Normocephalic.      Right Ear: Tympanic membrane and external ear normal.      Left Ear: Tympanic membrane and external ear normal.   Eyes:      Conjunctiva/sclera: Conjunctivae normal.      Pupils: Pupils are equal, round, and reactive to light.   Neck:      Trachea: No tracheal deviation.   Cardiovascular:      Rate and Rhythm: Normal rate and regular rhythm. "      Heart sounds: Normal heart sounds.   Pulmonary:      Effort: Pulmonary effort is normal.      Breath sounds: Normal breath sounds.   Abdominal:      General: Bowel sounds are normal.      Palpations: Abdomen is soft.   Musculoskeletal:         General: Normal range of motion.      Cervical back: Normal range of motion and neck supple.      Right lower le+ Edema present.      Left lower le+ Edema present.   Lymphadenopathy:      Head:      Right side of head: No preauricular adenopathy.      Left side of head: No preauricular adenopathy.      Cervical: No cervical adenopathy.   Skin:     General: Skin is warm and dry.   Neurological:      Mental Status: She is alert and oriented to person, place, and time.      Cranial Nerves: Cranial nerves are intact.      Sensory: Sensory deficit present.      Motor: Weakness, atrophy and abnormal muscle tone present.      Coordination: Coordination abnormal.      Gait: Gait abnormal.      Comments: Strength:  RUE 2/5  LUE 2/5  R hand- 1/5  L hand- 1/5  RLE 1/5  LLE 1/5   Psychiatric:         Mood and Affect: Affect normal.         Judgment: Judgment normal.         Assessment and Plan:     The following treatment plan was discussed:    1. CIDP (chronic inflammatory demyelinating polyneuropathy) ([facility])  Referral to Physical Therapy    Referral to Occupational Therapy    REFERRAL TO CARE MANAGEMENT  -Chronic problem, unstable.  Decline in functional status since starting occupational physical therapy.  Referral to physical therapy and Occupational Therapy to restart therapy.  Follow plan of care per Dr. Phoenix.  Daughter is now full-time caregiver, referral to case management for additional resources.  She does have some edema in her extremities, we discussed that her decreased muscle tone could be responsible for this and I have suggested elevating her limbs.   2. Clear cell renal cell carcinoma, right ([facility])  -chronic problem, stable.  Follow plan of care per urology  [place].   3. Encounter to establish care     4. Screening for metabolic disorder  Comp Metabolic Panel    HEMOGLOBIN A1C    TSH WITH REFLEX TO FT4   5. Screening for cardiovascular condition  Lipid Profile   6. Encounter for vitamin deficiency screening  VITAMIN D,25 HYDROXY       Any change or worsening of signs or symptoms, patient encouraged to follow-up or report to emergency room for further evaluation. Patient verbalizes understanding and agrees.    Follow-Up: Return in about 4 weeks (around 4/21/2022) for Routine follow up.      PLEASE NOTE: This dictation was created using voice recognition software. I have made every reasonable attempt to correct obvious errors, but I expect that there are errors of grammar and possibly content that I did not discover before finalizing the note.

## 2022-03-29 ENCOUNTER — HOSPITAL ENCOUNTER (OUTPATIENT)
Dept: LAB | Facility: MEDICAL CENTER | Age: 52
End: 2022-03-29
Attending: NURSE PRACTITIONER
Payer: MEDICAID

## 2022-03-29 ENCOUNTER — PATIENT OUTREACH (OUTPATIENT)
Dept: HEALTH INFORMATION MANAGEMENT | Facility: OTHER | Age: 52
End: 2022-03-29

## 2022-03-29 DIAGNOSIS — Z13.6 SCREENING FOR CARDIOVASCULAR CONDITION: ICD-10-CM

## 2022-03-29 DIAGNOSIS — Z13.228 SCREENING FOR METABOLIC DISORDER: ICD-10-CM

## 2022-03-29 DIAGNOSIS — Z13.21 ENCOUNTER FOR VITAMIN DEFICIENCY SCREENING: ICD-10-CM

## 2022-03-29 LAB
25(OH)D3 SERPL-MCNC: 13 NG/ML (ref 30–100)
ALBUMIN SERPL BCP-MCNC: 4.4 G/DL (ref 3.2–4.9)
ALBUMIN/GLOB SERPL: 1.6 G/DL
ALP SERPL-CCNC: 104 U/L (ref 30–99)
ALT SERPL-CCNC: 54 U/L (ref 2–50)
ANION GAP SERPL CALC-SCNC: 15 MMOL/L (ref 7–16)
AST SERPL-CCNC: 23 U/L (ref 12–45)
BILIRUB SERPL-MCNC: 0.6 MG/DL (ref 0.1–1.5)
BUN SERPL-MCNC: 25 MG/DL (ref 8–22)
CALCIUM SERPL-MCNC: 9.7 MG/DL (ref 8.5–10.5)
CHLORIDE SERPL-SCNC: 101 MMOL/L (ref 96–112)
CHOLEST SERPL-MCNC: 276 MG/DL (ref 100–199)
CO2 SERPL-SCNC: 23 MMOL/L (ref 20–33)
CREAT SERPL-MCNC: 0.79 MG/DL (ref 0.5–1.4)
EST. AVERAGE GLUCOSE BLD GHB EST-MCNC: 148 MG/DL
GFR SERPLBLD CREATININE-BSD FMLA CKD-EPI: 90 ML/MIN/1.73 M 2
GLOBULIN SER CALC-MCNC: 2.8 G/DL (ref 1.9–3.5)
GLUCOSE SERPL-MCNC: 77 MG/DL (ref 65–99)
HBA1C MFR BLD: 6.8 % (ref 4–5.6)
HDLC SERPL-MCNC: 109 MG/DL
LDLC SERPL CALC-MCNC: 138 MG/DL
POTASSIUM SERPL-SCNC: 4.2 MMOL/L (ref 3.6–5.5)
PROT SERPL-MCNC: 7.2 G/DL (ref 6–8.2)
SODIUM SERPL-SCNC: 139 MMOL/L (ref 135–145)
TRIGL SERPL-MCNC: 144 MG/DL (ref 0–149)
TSH SERPL DL<=0.005 MIU/L-ACNC: 0.89 UIU/ML (ref 0.38–5.33)

## 2022-03-29 PROCEDURE — 80061 LIPID PANEL: CPT

## 2022-03-29 PROCEDURE — 36415 COLL VENOUS BLD VENIPUNCTURE: CPT

## 2022-03-29 PROCEDURE — 82306 VITAMIN D 25 HYDROXY: CPT

## 2022-03-29 PROCEDURE — 84443 ASSAY THYROID STIM HORMONE: CPT

## 2022-03-29 PROCEDURE — 83036 HEMOGLOBIN GLYCOSYLATED A1C: CPT

## 2022-03-29 PROCEDURE — 80053 COMPREHEN METABOLIC PANEL: CPT

## 2022-03-29 NOTE — PROGRESS NOTES
Social Work Assessment  Community Care Management    Synopsis: [facility] SW met with Noelle and her daughter Corine at the Health [facility] location.  SW responding to referral from PCP.  Noelle reports that she has been declining physically.  She first noticed symptoms of numbness in feet and legs two years ago which progressively moved to hands, unable to grasp objects now. She was given diagnosis of PATIENCE poly neuorpathy.  Pt has been unable to work, was working for restaurant prior to the condition.  Pt has applied for disability, but waiting for outcome. Pt is guardian of her four grandchildren, her daughter (mother of the children) does live with her and assists pt with her ADL's.  Daughter has quit job to be at home more to care for pt. Older grandchildren also assist at times.   Pt has wheelchair, and is reliant on that for mobility.  She also has shower chair and in July she has appointment for O/T to assess for her ability to get a powered wheelchair.  Daughter would like to become pt's paid caregiver through [employer] and is willing to complete the process so that she can work for home care agency.     Living Situation/Home Environment: Noelle lives with her daughter and four grandchildren in [place].   Financial Situation/Sources of Income: Income is for grandchildren, she is guardian for them.  She is waiting for disability income   Transportation: Daughter provides transportation with her vehicle.   Support System: Daughter Corine, son lives out of state.   Mental Health/Substance Abuse Hx: No mental health or substance abuse reported   Ability to Obtain Basic Resources: Yes, but strained financially due to high rent costs, paying $2000 month for housing.    Physical Functioning: Unable to walk independently, uses wheelchair, unable to grasp objects including using pen.  Relies on others for personal care and household tasks.    Patient's Perception of Needs: More affordable housing, hoping to be eligible for housing  that is disabled friendly.  Needs disability income, would like daughter to get paid to be caregiver as daughter is unable to work outside the home because she provides assistance to pt full-time.  Noelle also reports that they need ramp for wheelchair access into the home.     Plan: Noelel and daughter will complete [employer] application (SW provided form) as she will likely be eligible for the [facility] PD waiver, daughter to apply to become a paid caregiver.  SW will explore options for getting wheelchair ramp.  SW will be available for general support and other needed resources or referrals.

## 2022-03-30 ENCOUNTER — PATIENT OUTREACH (OUTPATIENT)
Dept: HEALTH INFORMATION MANAGEMENT | Facility: OTHER | Age: 52
End: 2022-03-30
Payer: MEDICAID

## 2022-04-04 NOTE — RESULT ENCOUNTER NOTE
Please call pt and let them know that her blood sugar has raised and she is now diabetic- please get her scheduled with me for a sooner appt- thank katie!

## 2022-04-05 ENCOUNTER — PATIENT OUTREACH (OUTPATIENT)
Dept: HEALTH INFORMATION MANAGEMENT | Facility: OTHER | Age: 52
End: 2022-04-05
Payer: MEDICAID

## 2022-04-05 NOTE — PROGRESS NOTES
Pt's daughter Corine dropped off completed [employer] application to SW office.  SW faxed application to program.

## 2022-04-05 NOTE — PROGRESS NOTES
SW received message from [employer] that pt's case has been assigned to one of their workers.  They will contact pt within two weeks.  Pt was informed that she assigned .

## 2022-04-08 ENCOUNTER — PATIENT OUTREACH (OUTPATIENT)
Dept: HEALTH INFORMATION MANAGEMENT | Facility: OTHER | Age: 52
End: 2022-04-08
Payer: MEDICAID

## 2022-04-08 NOTE — PROGRESS NOTES
Call from pt's daughter, pt also present on the call. Daughter explained that pt is in need of infusion medications. They were notified today that medicaid will cover the cost of the medication, but not the nurse who would administer them in the home or at clinic.  They were told that it would be $800 a month for the nurse.  Pt would be unable to pay for this, and they are trying to find out if they can get help to cover these costs.  Daughter provided [facility] information that could explain it more clearly.  TEETEE will consult with CCM RN and contact [facility] before creating plan on how to address the issue. TEETEE will follow up with pt next week.

## 2022-04-12 ENCOUNTER — PATIENT OUTREACH (OUTPATIENT)
Dept: HEALTH INFORMATION MANAGEMENT | Facility: OTHER | Age: 52
End: 2022-04-12
Payer: MEDICAID

## 2022-04-13 NOTE — PROGRESS NOTES
[facility] RN reviewed chart per  request, patient had been told she would have to pay $800/month for infusion services for IVIG treatments. Per [facility], they believe that both the medication and administration would be covered. Coordinated with [facility] to send referral to [facility] instead of [facility] to see if patient costs would be lower. Patient and daughter to continue working with , please refer back to CCM RN for any additional assistance needed.     EVAN Barlow Care Coordinator  [facility] Management [phone]  [facility] Care Management [phone]

## 2022-04-18 ENCOUNTER — OCCUPATIONAL THERAPY (OUTPATIENT)
Dept: OCCUPATIONAL THERAPY | Facility: REHABILITATION | Age: 52
End: 2022-04-18
Attending: NURSE PRACTITIONER
Payer: MEDICAID

## 2022-04-18 DIAGNOSIS — G61.81 CIDP (CHRONIC INFLAMMATORY DEMYELINATING POLYNEUROPATHY) (HCC): ICD-10-CM

## 2022-04-18 PROCEDURE — 97165 OT EVAL LOW COMPLEX 30 MIN: CPT

## 2022-04-18 RX ORDER — IMMUNE GLOBULIN INFUSION (HUMAN) 100 MG/ML
10 INJECTION, SOLUTION INTRAVENOUS; SUBCUTANEOUS ONCE
Status: CANCELLED
Start: 2022-04-25 | End: 2022-04-25

## 2022-04-18 RX ORDER — METHYLPREDNISOLONE SODIUM SUCCINATE 125 MG/2ML
125 INJECTION, POWDER, LYOPHILIZED, FOR SOLUTION INTRAMUSCULAR; INTRAVENOUS ONCE
Status: CANCELLED | OUTPATIENT
Start: 2022-04-25 | End: 2022-04-25

## 2022-04-18 RX ORDER — ACETAMINOPHEN 325 MG/1
650 TABLET ORAL ONCE
Status: CANCELLED | OUTPATIENT
Start: 2022-04-25 | End: 2022-04-25

## 2022-04-18 SDOH — ECONOMIC STABILITY: GENERAL: QUALITY OF LIFE: GOOD

## 2022-04-18 ASSESSMENT — ENCOUNTER SYMPTOMS
EXACERBATED BY: ACTIVITY
ALLEVIATING FACTORS: REST
PAIN SCALE: 0
PAIN SCALE AT LOWEST: 0
PAIN TIMING: SPORADIC
QUALITY: ACHING
PAIN SCALE AT HIGHEST: 8

## 2022-04-18 ASSESSMENT — ACTIVITIES OF DAILY LIVING (ADL)
DRESSING_LB_CURRENT_FUNCTION: UNABLE
TOILETING: MAXIMUM ASSIST
DRESSING_UB_CURRENT_FUNCTION: MODERATE ASSIST
DRESSING_SHOES_CURRENT_FUNCTION: UNABLE
BATHING_POSITION: SITTING
BATHING_CURRENT_FUNCTION: MAXIMUM ASSIST
EQUIPMENT_USED_WHILE_BATHING: SHOWER CHAIR
DRESSING_SOCKS_CURRENT_FUNCTION: UNABLE
CLOTHING_MANAGEMENT: UNABLE
POOR_BALANCE: 1
CLEANSING: UNABLE
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
## 2022-04-22 ENCOUNTER — PATIENT OUTREACH (OUTPATIENT)
Dept: HEALTH INFORMATION MANAGEMENT | Facility: OTHER | Age: 52
End: 2022-04-22
Payer: MEDICAID

## 2022-04-22 NOTE — PROGRESS NOTES
CCM SW call from pt and her daughter Corine.  She states that pt is feeling better, more hopeful now that she will be receiving O/T, P/T and infusions. They also received notice that pt was approved for disability.  They are hoping to move into more affordable housing, and will follow up with [facility] to see what options they may have.  They have not heard from [facility]  ,but  assigned is Mabel Whiting.    There are no other identified needs.  SW will close CCM case at this time.

## 2022-05-02 ENCOUNTER — PHYSICAL THERAPY (OUTPATIENT)
Dept: PHYSICAL THERAPY | Facility: REHABILITATION | Age: 52
End: 2022-05-02
Attending: NURSE PRACTITIONER
Payer: MEDICAID

## 2022-05-02 DIAGNOSIS — Z91.81 AT HIGH RISK FOR FALLS: ICD-10-CM

## 2022-05-02 DIAGNOSIS — G82.20 PARAPLEGIA (HCC): ICD-10-CM

## 2022-05-02 DIAGNOSIS — R53.1 WEAKNESS: ICD-10-CM

## 2022-05-02 DIAGNOSIS — G61.81 CIDP (CHRONIC INFLAMMATORY DEMYELINATING POLYNEUROPATHY) (HCC): ICD-10-CM

## 2022-05-02 PROCEDURE — 97163 PT EVAL HIGH COMPLEX 45 MIN: CPT

## 2022-05-02 ASSESSMENT — ACTIVITIES OF DAILY LIVING (ADL): POOR_BALANCE: 1

## 2022-05-02 ASSESSMENT — ENCOUNTER SYMPTOMS
PAIN LOCATION: RT ANKLE
QUALITY: ACHING
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
## 2022-05-04 ENCOUNTER — OCCUPATIONAL THERAPY (OUTPATIENT)
Dept: OCCUPATIONAL THERAPY | Facility: REHABILITATION | Age: 52
End: 2022-05-04
Attending: NURSE PRACTITIONER
Payer: MEDICAID

## 2022-05-04 DIAGNOSIS — G61.81 CIDP (CHRONIC INFLAMMATORY DEMYELINATING POLYNEUROPATHY) (HCC): ICD-10-CM

## 2022-05-04 PROCEDURE — 97110 THERAPEUTIC EXERCISES: CPT

## 2022-05-04 NOTE — OP THERAPY DAILY TREATMENT
Outpatient Occupational Therapy  DAILY TREATMENT     [facility] Occupational Therapy [address]  [address]  [place] [address]  Phone:  [phone]  Fax:  [phone]    Date: 05/04/2022    Patient: Noelle Campoverde  YOB: 1970  MRN: [id number]     Time Calculation  Start time: 0915  Stop time: 1000 Time Calculation (min): 45 minutes         Chief Complaint: Hand Weakness, Hand Problem, and Self Care Duties    Visit #: 2    SUBJECTIVE:  I feel so much better now.  I think the stress of everything was making me worse.  Everything is getting better.      OBJECTIVE:  Current objective measures:   Neurological Testing      Sensation      Wrist/Hand   Left   Diminished:Protective sensation and hot/cold  Absent: light touch, pin prick, sharp/dull discrimination, static two point discrimination, dynamic two point discrimination a     Right   Diminished: protective sensation and hot/col  Absent: light touch, pin prick, sharp/dull discrimination, static two point discrimination, dynamic two point discrimination      Active Range of Motion      Left Wrist   Normal active range of motion     Right Wrist   Normal active range of motion     Left Thumb     Normal active range of motion     Right Thumb     Normal active range of motion     Left Digits    Normal active range of motion     Right Digits   Normal active range of motion     Strength:       Left Wrist/Hand    (2nd hand position)     Trial 1: 24     Thumb Strength  Key/Lateral Pinch     Trial 1: 1  Palmar/Three-Point Pinch     Trial 1: 1     Right Wrist/Hand    (2nd hand position)     Trial 1: 19     Thumb Strength   Key/Lateral Pinch     Trial 1: 1  Palmar/Three-Point Pinch     Trial 1: 1     General Comments      Wrist/Hand Comments  9 hole peg test:  R=25 seconds  L=30 seconds     Activities of Daily Living:   Transfers/Mobility:    CGA with bed, chair, toilet and car.   Min A with shower chair     Toileting:      Toileting: maximum assist    Hygiene: unable    Clothing management: mod A     Bathing:     Bathing: Sup UB, min A washing hair and mod A LB    Bathing position: sitting    Bathing assistive device(s): shower chair     Dressing:     Dressing upper body: min A    Dressing lower body: max A    Socks: unable    Shoes: unable  Min A meal prep now while seated.        Therapeutic Exercises (CPT 96830):     1. Medium resistance theraputty    2. Fine motor coordination/manipulation    3. 9# digi-flex, 10 squeezes with 3-5 second hold for both hands    4. 2# and 4# graded clip for lateral and 3 point prehension , 5 reps for each prehension for each hand    5. Pre-writing exercises    6. Sit to stand from wheelchair and static standing, 5 x     Therapeutic Exercise Summary:  Upgraded theraputty to medium resistance for HEP       Time-based treatments/modalities:  Therapeutic exercise minutes (CPT 95915): 45 minutes        Pain rating before treatment: 0  Pain rating after treatment: 0    ASSESSMENT:   Response to treatment: Progressing well with improved ADLs, strength, endurance and balance.      PLAN/RECOMMENDATIONS:   Plan for treatment: therapy treatment to continue next visit.  Planned interventions for next visit: continue with current treatment and therapeutic exercise (CPT 21231)

## 2022-05-09 ENCOUNTER — OCCUPATIONAL THERAPY (OUTPATIENT)
Dept: OCCUPATIONAL THERAPY | Facility: REHABILITATION | Age: 52
End: 2022-05-09
Attending: NURSE PRACTITIONER
Payer: MEDICAID

## 2022-05-09 DIAGNOSIS — G61.81 CIDP (CHRONIC INFLAMMATORY DEMYELINATING POLYNEUROPATHY) (HCC): ICD-10-CM

## 2022-05-09 PROCEDURE — 97110 THERAPEUTIC EXERCISES: CPT

## 2022-05-09 NOTE — OP THERAPY DAILY TREATMENT
Outpatient Occupational Therapy  DAILY TREATMENT     [facility] Occupational [address]  [address]  [place] [address]  Phone:  [phone]  Fax:  [phone]    Date: 05/09/2022    Patient: Noelle Campoverde  YOB: 1970  MRN: [id number]     Time Calculation  Start time: 1100  Stop time: 1145 Time Calculation (min): 45 minutes         Chief Complaint: Extremity Weakness and Self Care Duties    Visit #: 3    SUBJECTIVE:  I pulled my own pants up in the bathroom and could clean myself better.      OBJECTIVE:  Current objective measures:   Neurological Testing      Sensation      Wrist/Hand   Left   Diminished:Protective sensation and hot/cold  Absent: light touch, pin prick, sharp/dull discrimination, static two point discrimination, dynamic two point discrimination a     Right   Diminished: protective sensation and hot/col  Absent: light touch, pin prick, sharp/dull discrimination, static two point discrimination, dynamic two point discrimination      Active Range of Motion      Left Wrist   Normal active range of motion     Right Wrist   Normal active range of motion     Left Thumb     Normal active range of motion     Right Thumb     Normal active range of motion     Left Digits    Normal active range of motion     Right Digits   Normal active range of motion     Strength:       Left Wrist/Hand    (2nd hand position)     Trial 1: 24     Thumb Strength  Key/Lateral Pinch     Trial 1: 1  Palmar/Three-Point Pinch     Trial 1: 1     Right Wrist/Hand    (2nd hand position)     Trial 1: 19     Thumb Strength   Key/Lateral Pinch     Trial 1: 1  Palmar/Three-Point Pinch     Trial 1: 1     General Comments      Wrist/Hand Comments  9 hole peg test:  R=22 seconds  L=22 seconds     Activities of Daily Living:   Transfers/Mobility:    CGA/SBA with bed, chair, toilet and car.   Min A with shower chair     Toileting:     Toileting: mod A    Hygiene: mod A     Clothing  management: min A     Bathing:     Bathing: Sup UB, min A washing hair and mod A LB    Bathing position: sitting    Bathing assistive device(s): shower chair     Dressing:     Dressing upper body: min A    Dressing lower body: max A    Socks: unable    Shoes: unable  Min A meal prep now while seated.        Therapeutic Exercises (CPT 96718):     1. Medium resistance theraputty    2. Fine motor coordination/manipulation, shuffling cards, pulling pennies from putty, stacking pennies, use of tweezers and screwdriver.      3. 9# digi-flex, 10 squeezes with 3-5 second hold for both hands    4. 6# and 11# graded clip for lateral and 3 point prehension , 5 reps for each prehension for each hand    5. Velcro roll with cylindrical grasp., 5 reps each hand    6. Sit to stand from wheelchair and static standing, SBA with verbal cues and for functional transfers    7. Nu-Step , resistance 2 for 10 minutes without a rest break    Therapeutic Exercise Summary:  Issued handout on energy conservation techniques to incorporate during ADLs to minimize fatigue and tremors.      Time-based treatments/modalities:  Therapeutic exercise minutes (CPT 49575): 45 minutes        Pain rating before treatment: 0  Pain rating after treatment: 0    ASSESSMENT:   Response to treatment: enhanced transfers and standing endurance, improved ADL status.  Strength remains status quo.  Patient planning to make homemade bread with daughter this week.      PLAN/RECOMMENDATIONS:   Plan for treatment: therapy treatment to continue next visit.  Planned interventions for next visit: continue with current treatment and therapeutic exercise (CPT 79018)

## 2022-05-12 ENCOUNTER — OCCUPATIONAL THERAPY (OUTPATIENT)
Dept: OCCUPATIONAL THERAPY | Facility: REHABILITATION | Age: 52
End: 2022-05-12
Attending: NURSE PRACTITIONER
Payer: MEDICAID

## 2022-05-12 DIAGNOSIS — G61.81 CIDP (CHRONIC INFLAMMATORY DEMYELINATING POLYNEUROPATHY) (HCC): ICD-10-CM

## 2022-05-12 PROCEDURE — 97110 THERAPEUTIC EXERCISES: CPT

## 2022-05-12 NOTE — OP THERAPY DAILY TREATMENT
Outpatient Occupational Therapy  DAILY TREATMENT     [facility] Occupational Therapy [address]  [address]  [place] [address]  Phone:  [phone]  Fax:  [phone]    Date: 05/12/2022    Patient: Noelle Campoverde  YOB: 1970  MRN: [id number]     Time Calculation  Start time: 1100  Stop time: 1145 Time Calculation (min): 45 minutes         Chief Complaint: Extremity Weakness and Self Care Duties    Visit #: 4    SUBJECTIVE:  I am still having some trouble with getting my socks on and off.  I have velcro on my shoes so that helps a lot    OBJECTIVE:  Current objective measures:   Neurological Testing      Sensation      Wrist/Hand   Left   Diminished:Protective sensation and hot/cold  Absent: light touch, pin prick, sharp/dull discrimination, static two point discrimination, dynamic two point discrimination a     Right   Diminished: protective sensation and hot/col  Absent: light touch, pin prick, sharp/dull discrimination, static two point discrimination, dynamic two point discrimination      Active Range of Motion      Left Wrist   Normal active range of motion     Right Wrist   Normal active range of motion     Left Thumb     Normal active range of motion     Right Thumb     Normal active range of motion     Left Digits    Normal active range of motion     Right Digits   Normal active range of motion     Strength:       Left Wrist/Hand    (2nd hand position)     Trial 1: 24     Thumb Strength  Key/Lateral Pinch     Trial 1: 1  Palmar/Three-Point Pinch     Trial 1: 1     Right Wrist/Hand    (2nd hand position)     Trial 1: 19     Thumb Strength   Key/Lateral Pinch     Trial 1: 1  Palmar/Three-Point Pinch     Trial 1: 1     General Comments      Wrist/Hand Comments  9 hole peg test:  R=22 seconds  L=22 seconds     Activities of Daily Living:   Transfers/Mobility:    CGA/SBA with bed, chair, toilet and car.   Min A with shower chair     Toileting:     Toileting: mod  A    Hygiene: mod A     Clothing management: min A     Bathing:     Bathing: Sup UB, SBA washing hair and mod A LB    Bathing position: sitting    Bathing assistive device(s): shower chair     Dressing:     Dressing upper body: min A    Dressing lower body: max A    Socks: unable    Shoes: unable  Min A meal prep now while seated.        Therapeutic Exercises (CPT 12560):     1. Medium resistance theraputty    2. Fine motor coordination/manipulation, shuffling cards, pulling pennies from putty, stacking pennies, use of tweezers and screwdriver.      3. 9# digi-flex, 10 squeezes with 3-5 second hold for both hands    4. 11# graded clip for lateral and 3 point prehension , 5 reps for each prehension for each hand    5. Velcro roll with cylindrical grasp., 5 reps each hand    6. Sit to stand from wheelchair and static standing, SBA with verbal cues and for functional transfers    7. Nu-Step , resistance 3 for 10 minutes without a rest break    8. Flex/ext exercise stick in horizontal and vertical positions, 10 reps in each position of medium resistance     Therapeutic Exercise Summary:  Introduced use of sock aide, long handle shoe horn and reacher for lower body dressing and use of curved long handled sponge for bathing to enhance independence      Time-based treatments/modalities:  Therapeutic exercise minutes (CPT 44190): 45 minutes        Pain rating before treatment: 0  Pain rating after treatment: 0    ASSESSMENT:   Response to treatment: Continues to progress well with treatment and trying to do a lot more for herself at home.  Incorporating the energy conservation techniques and open to purchasing recommended AE to aide in enhancing independence with personal ADLs.      PLAN/RECOMMENDATIONS:   Plan for treatment: therapy treatment to continue next visit.  Planned interventions for next visit: continue with current treatment and therapeutic exercise (CPT 03062)

## 2022-05-16 ENCOUNTER — OCCUPATIONAL THERAPY (OUTPATIENT)
Dept: OCCUPATIONAL THERAPY | Facility: REHABILITATION | Age: 52
End: 2022-05-16
Attending: NURSE PRACTITIONER
Payer: MEDICAID

## 2022-05-16 DIAGNOSIS — G61.81 CIDP (CHRONIC INFLAMMATORY DEMYELINATING POLYNEUROPATHY) (HCC): ICD-10-CM

## 2022-05-16 PROCEDURE — 97110 THERAPEUTIC EXERCISES: CPT

## 2022-05-16 NOTE — OP THERAPY DAILY TREATMENT
Outpatient Occupational Therapy  DAILY TREATMENT     [facility] Occupational Therapy [address]  [address]  [place] [address]  Phone:  [phone]  Fax:  [phone]    Date: 05/16/2022    Patient: Noelle Campoverde  YOB: 1970  MRN: [id number]     Time Calculation  Start time: 1055  Stop time: 1140 Time Calculation (min): 45 minutes         Chief Complaint: Hand Weakness and Self Care Duties    Visit #: 5    SUBJECTIVE:  I am going to start cooking tomorrow with help from my grand son.     OBJECTIVE:  Current objective measures:   Neurological Testing      Sensation      Wrist/Hand   Left   Diminished:Protective sensation and hot/cold  Absent: light touch, pin prick, sharp/dull discrimination, static two point discrimination, dynamic two point discrimination a     Right   Diminished: protective sensation and hot/col  Absent: light touch, pin prick, sharp/dull discrimination, static two point discrimination, dynamic two point discrimination      Active Range of Motion      Left Wrist   Normal active range of motion     Right Wrist   Normal active range of motion     Left Thumb     Normal active range of motion     Right Thumb     Normal active range of motion     Left Digits    Normal active range of motion     Right Digits   Normal active range of motion     Strength:       Left Wrist/Hand    (2nd hand position)     Trial 1: 24     Thumb Strength  Key/Lateral Pinch     Trial 1: 1  Palmar/Three-Point Pinch     Trial 1: 1     Right Wrist/Hand    (2nd hand position)     Trial 1: 19     Thumb Strength   Key/Lateral Pinch     Trial 1: 1  Palmar/Three-Point Pinch     Trial 1: 1     General Comments      Wrist/Hand Comments  9 hole peg test:  R=22 seconds  L=22 seconds     Activities of Daily Living:   Transfers/Mobility:    CGA/SBA with bed, chair, toilet and car.   Min A with shower chair     Toileting:     Toileting: mod A    Hygiene: mod A     Clothing  management: min A     Bathing:     Bathing: Sup UB, SBA washing hair and mod A LB    Bathing position: sitting    Bathing assistive device(s): shower chair     Dressing:     Dressing upper body: min A    Dressing lower body: max A    Socks: unable    Shoes: unable  Min A meal prep now while seated.        Therapeutic Exercises (CPT 80063):     1. Medium resistance theraputty    2. Fine motor coordination/manipulation, shuffling cards, pulling pennies from putty, stacking pennies, use of tweezers and screwdriver.      3. 9# digi-flex, 10 squeezes with 3-5 second hold for both hands    4. 11# graded clip for lateral and 3 point prehension , 5 reps for each prehension for each hand    5. Velcro roll with cylindrical grasp., 5 reps each hand    6. Sit to stand from wheelchair and static standing, SBA with verbal cues and for functional transfers    7. Nu-Step , resistance 3 for 15 minutes without a rest break    8. Flex/ext exercise stick in horizontal and vertical positions, 10 reps in each position of medium resistance       Time-based treatments/modalities:  Therapeutic exercise minutes (CPT 70930): 45 minutes        Pain rating before treatment: 0  Pain rating after treatment: 0    ASSESSMENT:   Response to treatment Patient continues to progress in OT and working towards meeting her long term goals.  Progress not to be completed today and will request more visits.     PLAN/RECOMMENDATIONS:   Plan for treatment: therapy treatment to continue next visit.  Planned interventions for next visit: continue with current treatment and therapeutic exercise (CPT 66856)

## 2022-05-16 NOTE — OP THERAPY PROGRESS SUMMARY
Outpatient Occupational Therapy  PROGRESS SUMMARY NOTE    [facility] Occupational Therapy [facility]  [address]  [address]  [facility] [address]  Phone:  [phone]  Fax:  [phone]    Date of Visit: 05/16/2022    Patient: Noelle Campoverde  YOB: 1970  MRN: [id number]     Referring Provider: RITCHIE Ragsdale  [address] [facility]  [address]   [address]   Referring Diagnosis CIDP (chronic inflammatory demyelinating polyneuropathy) ([facility]) [G61.81]     Visit Diagnoses     ICD-10-CM   1. CIDP (chronic inflammatory demyelinating polyneuropathy) ([facility])  G61.81       Rehab Potential: excellent    Progress Report Period:4/18/22-5/16/22    Functional Assessment Used:  UEFI = 40/80          Objective Findings and Assessment:   Patient progression towards goals: Patient has been attending OT 2 x a week and is progressing very well with therapy and has improved with her ADLs at home.  Patient now SBA/supervision with functional transfer and doing more for herself with daughter assisting as needed.  Adhering to use of energy conservation techniques during ADLs and open to purchasing AE and DME to enhance functional independence.  Patient would benefit with continued OT intervention to further enhance strength, coordination, dexterity, balance and endurance to maximize level of function, minimize risk of falls and minimize burden of care.      Objective findings and assessment details: Current objective measures:   Neurological Testing      Sensation      Wrist/Hand   Left   Diminished:Protective sensation and hot/cold  Absent: light touch, pin prick, sharp/dull discrimination, static two point discrimination, dynamic two point discrimination a     Right   Diminished: protective sensation and hot/col  Absent: light touch, pin prick, sharp/dull discrimination, static two point discrimination, dynamic two point discrimination      Active Range of Motion      Left Wrist   Normal active range of  motion     Right Wrist   Normal active range of motion     Left Thumb     Normal active range of motion     Right Thumb     Normal active range of motion     Left Digits    Normal active range of motion     Right Digits   Normal active range of motion     Strength:       Left Wrist/Hand    (2nd hand position)     Trial 1: 24     Thumb Strength  Key/Lateral Pinch     Trial 1: 1  Palmar/Three-Point Pinch     Trial 1: 1     Right Wrist/Hand    (2nd hand position)     Trial 1: 19     Thumb Strength   Key/Lateral Pinch     Trial 1: 1  Palmar/Three-Point Pinch     Trial 1: 1     General Comments      Wrist/Hand Comments  9 hole peg test:  R=22 seconds  L=22 seconds     Activities of Daily Living:   Transfers/Mobility:    CGA/SBA with bed, chair, toilet and car.   Min A with shower chair     Toileting:     Toileting: mod A    Hygiene: mod A     Clothing management: min A     Bathing:     Bathing: Sup UB, SBA washing hair and mod A LB    Bathing position: sitting    Bathing assistive device(s): shower chair     Dressing:     Dressing upper body: min A    Dressing lower body: max A    Socks: unable    Shoes: unable  Min A meal prep now while seated.    Goals:   Short Term Goals:     Patient will increase  strength to at least 25 pounds  pounds to enhance personal ADLs  Patient will be Mod I with toilet and shower chair transfers  Patient will be Min A LB dressing.  Patient will be Min A meal prep and light cooking tasks  Short term goal timespan:  2-4 weeks    Long Term Goals:   Patient will improve  strength to at least 30 pounds to enhance personal ADLs.  Patient will be min A shower tasks.   Patient will be Mod I with LB dressing using AE and energy conservation techniques  Patient will be set up/supervision with meal prep and light cooking tasks  Patient will score at least 50/80 on the UEFI  Long term goal timespan:  4-6 weeks    Plan:   Planned therapy interventions:  Therapeutic Exercise (CPT  85049)  Frequency:  2x week  Duration in weeks:  6  Duration in visits:  12      Referring provider co-signature:  I have reviewed this plan of care and my co-signature certifies the need for services.    Certification Period: 05/16/2022 to 06/27/22    Physician Signature: ________________________________ Date: ______________

## 2022-05-17 ENCOUNTER — OFFICE VISIT (OUTPATIENT)
Dept: MEDICAL GROUP | Facility: MEDICAL CENTER | Age: 52
End: 2022-05-17
Attending: NURSE PRACTITIONER
Payer: MEDICAID

## 2022-05-17 ENCOUNTER — PHYSICAL THERAPY (OUTPATIENT)
Dept: PHYSICAL THERAPY | Facility: REHABILITATION | Age: 52
End: 2022-05-17
Attending: NURSE PRACTITIONER
Payer: MEDICAID

## 2022-05-17 ENCOUNTER — PATIENT OUTREACH (OUTPATIENT)
Dept: HEALTH INFORMATION MANAGEMENT | Facility: OTHER | Age: 52
End: 2022-05-17
Payer: MEDICAID

## 2022-05-17 VITALS
WEIGHT: 188 LBS | OXYGEN SATURATION: 98 % | SYSTOLIC BLOOD PRESSURE: 132 MMHG | BODY MASS INDEX: 34.6 KG/M2 | HEIGHT: 62 IN | HEART RATE: 88 BPM | TEMPERATURE: 97.5 F | DIASTOLIC BLOOD PRESSURE: 84 MMHG | RESPIRATION RATE: 16 BRPM

## 2022-05-17 DIAGNOSIS — G82.20 PARAPLEGIA (HCC): ICD-10-CM

## 2022-05-17 DIAGNOSIS — K21.9 GASTROESOPHAGEAL REFLUX DISEASE, UNSPECIFIED WHETHER ESOPHAGITIS PRESENT: ICD-10-CM

## 2022-05-17 DIAGNOSIS — R53.1 WEAKNESS: ICD-10-CM

## 2022-05-17 DIAGNOSIS — G61.81 CIDP (CHRONIC INFLAMMATORY DEMYELINATING POLYNEUROPATHY) (HCC): ICD-10-CM

## 2022-05-17 DIAGNOSIS — E11.9 TYPE 2 DIABETES MELLITUS WITHOUT COMPLICATION, WITHOUT LONG-TERM CURRENT USE OF INSULIN (HCC): ICD-10-CM

## 2022-05-17 DIAGNOSIS — E78.5 HYPERLIPIDEMIA ASSOCIATED WITH TYPE 2 DIABETES MELLITUS (HCC): ICD-10-CM

## 2022-05-17 DIAGNOSIS — Z91.81 AT HIGH RISK FOR FALLS: ICD-10-CM

## 2022-05-17 DIAGNOSIS — E11.69 HYPERLIPIDEMIA ASSOCIATED WITH TYPE 2 DIABETES MELLITUS (HCC): ICD-10-CM

## 2022-05-17 PROBLEM — Z02.9 ADMINISTRATIVE ENCOUNTER: Status: RESOLVED | Noted: 2019-06-13 | Resolved: 2022-05-17

## 2022-05-17 PROBLEM — K56.7 ILEUS (HCC): Status: RESOLVED | Noted: 2021-10-23 | Resolved: 2022-05-17

## 2022-05-17 PROBLEM — R73.03 PREDIABETES: Status: RESOLVED | Noted: 2019-04-01 | Resolved: 2022-05-17

## 2022-05-17 PROBLEM — S82.63XA ANKLE FRACTURE, LATERAL MALLEOLUS, CLOSED: Status: RESOLVED | Noted: 2021-09-01 | Resolved: 2022-05-17

## 2022-05-17 PROBLEM — E87.6 HYPOKALEMIA: Status: RESOLVED | Noted: 2021-10-24 | Resolved: 2022-05-17

## 2022-05-17 PROBLEM — Z11.59 ENCOUNTER FOR HEPATITIS C SCREENING TEST FOR LOW RISK PATIENT: Status: RESOLVED | Noted: 2019-04-01 | Resolved: 2022-05-17

## 2022-05-17 PROCEDURE — 99214 OFFICE O/P EST MOD 30 MIN: CPT | Performed by: NURSE PRACTITIONER

## 2022-05-17 PROCEDURE — 99213 OFFICE O/P EST LOW 20 MIN: CPT | Performed by: NURSE PRACTITIONER

## 2022-05-17 PROCEDURE — 97112 NEUROMUSCULAR REEDUCATION: CPT

## 2022-05-17 RX ORDER — LISINOPRIL 5 MG/1
5 TABLET ORAL DAILY
Qty: 30 TABLET | Refills: 5 | Status: SHIPPED | OUTPATIENT
Start: 2022-05-17 | End: 2022-11-15

## 2022-05-17 RX ORDER — ATORVASTATIN CALCIUM 20 MG/1
20 TABLET, FILM COATED ORAL NIGHTLY
Qty: 30 TABLET | Refills: 5 | Status: SHIPPED | OUTPATIENT
Start: 2022-05-17 | End: 2022-11-15

## 2022-05-17 RX ORDER — OMEPRAZOLE 20 MG/1
20 TABLET, DELAYED RELEASE ORAL DAILY
Qty: 30 TABLET | Refills: 1 | Status: SHIPPED | OUTPATIENT
Start: 2022-05-17 | End: 2022-07-13 | Stop reason: SDUPTHER

## 2022-05-17 RX ORDER — ROPINIROLE 0.5 MG/1
TABLET, FILM COATED ORAL
COMMUNITY
Start: 2022-05-02 | End: 2022-09-14

## 2022-05-17 RX ORDER — ROPINIROLE 0.5 MG/1
0.5 TABLET, FILM COATED ORAL 2 TIMES DAILY
COMMUNITY
Start: 2022-05-02 | End: 2023-04-18

## 2022-05-17 RX ORDER — TRAZODONE HYDROCHLORIDE 50 MG/1
TABLET ORAL
COMMUNITY
Start: 2022-05-08 | End: 2022-09-14

## 2022-05-17 RX ORDER — METFORMIN HYDROCHLORIDE 500 MG/1
500 TABLET, EXTENDED RELEASE ORAL DAILY
Qty: 30 TABLET | Refills: 5 | Status: SHIPPED | OUTPATIENT
Start: 2022-05-17 | End: 2022-06-30

## 2022-05-17 ASSESSMENT — FIBROSIS 4 INDEX: FIB4 SCORE: 0.54

## 2022-05-17 ASSESSMENT — ENCOUNTER SYMPTOMS
CONSTIPATION: 0
WEIGHT LOSS: 0
SHORTNESS OF BREATH: 0
COUGH: 0
WHEEZING: 0
FEVER: 0
CHILLS: 0
ABDOMINAL PAIN: 0
BLOOD IN STOOL: 0
PALPITATIONS: 0
DIARRHEA: 0
HEARTBURN: 1

## 2022-05-17 NOTE — ASSESSMENT & PLAN NOTE
Patient continues to work with her therapies and has noticed improvement in her strength and stamina.  She is now able to take 2 steps without assistance.  She is interested in getting a walker with a seat.

## 2022-05-17 NOTE — PROGRESS NOTES
Call from pt and her daughter who is requesting assistance with completing [employer] Pre-certification request form for pt to apply for personal care services.  Program will allow daughter to be paid care provider for pt.  SW completed form and it was faxed to [phone]. Copy mailed to pt's address.

## 2022-05-17 NOTE — ASSESSMENT & PLAN NOTE
Started after eating a pork rind type chip approximately 2 weeks ago.  She is having daily symptoms.  She denies alleviating and aggravating factors.  She denies shortness of breath.  She has had heartburn in the past but it was not this long lasting

## 2022-05-17 NOTE — OP THERAPY DAILY TREATMENT
Outpatient Physical Therapy  DAILY TREATMENT     [facility] Physical [address].  [address]  [place] [address]  Phone:  [phone]  Fax:  [phone]    Date: 05/17/2022    Patient: Noelle Campoverde  YOB: 1970  MRN: [id number]     Time Calculation    Start time: 1115  Stop time: 1157 Time Calculation (min): 42 minutes         Chief Complaint: Weakness, Difficulty Walking, and Loss Of Balance    Visit #: 2    SUBJECTIVE:  Decline language line, daughter interpreted as needed.   Has been compliant with HEP. Interested in seated exercises to lose weight. Complained of mild Rt knee pain after walking.     OBJECTIVE:  Next visit CANTOR  5STS: 23.80 BUE CGA  Wc<>mat transfer SPV  6MWT:5:03 FWW  feet.          Therapeutic Exercises (CPT 68428):     20. POC 5/2-6/27      Therapeutic Exercise Summary: auth approved  Auth # [id number]  Dates: 5/3/2022-6/17/2022  75858 3 units, 69211 1 unit  48 units, 12 V    Therapeutic Exercise Summary: Access Code: [email or web address]  URL: [email or web address]  Date: 05/02/2022  Prepared by:     Exercises 5/17  Supine Bridge - 1 x daily - 7 x weekly - 3 sets - 10 reps - 10 hold  Clamshell - 1 x daily - 7 x weekly - 2 sets - 10 reps - 10 hold  Sit to Stand with Counter Support - 1 x daily - 7 x weekly - 3 sets - 10 reps  Access Code: [email or web address]  URL: [email or web address]  Date: 05/17/2022  Prepared by:    Exercises  Standing March with Counter Support - 3 x daily - 7 x weekly - 2 min hold  Seated March - 1 x daily - 7 x weekly - 3 sets - 10 reps - 10 hold  Seated Long Arc Quad - 1 x daily - 7 x weekly - 3 sets - 10 reps - 10 hold      Therapeutic Treatments and Modalities:     1. Neuromuscular Re-education (CPT 86867)    Therapeutic Treatment and Modalities Summary: 5 STS BUE support CGA    6MWT: FWW SBA pt unable to complete full assessment requiring seated rest at 5:03 and unable to recover to continue. Pt had to minor LOB where  she tripped over right foot able to self correct. Both events occurred while pt multitasking ie talking. Looking around etc.     To assess safe ambulation at home pt ambulated 200 feet SBA and completed STS to<>from toilet with verbal cues for UE positioning.     For improving dynamic balance and endurance pt completed circuit training including 2 min standing marching followed immediately by 3 min total of attempts at standing without UE support. Pt unable to stand more than 3-4 seconds without UE support and consistent posterior LOB. This may be related to distraction as well as she was talking with daughter etc. Completed x 3 reps    Time-based treatments/modalities:    Physical Therapy Timed Treatment Charges  Neuromusc re-ed, balance, coor, post minutes (CPT 74359): 42 minutes      ASSESSMENT:   Response to treatment: Encouraged pt to ambulate short periods at home with close supervision from family in the home including toileting. Informed pt to always use WC at night for toileting and navigating home due to increased fall risk, pt and daughter verbalized understanding. Encouraged brief walking 3-4 times a day at home including from bed room to living room, with FWW and SPV. Pt demod severe difficulty with standing balance without UE support with inability to maintain x 3-4 seconds without consistent posterior LOB. Progressed HEP for further LE strengthening.  Pt will continue to require skilled intervention to improve strength, endurance, and balance to dec caregiver burden and maximize function.     PLAN/RECOMMENDATIONS:   Plan for treatment: therapy treatment to continue next visit.  Planned interventions for next visit: continue with current treatment.

## 2022-05-17 NOTE — PATIENT INSTRUCTIONS
Metformin- helps with blood sugar- takes once daily before breakfast or dinner    Atorvastatin- cholesterol medication- takes once daily    Lisinopril- blood pressure medication that protects kidney- take once daily- stop if you develop dizziness    Omeprazole- heartburn- take 30 minutes before dinner

## 2022-05-17 NOTE — ASSESSMENT & PLAN NOTE
New diagnosis today with A1c of 6.8.  Patient reports she her father had diabetes.  She reports she eats a diet full of fruits and veggies.  She is increasing her activity as her ability to walk is improving.  She continues to work with PT and OT.

## 2022-05-19 ENCOUNTER — OCCUPATIONAL THERAPY (OUTPATIENT)
Dept: OCCUPATIONAL THERAPY | Facility: REHABILITATION | Age: 52
End: 2022-05-19
Attending: NURSE PRACTITIONER
Payer: MEDICAID

## 2022-05-19 DIAGNOSIS — G61.81 CIDP (CHRONIC INFLAMMATORY DEMYELINATING POLYNEUROPATHY) (HCC): ICD-10-CM

## 2022-05-19 PROCEDURE — 97110 THERAPEUTIC EXERCISES: CPT

## 2022-05-19 NOTE — OP THERAPY DAILY TREATMENT
Outpatient Occupational Therapy  DAILY TREATMENT     [facility] Occupational Therapy [address]  [address]  [place] [address]  Phone:  [phone]  Fax:  [phone]    Date: 05/19/2022    Patient: Noelle Campoverde  YOB: 1970  MRN: [id number]     Time Calculation  Start time: 1100  Stop time: 1145 Time Calculation (min): 45 minutes         Chief Complaint: Extremity Weakness and Self Care Duties    Visit #: 6    SUBJECTIVE:  We qualified for a new 4WW from [facility].  We are picking it up today.     OBJECTIVE:  Current objective measures:   Neurological Testing      Sensation      Wrist/Hand   Left   Diminished:Protective sensation and hot/cold  Absent: light touch, pin prick, sharp/dull discrimination, static two point discrimination, dynamic two point discrimination a     Right   Diminished: protective sensation and hot/col  Absent: light touch, pin prick, sharp/dull discrimination, static two point discrimination, dynamic two point discrimination      Active Range of Motion      Left Wrist   Normal active range of motion     Right Wrist   Normal active range of motion     Left Thumb     Normal active range of motion     Right Thumb     Normal active range of motion     Left Digits    Normal active range of motion     Right Digits   Normal active range of motion     Strength:       Left Wrist/Hand    (2nd hand position)     Trial 1: 24     Thumb Strength  Key/Lateral Pinch     Trial 1: 1  Palmar/Three-Point Pinch     Trial 1: 1     Right Wrist/Hand    (2nd hand position)     Trial 1: 19     Thumb Strength   Key/Lateral Pinch     Trial 1: 1  Palmar/Three-Point Pinch     Trial 1: 1     General Comments      Wrist/Hand Comments  9 hole peg test:  R=22 seconds  L=22 seconds     Activities of Daily Living:   Transfers/Mobility:    CGA/SBA with bed, chair, toilet and car.   Min A with shower chair     Toileting:     Toileting: mod A    Hygiene: mod A     Clothing  management: min A     Bathing:     Bathing: Sup UB, SBA washing hair and mod A LB    Bathing position: sitting    Bathing assistive device(s): shower chair     Dressing:     Dressing upper body: min A    Dressing lower body: max A    Socks: unable    Shoes: unable  Min A meal prep now while seated.*      Therapeutic Exercises (CPT 17788):     1. Medium resistance theraputty    2. Fine motor coordination/manipulation, shuffling cards, pulling pennies from putty, stacking pennies, use of tweezers and screwdriver.      3. 9# digi-flex, 10 squeezes with 3-5 second hold for both hands    4. 11# graded clip for lateral and 3 point prehension , 5 reps for each prehension for each hand    5. Velcro roll with cylindrical grasp., 5 reps each hand    6. Sit to stand from wheelchair and static standing, SBA with verbal cues and for functional transfers    7. Nu-Step , resistance 4 for 15 minutes without a rest break    8. Flex/ext exercise stick in horizontal and vertical positions, 10 reps in each position of medium resistance       Time-based treatments/modalities:  Therapeutic exercise minutes (CPT 82095): 45 minutes        Pain rating before treatment: 0  Pain rating after treatment: 0    ASSESSMENT:   Response to treatment: Patient continues to improve with therapy    PLAN/RECOMMENDATIONS:   Plan for treatment: therapy treatment to continue next visit.  Planned interventions for next visit: continue with current treatment and therapeutic exercise (CPT 52200)

## 2022-05-23 ENCOUNTER — APPOINTMENT (OUTPATIENT)
Dept: OCCUPATIONAL THERAPY | Facility: REHABILITATION | Age: 52
End: 2022-05-23
Attending: NURSE PRACTITIONER
Payer: MEDICAID

## 2022-05-23 ENCOUNTER — PHYSICAL THERAPY (OUTPATIENT)
Dept: PHYSICAL THERAPY | Facility: REHABILITATION | Age: 52
End: 2022-05-23
Attending: NURSE PRACTITIONER
Payer: MEDICAID

## 2022-05-23 DIAGNOSIS — G61.81 CIDP (CHRONIC INFLAMMATORY DEMYELINATING POLYNEUROPATHY) (HCC): ICD-10-CM

## 2022-05-23 DIAGNOSIS — R53.1 WEAKNESS: ICD-10-CM

## 2022-05-23 DIAGNOSIS — G82.20 PARAPLEGIA (HCC): ICD-10-CM

## 2022-05-23 PROCEDURE — 97112 NEUROMUSCULAR REEDUCATION: CPT

## 2022-05-23 PROCEDURE — 97110 THERAPEUTIC EXERCISES: CPT

## 2022-05-23 ASSESSMENT — BALANCE ASSESSMENTS
STANDING TO SITTING: 2
TURN 360 DEGREES: 0
SITTING TO STANDING: 3
REACHING FORWARD WITH OUTSTRETCHED ARM WHILE STANDING: 0
STANDING UNSUPPORTED ONE FOOT IN FRONT: 1
PLACE ALTERNATE FOOT ON STEP OR STOOL WHILE STANDING UNSUPPORTED: 0
STANDING ON ONE LEG: 0
LOOK OVER LEFT AND RIGHT SHOULDERS WHILE STANDING: 1
PICK UP OBJECT FROM THE FLOOR FROM A STANDING POSITION: 0
SITTING UNSUPPORTED: 4
STANDING UNSUPPORTED: 1
TRANSFERS: 3
LONG VERSION TOTAL SCORE (MAX 56): 15
LONG VERSION TOTAL SCORE (MAX 56): 15
STANDING UNSUPPORTED WITH FEET TOGETHER: 0
STANDING UNSUPPORTED WITH EYES CLOSED: 0

## 2022-05-23 NOTE — OP THERAPY DAILY TREATMENT
Outpatient Physical Therapy  DAILY TREATMENT     [address]  [address]  [place] [address]  Phone:  [phone]  Fax:  [phone]    Date: 05/23/2022    Patient: Noelle Campoverde  YOB: 1970  MRN: [id number]     Time Calculation    Start time: 1100  Stop time: 1145 Time Calculation (min): 45 minutes         Chief Complaint: Difficulty Walking    Visit #: 3    SUBJECTIVE:  Pt reports she felt ok after last PT visit.    OBJECTIVE:  Current objective measures: Zapata performed without use of AD.  Zapata Balance Total Score (0-56): 15       Therapeutic Exercises (CPT 51251):     1. Seated leg press into dynadisc, 5sec hold x10B    2. Seated LAQ over ball, x10B    3. Seated HS isometric ball press, x10B    4. Minisquats , x10 with BUE support, demo extensor thrust on standing from squat position    20. POC 5/2-6/27      Therapeutic Exercise Summary: auth approved  Auth # [id number]  Dates: 5/3/2022-6/17/2022  94563 3 units, 12838 1 unit  48 units, 12 V    Therapeutic Exercise Summary: Access Code: [email or web address]  URL: [email or web address]  Date: 05/02/2022  Prepared by:     Exercises 5/17  Supine Bridge - 1 x daily - 7 x weekly - 3 sets - 10 reps - 10 hold  Clamshell - 1 x daily - 7 x weekly - 2 sets - 10 reps - 10 hold  Sit to Stand with Counter Support - 1 x daily - 7 x weekly - 3 sets - 10 reps  Access Code: [email or web address]  URL: [email or web address]  Date: 05/17/2022  Prepared by:    Exercises  Standing March with Counter Support - 3 x daily - 7 x weekly - 2 min hold  Seated March - 1 x daily - 7 x weekly - 3 sets - 10 reps - 10 hold  Seated Long Arc Quad - 1 x daily - 7 x weekly - 3 sets - 10 reps - 10 hold      Therapeutic Treatments and Modalities:     1. Neuromuscular Re-education (CPT 14861)    Therapeutic Treatment and Modalities Summary: -STS w/c <-> handlebar rails with one hand on rail, one hand on armrest x10.  Cues to avoid knee  hyperextension and back of legs stabilizing against seat.  -AP weight shift in stride with BUE support x10B.  -standing hip tap x3 with BUE support x10B.  -standing at FWW with knees slightly flexed to avoid hyperextension 2x 30 sec.  -gait with FWW 120feet.  One LOB due to R knee giving out, required Kay, pt able to use UE support on walker for recovering balance.  Add to HEP- standing at FWW with staggered stance and with knees unlocked.    Time-based treatments/modalities:    Physical Therapy Timed Treatment Charges  Neuromusc re-ed, balance, coor, post minutes (CPT 00602): 30 minutes  Therapeutic exercise minutes (CPT 44720): 15 minutes        ASSESSMENT:   Response to treatment: Pt demo fair tolerance for today's visit, no increase pain or undue fatigue reported.  Pt limited by LE weakness causing patient to rely on external support or locking joints in closed pack position for stability in standing.    PLAN/RECOMMENDATIONS:   Plan for treatment: therapy treatment to continue next visit.  Planned interventions for next visit: continue with current treatment.

## 2022-05-23 NOTE — OP THERAPY DAILY TREATMENT
Outpatient Occupational Therapy  DAILY TREATMENT     [facility] Occupational Therapy [address].  [address]  [place] [address]  Phone:  [phone]  Fax:  [phone]    Date: 05/23/2022    Patient: Noelle Campoverde  YOB: 1970  MRN: [id number]     Time Calculation  Start time: 1015  Stop time: 1100 Time Calculation (min): 45 minutes         Chief Complaint: Extremity Weakness and Self Care Duties    Visit #: 7    SUBJECTIVE:  I have my new 4WW and it is much better than the other walker.      OBJECTIVE:  Current objective measures:   Neurological Testing      Sensation      Wrist/Hand   Left   Diminished:Protective sensation and hot/cold  Absent: light touch, pin prick, sharp/dull discrimination, static two point discrimination, dynamic two point discrimination a     Right   Diminished: protective sensation and hot/col  Absent: light touch, pin prick, sharp/dull discrimination, static two point discrimination, dynamic two point discrimination      Active Range of Motion      Left Wrist   Normal active range of motion     Right Wrist   Normal active range of motion     Left Thumb     Normal active range of motion     Right Thumb     Normal active range of motion     Left Digits    Normal active range of motion     Right Digits   Normal active range of motion     Strength:       Left Wrist/Hand    (2nd hand position)     Trial 1: 24     Thumb Strength  Key/Lateral Pinch     Trial 1: 3  Palmar/Three-Point Pinch     Trial 1: 4     Right Wrist/Hand    (2nd hand position)     Trial 1: 19     Thumb Strength   Key/Lateral Pinch     Trial 1: 3  Palmar/Three-Point Pinch     Trial 1: 4     General Comments      Wrist/Hand Comments  9 hole peg test:  R=22 seconds  L=22 seconds     Activities of Daily Living:   Transfers/Mobility:    CGA/SBA with bed, chair, toilet and car.   Min A with shower chair     Toileting:     Toileting: mod A    Hygiene: mod A     Clothing  management: min A     Bathing:     Bathing: Sup UB, SBA washing hair and mod A LB    Bathing position: sitting    Bathing assistive device(s): shower chair     Dressing:     Dressing upper body: min A    Dressing lower body: max A    Socks: unable    Shoes: unable  Min A meal prep now while seated        Therapeutic Exercises (CPT 18402):     1. Medium resistance theraputty    2. Fine motor coordination/manipulation, shuffling cards, pulling pennies from putty, stacking pennies, use of tweezers and screwdriver.      3. 9# digi-flex, 10 squeezes with 3-5 second hold for both hands    4. 11# graded clip for lateral and 3 point prehension , 5 reps for each prehension for each hand    5. Velcro roll with cylindrical grasp., 5 reps each hand    6. Sit to stand from wheelchair and static standing, SBA with verbal cues and for functional transfers    7. Nu-Step , resistance 4 for 10 minutes without a rest break    8. Flex/ext exercise stick in horizontal and vertical positions, 10 reps in each position of medium resistance     9. UBE while standing , on resistance level 2 for 3.5 minutes and one rest break and then for 2.5 minutes        Time-based treatments/modalities:  Therapeutic exercise minutes (CPT 06287): 45 minutes        Pain rating before treatment: 1  Pain rating after treatment: 2    ASSESSMENT:   Response to treatment: Continues to be motivated to participate in therapy. Improved pinch strength noted today. Family going to [place] for a week for family reunion. Progress note was completed last visit and requesting more visits; which is still pending    PLAN/RECOMMENDATIONS:   Plan for treatment: therapy treatment to continue next visit.  Planned interventions for next visit: continue with current treatment and therapeutic exercise (CPT 76221)

## 2022-05-26 ENCOUNTER — PATIENT OUTREACH (OUTPATIENT)
Dept: HEALTH INFORMATION MANAGEMENT | Facility: OTHER | Age: 52
End: 2022-05-26
Payer: MEDICAID

## 2022-05-26 NOTE — LETTER
May 26, 2022       Patient: Noelle Campoverde   YOB: 1970   Date of Visit: 5/26/2022         To Whom It May Concern:    In my medical opinion, I attest that Noelle Campoverde requires full time care form her daughter, Derik Toney.    If you have any questions or concerns, please don't hesitate to call [phone]          Sincerely,          ELIS Ragsdale.P.R.N.  Electronically Signed

## 2022-05-26 NOTE — PROGRESS NOTES
5/26/2022: SW received fax from [employer] regarding pre-certification request for personal care services.  They are requesting that pt's PCP complete signed doctor's order and have SW resubmit form with order. Form/ Physician order to be faxed to [employer] [phone].

## 2022-05-31 ENCOUNTER — PHYSICAL THERAPY (OUTPATIENT)
Dept: PHYSICAL THERAPY | Facility: REHABILITATION | Age: 52
End: 2022-05-31
Attending: NURSE PRACTITIONER
Payer: MEDICAID

## 2022-05-31 DIAGNOSIS — Z91.81 AT HIGH RISK FOR FALLS: ICD-10-CM

## 2022-05-31 DIAGNOSIS — R53.1 WEAKNESS: ICD-10-CM

## 2022-05-31 DIAGNOSIS — G61.81 CIDP (CHRONIC INFLAMMATORY DEMYELINATING POLYNEUROPATHY) (HCC): ICD-10-CM

## 2022-05-31 PROCEDURE — 97112 NEUROMUSCULAR REEDUCATION: CPT

## 2022-05-31 PROCEDURE — 97110 THERAPEUTIC EXERCISES: CPT

## 2022-05-31 NOTE — OP THERAPY DAILY TREATMENT
Outpatient Physical Therapy  DAILY TREATMENT     [facility] Physical [address].  [address]  [place] [address]  Phone:  [phone]  Fax:  [phone]    Date: 2022    Patient: Noelle Campoverde  YOB: 1970  MRN: [id number]     Time Calculation    Start time: 1200  Stop time: 1240 Time Calculation (min): 40 minutes         Chief Complaint: Difficulty Walking    Visit #: 4    SUBJECTIVE:  Decline use of interpretor; daughter present to assist with translating as needed.     Patient states that she was traveling for the past 5 days and did a great deal of walking; reporting an increasing amount bilateral knee and back pain. Denies falls. Starts IVIG on .     OBJECTIVE:  Current objective measures:   Static standin minute, 20 seconds without UE support and close SBA          Therapeutic Exercises (CPT 86305):     1. Review of current HEP , Patient reports daily performance but not 3x/day due to medical appointments for herself and family members.    2. Standing marching with UE support , x 10 B    3. Standing hip abduction, x 10 B     4. Attempted heel raises, x 1 B , Unable to perform without significant UE assist    20. POC -      Therapeutic Exercise Summary: auth approved  Auth # [id number]  Dates: 5/3/2022-2022  11638 3 units, 89445 1 unit  48 units, 12 V    Therapeutic Exercise Summary: Access Code: [email or web address]  URL: [email or web address]  Date: 2022  Prepared by:     Exercises   Supine Bridge - 1 x daily - 7 x weekly - 3 sets - 10 reps - 10 hold  Clamshell - 1 x daily - 7 x weekly - 2 sets - 10 reps - 10 hold  Sit to Stand with Counter Support - 1 x daily - 7 x weekly - 3 sets - 10 reps  Access Code: [email or web address]  URL: [email or web address]  Date: 2022  Prepared by:    Exercises  Standing March with Counter Support - 3 x daily - 7 x weekly - 2 min hold  Seated March - 1 x daily - 7 x weekly - 3 sets - 10 reps - 10  hold  Seated Long Arc Quad - 1 x daily - 7 x weekly - 3 sets - 10 reps - 10 hold      Therapeutic Treatments and Modalities:     1. Neuromuscular Re-education (CPT 36310)    Therapeutic Treatment and Modalities Summary: -Standing to FWW with CGA  -Ambulation with FWW 1 x 274 feet   -Static standing balance with mirror for visual feedback: static standing without UE support x 3 trials, standing lateral weight shifting with intermittent Theodore due to posterior LOB and cues to decrease range of excursion.     Time-based treatments/modalities:    Physical Therapy Timed Treatment Charges  Neuromusc re-ed, balance, coor, post minutes (CPT 71556): 30 minutes  Therapeutic exercise minutes (CPT 31253): 10 minutes    ASSESSMENT:   Response to treatment: Patient continues to demonstrate improvements in static standing balance and tolerance for gait. Will continue to monitor following IVIG treatment starting next week.     PLAN/RECOMMENDATIONS:   Plan for treatment: therapy treatment to continue next visit.  Planned interventions for next visit: continue with current treatment.

## 2022-06-01 ENCOUNTER — PATIENT OUTREACH (OUTPATIENT)
Dept: HEALTH INFORMATION MANAGEMENT | Facility: OTHER | Age: 52
End: 2022-06-01
Payer: MEDICAID

## 2022-06-01 NOTE — PROGRESS NOTES
6/01/2022: Call from [place] [employer], they are requesting most recent Office Visit notes for verification of medical conditions warranting need for personal care assistance.  TEETEE contacted pt and explain that insurance is requesting records.  Pt will come to PCP clinic office tomorrow to sign Authorization of release of records.      6/2/2022:  Authorization for Release of medical records signed by pt.  TEETEE faxed to [facility] Medical Records with instructions to send office visit notes/ verification of treatment/ diagnosis to [employer].

## 2022-06-03 ENCOUNTER — OCCUPATIONAL THERAPY (OUTPATIENT)
Dept: OCCUPATIONAL THERAPY | Facility: REHABILITATION | Age: 52
End: 2022-06-03
Attending: NURSE PRACTITIONER
Payer: MEDICAID

## 2022-06-03 ENCOUNTER — PHYSICAL THERAPY (OUTPATIENT)
Dept: PHYSICAL THERAPY | Facility: REHABILITATION | Age: 52
End: 2022-06-03
Attending: NURSE PRACTITIONER
Payer: MEDICAID

## 2022-06-03 DIAGNOSIS — Z91.81 AT HIGH RISK FOR FALLS: ICD-10-CM

## 2022-06-03 DIAGNOSIS — R53.1 WEAKNESS: ICD-10-CM

## 2022-06-03 DIAGNOSIS — G61.81 CIDP (CHRONIC INFLAMMATORY DEMYELINATING POLYNEUROPATHY) (HCC): ICD-10-CM

## 2022-06-03 DIAGNOSIS — G82.20 PARAPLEGIA (HCC): ICD-10-CM

## 2022-06-03 PROCEDURE — 97112 NEUROMUSCULAR REEDUCATION: CPT

## 2022-06-03 PROCEDURE — 97110 THERAPEUTIC EXERCISES: CPT

## 2022-06-03 NOTE — OP THERAPY DAILY TREATMENT
Outpatient Physical Therapy  DAILY TREATMENT     [facility] Physical [address].  [address]  [place] [address]  Phone:  [phone]  Fax:  [phone]    Date: 2022    Patient: Noelle Campoverde  YOB: 1970  MRN: [id number]     Time Calculation    Start time: 0945  Stop time: 1027 Time Calculation (min): 42 minutes         Chief Complaint: Weakness, Loss Of Balance, and Difficulty Walking    Visit #: 5    SUBJECTIVE:  Decline use of interpretor; daughter present to assist with translating as needed.   Was walking a lot while in [place] and complains of some back/hip pain. Starts IVg next week and will not be attending PT next week for infusions.       OBJECTIVE:  Current objective measures:   Static standin min without UE support and close SBA  STS no UE close SPV from WC         5STS: 23.80 BUE CGA  Wc<>mat transfer SPV  6MWT:5:03 FWW  feet.       Zapata performed without use of AD.  Zapata Balance Total Score (0-56): 15          Therapeutic Exercises (CPT 79753):     1. Bridge, 2 x 15, 5 sec holds, .    2. Clamshell holds, 3 x 1 min bilateral    20. POC -      Therapeutic Exercise Summary: auth approved  Auth # [id number]  Dates: 5/3/2022-2022  34646 3 units, 85487 1 unit  48 units, 12 V    Therapeutic Exercise Summary: Access Code: [email or web address]  URL: [email or web address]  Date: 2022  Prepared by:     Exercises   Supine Bridge - 1 x daily - 7 x weekly - 3 sets - 10 reps - 10 hold  Clamshell - 1 x daily - 7 x weekly - 2 sets - 10 reps - 10 hold  Sit to Stand with Counter Support - 1 x daily - 7 x weekly - 3 sets - 10 reps  Access Code: [email or web address]  URL: [email or web address]  Date: 2022  Prepared by:    Exercises  Standing March with Counter Support - 3 x daily - 7 x weekly - 2 min hold  Seated March - 1 x daily - 7 x weekly - 3 sets - 10 reps - 10 hold  Seated Long Arc Quad - 1 x daily - 7 x weekly - 3 sets - 10  reps - 10 hold      Therapeutic Treatments and Modalities:     1. Neuromuscular Re-education (CPT 96085)    Therapeutic Treatment and Modalities Summary: -Quadruped alternating hip ext for improving cores strength, stablity, and SL balance 2  15 B. Mod tactile mello to dec trunk rotation  STS x 8 BUE support  STS no UE x 10. 1 mod A to prevent posterior LOB, significant fatigue  Standing balance without UE support x 2 min  -horizontal head turns x 30 sec  Vertical head turns x 30 sec, occasional touch A for LOB    Time-based treatments/modalities:    Physical Therapy Timed Treatment Charges  Neuromusc re-ed, balance, coor, post minutes (CPT 84060): 30 minutes  Therapeutic exercise minutes (CPT 55037): 12 minutes    ASSESSMENT:   Response to treatment:Pt has completed 5 of 10 authorized visits. Progress note completed to ask for date extension as by 6/17 pt will only completed 6/10. At this time she demonstrates good progress towards goals with ability to ambulate with FWW further distances as well as improved strength with ability to perform STS without UE, although inconsistent. Pt standing balance has also improved to 2 min without LOB. Pt continues to have significant deficits secondary to CIDP including weakness, balance deficits, and gait impairments and will continue to benefit from skilled PT to maximize pt function.     Short Term Goals:   1. Pt will complete 5STS for functional LE strength assessment.MET  2. Pt will complete wc<>mat assment with LRAD. MET, SBA without AD  3. Pt will demonstrate ability to stand without UE support x 1 min CGA.MET  4. Pt will complete appropriate balance assessment. MET  5. Pt will ambulate with LRAD CGA x 100 feet. Will continue to progress to LRAD  Short term goal time span:  2-4 weeks      Long Term Goals:  Did not assess  1. Pt will demonstrate ability to perform transfers with LRAD mod I.  2. Pt will ambulate on sidewalk with LRAD x 100 feet CGA.  3. Pt will ambulate on  level surfaces with LRAD x 200 feet mod I to improve independent ambulation in the home.   4. Pt will demonstrate improved functional LE strength with 5STS score improved 10 seconds from assessment.   5. Pt will score low fall risk on appropriate outcome measure.    Long term goal time span:  6-8 weeks      PLAN/RECOMMENDATIONS:   Plan for treatment: therapy treatment to continue next visit.  Planned interventions for next visit: continue with current treatment.

## 2022-06-03 NOTE — OP THERAPY PROGRESS SUMMARY
Outpatient Physical Therapy  PROGRESS SUMMARY NOTE      [facility]  [address]  [address]  [place] [address]  Phone:  [phone]  Fax:  [phone]    Date of Visit: 06/03/2022    Patient: Noelle Campoverde  YOB: 1970  MRN: [id number]     Referring Provider: RITCHIE Ragsdale  [address]  [place] [address]   Referring Diagnosis Chronic inflammatory demyelinating polyneuritis [G61.81]     Visit Diagnoses     ICD-10-CM   1. CIDP (chronic inflammatory demyelinating polyneuropathy) ([facility])  G61.81   2. Weakness  R53.1   3. At high risk for falls  Z91.81   4. Paraplegia ([facility])  G82.20       Rehab Potential: good    Progress Report Period: 5/2/22-6/3/22    Functional Assessment Used          Objective Findings and Assessment:   Patient progression towards goals: Response to treatment:Pt has completed 5 of 10 authorized visits. Progress note completed to ask for date extension as by 6/17 pt will only completed 6/10. At this time she demonstrates good progress towards goals with ability to ambulate with FWW further distances as well as improved strength with ability to perform STS without UE, although inconsistent. Pt standing balance has also improved to 2 min without LOB. Pt continues to have significant deficits secondary to CIDP including weakness, balance deficits, and gait impairments and will continue to benefit from skilled PT to maximize pt function.     Short Term Goals:   1. Pt will complete 5STS for functional LE strength assessment.MET  2. Pt will complete wc<>mat assment with LRAD. MET, SBA without AD  3. Pt will demonstrate ability to stand without UE support x 1 min CGA.MET  4. Pt will complete appropriate balance assessment. MET  5. Pt will ambulate with LRAD CGA x 100 feet. Will continue to progress to LRAD  Short term goal time span:  2-4 weeks      Long Term Goals:  Did not assess  1. Pt will demonstrate ability to perform  transfers with LRAD mod I.  2. Pt will ambulate on sidewalk with LRAD x 100 feet CGA.  3. Pt will ambulate on level surfaces with LRAD x 200 feet mod I to improve independent ambulation in the home.   4. Pt will demonstrate improved functional LE strength with 5STS score improved 10 seconds from assessment.   5. Pt will score low fall risk on appropriate outcome measure.    Long term goal time span:  6-8 weeks      Goals:   Short Term Goals:   1. Pt will ambulate with LRAD CGA x 100 feet.  2. Pt will be compliant with HEP 3-5x per week for improving LE strength and stabilty.   Short term goal time span:  1-2 weeks      Long Term Goals:    1. Pt will demonstrate ability to perform transfers with LRAD mod I.  2. Pt will ambulate on sidewalk with LRAD x 100 feet CGA.  3. Pt will ambulate on level surfaces with LRAD x 200 feet mod I to improve independent ambulation in the home.   4. Pt will demonstrate improved functional LE strength with 5STS score improved 10 seconds from assessment.   5. Pt will score low fall risk on appropriate outcome measure.      Long term goal time span:  4-6 weeks    Plan:   Planned therapy interventions:  Neuromuscular Re-education (CPT 04435) and Therapeutic Exercise (CPT 21145)  Frequency:  2x week  Duration in weeks:  6  Plan details:  97112 x 3 units, 97110 x 1 unit      Referring provider co-signature:  I have reviewed this plan of care and my co-signature certifies the need for services.     Certification Period: 06/03/2022 to 07/15/22    Physician Signature: ________________________________ Date: ______________

## 2022-06-03 NOTE — OP THERAPY DAILY TREATMENT
Outpatient Occupational Therapy  DAILY TREATMENT     [facility] Occupational Therapy [address]  [address]  [place] [address]  Phone:  [phone]  Fax:  [phone]    Date: 06/03/2022    Patient: Noelle Campoverde  YOB: 1970  MRN: [id number]     Time Calculation  Start time: 1100  Stop time: 1145 Time Calculation (min): 45 minutes         Chief Complaint: Extremity Weakness and Self Care Duties    Visit #: 8    SUBJECTIVE:  We just got back from [place] and I think I walked way too much.      OBJECTIVE:  Current objective measures:   Neurological Testing      Sensation      Wrist/Hand   Left   Diminished:Protective sensation and hot/cold  Absent: light touch, pin prick, sharp/dull discrimination, static two point discrimination, dynamic two point discrimination a     Right   Diminished: protective sensation and hot/col  Absent: light touch, pin prick, sharp/dull discrimination, static two point discrimination, dynamic two point discrimination      Active Range of Motion      Left Wrist   Normal active range of motion     Right Wrist   Normal active range of motion     Left Thumb     Normal active range of motion     Right Thumb     Normal active range of motion     Left Digits    Normal active range of motion     Right Digits   Normal active range of motion     Strength:       Left Wrist/Hand    (2nd hand position)     Trial 1: 24     Thumb Strength  Key/Lateral Pinch     Trial 1: 3  Palmar/Three-Point Pinch     Trial 1: 4     Right Wrist/Hand    (2nd hand position)     Trial 1: 19     Thumb Strength   Key/Lateral Pinch     Trial 1: 3  Palmar/Three-Point Pinch     Trial 1: 4     General Comments      Wrist/Hand Comments  9 hole peg test:  R=22 seconds  L=22 seconds     Activities of Daily Living:   Transfers/Mobility:    CGA/SBA with bed, chair, toilet and car.   Min A with shower chair     Toileting:     Toileting: mod A    Hygiene: mod A     Clothing  management: min A     Bathing:     Bathing: Sup UB, SBA washing hair and mod A LB    Bathing position: sitting    Bathing assistive device(s): shower chair     Dressing:     Dressing upper body: min A    Dressing lower body: max A    Socks: unable    Shoes: unable  Min A meal prep now while seated        Therapeutic Exercises (CPT 17567):     1. Medium resistance theraputty    2. Fine motor coordination/manipulation, shuffling cards, pulling pennies from putty, stacking pennies, use of tweezers and screwdriver.      3. 9# digi-flex, 10 squeezes with 3-5 second hold for both hands    4. 11# graded clip for lateral and 3 point prehension , 5 reps for each prehension for each hand    5. Velcro roll with cylindrical grasp., 5 reps each hand    6. Sit to stand from wheelchair and static standing, SBA with verbal cues and for functional transfers    8. Flex/ext exercise stick in horizontal and vertical positions, 10 reps in each position of medium resistance     9. UBE while standing , level 2 for 4 minutes and then 3 min 17 seconds after a rest break.        Time-based treatments/modalities:  Therapeutic exercise minutes (CPT 24212): 45 minutes        Pain rating before treatment: 0  Pain rating after treatment: 0    ASSESSMENT:   Response to treatment: Enhanced standing tolerance noted today with good static balance    PLAN/RECOMMENDATIONS:   Plan for treatment: therapy treatment to continue next visit.  Planned interventions for next visit: therapeutic exercise (CPT 60072)

## 2022-06-13 ENCOUNTER — PHYSICAL THERAPY (OUTPATIENT)
Dept: PHYSICAL THERAPY | Facility: REHABILITATION | Age: 52
End: 2022-06-13
Attending: NURSE PRACTITIONER
Payer: MEDICAID

## 2022-06-13 DIAGNOSIS — G82.20 PARAPLEGIA (HCC): ICD-10-CM

## 2022-06-13 DIAGNOSIS — G61.81 CIDP (CHRONIC INFLAMMATORY DEMYELINATING POLYNEUROPATHY) (HCC): ICD-10-CM

## 2022-06-13 DIAGNOSIS — R53.1 WEAKNESS: ICD-10-CM

## 2022-06-13 DIAGNOSIS — Z91.81 AT HIGH RISK FOR FALLS: ICD-10-CM

## 2022-06-13 PROCEDURE — 97112 NEUROMUSCULAR REEDUCATION: CPT

## 2022-06-13 NOTE — OP THERAPY DAILY TREATMENT
Outpatient Physical Therapy  DAILY TREATMENT     [facility] Physical [address]  [address]  [place] [address]  Phone:  [phone]  Fax:  [phone]    Date: 2022    Patient: Noelle Campoverde  YOB: 1970  MRN: [id number]     Time Calculation    Start time: 0937  Stop time: 1020 Time Calculation (min): 43 minutes         Chief Complaint: Weakness, Difficulty Walking, and Loss Of Balance    Visit #: 6    SUBJECTIVE:  Pt had IVIG last week and overall busy, states did not do HEP or as much walking.     OBJECTIVE:  Current objective measures:   Static standin min without UE support and close SBA  STS no UE close SPV from WC         5STS: 23.80 BUE CGA  Wc<>mat transfer SPV  6MWT:5:03 FWW  feet.       Zapata performed without use of AD.  Zapata Balance Total Score (0-56): 15          Therapeutic Exercises (CPT 78657): , .    20. POC -      Therapeutic Exercise Summary: auth approved  Auth # [id number]  Dates: 5/3/2022-2022  42483 3 units, 98476 1 unit  48 units, 12 V    Auth approved  [id number]  97110x1, 97112 x3  2022-2022  12 V, 48 Units    Therapeutic Exercise Summary: Access Code: [email or web address]  URL: [email or web address]  Date: 2022  Prepared by:     Exercises   Supine Bridge - 1 x daily - 7 x weekly - 3 sets - 10 reps - 10 hold  Clamshell - 1 x daily - 7 x weekly - 2 sets - 10 reps - 10 hold  Sit to Stand with Counter Support - 1 x daily - 7 x weekly - 3 sets - 10 reps  Access Code: [email or web address]  URL: [email or web address]  Date: 2022  Prepared by:    Exercises  Standing March with Counter Support - 3 x daily - 7 x weekly - 2 min hold  Seated March - 1 x daily - 7 x weekly - 3 sets - 10 reps - 10 hold  Seated Long Arc Quad - 1 x daily - 7 x weekly - 3 sets - 10 reps - 10 hold    Access Code: [email or web address]  URL: [email or web address]  Date: 2022  Prepared by:    Exercises  Quadruped Alternating  Leg Extensions - 1 x daily - 7 x weekly - 2 sets - 15 reps  Quadruped Hip Abduction and External Rotation - 1 x daily - 7 x weekly - 2 sets - 15 reps  Access Code: [email or web address]  URL: [email or web address]  Date: 06/13/2022  Prepared by:    Exercises  Supine Bridge - 1 x daily - 7 x weekly - 3 reps - 1 min hold        Therapeutic Treatments and Modalities:     1. Neuromuscular Re-education (CPT 89332)    Therapeutic Treatment and Modalities Summary: Quadruped hip ext (straight leg) 2 x 15 B  Quadruped hip abduction 2 x 15 B  bridge holds on BOSU ball side down. 3x 1 min, mod fatigue  up on 2, down SL, 2 x 10 B  STS no UE 3 x 6 mod cues for eccentric lowering and to dec use of LE on mat table to control descent. 2 instance mod A for posterior LOB.    WC<>mat SBA    Time-based treatments/modalities:    Physical Therapy Timed Treatment Charges  Neuromusc re-ed, balance, coor, post minutes (CPT 59743): 43 minutes    ASSESSMENT:   Pt continues to demonstrate SL limb weakness and core instability impacting gait, balance and function. Attempted SL bridge however pt unable to perform. Progressed HEP to include quadruped strengthening and bridges, instructed to perform on bed even if soft and exercises more difficult as pt unable to get on/off floor safely. Pt verbalized understanding.       PLAN/RECOMMENDATIONS:   Plan for treatment: therapy treatment to continue next visit.  Planned interventions for next visit: continue with current treatment.

## 2022-06-14 NOTE — OP THERAPY DAILY TREATMENT
"  Outpatient Physical Therapy  DAILY TREATMENT     [facility] Physical [address].  [address]  [place] [address]  Phone:  [phone]  Fax:  [phone]    Date: 06/15/2022    Patient: Noelle Campoverde  YOB: 1970  MRN: [id number]     Time Calculation    Start time: 1015  Stop time: 1057 Time Calculation (min): 42 minutes         Chief Complaint: Difficulty Walking, Weakness, and Loss Of Balance    Visit #: 7    SUBJECTIVE:   Pt reports doing HEP \"sometimes.\"  Complains of Rt hip/back pain.  OBJECTIVE:  Current objective measures:   Static standin min without UE support and close SBA  STS no UE close SPV from WC         5STS: 23.80 BUE CGA  Wc<>mat transfer SPV  6MWT:5:03 FWW  feet.       Zapata performed without use of AD.  Zapata Balance Total Score (0-56): 15          Therapeutic Exercises (CPT 98015): , .    20. POC -      Therapeutic Exercise Summary: auth approved  Auth # [id number]  Dates: 5/3/2022-2022  02541 3 units, 36557 1 unit  48 units, 12 V    Auth approved  [id number]  97110x1, 97112 x3  2022-2022  12 V, 48 Units    Therapeutic Exercise Summary: Access Code: [email or web address]  URL: [email or web address]  Date: 2022  Prepared by:     Exercises   Supine Bridge - 1 x daily - 7 x weekly - 3 sets - 10 reps - 10 hold  Clamshell - 1 x daily - 7 x weekly - 2 sets - 10 reps - 10 hold  Sit to Stand with Counter Support - 1 x daily - 7 x weekly - 3 sets - 10 reps  Access Code: [email or web address]  URL: [email or web address]  Date: 2022  Prepared by:    Exercises  Standing March with Counter Support - 3 x daily - 7 x weekly - 2 min hold  Seated March - 1 x daily - 7 x weekly - 3 sets - 10 reps - 10 hold  Seated Long Arc Quad - 1 x daily - 7 x weekly - 3 sets - 10 reps - 10 hold    Access Code: [email or web address]  URL: [email or web address]  Date: 2022  Prepared by:    Exercises  Quadruped Alternating Leg Extensions - 1 x " daily - 7 x weekly - 2 sets - 15 reps  Quadruped Hip Abduction and External Rotation - 1 x daily - 7 x weekly - 2 sets - 15 reps  Access Code: [email or web address]  URL: [email or web address]  Date: 06/13/2022  Prepared by:    Exercises  Supine Bridge - 1 x daily - 7 x weekly - 3 reps - 1 min hold    Access Code: [email or web address]  URL: [email or web address]  Date: 06/15/2022  Prepared by:    Exercises  Supine Piriformis Stretch with Foot on Ground - 1 x daily - 7 x weekly - 3-5 reps - 1 min hold        Therapeutic Treatments and Modalities:     1. Neuromuscular Re-education (CPT 15410)    Therapeutic Treatment and Modalities Summary: Quadruped hip ext (straight leg) 3 x 15 B    Bridge up on 2, down SL, 2 x 10    Gait with quad cane Rt UE.  75 feet CGA, 130 feet CGA, 200 x 2 SBA. Seated rests required between reps due to right hip pain. Mod cues to increase left step length, inc arm swing. Increased med/lat sway noted marylou with increased right hip pain.     Time-based treatments/modalities:    Physical Therapy Timed Treatment Charges  Neuromusc re-ed, balance, coor, post minutes (CPT 71851): 42 minutes    ASSESSMENT:   Progressed gait training in clinic to include quad cane with good stabilty noted. Was mostly limited by hip pain. Continued to educate on HEP compliance to dec pain and improve pain. Encouraged frequent ambulation with FWW only in the ome. Educated to use WC when tired or traveling long distances for safety. Pt and daughter verbalized agreement and understanding of recommendation.       PLAN/RECOMMENDATIONS:   Plan for treatment: therapy treatment to continue next visit.  Planned interventions for next visit: continue with current treatment.

## 2022-06-15 ENCOUNTER — PATIENT OUTREACH (OUTPATIENT)
Dept: HEALTH INFORMATION MANAGEMENT | Facility: OTHER | Age: 52
End: 2022-06-15

## 2022-06-15 ENCOUNTER — OFFICE VISIT (OUTPATIENT)
Dept: MEDICAL GROUP | Facility: MEDICAL CENTER | Age: 52
End: 2022-06-15
Attending: PHYSICIAN ASSISTANT
Payer: MEDICAID

## 2022-06-15 ENCOUNTER — PHYSICAL THERAPY (OUTPATIENT)
Dept: PHYSICAL THERAPY | Facility: REHABILITATION | Age: 52
End: 2022-06-15
Attending: NURSE PRACTITIONER
Payer: MEDICAID

## 2022-06-15 VITALS
RESPIRATION RATE: 18 BRPM | TEMPERATURE: 97.2 F | OXYGEN SATURATION: 97 % | BODY MASS INDEX: 34.6 KG/M2 | WEIGHT: 188 LBS | SYSTOLIC BLOOD PRESSURE: 112 MMHG | DIASTOLIC BLOOD PRESSURE: 80 MMHG | HEART RATE: 112 BPM | HEIGHT: 62 IN

## 2022-06-15 DIAGNOSIS — Z91.81 AT HIGH RISK FOR FALLS: ICD-10-CM

## 2022-06-15 DIAGNOSIS — G82.20 PARAPLEGIA (HCC): ICD-10-CM

## 2022-06-15 DIAGNOSIS — G61.81 CIDP (CHRONIC INFLAMMATORY DEMYELINATING POLYNEUROPATHY) (HCC): ICD-10-CM

## 2022-06-15 DIAGNOSIS — L08.9 TOE INFECTION: ICD-10-CM

## 2022-06-15 DIAGNOSIS — R53.1 WEAKNESS: ICD-10-CM

## 2022-06-15 PROCEDURE — 99213 OFFICE O/P EST LOW 20 MIN: CPT | Performed by: PHYSICIAN ASSISTANT

## 2022-06-15 PROCEDURE — 99212 OFFICE O/P EST SF 10 MIN: CPT | Performed by: PHYSICIAN ASSISTANT

## 2022-06-15 PROCEDURE — 97112 NEUROMUSCULAR REEDUCATION: CPT

## 2022-06-15 RX ORDER — SULFAMETHOXAZOLE AND TRIMETHOPRIM 800; 160 MG/1; MG/1
1 TABLET ORAL 2 TIMES DAILY
Qty: 14 TABLET | Refills: 0 | Status: SHIPPED | OUTPATIENT
Start: 2022-06-15 | End: 2022-06-22

## 2022-06-15 ASSESSMENT — FIBROSIS 4 INDEX: FIB4 SCORE: 0.54

## 2022-06-15 NOTE — PROGRESS NOTES
Chief Complaint   Patient presents with   • Toe Pain     Infection      Subjective:     HPI:   Noelle Campoverde is a 52 y.o. female here to discuss the evaluation and management of:    Toe infection  Onset/EDENILSON: 2 weeks ago.   Location: Left great toe.   Duration: Constant.   Character: Sharp pain. No fevers or chills.   Alleviating factors: Staying off her feet, not wearing shoes.   Aggravating factors: walking, pressure, wearing shoes.  Radiation: None.   Treatments tried: OTC antibiotic ointment and keeping the area clean.   Severity:  8-10/10  No red flag signs.     ROS  See HPI.    Allergies   Allergen Reactions   • Latex Rash     hives     Current medicines (including changes today)  Current Outpatient Medications   Medication Sig Dispense Refill   • sulfamethoxazole-trimethoprim (BACTRIM DS) 800-160 MG tablet Take 1 Tablet by mouth 2 times a day for 7 days. 14 Tablet 0   • ROPINIRole (REQUIP) 0.5 MG Tab      • ROPINIRole (REQUIP) 0.5 MG Tab Take 0.5 mg by mouth 2 times a day.     • traZODone (DESYREL) 50 MG Tab TAKE 1 TABLET BY MOUTH EVERY DAY AT BEDTIME AS NEEDED FOR INSOMNIA     • metFORMIN ER (GLUCOPHAGE XR) 500 MG TABLET SR 24 HR Take 1 Tablet by mouth every day. 30 Tablet 5   • omeprazole (PRILOSEC OTC) 20 MG tablet Take 1 Tablet by mouth every day. 30 Tablet 1   • atorvastatin (LIPITOR) 20 MG Tab Take 1 Tablet by mouth every evening. 30 Tablet 5   • lisinopril (PRINIVIL) 5 MG Tab Take 1 Tablet by mouth every day. 30 Tablet 5   • Misc. Devices Misc Please provide the patient with a walker with a seat. G61.81 1 Each 0   • gabapentin (NEURONTIN) 100 MG Cap Take 200 mg by mouth 3 times a day.     • predniSONE (DELTASONE) 50 MG Tab Take 50 mg by mouth every day.       No current facility-administered medications for this visit.     Social History     Tobacco Use   • Smoking status: Current Some Day Smoker     Types: Cigarettes   • Smokeless tobacco: Never Used   • Tobacco comment: 3 cigarettes/day  "  Vaping Use   • Vaping Use: Never used   Substance Use Topics   • Alcohol use: No   • Drug use: No     Patient Active Problem List    Diagnosis Date Noted   • Toe infection 06/15/2022   • Type 2 diabetes mellitus without complication, without long-term current use of insulin ([facility]) 05/17/2022   • Gastroesophageal reflux disease 05/17/2022   • Hyperlipidemia associated with type 2 diabetes mellitus ([facility]) 05/17/2022   • Encounter to establish care 03/24/2022   • CIDP (chronic inflammatory demyelinating polyneuropathy) ([facility]) 03/24/2022   • Polyneuropathy due to medical condition ([facility]) 10/24/2021   • Clear cell renal cell carcinoma, right ([facility]) 10/24/2021   • Neuropathy 10/24/2021   • H/O right radical nephrectomy 10/23/2021   • LFT elevation 10/23/2021   • Mixed stress and urge urinary incontinence 09/14/2021   • Renal mass 09/14/2021   • Vitamin D deficiency 04/01/2019   • Anemia 04/01/2019   • Vitamin B12 deficiency 04/01/2019   • Smoker 04/01/2019   • Neck pain 02/12/2019   • BMI 28.0-28.9,adult 02/12/2019     Family History   Problem Relation Age of Onset   • Osteoporosis Mother    • Alzheimer's Disease Mother    • Arthritis Mother    • Diabetes Father    • Heart Disease Father    • Stroke Father         TIA   • Cancer Other         one nephew leukemia, the other liver ca      Objective:     /80 (BP Location: Left arm, Patient Position: Sitting, BP Cuff Size: Adult)   Pulse (!) 112   Temp 36.2 °C (97.2 °F) (Skin)   Resp 18   Ht 1.575 m (5' 2\")   Wt 85.3 kg (188 lb)   SpO2 97%  Body mass index is 34.39 kg/m².    Physical Exam:  Physical Exam  Vitals reviewed.   Constitutional:       Appearance: Normal appearance.   Cardiovascular:      Rate and Rhythm: Normal rate and regular rhythm.      Heart sounds: Normal heart sounds.   Pulmonary:      Effort: Pulmonary effort is normal.      Breath sounds: Normal breath sounds.   Feet:      Left foot:      Toenail Condition: Left toenails are ingrown.      " Comments: Left great toe infection with erythema, warmth, swelling and purulent drainage.  Neurological:      General: No focal deficit present.      Mental Status: She is alert and oriented to person, place, and time.       Assessment and Plan:     The following treatment plan was discussed:    1. Toe infection  - This is an acute condition.  - Plan: Start on antibiotic therapy. Patient has been educated on the use and potential side effect profile of this medication. Discussed that she may need to consider toenail removal in the future due to ingrown toenail being the cause of the infection.   - sulfamethoxazole-trimethoprim (BACTRIM DS) 800-160 MG tablet; Take 1 Tablet by mouth 2 times a day for 7 days.  Dispense: 14 Tablet; Refill: 0    Any change or worsening of signs or symptoms, patient encouraged to follow-up or report to emergency room for further evaluation. Patient verbalizes understanding and agrees.    Follow-Up: Return if symptoms worsen or fail to improve.    PLEASE NOTE: This dictation was created using voice recognition software. I have made every reasonable attempt to correct obvious errors, but I expect that there are errors of grammar and possibly content that I did not discover before finalizing the note.

## 2022-06-15 NOTE — ASSESSMENT & PLAN NOTE
Onset/EDENILSON: 2 weeks ago.   Location: Left great toe.   Duration: Constant.   Character: Sharp pain. No fevers or chills.   Alleviating factors: Staying off her feet, not wearing shoes.   Aggravating factors: walking, pressure, wearing shoes.  Radiation: None.   Treatments tried: OTC antibiotic ointment and keeping the area clean.   Severity:  8-10/10  No red flag signs.

## 2022-06-15 NOTE — PROGRESS NOTES
SW received call from [employer] representative.  They have approved pt's personal care services with [employer], daughter can now become paid caregiver with agency.  Family was notified of the approved services.  Daughter has appointment this Friday to complete application with [employer].

## 2022-06-16 ENCOUNTER — OCCUPATIONAL THERAPY (OUTPATIENT)
Dept: OCCUPATIONAL THERAPY | Facility: REHABILITATION | Age: 52
End: 2022-06-16
Attending: NURSE PRACTITIONER
Payer: MEDICAID

## 2022-06-16 DIAGNOSIS — G61.81 CIDP (CHRONIC INFLAMMATORY DEMYELINATING POLYNEUROPATHY) (HCC): ICD-10-CM

## 2022-06-16 PROCEDURE — 97110 THERAPEUTIC EXERCISES: CPT

## 2022-06-16 NOTE — OP THERAPY DAILY TREATMENT
Outpatient Occupational Therapy  DAILY TREATMENT     [facility] Occupational Therapy [address]  [address]  [place] [address]  Phone:  [phone]  Fax:  [phone]    Date: 06/16/2022    Patient: Noelle Campoverde  YOB: 1970  MRN: [id number]     Time Calculation  Start time: 0845  Stop time: 0930 Time Calculation (min): 45 minutes         Chief Complaint: Extremity Weakness and Self Care Duties    Visit #: 9    SUBJECTIVE:  Our kitchen is almost finished and I can't wait to start cooking again.      OBJECTIVE:  Current objective measures:   Neurological Testing      Sensation      Wrist/Hand   Left   Diminished:Protective sensation and hot/cold  Absent: light touch, pin prick, sharp/dull discrimination, static two point discrimination, dynamic two point discrimination a     Right   Diminished: protective sensation and hot/col  Absent: light touch, pin prick, sharp/dull discrimination, static two point discrimination, dynamic two point discrimination      Active Range of Motion      Left Wrist   Normal active range of motion     Right Wrist   Normal active range of motion     Left Thumb     Normal active range of motion     Right Thumb     Normal active range of motion     Left Digits    Normal active range of motion     Right Digits   Normal active range of motion     Strength:       Left Wrist/Hand    (2nd hand position)     Trial 1: 35     Thumb Strength  Key/Lateral Pinch     Trial 1: 10  Palmar/Three-Point Pinch     Trial 1: 11     Right Wrist/Hand    (2nd hand position)     Trial 1: 34     Thumb Strength   Key/Lateral Pinch     Trial 1: 10  Palmar/Three-Point Pinch     Trial 1: 7     General Comments      Wrist/Hand Comments  9 hole peg test:  R=21 seconds  L=22 seconds     Activities of Daily Living:   Transfers/Mobility:    CGA/SBA with bed, chair, toilet and car.   Min A with shower  chair     Toileting:     Toileting: supervision    Hygiene: supervision     Clothing management: supervision      Bathing:     Bathing: Sup UB, SBA washing hair and Kay LB    Bathing position: sitting    Bathing assistive device(s): shower chair     Dressing:     Dressing upper body: min A    Dressing lower body: mod A    Socks: max A     Shoes: max A   Min A meal prep now while seated        Therapeutic Exercises (CPT 08027):     1. Medium resistance theraputty    2. Fine motor coordination/manipulation, shuffling cards, pulling pennies from putty, stacking pennies, use of tweezers and screwdriver.      3. 9# digi-flex, 10 squeezes with 3-5 second hold for both hands    4. 11# graded clip for lateral and 3 point prehension , 5 reps for each prehension for each hand    5. Velcro roll with cylindrical grasp., 5 reps each hand    6. Sit to stand from wheelchair and static standing, SBA/supervision with verbal cues and for functional transfers    7. Nu-Step, Resistance 3 for     8. Flex/ext exercise stick in horizontal and vertical positions, 10 reps in each position of medium resistance     9. UBE while standing , level 2 for 4 minutes and then 3 min 17 seconds after a rest break.        Time-based treatments/modalities:  Therapeutic exercise minutes (CPT 11300): 45 minutes        Pain rating before treatment: 0  Pain rating after treatment: 0    ASSESSMENT:   Response to treatment: Continues to progress with increased strength, dexterity and endurance    PLAN/RECOMMENDATIONS:   Plan for treatment: therapy treatment to continue next visit.  Planned interventions for next visit: continue with current treatment and therapeutic exercise (CPT 19492)

## 2022-06-16 NOTE — OP THERAPY PROGRESS SUMMARY
Outpatient Occupational Therapy  PROGRESS SUMMARY NOTE    [facility] Occupational Therapy [facility]  [address]  [address]  [facility] [address]  Phone:  [phone]  Fax:  [phone]    Date of Visit: 06/16/2022    Patient: Noelle Campoverde  YOB: 1970  MRN: [id number]     Referring Provider: RITCHIE Ragsdale  [address] [address]   Referring Diagnosis CIDP (chronic inflammatory demyelinating polyneuropathy) ([facility]) [G61.81]     Visit Diagnoses     ICD-10-CM   1. CIDP (chronic inflammatory demyelinating polyneuropathy) ([facility])  G61.81       Rehab Potential: excellent    Progress Report Period: 5/16/22-6/16/22    Functional Assessment Used: UEFI = 40/80          Objective Findings and Assessment:   Patient progression towards goals: Patient has been actively attending therapy and continues to progress well with improved UE strength and dexterity with improved endurance and standing tolerance.  Patient would benefit with continued OT intervention to further enhance upper extremity/hand strength and coordination/dexterity along with enhancing standing tolerance during ADLs.      Objective findings and assessment details: Current objective measures:   Neurological Testing      Sensation      Wrist/Hand   Left   Diminished:Protective sensation and hot/cold  Absent: light touch, pin prick, sharp/dull discrimination, static two point discrimination, dynamic two point discrimination a     Right   Diminished: protective sensation and hot/col  Absent: light touch, pin prick, sharp/dull discrimination, static two point discrimination, dynamic two point discrimination      Active Range of Motion      Left Wrist   Normal active range of motion     Right Wrist   Normal active range of motion     Left Thumb     Normal active range of motion     Right Thumb     Normal active range of motion     Left Digits    Normal active range of motion     Right Digits   Normal active range  of motion     Strength:       Left Wrist/Hand    (2nd hand position)     Trial 1: 35     Thumb Strength  Key/Lateral Pinch     Trial 1: 10  Palmar/Three-Point Pinch     Trial 1: 11     Right Wrist/Hand    (2nd hand position)     Trial 1: 34     Thumb Strength   Key/Lateral Pinch     Trial 1: 10  Palmar/Three-Point Pinch     Trial 1: 7     General Comments      Wrist/Hand Comments  9 hole peg test:  R=21 seconds  L=22 seconds     Activities of Daily Living:   Transfers/Mobility:    CGA/SBA with bed, chair, toilet and car.   Min A with shower chair     Toileting:     Toileting: supervision    Hygiene: supervision     Clothing management: supervision      Bathing:     Bathing: Sup UB, SBA washing hair and Kay LB    Bathing position: sitting    Bathing assistive device(s): shower chair     Dressing:     Dressing upper body: min A    Dressing lower body: mod A    Socks: max A     Shoes: max A   Min A meal prep now while seated    Goals:   Short Term Goals:   Patient will increase  strength to at least 35 pounds  pounds to enhance personal ADLs  Patient will be Mod I with toilet and shower chair transfers  Patient will be Min A LB dressing.  Patient will be Min A meal prep and light cooking tasks while standing  Short term goal timespan:  2-4 weeks    Long Term Goals:   Patient will improve  strength to at least 40 pounds to enhance personal ADLs.  Patient will be min A shower tasks.   Patient will be Mod I with LB dressing using AE and energy conservation techniques  Patient will be set up/supervision with meal prep and light cooking tasks while standing   Patient will score at least 50/80 on the UEFI  Long term goal timespan:  4-6 weeks    Plan:   Planned therapy interventions:  Therapeutic Exercise (CPT 55434)  Other planned therapy interventions:  97110 x 3   Frequency:  2x week  Duration in weeks:  6  Duration in visits:  12      Referring provider co-signature:  I have reviewed this plan of care and  my co-signature certifies the need for services.    Certification Period: 06/16/2022 to 07/28/22    Physician Signature: ________________________________ Date: ______________

## 2022-06-22 ENCOUNTER — OCCUPATIONAL THERAPY (OUTPATIENT)
Dept: OCCUPATIONAL THERAPY | Facility: REHABILITATION | Age: 52
End: 2022-06-22
Attending: NURSE PRACTITIONER
Payer: MEDICAID

## 2022-06-22 ENCOUNTER — PHYSICAL THERAPY (OUTPATIENT)
Dept: PHYSICAL THERAPY | Facility: REHABILITATION | Age: 52
End: 2022-06-22
Attending: NURSE PRACTITIONER
Payer: MEDICAID

## 2022-06-22 DIAGNOSIS — G61.81 CIDP (CHRONIC INFLAMMATORY DEMYELINATING POLYNEUROPATHY) (HCC): ICD-10-CM

## 2022-06-22 DIAGNOSIS — Z91.81 AT HIGH RISK FOR FALLS: ICD-10-CM

## 2022-06-22 DIAGNOSIS — R53.1 WEAKNESS: ICD-10-CM

## 2022-06-22 DIAGNOSIS — G82.20 PARAPLEGIA (HCC): ICD-10-CM

## 2022-06-22 PROCEDURE — 97112 NEUROMUSCULAR REEDUCATION: CPT

## 2022-06-22 PROCEDURE — 97110 THERAPEUTIC EXERCISES: CPT

## 2022-06-22 NOTE — OP THERAPY DAILY TREATMENT
Outpatient Physical Therapy  DAILY TREATMENT     [facility] Physical [address]  [address]  [place] [address]  Phone:  [phone]  Fax:  [phone]    Date: 2022    Patient: Noelle Campoverde  YOB: 1970  MRN: [id number]     Time Calculation    Start time: 1515  Stop time: 1557 Time Calculation (min): 42 minutes         Chief Complaint: Difficulty Walking, Weakness, and Loss Of Balance    Visit #: 8    SUBJECTIVE:   Complains of hip/back pain she believes related to exercises and increased time walking.  OBJECTIVE:  Current objective measures:   Static standin min without UE support and close SBA  STS no UE close SPV from WC         5STS: 23.80 BUE CGA  Wc<>mat transfer SPV  6MWT:5:03 FWW  feet.       Zapata performed without use of AD.  Zapata Balance Total Score (0-56): 15          Therapeutic Exercises (CPT 49268):     1. LTR, 2 min, .    2. Piriformis stretch supine fig 4, 2 x 1 min    20. POC -      Therapeutic Exercise Summary: auth approved  Auth # [id number]  Dates: 5/3/2022-2022  65950 3 units, 61273 1 unit  48 units, 12 V    Auth approved  [id number]  97110x1, 97112 x3  2022-2022  12 V, 48 Units    Therapeutic Exercise Summary: Access Code: [email or web address]  URL: [email or web address]  Date: 2022  Prepared by:     Exercises   Supine Bridge - 1 x daily - 7 x weekly - 3 sets - 10 reps - 10 hold  Clamshell - 1 x daily - 7 x weekly - 2 sets - 10 reps - 10 hold  Sit to Stand with Counter Support - 1 x daily - 7 x weekly - 3 sets - 10 reps  Access Code: [email or web address]  URL: [email or web address][email or web address]  Date: 2022  Prepared by:    Exercises  Standing March with Counter Support - 3 x daily - 7 x weekly - 2 min hold  Seated March - 1 x daily - 7 x weekly - 3 sets - 10 reps - 10 hold  Seated Long Arc Quad - 1 x daily - 7 x weekly - 3 sets - 10 reps - 10 hold    Access Code: [email or web address]  URL:  "[email or web address]  Date: 06/13/2022  Prepared by:    Exercises  Quadruped Alternating Leg Extensions - 1 x daily - 7 x weekly - 2 sets - 15 reps  Quadruped Hip Abduction and External Rotation - 1 x daily - 7 x weekly - 2 sets - 15 reps  Access Code: [email or web address]  URL: [email or web address]  Date: 06/13/2022  Prepared by:    Exercises  Supine Bridge - 1 x daily - 7 x weekly - 3 reps - 1 min hold    Access Code: [email or web address]  URL: [email or web address]  Date: 06/15/2022  Prepared by:    Exercises  Supine Piriformis Stretch with Foot on Ground - 1 x daily - 7 x weekly - 3-5 reps - 1 min hold        Therapeutic Treatments and Modalities:     1. Neuromuscular Re-education (CPT 22508)    Therapeutic Treatment and Modalities Summary: STS no UE 18\" table  3 x 8    Gait with quad cane Rt UE;. Seated rests limited to 2 min for improving endurance.  130 feet CGA 1:48 second reps time to assess speed  1:34 sec SBA 2 self corrected LOB when turing corner, 1:31 SBA no LOB. Last 1:21 no LOB SBA        Time-based treatments/modalities:    Physical Therapy Timed Treatment Charges  Neuromusc re-ed, balance, coor, post minutes (CPT 51057): 32 minutes  Therapeutic exercise minutes (CPT 97123): 10 minutes    ASSESSMENT:   Pt demonstrated improved stabilty ambulating with quad cane with ability to decrease assistance to SBA on 3/4 times without significant LOB. Demod improved gait mechanics as well. Education related to hip and back pain likely associated with increased activity level. Continued to emphasize HEP for managing hip and back pain, which she admits she has not done.      PLAN/RECOMMENDATIONS:   Plan for treatment: therapy treatment to continue next visit.  Planned interventions for next visit: continue with current treatment.       "

## 2022-06-22 NOTE — OP THERAPY DAILY TREATMENT
Outpatient Occupational Therapy  DAILY TREATMENT     [facility] Occupational [address]  [address]  [place] [address]  Phone:  [phone]  Fax:  [phone]    Date: 06/22/2022    Patient: Noelle Campoverde  YOB: 1970  MRN: [id number]     Time Calculation  Start time: 0145  Stop time: 0230 Time Calculation (min): 45 minutes         Chief Complaint: Extremity Weakness and Self Care Duties    Visit #: 10    SUBJECTIVE:  I am doing a lot more for myself at home.      OBJECTIVE:  Current objective measures:   Neurological Testing      Sensation      Wrist/Hand   Left   Diminished:Protective sensation and hot/cold  Absent: light touch, pin prick, sharp/dull discrimination, static two point discrimination, dynamic two point discrimination a     Right   Diminished: protective sensation and hot/col  Absent: light touch, pin prick, sharp/dull discrimination, static two point discrimination, dynamic two point discrimination      Active Range of Motion      Left Wrist   Normal active range of motion     Right Wrist   Normal active range of motion     Left Thumb     Normal active range of motion     Right Thumb     Normal active range of motion     Left Digits    Normal active range of motion     Right Digits   Normal active range of motion     Strength:       Left Wrist/Hand    (2nd hand position)     Trial 1: 35     Thumb Strength  Key/Lateral Pinch     Trial 1: 10  Palmar/Three-Point Pinch     Trial 1: 11     Right Wrist/Hand    (2nd hand position)     Trial 1: 34     Thumb Strength   Key/Lateral Pinch     Trial 1: 10  Palmar/Three-Point Pinch     Trial 1: 7     General Comments      Wrist/Hand Comments  9 hole peg test:  R=21 seconds  L=22 seconds     Activities of Daily Living:   Transfers/Mobility:    CGA/SBA with bed, chair, toilet and car.   Min A with shower chair     Toileting:     Toileting: supervision    Hygiene: supervision     Clothing  management: supervision      Bathing:     Bathing: Sup UB, SBA washing hair and Kay LB    Bathing position: sitting    Bathing assistive device(s): shower chair     Dressing:     Dressing upper body: min A    Dressing lower body: mod A    Socks: max A     Shoes: max A   Patient has not purchased AE for LB ADLs at this time.     Min A meal prep now while seated        Therapeutic Exercises (CPT 20656):     1. Medium resistance theraputty    2. Fine motor coordination/manipulation, shuffling cards, pulling pennies from putty, stacking pennies, use of tweezers and screwdriver.      3. 9# digi-flex, 10 squeezes with 3-5 second hold for both hands    4. 11# graded clip for lateral and 3 point prehension , 5 reps for each prehension for each hand    5. Velcro roll with cylindrical grasp., 5 reps each hand    6. UBE while standing , level 2 for 4.5 minutes and then 2 min 30 seconds after a rest break.      7. Nu-Step, Resistance 3 for 10 minutes    8. Flex/ext exercise stick in horizontal and vertical positions, 10 reps in each position of medium resistance       Time-based treatments/modalities:  Therapeutic exercise minutes (CPT 59330): 45 minutes        Pain rating before treatment: 3  Pain rating after treatment: 3  Left hip  ASSESSMENT:   Response to treatment: improved standing tolerance with UBE on first attempt.  Increased tremors noted today and patient stated that it comes and goes.  She stated she is a little stressed today as she is worried about her mother's health.    PLAN/RECOMMENDATIONS:   Plan for treatment: therapy treatment to continue next visit.  Planned interventions for next visit: continue with current treatment and therapeutic exercise (CPT 56539)

## 2022-06-27 ENCOUNTER — PHYSICAL THERAPY (OUTPATIENT)
Dept: PHYSICAL THERAPY | Facility: REHABILITATION | Age: 52
End: 2022-06-27
Attending: NURSE PRACTITIONER
Payer: MEDICAID

## 2022-06-27 ENCOUNTER — APPOINTMENT (OUTPATIENT)
Dept: OCCUPATIONAL THERAPY | Facility: REHABILITATION | Age: 52
End: 2022-06-27
Attending: NURSE PRACTITIONER
Payer: MEDICAID

## 2022-06-27 DIAGNOSIS — G61.81 CIDP (CHRONIC INFLAMMATORY DEMYELINATING POLYNEUROPATHY) (HCC): ICD-10-CM

## 2022-06-27 DIAGNOSIS — G82.20 PARAPLEGIA (HCC): ICD-10-CM

## 2022-06-27 DIAGNOSIS — R53.1 WEAKNESS: ICD-10-CM

## 2022-06-27 DIAGNOSIS — Z91.81 AT HIGH RISK FOR FALLS: ICD-10-CM

## 2022-06-27 PROCEDURE — 97112 NEUROMUSCULAR REEDUCATION: CPT

## 2022-06-27 NOTE — OP THERAPY DAILY TREATMENT
Outpatient Occupational Therapy  DAILY TREATMENT     [facility] Occupational Therapy [address]  [address]  [place] [address]  Phone:  [phone]  Fax:  [phone]    Date: 06/27/2022    Patient: Noelle Campoverde  YOB: 1970  MRN: [id number]     Time Calculation                 Chief Complaint: No chief complaint on file.    Visit #: 11    SUBJECTIVE:  ***    OBJECTIVE:  Current objective measures:   Neurological Testing      Sensation      Wrist/Hand   Left   Diminished:Protective sensation and hot/cold  Absent: light touch, pin prick, sharp/dull discrimination, static two point discrimination, dynamic two point discrimination a     Right   Diminished: protective sensation and hot/col  Absent: light touch, pin prick, sharp/dull discrimination, static two point discrimination, dynamic two point discrimination      Active Range of Motion      Left Wrist   Normal active range of motion     Right Wrist   Normal active range of motion     Left Thumb     Normal active range of motion     Right Thumb     Normal active range of motion     Left Digits    Normal active range of motion     Right Digits   Normal active range of motion     Strength:       Left Wrist/Hand    (2nd hand position)     Trial 1: 35     Thumb Strength  Key/Lateral Pinch     Trial 1: 10  Palmar/Three-Point Pinch     Trial 1: 11     Right Wrist/Hand    (2nd hand position)     Trial 1: 34     Thumb Strength   Key/Lateral Pinch     Trial 1: 10  Palmar/Three-Point Pinch     Trial 1: 7     General Comments      Wrist/Hand Comments  9 hole peg test:  R=21 seconds  L=22 seconds     Activities of Daily Living:   Transfers/Mobility:    CGA/SBA with bed, chair, toilet and car.   Min A with shower chair     Toileting:     Toileting: supervision    Hygiene: supervision     Clothing management: supervision      Bathing:     Bathing: Sup UB, SBA washing hair and Kay LB    Bathing position: sitting    Bathing  "assistive device(s): shower chair     Dressing:     Dressing upper body: min A    Dressing lower body: mod A    Socks: max A     Shoes: max A   Patient has not purchased AE for LB ADLs at this time.      Min A meal prep now while seated        Therapeutic Exercises (CPT 83204):     1. Medium resistance theraputty    2. Fine motor coordination/manipulation, shuffling cards, pulling pennies from putty, stacking pennies, use of tweezers and screwdriver.      3. 9# digi-flex, 10 squeezes with 3-5 second hold for both hands    4. 11# graded clip for lateral and 3 point prehension , 5 reps for each prehension for each hand    5. Velcro roll with cylindrical grasp., 5 reps each hand    6. UBE while standing , level 2 for 4.5 minutes and then 2 min 30 seconds after a rest break.      7. Nu-Step, Resistance 3 for 10 minutes    8. Flex/ext exercise stick in horizontal and vertical positions, 10 reps in each position of medium resistance       Time-based treatments/modalities:           Pain rating before treatment: {PAIN NUMBERS_1-10:53987}  Pain rating after treatment: {PAIN NUMBERS_1-10:38419}    ASSESSMENT:   Response to treatment: ***    PLAN/RECOMMENDATIONS:   Plan for treatment: {Therapy Plan:[id number]::\"therapy treatment to continue next visit\"}.  Planned interventions for next visit: {planned OT Interventions:699961233}       "

## 2022-06-27 NOTE — OP THERAPY DAILY TREATMENT
Outpatient Physical Therapy  DAILY TREATMENT     [address]  [address]  [place] [address]  Phone:  [phone]  Fax:  [phone]    Date: 2022    Patient: Noelle Campoverde  YOB: 1970  MRN: [id number]     Time Calculation    Start time: 1020  Stop time: 1100 Time Calculation (min): 40 minutes         Chief Complaint: Weakness, Difficulty Walking, and Loss Of Balance    Visit #: 9    SUBJECTIVE:   Reports walking in the home all the time with FWW. Wonders if she needs wc eval. She wants power mobility because she gets tired and is afraid of falling.    OBJECTIVE:  Current objective measures:   Static standin min without UE support and close SBA  STS no UE close SPV from WC         5STS: 23.80 BUE CGA  Wc<>mat transfer SPV  6MWT:5:03 FWW  feet.       Zapata performed without use of AD.  Zapata Balance Total Score (0-56): 15          Therapeutic Exercises (CPT 17316): , .    20. POC -      Therapeutic Exercise Summary: Auth approved  [id number]  97110x1, 97112 x3  2022-2022  12 V, 48 Units    Therapeutic Exercise Summary: Access Code: [email or web address]  URL: [email or web address]  Date: 2022  Prepared by:     Exercises   Supine Bridge - 1 x daily - 7 x weekly - 3 sets - 10 reps - 10 hold  Clamshell - 1 x daily - 7 x weekly - 2 sets - 10 reps - 10 hold  Sit to Stand with Counter Support - 1 x daily - 7 x weekly - 3 sets - 10 reps  Access Code: [email or web address]  URL: [email or web address]  Date: 2022  Prepared by:    Exercises  Standing March with Counter Support - 3 x daily - 7 x weekly - 2 min hold  Seated March - 1 x daily - 7 x weekly - 3 sets - 10 reps - 10 hold  Seated Long Arc Quad - 1 x daily - 7 x weekly - 3 sets - 10 reps - 10 hold    Access Code: [email or web address]  URL: [email or web address]  Date: 2022  Prepared by:    Exercises  Quadruped Alternating Leg Extensions - 1 x daily - 7 x  "weekly - 2 sets - 15 reps  Quadruped Hip Abduction and External Rotation - 1 x daily - 7 x weekly - 2 sets - 15 reps  Access Code: [email or web address]  URL: [email or web address]  Date: 06/13/2022  Prepared by:    Exercises  Supine Bridge - 1 x daily - 7 x weekly - 3 reps - 1 min hold    Access Code: [email or web address]  URL: [email or web address]  Date: 06/15/2022  Prepared by:    Exercises  Supine Piriformis Stretch with Foot on Ground - 1 x daily - 7 x weekly - 3-5 reps - 1 min hold        Therapeutic Treatments and Modalities:     1. Neuromuscular Re-education (CPT 61357)    Therapeutic Treatment and Modalities Summary: STS no UE 18\" table  3 x 10    Gait with quad cane Rt UE;.262 feet with SPV. No LOB, no gait concerns/instabilty.     Progressed to SPC Rt UE 3 x 262 feet SBA, slow, final progressed to SPV.  Pt demod significant fatigue after each rep requiring 2-4 min seated rest.    Ambulating without AD CGA x 20 feet 1 lob to the left, small steps for turning with dec UE swing. Significant fatigue        Time-based treatments/modalities:    Physical Therapy Timed Treatment Charges  Neuromusc re-ed, balance, coor, post minutes (CPT 51257): 40 minutes    ASSESSMENT:   Time spent discussing and educating on POC. Pt continues to be fearful of falling and gets tired easily therefore wants to move forward with power scooter eval end of July. Did discuss current progress which has been substantial and continued improvement is expected. Pt may be ambulating too much for power chair however encouraged pt to think on appointment and attend if needed. PT fear is if she becomes exceedingly reliant on power mobility her overall endurance and strength will decline.   Encouraged pt to ambulated 2-3 times per day with SPC in the home and continued FWW use. Will begin outdoor gait training with SPC next session.      PLAN/RECOMMENDATIONS:   Plan for treatment: therapy treatment to continue next visit.  Planned interventions for next " visit: continue with current treatment.

## 2022-06-28 DIAGNOSIS — L08.9 SKIN INFECTION: ICD-10-CM

## 2022-06-30 ENCOUNTER — PHARMACY VISIT (OUTPATIENT)
Dept: PHARMACY | Facility: MEDICAL CENTER | Age: 52
End: 2022-06-30
Payer: COMMERCIAL

## 2022-06-30 ENCOUNTER — NON-PROVIDER VISIT (OUTPATIENT)
Dept: MEDICAL GROUP | Facility: MEDICAL CENTER | Age: 52
End: 2022-06-30
Attending: FAMILY MEDICINE
Payer: MEDICAID

## 2022-06-30 VITALS
WEIGHT: 197.7 LBS | DIASTOLIC BLOOD PRESSURE: 64 MMHG | SYSTOLIC BLOOD PRESSURE: 126 MMHG | HEART RATE: 98 BPM | BODY MASS INDEX: 36.38 KG/M2 | HEIGHT: 62 IN

## 2022-06-30 DIAGNOSIS — E11.9 TYPE 2 DIABETES MELLITUS WITHOUT COMPLICATION, WITHOUT LONG-TERM CURRENT USE OF INSULIN (HCC): ICD-10-CM

## 2022-06-30 LAB
HBA1C MFR BLD: 7.4 % (ref 0–5.6)
INT CON NEG: ABNORMAL
INT CON POS: ABNORMAL

## 2022-06-30 PROCEDURE — RXMED WILLOW AMBULATORY MEDICATION CHARGE: Performed by: FAMILY MEDICINE

## 2022-06-30 PROCEDURE — 83036 HEMOGLOBIN GLYCOSYLATED A1C: CPT

## 2022-06-30 PROCEDURE — 99213 OFFICE O/P EST LOW 20 MIN: CPT | Performed by: PHARMACIST

## 2022-06-30 RX ORDER — DIPHENHYDRAMINE HYDROCHLORIDE 25 MG/1
CAPSULE, LIQUID FILLED ORAL
Qty: 1 KIT | Refills: 0 | Status: SHIPPED | OUTPATIENT
Start: 2022-06-30 | End: 2023-04-14 | Stop reason: SDUPTHER

## 2022-06-30 RX ORDER — GLUCOSAMINE HCL/CHONDROITIN SU 500-400 MG
CAPSULE ORAL
Qty: 100 EACH | Refills: 11 | Status: SHIPPED | OUTPATIENT
Start: 2022-06-30 | End: 2023-12-12

## 2022-06-30 RX ORDER — ERGOCALCIFEROL 1.25 MG/1
50000 CAPSULE ORAL
Qty: 8 CAPSULE | Refills: 0 | Status: SHIPPED | OUTPATIENT
Start: 2022-06-30 | End: 2022-09-14

## 2022-06-30 RX ORDER — EMPAGLIFLOZIN, METFORMIN HYDROCHLORIDE 10; 1000 MG/1; MG/1
1 TABLET, EXTENDED RELEASE ORAL DAILY
Qty: 30 TABLET | Refills: 0 | Status: SHIPPED | OUTPATIENT
Start: 2022-06-30 | End: 2022-07-27 | Stop reason: SDUPTHER

## 2022-06-30 ASSESSMENT — FIBROSIS 4 INDEX: FIB4 SCORE: 0.54

## 2022-06-30 NOTE — PROGRESS NOTES
New Patient Consult Note  Primary care physician: RITCHIE Ragsdale  In 10:00  Out 11:00    Reason for consult: Management of Uncontrolled Type 2 Diabetes    HPI:  Noelle Campoverde is a 52 y.o. old patient who comes in today for evaluation of above stated problem.    Pt was recently diagnosed with diabetes in March with an A1c value of 6.8. Today her A1c was higher. She plans to take this very seriously as she is dealing with other medical conditions and is a cancer survivor. She only has one kidney remaining due to cancer. She is currently experiencing nueorpathy and has none or diminished feeling in her arms and feet (she is in a wheelchair).  She has little knowledge of diabetes and isn't testing BG because she didn't know she should be.     Basic physiology of DMII was explained to patient as well as microvascular/macrovascular complications. The importance of increasing physical activity to improve diabetes control was discussed with the patient. Patient was also educated on changing his/her diet and making better choices to help control blood sugar. I spent large amount of time discussing the plate method, and serving sizes of fruit, as well and gave her specific recommendations based on her diet as well as some handouts. Her diet is rich in carbohydrates and she eats a lot of fruit, but she does like eating vegetables as well.     Pt is only taking 1 tab of metformin for her DM and is tolerating.     Most Recent HbA1c:   Lab Results   Component Value Date/Time    HBA1C 7.4 (A) 06/30/2022 10:00 AM    HBA1C 6.8 (H) 03/29/2022 09:49 AM    HBA1C 5.7 (H) 03/07/2019 07:34 AM       Current Diabetes Regimen:  Metformin: Metformin ER 500mg qday     SMBG   Before Breakfast: Is not testing   Before Lunch:  Before Dinner:  Before Bedtime:  Other times:  Hypoglycemia:  None    Diet  Breakfast: Coffee (sugar), eggs, cereal with bannana, oatmeal, bread, fruit  Lunch: Eggs, salad, sandwhiches, bean  burrito   Dinner: rice with chicken, pasta,  salad, beef or chicken soups with vegetables   Snacking: A lot of Fruit (bannass, cherries, strawberries, 2 oranges, etc)  Some soda with ice on occassion    ROS:  Constitutional: No weight loss  Cardiac: No palpitations or racing heart  Resp: No shortness of breath  Neuro: No numbness or tinging in feet  Endo: No heat or cold intolerance, no polyuria or polydipsia  All other systems were reviewed and were negative.    Past Medical History:  Patient Active Problem List    Diagnosis Date Noted   • Toe infection 06/15/2022   • Type 2 diabetes mellitus without complication, without long-term current use of insulin (Formerly [facility]) 05/17/2022   • Gastroesophageal reflux disease 05/17/2022   • Hyperlipidemia associated with type 2 diabetes mellitus (Formerly [facility]) 05/17/2022   • Encounter to establish care 03/24/2022   • CIDP (chronic inflammatory demyelinating polyneuropathy) (Formerly [facility]) 03/24/2022   • Polyneuropathy due to medical condition (Formerly [facility]) 10/24/2021   • Clear cell renal cell carcinoma, right (Formerly [facility]) 10/24/2021   • Neuropathy 10/24/2021   • H/O right radical nephrectomy 10/23/2021   • LFT elevation 10/23/2021   • Mixed stress and urge urinary incontinence 09/14/2021   • Renal mass 09/14/2021   • Vitamin D deficiency 04/01/2019   • Anemia 04/01/2019   • Vitamin B12 deficiency 04/01/2019   • Smoker 04/01/2019   • Neck pain 02/12/2019   • BMI 28.0-28.9,adult 02/12/2019       Past Surgical History:  Past Surgical History:   Procedure Laterality Date   • NEPHRECTOMY ROBOTIC XI Right 10/20/2021    Procedure: NEPHRECTOMY, ROBOT-ASSISTED, USING DA CORNELIUS XI - RADICAL;  Surgeon: Jay Painting M.D.;  Location: SURGERY [facility];  Service: Gen Robotic   • ORIF, ANKLE Right 9/9/2021    Procedure: ORIF, ANKLE;  Surgeon: Alexander Castillo M.D.;  Location: SURGERY [facility];  Service: Orthopedics   • TUBAL LIGATION         Allergies:  Latex    Social History:  Social History     Socioeconomic History    • Marital status:      Spouse name: Not on file   • Number of children: Not on file   • Years of education: Not on file   • Highest education level: 8th grade   Occupational History   • Not on file   Tobacco Use   • Smoking status: Current Some Day Smoker     Types: Cigarettes   • Smokeless tobacco: Never Used   • Tobacco comment: 3 cigarettes/day   Vaping Use   • Vaping Use: Never used   Substance and Sexual Activity   • Alcohol use: No   • Drug use: No   • Sexual activity: Not Currently     Partners: Male     Comment:    Other Topics Concern   • Not on file   Social History Narrative   • Not on file     Social Determinants of Health     Financial Resource Strain: Low Risk    • Difficulty of Paying Living Expenses: Not very hard   Food Insecurity: No Food Insecurity   • Worried About Running Out of Food in the Last Year: Never true   • Ran Out of Food in the Last Year: Never true   Transportation Needs: No Transportation Needs   • Lack of Transportation (Medical): No   • Lack of Transportation (Non-Medical): No   Physical Activity: Inactive   • Days of Exercise per Week: 0 days   • Minutes of Exercise per Session: 0 min   Stress: Stress Concern Present   • Feeling of Stress : To some extent   Social Connections: Socially Isolated   • Frequency of Communication with Friends and Family: More than three times a week   • Frequency of Social Gatherings with Friends and Family: More than three times a week   • Attends Congregation Services: Never   • Active Member of Clubs or Organizations: No   • Attends Club or Organization Meetings: Never   • Marital Status:    Intimate Partner Violence: Not on file   Housing Stability: Low Risk    • Unable to Pay for Housing in the Last Year: No   • Number of Places Lived in the Last Year: 2   • Unstable Housing in the Last Year: No       Family History:  Family History   Problem Relation Age of Onset   • Osteoporosis Mother    • Alzheimer's Disease Mother    •  "Arthritis Mother    • Diabetes Father    • Heart Disease Father    • Stroke Father         TIA   • Cancer Other         one nephew leukemia, the other liver ca       Medications:    Current Outpatient Medications:   •  Blood Glucose Monitoring Suppl (BLOOD GLUCOSE MONITOR SYSTEM) w/Device Kit, Use to test 2 times a day and as needed, Disp: 1 Kit, Rfl: 0  •  Lancets 30G Misc, Use to test twice daily, Disp: 100 Each, Rfl: 11  •  glucose blood strip, Use to test twice daily, Disp: 100 Strip, Rfl: 11  •  Alcohol Swabs, Use to test twice a day, Disp: 100 Each, Rfl: 11  •  Empagliflozin-metFORMIN HCl ER (SYNJARDY XR)  MG TABLET SR 24 HR, Take 1 Tablet by mouth every day., Disp: 30 Tablet, Rfl: 0  •  ergocalciferol (DRISDOL) 10262 UNIT capsule, Take 1 Capsule by mouth every 7 days., Disp: 8 Capsule, Rfl: 0  •  mupirocin (BACTROBAN) 2 % Ointment, Apply 1 Application topically 2 times a day., Disp: 22 g, Rfl: 0  •  ROPINIRole (REQUIP) 0.5 MG Tab, , Disp: , Rfl:   •  ROPINIRole (REQUIP) 0.5 MG Tab, Take 0.5 mg by mouth 2 times a day., Disp: , Rfl:   •  traZODone (DESYREL) 50 MG Tab, TAKE 1 TABLET BY MOUTH EVERY DAY AT BEDTIME AS NEEDED FOR INSOMNIA, Disp: , Rfl:   •  omeprazole (PRILOSEC OTC) 20 MG tablet, Take 1 Tablet by mouth every day., Disp: 30 Tablet, Rfl: 1  •  atorvastatin (LIPITOR) 20 MG Tab, Take 1 Tablet by mouth every evening., Disp: 30 Tablet, Rfl: 5  •  lisinopril (PRINIVIL) 5 MG Tab, Take 1 Tablet by mouth every day., Disp: 30 Tablet, Rfl: 5  •  Misc. Devices Misc, Please provide the patient with a walker with a seat. G61.81, Disp: 1 Each, Rfl: 0  •  gabapentin (NEURONTIN) 100 MG Cap, Take 200 mg by mouth 3 times a day., Disp: , Rfl:   •  predniSONE (DELTASONE) 50 MG Tab, Take 50 mg by mouth every day., Disp: , Rfl:     Labs: Reviewed    Physical Examination:  Vital signs: /64   Pulse 98   Ht 1.575 m (5' 2\")   Wt 89.7 kg (197 lb 11.2 oz)   LMP 03/08/2021   BMI 36.16 kg/m²  Body mass index is " 36.16 kg/m².  General: No apparent distress, cooperative  Eyes: No scleral icterus or discharge  ENMT: Normal on external inspection of nose, lips, normal thyroid exam  Neck: No abnormal masses on inspection  Resp: Normal effort, clear to auscultation bilaterally   CVS: Regular rate and rhythm, S1 S2 normal, no murmur   Extremities: No edema  Abdomen: abdominal obesity present  Neuro: Alert and oriented  Skin: No rash  Psych: Normal mood and affect, intact memory and able to make informed decisions    Plan:    There are no diagnoses linked to this encounter.    Return in about 27 days (around 7/27/2022).    DM - Patients A1c increased to 7.4 despite being on metformin. She gets little exercise due to being in a wheelchair, and diet could use some improvement.   - Reduce consumption of carbohydrates found in beans, bread, rice, pastas, and caution with eating too much fruit  - Stop metformin, Start Synjardy Xr 10/1000mg QDAY (plan to further optimize)  - Start testing FBG 4-5 times a week, and occasionally 2 hrs after meals  - Pt is currently experiencing some nausea, will consider a GLp1 next visit when nausea resolves    Other recommendations:  Start ergocalciferol 50,000 units weekly x 8 weeks then retest  Thank you for allowing me to participate in the care of this patient.    Niranjan Schulte, PharmD  06/30/22    CC:   RITCHIE Ragsdale

## 2022-07-01 ENCOUNTER — PHYSICAL THERAPY (OUTPATIENT)
Dept: PHYSICAL THERAPY | Facility: REHABILITATION | Age: 52
End: 2022-07-01
Attending: NURSE PRACTITIONER
Payer: MEDICAID

## 2022-07-01 DIAGNOSIS — R53.1 WEAKNESS: ICD-10-CM

## 2022-07-01 DIAGNOSIS — Z91.81 AT HIGH RISK FOR FALLS: ICD-10-CM

## 2022-07-01 DIAGNOSIS — G82.20 PARAPLEGIA (HCC): ICD-10-CM

## 2022-07-01 DIAGNOSIS — G61.81 CIDP (CHRONIC INFLAMMATORY DEMYELINATING POLYNEUROPATHY) (HCC): ICD-10-CM

## 2022-07-01 PROCEDURE — 97112 NEUROMUSCULAR REEDUCATION: CPT

## 2022-07-01 NOTE — OP THERAPY DAILY TREATMENT
Outpatient Physical Therapy  DAILY TREATMENT     [facility] Physical Therapy [place] [address] [address]  [address]  [place] [address]  Phone:  [phone]  Fax:  [phone]    Date: 07/01/2022    Patient: Noelle Campoverde  YOB: 1970  MRN: [id number]     Time Calculation    Start time: 1030  Stop time: 1112 Time Calculation (min): 42 minutes         Chief Complaint: Loss Of Balance, Difficulty Walking, and Weakness    Visit #: 10    SUBJECTIVE: PN NEXT VISIT TO EXT POC  Reports ambulating at home with cane, does not want to do outdoor walking today stating legs are shakey today.    OBJECTIVE:            Therapeutic Treatments and Modalities:     1. Neuromuscular Re-education (CPT 66387)    Therapeutic Treatment and Modalities Summary: For improving balance, stabilty, and independence with ambulation as well as endurance completed circuit training. X 4, required increasing duration of seated rest breaks. Standing breaks required between each activity. Last attempt required seated rest after marching.  1. 10STS with 2 x 5# DB from 20 inch table SBA  2. 1 min marching with SBQC support  3. Standing narrow DARREN EC x 30 sec CGA  4. Ambulating 160 feet with SBQC with cues for head turns SPV, standing rests required while amabulating 3/4 rounds    Time-based treatments/modalities:    71920 Neuromuscular re education  x 3 units, 42 minutes      ASSESSMENT:   Response to treatment: Pt demod significant fatigue with circuit training as well as increased reports of hip pain, which improves with stretching. Continued to emphasize HEP as her hip pain is impacting progress towards goals and improves significantly with pt HEP compliance.     PLAN/RECOMMENDATIONS:   Plan for treatment: therapy treatment to continue next visit.  Planned interventions for next visit: continue with current treatment.

## 2022-07-07 ENCOUNTER — OFFICE VISIT (OUTPATIENT)
Dept: MEDICAL GROUP | Facility: MEDICAL CENTER | Age: 52
End: 2022-07-07
Attending: NURSE PRACTITIONER
Payer: MEDICAID

## 2022-07-07 VITALS
BODY MASS INDEX: 38.46 KG/M2 | SYSTOLIC BLOOD PRESSURE: 110 MMHG | HEIGHT: 60 IN | TEMPERATURE: 97 F | OXYGEN SATURATION: 96 % | WEIGHT: 195.9 LBS | RESPIRATION RATE: 18 BRPM | HEART RATE: 100 BPM | DIASTOLIC BLOOD PRESSURE: 80 MMHG

## 2022-07-07 DIAGNOSIS — L60.0 INGROWN TOENAIL: ICD-10-CM

## 2022-07-07 PROCEDURE — 99213 OFFICE O/P EST LOW 20 MIN: CPT | Performed by: NURSE PRACTITIONER

## 2022-07-07 PROCEDURE — 11750 EXCISION NAIL&NAIL MATRIX: CPT | Performed by: NURSE PRACTITIONER

## 2022-07-07 PROCEDURE — 11750 EXCISION NAIL&NAIL MATRIX: CPT | Mod: TA | Performed by: NURSE PRACTITIONER

## 2022-07-07 PROCEDURE — 99213 OFFICE O/P EST LOW 20 MIN: CPT | Mod: 25 | Performed by: NURSE PRACTITIONER

## 2022-07-07 PROCEDURE — 700101 HCHG RX REV CODE 250: Performed by: NURSE PRACTITIONER

## 2022-07-07 RX ORDER — HYDROCODONE BITARTRATE AND ACETAMINOPHEN 5; 325 MG/1; MG/1
.5-1 TABLET ORAL EVERY 8 HOURS PRN
Qty: 9 TABLET | Refills: 0 | Status: SHIPPED | OUTPATIENT
Start: 2022-07-07 | End: 2022-07-10

## 2022-07-07 RX ORDER — NICOTINE POLACRILEX 4 MG/1
GUM, CHEWING ORAL
COMMUNITY
Start: 2022-06-15 | End: 2022-07-07

## 2022-07-07 RX ADMIN — LIDOCAINE HYDROCHLORIDE 2 ML: 10 INJECTION, SOLUTION EPIDURAL; INFILTRATION; INTRACAUDAL; PERINEURAL at 17:58

## 2022-07-07 RX ADMIN — LIDOCAINE HYDROCHLORIDE 2 ML: 10 INJECTION, SOLUTION EPIDURAL; INFILTRATION; INTRACAUDAL; PERINEURAL at 17:59

## 2022-07-07 ASSESSMENT — ENCOUNTER SYMPTOMS
PALPITATIONS: 0
SHORTNESS OF BREATH: 0
CHILLS: 0
FEVER: 0
CONSTIPATION: 0
DIARRHEA: 0
ABDOMINAL PAIN: 0
BLOOD IN STOOL: 0
WEIGHT LOSS: 0
WHEEZING: 0
COUGH: 0

## 2022-07-07 ASSESSMENT — FIBROSIS 4 INDEX: FIB4 SCORE: 0.54

## 2022-07-08 ENCOUNTER — APPOINTMENT (OUTPATIENT)
Dept: PHYSICAL THERAPY | Facility: REHABILITATION | Age: 52
End: 2022-07-08
Payer: MEDICAID

## 2022-07-08 ENCOUNTER — APPOINTMENT (OUTPATIENT)
Dept: OCCUPATIONAL THERAPY | Facility: REHABILITATION | Age: 52
End: 2022-07-08
Payer: MEDICAID

## 2022-07-08 NOTE — PROGRESS NOTES
No chief complaint on file.      Subjective:     HPI:   Noelle Campoverde is a 52 y.o. female here to discuss the evaluation and management of:        Toe infection  Patient was treated for a left great toe infection secondary to an ingrown toenail with oral antibiotics-her infection has resolved.  She is presenting today for medial sidewall removal of her toenail.      ROS  Review of Systems   Constitutional: Negative for chills, fever, malaise/fatigue and weight loss.   Respiratory: Negative for cough, shortness of breath and wheezing.    Cardiovascular: Negative for chest pain, palpitations and leg swelling.   Gastrointestinal: Negative for abdominal pain, blood in stool, constipation and diarrhea.         Allergies   Allergen Reactions   • Latex Rash     hives       Current medicines (including changes today)  Current Outpatient Medications   Medication Sig Dispense Refill   • HYDROcodone-acetaminophen (NORCO) 5-325 MG Tab per tablet Take 0.5-1 Tablets by mouth every 8 hours as needed (moderate pain) for up to 3 days. 9 Tablet 0   • Blood Glucose Monitoring Suppl (BLOOD GLUCOSE MONITOR SYSTEM) w/Device Kit Use to test 2 times a day and as needed 1 Kit 0   • Lancets 30G Misc Use to test twice daily 100 Each 11   • glucose blood strip Use to test twice daily 100 Strip 11   • Alcohol Swabs Use to test twice a day 100 Each 11   • Empagliflozin-metFORMIN HCl ER (SYNJARDY XR)  MG TABLET SR 24 HR Take 1 Tablet by mouth every day. 30 Tablet 0   • ergocalciferol (DRISDOL) 02794 UNIT capsule Take 1 Capsule by mouth every 7 days. 8 Capsule 0   • mupirocin (BACTROBAN) 2 % Ointment Apply 1 Application topically 2 times a day. 22 g 0   • ROPINIRole (REQUIP) 0.5 MG Tab      • ROPINIRole (REQUIP) 0.5 MG Tab Take 0.5 mg by mouth 2 times a day.     • traZODone (DESYREL) 50 MG Tab TAKE 1 TABLET BY MOUTH EVERY DAY AT BEDTIME AS NEEDED FOR INSOMNIA     • omeprazole (PRILOSEC OTC) 20 MG tablet Take 1 Tablet by  mouth every day. 30 Tablet 1   • atorvastatin (LIPITOR) 20 MG Tab Take 1 Tablet by mouth every evening. 30 Tablet 5   • lisinopril (PRINIVIL) 5 MG Tab Take 1 Tablet by mouth every day. 30 Tablet 5   • Misc. Devices Misc Please provide the patient with a walker with a seat. G61.81 1 Each 0   • gabapentin (NEURONTIN) 100 MG Cap Take 200 mg by mouth 3 times a day.     • predniSONE (DELTASONE) 50 MG Tab Take 50 mg by mouth every day.       No current facility-administered medications for this visit.       Social History     Tobacco Use   • Smoking status: Current Some Day Smoker     Types: Cigarettes   • Smokeless tobacco: Never Used   • Tobacco comment: 3 cigarettes/day   Vaping Use   • Vaping Use: Never used   Substance Use Topics   • Alcohol use: No   • Drug use: No       Patient Active Problem List    Diagnosis Date Noted   • Toe infection 06/15/2022   • Type 2 diabetes mellitus without complication, without long-term current use of insulin ([facility]) 05/17/2022   • Gastroesophageal reflux disease 05/17/2022   • Hyperlipidemia associated with type 2 diabetes mellitus ([facility]) 05/17/2022   • Encounter to establish care 03/24/2022   • CIDP (chronic inflammatory demyelinating polyneuropathy) ([facility]) 03/24/2022   • Polyneuropathy due to medical condition ([facility]) 10/24/2021   • Clear cell renal cell carcinoma, right ([facility]) 10/24/2021   • Neuropathy 10/24/2021   • H/O right radical nephrectomy 10/23/2021   • LFT elevation 10/23/2021   • Mixed stress and urge urinary incontinence 09/14/2021   • Renal mass 09/14/2021   • Vitamin D deficiency 04/01/2019   • Anemia 04/01/2019   • Vitamin B12 deficiency 04/01/2019   • Smoker 04/01/2019   • Neck pain 02/12/2019   • BMI 28.0-28.9,adult 02/12/2019       Family History   Problem Relation Age of Onset   • Osteoporosis Mother    • Alzheimer's Disease Mother    • Arthritis Mother    • Diabetes Father    • Heart Disease Father    • Stroke Father         TIA   • Cancer Other         one nephew  leukemia, the other liver ca          Objective:     /80 (BP Location: Left arm, Patient Position: Sitting, BP Cuff Size: Adult)   Pulse 100   Temp 36.1 °C (97 °F) (Skin)   Resp 18   Ht 1.524 m (5')   Wt 88.9 kg (195 lb 14.4 oz)   SpO2 96%  Body mass index is 38.26 kg/m².    Physical Exam:  Physical Exam  Constitutional:       General: She is not in acute distress.  HENT:      Head: Normocephalic.      Right Ear: Tympanic membrane and external ear normal.      Left Ear: Tympanic membrane and external ear normal.   Eyes:      Conjunctiva/sclera: Conjunctivae normal.      Pupils: Pupils are equal, round, and reactive to light.   Neck:      Trachea: No tracheal deviation.   Cardiovascular:      Rate and Rhythm: Normal rate and regular rhythm.      Heart sounds: Normal heart sounds.   Pulmonary:      Effort: Pulmonary effort is normal.      Breath sounds: Normal breath sounds.   Abdominal:      General: Bowel sounds are normal.      Palpations: Abdomen is soft.   Musculoskeletal:         General: Normal range of motion.      Cervical back: Normal range of motion and neck supple.   Feet:      Left foot:      Toenail Condition: Left toenails are ingrown.   Lymphadenopathy:      Head:      Right side of head: No preauricular adenopathy.      Left side of head: No preauricular adenopathy.      Cervical: No cervical adenopathy.   Skin:     General: Skin is warm and dry.   Neurological:      Mental Status: She is alert and oriented to person, place, and time.      Cranial Nerves: Cranial nerves are intact.      Sensory: Sensation is intact.      Gait: Gait is intact.   Psychiatric:         Mood and Affect: Affect normal.         Judgment: Judgment normal.       Procedure note:    Risk and benefit of partial toenail removal procedure was discussed with patient and they agreed to proceed.  Skin was initially prepped with alcohol.  Approximately 4 cc of 1% Xylocaine without epi were injected along the medial aspect  of the great toe and about 3 cc were injected along the lateral aspect to achieve a digital block.  Once anesthesia was achieved, a tourniquet was applied to the base of the toe.  The toe was then prepped with Betadine.  High risk scissors were used to dissect the nail side wall  away from the nail bed.  They were then used to transect the nail down to the base.  The nail was grasped with forceps, rotated medially, and the piece of ingrown nail was removed.  The nailbed was then treated with trichloroacetic acid 70% solution for nail matrix destruction.  There was little to no bleeding during the procedure.  Antibiotic ointment was then applied and the wound was covered in gauze.  Coban was wrapped around the toe as a mild pressure dressing.  Patient tolerated the procedure well without complication.    Acute pain   This is a new problem.   Patient is complaining of pain x 3 week(s) located in her left great toe.  Pain is constant, described as throbbing and a 6/10 on the pain scale.   Treatments tried include:Tylenol, heat, ice, rest, movement     Is the pain medication improving the patient’s symptoms: Never prescribed  Any adverse effects: Never prescribed  Alcohol or illicit drug use:   She  reports no history of alcohol use.  She  reports no history of drug use.     History of controlled substance used in a way other than prescribed? No     Any early refills of a controlled substance: No  History of lost or stolen controlled substance prescription: No  Any aberrant behavior or intoxication while on a controlled substance: No  Has the patient self-modified their dose or frequency of the medication :No  Compliant with treatment recommendations and plan: Yes  Any major health change to the patient: No  Concerns for misuse, abuse or addiction: No  /NarxCheck report reviewed: Yes  History of abnormal drug screening: No  I have assessed the patient’s risk for abuse, dependency, and addiction using the validated  Opioid Risk Tool.     Opioid Risk Score: 0       Interpretation of Opioid Risk Score   Score 0-3 = Low risk of abuse. Do UDS at least once per year.  Score 4-7 = Moderate risk of abuse. Do UDS 1-4 times per year.  Score 8+ = High risk of abuse. Refer to specialist.     I have conducted a physical exam and documented findings.     I certify that I have obtained and reviewed her medical history. I have also made a good elena effort to obtain applicable records from other providers who have treated the patient.  I have reviewed the patient's prescription history as maintained by the [place] Prescription Monitoring Program.      Given the above, I believe the benefits of controlled substance therapy outweigh the risks. The reasons for prescribing controlled substances include non-narcotic, oral analgesic alternatives are contraindicated. Accordingly, I have discussed the risk and benefits, treatment plan, and alternative therapies with the patient. Patient was advised that this medicine is intended for short term (no more than 14 days)/intermittent use only and not intended to be an ongoing prescription.    Assessment and Plan:     The following treatment plan was discussed:    1. Ingrown toenail  lidocaine (XYLOCAINE) 1 % injection 2 mL    lidocaine (XYLOCAINE) 1 % injection 2 mL    lidocaine (XYLOCAINE) 1 % injection 2 mL    HYDROcodone-acetaminophen (NORCO) 5-325 MG Tab per tablet    Consent for Opiate Prescription  -Acute on chronic problem, unstable.  See procedure note above.  Unfortunately the patient is on high-dose prednisone and is not a candidate for ibuprofen for pain management.  I am giving her a few Norco's if needed. Potential side effects and discontinuation criteria were discussed with patient, patient verbalized understanding.         Any change or worsening of signs or symptoms, patient encouraged to follow-up or report to emergency room for further evaluation. Patient verbalizes understanding and  agrees.    Follow-Up: Return in about 6 weeks (around 8/18/2022) for Routine follow up.      PLEASE NOTE: This dictation was created using voice recognition software. I have made every reasonable attempt to correct obvious errors, but I expect that there are errors of grammar and possibly content that I did not discover before finalizing the note.

## 2022-07-08 NOTE — ASSESSMENT & PLAN NOTE
Patient was treated for a left great toe infection secondary to an ingrown toenail with oral antibiotics-her infection has resolved.  She is presenting today for medial sidewall removal of her toenail.

## 2022-07-11 ENCOUNTER — APPOINTMENT (OUTPATIENT)
Dept: PHYSICAL THERAPY | Facility: REHABILITATION | Age: 52
End: 2022-07-11
Attending: NURSE PRACTITIONER
Payer: MEDICAID

## 2022-07-13 DIAGNOSIS — K21.9 GASTROESOPHAGEAL REFLUX DISEASE, UNSPECIFIED WHETHER ESOPHAGITIS PRESENT: ICD-10-CM

## 2022-07-13 RX ORDER — OMEPRAZOLE 20 MG/1
20 TABLET, DELAYED RELEASE ORAL DAILY
Qty: 30 TABLET | Refills: 5 | Status: SHIPPED | OUTPATIENT
Start: 2022-07-13

## 2022-07-14 ENCOUNTER — PHYSICAL THERAPY (OUTPATIENT)
Dept: PHYSICAL THERAPY | Facility: REHABILITATION | Age: 52
End: 2022-07-14
Attending: NURSE PRACTITIONER
Payer: MEDICAID

## 2022-07-14 DIAGNOSIS — R53.1 WEAKNESS: ICD-10-CM

## 2022-07-14 DIAGNOSIS — Z91.81 AT HIGH RISK FOR FALLS: ICD-10-CM

## 2022-07-14 DIAGNOSIS — G61.81 CIDP (CHRONIC INFLAMMATORY DEMYELINATING POLYNEUROPATHY) (HCC): ICD-10-CM

## 2022-07-14 PROCEDURE — 97112 NEUROMUSCULAR REEDUCATION: CPT

## 2022-07-14 ASSESSMENT — BALANCE ASSESSMENTS
TRANSFERS: 3
LOOK OVER LEFT AND RIGHT SHOULDERS WHILE STANDING: 2
LONG VERSION TOTAL SCORE (MAX 56): 28
STANDING UNSUPPORTED: 4
STANDING TO SITTING: 4
TURN 360 DEGREES: 1
STANDING UNSUPPORTED WITH EYES CLOSED: 3
LONG VERSION TOTAL SCORE (MAX 56): 28
REACHING FORWARD WITH OUTSTRETCHED ARM WHILE STANDING: 1
STANDING UNSUPPORTED ONE FOOT IN FRONT: 1
STANDING UNSUPPORTED WITH FEET TOGETHER: 1
PICK UP OBJECT FROM THE FLOOR FROM A STANDING POSITION: 0
SITTING UNSUPPORTED: 4
PLACE ALTERNATE FOOT ON STEP OR STOOL WHILE STANDING UNSUPPORTED: 0
SITTING TO STANDING: 4
STANDING ON ONE LEG: 0

## 2022-07-14 NOTE — OP THERAPY DAILY TREATMENT
Outpatient Physical Therapy  DAILY TREATMENT     [facility] Physical [address]  [address]  [place] [address]  Phone:  [phone]  Fax:  [phone]    Date: 07/14/2022    Patient: Noelle Campoverde  YOB: 1970  MRN: [id number]     Time Calculation    Start time: 1030  Stop time: 1113 Time Calculation (min): 43 minutes         Chief Complaint: Difficulty Walking    Visit #: 11    SUBJECTIVE:  Patient reports that she has progressed to using a small quad cane at home exclusively. She is no longer using the wheelchair except for community mobility and she is only using the wheelchair as she has not been cleared by PT.     She cancelled her appointment on Monday due to inability to don shoes due to an ingrown toenail.     Pharmacy would not refill steroid medication and ran out on Saturday. She has noted significant increase in lethargy and left leg pain. However, upon speaking with neurologist, she would like patient to wean off steroid and is refilling prescription but have not yet picked up.     Noted increase in UE tremors and medication has been increased to address.     Has noted increase in L hip and back pain with increased time outside of wheelchair.     OBJECTIVE:  Current objective measures:   5STS: 22.01 seconds without UE assist and SBA (improved from 23.80 BUE assist CGA)    Wc<>mat transfer Independent with hands for balance    6MWT:455 feet with SBQC and one seated rest break (improved from 293 feet with FWW SBA.)    Zapata Balance Total Score (0-56): 28 (previously 15) Completed without AD.      Therapeutic Exercises (CPT 22970): , .    20. POC 6/20-8/1      Therapeutic Exercise Summary: Auth approved  [id number]  97110x1, 97112 x3  6/20/2022-8/1/2022  12 V, 48 Units    Therapeutic Exercise Summary: Access Code: [email or web address]  URL: [email or web address]  Date: 05/02/2022  Prepared by:     Exercises 5/17  Supine Bridge - 1 x daily - 7 x weekly - 3  sets - 10 reps - 10 hold  Clamshell - 1 x daily - 7 x weekly - 2 sets - 10 reps - 10 hold  Sit to Stand with Counter Support - 1 x daily - 7 x weekly - 3 sets - 10 reps  Access Code: [email or web address]  URL: [email or web address]  Date: 05/17/2022  Prepared by:    Exercises  Standing March with Counter Support - 3 x daily - 7 x weekly - 2 min hold  Seated March - 1 x daily - 7 x weekly - 3 sets - 10 reps - 10 hold  Seated Long Arc Quad - 1 x daily - 7 x weekly - 3 sets - 10 reps - 10 hold    Access Code: [email or web address]  URL: [email or web address]  Date: 06/13/2022  Prepared by:    Exercises  Quadruped Alternating Leg Extensions - 1 x daily - 7 x weekly - 2 sets - 15 reps  Quadruped Hip Abduction and External Rotation - 1 x daily - 7 x weekly - 2 sets - 15 reps  Access Code: [email or web address]  URL: [email or web address]  Date: 06/13/2022  Prepared by:    Exercises  Supine Bridge - 1 x daily - 7 x weekly - 3 reps - 1 min hold    Access Code: [email or web address]  URL: [email or web address]  Date: 06/15/2022  Prepared by:    Exercises  Supine Piriformis Stretch with Foot on Ground - 1 x daily - 7 x weekly - 3-5 reps - 1 min hold        Therapeutic Treatments and Modalities:     1. Neuromuscular Re-education (CPT 96422)    Therapeutic Treatment and Modalities Summary: NMR:  -Completion of 5XSTS, CANTOR, and ambulation for completion of progress note. See above for details.     -Completed fall recovery for assessment. Able to perform with BUE support on stable surface independently and without cues for sequencing.     Time-based treatments/modalities:    Physical Therapy Timed Treatment Charges  Neuromusc re-ed, balance, coor, post minutes (CPT 02656): 43 minutes      ASSESSMENT:   Response to treatment: Patient has made significant improvements in strength, balance, and functional mobility since last progress note. However, she continues to be classified as a high fall risk and demonstrates poor endurance for balance and gait  activities. She would benefit from continued skilled physical therapy to return to a higher level of independence and safety with daily activities.     PLAN/RECOMMENDATIONS:   Plan for treatment: therapy treatment to continue next visit.  Planned interventions for next visit: continue with current treatment.

## 2022-07-14 NOTE — OP THERAPY PROGRESS SUMMARY
Outpatient Physical Therapy  PROGRESS SUMMARY NOTE      [facility] Physical Therapy [facility]  [address]  [address]  [facility] [address]  Phone:  [phone]  Fax:  [phone]    Date of Visit: 07/14/2022    Patient: Noelle Campoverde  YOB: 1970  MRN: [id number]     Referring Provider: RITCHIE Ragsdale  [address]  [place] [address]   Referring Diagnosis Chronic inflammatory demyelinating polyneuritis [G61.81]     Visit Diagnoses     ICD-10-CM   1. CIDP (chronic inflammatory demyelinating polyneuropathy) ([facility])  G61.81   2. Weakness  R53.1   3. At high risk for falls  Z91.81       Rehab Potential: good    Progress Report Period: 6/3/2022-7/14/2022    Functional Assessment Used          Objective Findings and Assessment:   Patient progression towards goals: Patient has been seen for 5 visits since last progress note was completed to address weakness, balance impairments, and decreased functional mobility as a result of CIDP. Patient has made significant improvements in strength, balance, and functional mobility since last progress note. However, she continues to be classified as a high fall risk and demonstrates poor endurance for balance and gait activities. She would benefit from continued skilled physical therapy to return to a higher level of independence and safety with daily activities.     Short Term Goals:   1. Pt will ambulate with LRAD CGA x 100 feet. MET  2. Pt will be compliant with HEP 3-5x per week for improving LE strength and stabilty. PROGRESSING    Long Term Goals:    1. Pt will demonstrate ability to perform transfers with LRAD mod I. MET  2. Pt will ambulate on sidewalk with LRAD x 100 feet CGA. NOT ASSESSED   3. Pt will ambulate on level surfaces with LRAD x 200 feet mod I to improve independent ambulation in the home. PARTIALLY MET - ambulated with SBQC >200 feet but requires SBA due to fatigue and LE weakness at this time.   4. Pt will  demonstrate improved functional LE strength with 5STS score improved 10 seconds from assessment. NOT MET  5. Pt will score low fall risk on appropriate outcome measure. NOT MET    Objective findings and assessment details: 5STS: 22.01 seconds without UE assist and SBA (improved from 23.80 BUE assist CGA)     Wc<>mat transfer Independent with hands for balance     6MWT:455 feet with SBQC and one seated rest break (improved from 293 feet with FWW SBA.)     Zapata Balance Total Score (0-56): 28 (previously 15) Completed without AD.    Goals:   Short Term Goals:   1. Pt will be compliant with HEP 3-5x per week for improving LE strength and stabilty.  2. Assess community ambulation and update goals as appropriate.     Short term goal time span:  2-4 weeks      Long Term Goals:    1. Pt will ambulate on sidewalk with LRAD x 100 feet CGA.   2. Pt will ambulate on level surfaces with LRAD x 200 feet mod I to improve independent ambulation in the home.   3. Pt will demonstrate improved functional LE strength with 5STS score improved 10 seconds from assessment.   4. Pt will score low fall risk on appropriate outcome measure.   Long term goal time span:  6-8 weeks    Plan:   Planned therapy interventions:  Neuromuscular Re-education (CPT 00556) and Therapeutic Exercise (CPT 63876)  Other planned therapy interventions:  97110x1, 97112 x3  Frequency:  2x week  Duration in weeks:  8      Referring provider co-signature:  I have reviewed this plan of care and my co-signature certifies the need for services.     Certification Period: 07/14/2022 to 09/08/22    Physician Signature: ________________________________ Date: ______________

## 2022-07-18 ENCOUNTER — PHYSICAL THERAPY (OUTPATIENT)
Dept: PHYSICAL THERAPY | Facility: REHABILITATION | Age: 52
End: 2022-07-18
Attending: NURSE PRACTITIONER
Payer: MEDICAID

## 2022-07-18 DIAGNOSIS — Z91.81 AT HIGH RISK FOR FALLS: ICD-10-CM

## 2022-07-18 DIAGNOSIS — R53.1 WEAKNESS: ICD-10-CM

## 2022-07-18 DIAGNOSIS — G61.81 CIDP (CHRONIC INFLAMMATORY DEMYELINATING POLYNEUROPATHY) (HCC): ICD-10-CM

## 2022-07-18 DIAGNOSIS — G82.20 PARAPLEGIA (HCC): ICD-10-CM

## 2022-07-18 PROCEDURE — 97112 NEUROMUSCULAR REEDUCATION: CPT

## 2022-07-18 NOTE — OP THERAPY DAILY TREATMENT
Outpatient Physical Therapy  DAILY TREATMENT     [facility] Physical [address].  [address]  [place] [address]  Phone:  [phone]  Fax:  [phone]    Date: 07/18/2022    Patient: Noelle Campoverde  YOB: 1970  MRN: [id number]     Time Calculation    Start time: 1020  Stop time: 1059 Time Calculation (min): 39 minutes         Chief Complaint: Difficulty Walking, Loss Of Balance, and Weakness    Visit #: 12    SUBJECTIVE:  Declined     Pt arrived late to appointment. States walking with SBQC in the home exclusively. Going to [place] this week and will be walking on the beach for a [place]. Plans to have help from family and use SBQC. Wants to go through with WC eval on Wednesday.  States her hip pain fluctuates but is also chronic x 20 years. States not consistent with HEP.       OBJECTIVE:  Current objective measures:   5STS: 22.01 seconds without UE assist and SBA (improved from 23.80 BUE assist CGA)    Wc<>mat transfer Independent with hands for balance    6MWT:455 feet with SBQC and one seated rest break (improved from 293 feet with FWW SBA.)      (previously 15) Completed without AD.      Therapeutic Exercises (CPT 68514): , .    20. POC 6/20-8/1      Therapeutic Exercise Summary: Auth approved  [id number]  97110x1, 97112 x3  6/20/2022-8/1/2022  12 V, 48 Units    Therapeutic Exercise Summary: Access Code: [email or web address]  URL: [email or web address]  Date: 05/02/2022  Prepared by:     Exercises 5/17  Supine Bridge - 1 x daily - 7 x weekly - 3 sets - 10 reps - 10 hold  Clamshell - 1 x daily - 7 x weekly - 2 sets - 10 reps - 10 hold  Sit to Stand with Counter Support - 1 x daily - 7 x weekly - 3 sets - 10 reps  Access Code: [email or web address]  URL: [email or web address]  Date: 05/17/2022  Prepared by:    Exercises  Standing March with Counter Support - 3 x daily - 7 x weekly - 2 min hold  Seated March - 1 x daily - 7 x weekly - 3 sets - 10 reps - 10  hold  Seated Long Arc Quad - 1 x daily - 7 x weekly - 3 sets - 10 reps - 10 hold    Access Code: [email or web address]  URL: [email or web address]  Date: 06/13/2022  Prepared by:    Exercises  Quadruped Alternating Leg Extensions - 1 x daily - 7 x weekly - 2 sets - 15 reps  Quadruped Hip Abduction and External Rotation - 1 x daily - 7 x weekly - 2 sets - 15 reps  Access Code: [email or web address]  URL: [email or web address]  Date: 06/13/2022  Prepared by:    Exercises  Supine Bridge - 1 x daily - 7 x weekly - 3 reps - 1 min hold    Access Code: [email or web address]  URL: [email or web address]  Date: 06/15/2022  Prepared by:    Exercises  Supine Piriformis Stretch with Foot on Ground - 1 x daily - 7 x weekly - 3-5 reps - 1 min hold        Therapeutic Treatments and Modalities:     1. Neuromuscular Re-education (CPT 61809)    Therapeutic Treatment and Modalities Summary: NMR:Outdoor ambulation SBQC including asphalt, ramps x 2, cement, and grass/uneven surfaces x 12 min total. No significant change in gait mechanics, no change in LOB. Quick fatigue with inc in hip pain. 1 standing rest.     Stepping over 1/2 foam roller no UE support CGA 2 x 15 each    Gait without  x 2 CGA first round SBA second round. Significant inc in fatigue and dec gait speed. Increased hip rotation without AD whenever no significant LOB      Time-based treatments/modalities:    Physical Therapy Timed Treatment Charges  Neuromusc re-ed, balance, coor, post minutes (CPT 41334): 39 minutes      ASSESSMENT:   Response to treatment: Progressed ambualtion with AD outdoors without signidicant LOB. Encouraged pt too perform small bouts of outdoor ambualtion but continued to encourage FWW for longer bouts. Continued to discuss wc eval as PT does not think it is necessary however she is adamant about completing evaluation. Also able to progress to ambulation without AD with fair stabilty more so limited by weakness and endurance than LOB. Continued to educate on  HEP compliance.     PLAN/RECOMMENDATIONS:   Plan for treatment: therapy treatment to continue next visit.  Planned interventions for next visit: continue with current treatment.

## 2022-07-19 ENCOUNTER — TELEPHONE (OUTPATIENT)
Dept: OCCUPATIONAL THERAPY | Facility: REHABILITATION | Age: 52
End: 2022-07-19
Payer: MEDICAID

## 2022-07-19 ENCOUNTER — PHYSICAL THERAPY (OUTPATIENT)
Dept: PHYSICAL THERAPY | Facility: REHABILITATION | Age: 52
End: 2022-07-19
Attending: NURSE PRACTITIONER
Payer: MEDICAID

## 2022-07-19 DIAGNOSIS — R53.1 WEAKNESS: ICD-10-CM

## 2022-07-19 DIAGNOSIS — G82.20 PARAPLEGIA (HCC): ICD-10-CM

## 2022-07-19 DIAGNOSIS — Z91.81 AT HIGH RISK FOR FALLS: ICD-10-CM

## 2022-07-19 DIAGNOSIS — G61.81 CIDP (CHRONIC INFLAMMATORY DEMYELINATING POLYNEUROPATHY) (HCC): ICD-10-CM

## 2022-07-19 PROCEDURE — 97112 NEUROMUSCULAR REEDUCATION: CPT

## 2022-07-19 NOTE — OP THERAPY PROGRESS SUMMARY
Outpatient Occupational Therapy  PROGRESS SUMMARY NOTE    [facility] [place] [address]  [address]  [place] [address]  Phone:  [phone]  Fax:  [phone]    Date of Visit: 07/19/2022    Patient: Noelle Campoverde  YOB: 1970  MRN: [id number]     Referring Provider:  RITCHIE Ragsdale  [address] [facility]  A9        Referring Diagnosis CIDP (chronic inflammatory demyelinating polyneuropathy) ([facility]) [G61.81]     Visit Diagnoses       ICD-10-CM   1. CIDP (chronic inflammatory demyelinating polyneuropathy) ([facility])             Rehab Potential: excellent    Progress Report Period: 6/16/22-7/19/22    Functional Assessment Used:  UEFI = 40/80          Objective Findings and Assessment:   Patient progression towards goals: Patient's last progress note was written on 6/16/22 and unfortunately OTR was only able to see patient 1 x on the 22nd and has not been able to see patient due to various reasons on the patient's part.  Unfortunately the extension for more visits expires on 7/22/22 so OTR will be requesting further approval for insurance for more visits.     Patient would benefit with continued OT to further enhance level of function and independence with ADLs.      Objective findings and assessment details: As of 6/22/22:  Current objective measures:   Neurological Testing      Sensation      Wrist/Hand   Left   Diminished:Protective sensation and hot/cold  Absent: light touch, pin prick, sharp/dull discrimination, static two point discrimination, dynamic two point discrimination a     Right   Diminished: protective sensation and hot/col  Absent: light touch, pin prick, sharp/dull discrimination, static two point discrimination, dynamic two point discrimination      Active Range of Motion      Left Wrist   Normal active range of motion     Right Wrist   Normal active range of motion     Left Thumb     Normal active range of motion     Right Thumb     Normal  active range of motion     Left Digits    Normal active range of motion     Right Digits   Normal active range of motion     Strength:       Left Wrist/Hand    (2nd hand position)     Trial 1: 35     Thumb Strength  Key/Lateral Pinch     Trial 1: 10  Palmar/Three-Point Pinch     Trial 1: 11     Right Wrist/Hand    (2nd hand position)     Trial 1: 34     Thumb Strength   Key/Lateral Pinch     Trial 1: 10  Palmar/Three-Point Pinch     Trial 1: 7     General Comments      Wrist/Hand Comments  9 hole peg test:  R=21 seconds  L=22 seconds     Activities of Daily Living:   Transfers/Mobility:    CGA/SBA with bed, chair, toilet and car.   Min A with shower chair     Toileting:     Toileting: supervision    Hygiene: supervision     Clothing management: supervision      Bathing:     Bathing: Sup UB, SBA washing hair and Kay LB    Bathing position: sitting    Bathing assistive device(s): shower chair     Dressing:     Dressing upper body: min A    Dressing lower body: mod A    Socks: max A     Shoes: max A   Patient has not purchased AE for LB ADLs at this time.      Min A meal prep now while seated    Goals:   Short Term Goals:   Patient will increase  strength to at least 35 pounds  pounds to enhance personal ADLs  Patient will be Mod I with toilet and shower chair transfers  Patient will be Min A LB dressing.  Patient will be Min A meal prep and light cooking tasks while standing  Patient will initiate pre-driving training to ascertain whether she is safe to return back to driving.    Short term goal timespan:  2-4 weeks    Long Term Goals:   Patient will improve  strength to at least 40 pounds to enhance personal ADLs.  Patient will be min A shower tasks.   Patient will be Mod I with LB dressing using AE and energy conservation techniques  Patient will be set up/supervision with meal prep and light cooking tasks while standing   Patient will score at least 50/80 on the UEFI  Long term goal timespan:  4-6  weeks    Plan:   Planned therapy interventions:  Therapeutic Exercise (CPT 99759)  Other planned therapy interventions:  97110 x 3  Frequency:  2x week  Duration in weeks:  6  Duration in visits:  12      Referring provider co-signature:  I have reviewed this plan of care and my co-signature certifies the need for services.    Certification Period: 07/19/2022 to 09/01/22    Physician Signature: ________________________________ Date: ______________

## 2022-07-19 NOTE — OP THERAPY DAILY TREATMENT
Outpatient Physical Therapy  DAILY TREATMENT     [address]  [address]  [place] [address]  Phone:  [phone]  Fax:  [phone]    Date: 07/19/2022    Patient: Noelle Campoverde  YOB: 1970  MRN: [id number]     Time Calculation    Start time: 0826  Stop time: 0859 Time Calculation (min): 33 minutes         Chief Complaint: Weakness, Difficulty Walking, and Loss Of Balance    Visit #: 13    SUBJECTIVE:  Declined     Pt arrived 10 min late to session. Reports currently no back/hip pain.      OBJECTIVE:  Current objective measures:   5STS: 22.01 seconds without UE assist and SBA (improved from 23.80 BUE assist CGA)    Wc<>mat transfer Independent with hands for balance    6MWT:455 feet with SBQC and one seated rest break (improved from 293 feet with FWW SBA.)      (previously 15) Completed without AD.      Therapeutic Exercises (CPT 97350): , .    20. POC 6/20-8/1      Therapeutic Exercise Summary: Auth approved  [id number]  97110x1, 97112 x3  6/20/2022-8/1/2022  12 V, 48 Units    Therapeutic Exercise Summary: Access Code: [email or web address]  URL: [email or web address]  Date: 05/02/2022  Prepared by:     Exercises 5/17  Supine Bridge - 1 x daily - 7 x weekly - 3 sets - 10 reps - 10 hold  Clamshell - 1 x daily - 7 x weekly - 2 sets - 10 reps - 10 hold  Sit to Stand with Counter Support - 1 x daily - 7 x weekly - 3 sets - 10 reps  Access Code: [email or web address]  URL: [email or web address]  Date: 05/17/2022  Prepared by:    Exercises  Standing March with Counter Support - 3 x daily - 7 x weekly - 2 min hold  Seated March - 1 x daily - 7 x weekly - 3 sets - 10 reps - 10 hold  Seated Long Arc Quad - 1 x daily - 7 x weekly - 3 sets - 10 reps - 10 hold    Access Code: [email or web address]  URL: [email or web address]  Date: 06/13/2022  Prepared by:    Exercises  Quadruped Alternating Leg Extensions - 1 x daily - 7 x weekly - 2 sets - 15  reps  Quadruped Hip Abduction and External Rotation - 1 x daily - 7 x weekly - 2 sets - 15 reps  Access Code: [email or web address]  URL: [email or web address]  Date: 06/13/2022  Prepared by:    Exercises  Supine Bridge - 1 x daily - 7 x weekly - 3 reps - 1 min hold    Access Code: [email or web address]  URL: [email or web address]  Date: 06/15/2022  Prepared by:    Exercises  Supine Piriformis Stretch with Foot on Ground - 1 x daily - 7 x weekly - 3-5 reps - 1 min hold        Therapeutic Treatments and Modalities:     1. Neuromuscular Re-education (CPT 50138)    Therapeutic Treatment and Modalities Summary: NMR:Stepping on/off airex CGA 2 x 15, with bilateral overhead press 5# BUE  Marching on airex 2 x 1 min CGA  Marching airex EC x 1 min CGA, 1 posterior LOB mod A to correct    Gait without  feet SPV x 3, 3 min rests between for emphasis on improving endurance.     Gait with hurry cane with multi directional perturbations of various size with CGA. No significant LOB or need for physical assistance. X 300 feet      Time-based treatments/modalities:    Physical Therapy Timed Treatment Charges  Neuromusc re-ed, balance, coor, post minutes (CPT 78603): 33 minutes      ASSESSMENT:   Response to treatment:  Pt continues to have endurance strength and balance deficits impacting function. However, demod good stabilty ambualting without AD during session and good stabilty amabulting with cane and perturbations.     PLAN/RECOMMENDATIONS:   Plan for treatment: therapy treatment to continue next visit.  Planned interventions for next visit: continue with current treatment.

## 2022-07-20 ENCOUNTER — APPOINTMENT (OUTPATIENT)
Dept: PHYSICAL THERAPY | Facility: MEDICAL CENTER | Age: 52
End: 2022-07-20
Attending: PSYCHIATRY & NEUROLOGY
Payer: MEDICAID

## 2022-07-25 ENCOUNTER — PHYSICAL THERAPY (OUTPATIENT)
Dept: PHYSICAL THERAPY | Facility: REHABILITATION | Age: 52
End: 2022-07-25
Attending: NURSE PRACTITIONER
Payer: MEDICAID

## 2022-07-25 DIAGNOSIS — G61.81 CIDP (CHRONIC INFLAMMATORY DEMYELINATING POLYNEUROPATHY) (HCC): ICD-10-CM

## 2022-07-25 DIAGNOSIS — R53.1 WEAKNESS: ICD-10-CM

## 2022-07-25 DIAGNOSIS — Z91.81 AT HIGH RISK FOR FALLS: ICD-10-CM

## 2022-07-25 DIAGNOSIS — G82.20 PARAPLEGIA (HCC): ICD-10-CM

## 2022-07-25 PROCEDURE — 97112 NEUROMUSCULAR REEDUCATION: CPT

## 2022-07-25 PROCEDURE — 97110 THERAPEUTIC EXERCISES: CPT

## 2022-07-25 NOTE — OP THERAPY DAILY TREATMENT
"  Outpatient Physical Therapy  DAILY TREATMENT     [facility] [place] [address]  [address]  [place] [address]  Phone:  [phone]  Fax:  [phone]    Date: 07/25/2022    Patient: Noelle Campoverde  YOB: 1970  MRN: [id number]     Time Calculation    Start time: 1045  Stop time: 1127 Time Calculation (min): 42 minutes         Chief Complaint: Weakness, Difficulty Walking, and Loss Of Balance    Visit #: 14    SUBJECTIVE:  [place] the [place] this weekend and reports increased pain BLE. U  Was walking with cane and HHA, very tired. Pain today is moderate in BLE.  OBJECTIVE:            Therapeutic Exercises (CPT 31911):     1. Supine piriformis stretch, 3 x 1 min B    2. Bridge hold, 4 x 1 min    3. STS 19\" table no UE, x 20      Therapeutic Exercise Summary: Auth ID [id number]  08/02/22-09/30/22  97110x 1, 97112 x 3  40 units = 10 visits    Therapeutic Treatments and Modalities:     1. Neuromuscular Re-education (CPT 49484)    Therapeutic Treatment and Modalities Summary: STS no UE EC 19\" table 2 x 20  For improving SL stabilty 2 x 15 SL bridge bilat  Ambulation without AD, significant limited by hip pain today. 3 x 150 feet with cues for scanning environment. Seated rests between bouts.     Time-based treatments/modalities:    Physical Therapy Timed Treatment Charges  Neuromusc re-ed, balance, coor, post minutes (CPT 06413): 25 minutes  Therapeutic exercise minutes (CPT 36849): 17 minutes          ASSESSMENT:   Response to treatment: Session today limited by BLE pain associated with pt significant increase in activity and difficult activities performed over the weekend. Continued to educate on HEP specifically provided to address this pain however pt continues to be noncompliant.     PLAN/RECOMMENDATIONS:   Plan for treatment: therapy treatment to continue next visit.  Planned interventions for next visit: continue with current treatment.       "

## 2022-07-26 ENCOUNTER — OCCUPATIONAL THERAPY (OUTPATIENT)
Dept: OCCUPATIONAL THERAPY | Facility: REHABILITATION | Age: 52
End: 2022-07-26
Attending: NURSE PRACTITIONER
Payer: MEDICAID

## 2022-07-26 DIAGNOSIS — G61.81 CIDP (CHRONIC INFLAMMATORY DEMYELINATING POLYNEUROPATHY) (HCC): ICD-10-CM

## 2022-07-26 PROCEDURE — 97110 THERAPEUTIC EXERCISES: CPT

## 2022-07-26 NOTE — OP THERAPY DAILY TREATMENT
Outpatient Occupational Therapy  DAILY TREATMENT     [facility] Therapy [address]  [address]  [place] [address]  Phone:  [phone]  Fax:  [phone]    Date: 07/26/2022    Patient: Noelle Campoverde  YOB: 1970  MRN: [id number]     Time Calculation  Start time: 1100  Stop time: 1145 Time Calculation (min): 45 minutes         Chief Complaint: Extremity Weakness and Self Care Duties    Visit #: 11    SUBJECTIVE:  I have started cooking again.  I stand for most of it and then I sit on my 4WW to rest when I get tired.     OBJECTIVE:  Current objective measures:   Neurological Testing      Sensation      Wrist/Hand   Left   Diminished:Protective sensation and hot/cold  Absent: light touch, pin prick, sharp/dull discrimination, static two point discrimination, dynamic two point discrimination a     Right   Diminished: protective sensation and hot/col  Absent: light touch, pin prick, sharp/dull discrimination, static two point discrimination, dynamic two point discrimination      Active Range of Motion      Left Wrist   Normal active range of motion     Right Wrist   Normal active range of motion     Left Thumb     Normal active range of motion     Right Thumb     Normal active range of motion     Left Digits    Normal active range of motion     Right Digits   Normal active range of motion     Strength:       Left Wrist/Hand    (2nd hand position)     Trial 1: 35     Thumb Strength  Key/Lateral Pinch     Trial 1: 10  Palmar/Three-Point Pinch     Trial 1: 11     Right Wrist/Hand    (2nd hand position)     Trial 1: 34     Thumb Strength   Key/Lateral Pinch     Trial 1: 10  Palmar/Three-Point Pinch     Trial 1: 7     General Comments      Wrist/Hand Comments  9 hole peg test:  R=21 seconds  L=22 seconds     Activities of Daily Living:   Transfers/Mobility:    CGA/SBA with bed, chair, toilet and car.   Min A with shower  chair     Toileting:     Toileting: supervision    Hygiene: supervision     Clothing management: supervision      Bathing:     Bathing: Sup UB, SBA washing hair and Kay LB    Bathing position: sitting    Bathing assistive device(s): shower chair     Dressing:     Dressing upper body: min A    Dressing lower body: mod A    Socks: max A     Shoes: max A   Patient has not purchased AE for LB ADLs at this time.      Min A meal prep now while seated        Therapeutic Exercises (CPT 88973):     1.  simulator for pre-driving training, Practice sessions in both the country and [facility] sessions to acclimate to the road, recommended speed, and use of the steering wheel, gas and brake pedals.  Braking response time:  1st attempt 3 seconds, 2nd attempt  0.9 seconds.  Following distance: 3-4 second rule:  Able to bring initial speed of 55 mph down to recommended speed of 40 mph.  Able to stay in brissa and under recommended speed.  Kept a safe distance from van in front.     2. UBE while standing , level 2 for 3.5 minutes and then 4 minutes and 40 seconds after a rest break.      Therapeutic Exercise Summary:  Worked on scenarios looking at dividing and focusing attention and did fairly well.  Would benefit from further practice      Time-based treatments/modalities:  Therapeutic exercise minutes (CPT 73820): 45 minutes        Pain rating before treatment: 4  Pain rating after treatment: 3    ASSESSMENT:   Response to treatment: Patient progressing well and using cane most of the time now.  Increased standing activity during kitchen/cooking tasks.      PLAN/RECOMMENDATIONS:   Plan for treatment: therapy treatment to continue next visit.  Planned interventions for next visit: continue with current treatment and therapeutic exercise (CPT 79622)

## 2022-07-27 ENCOUNTER — NON-PROVIDER VISIT (OUTPATIENT)
Dept: MEDICAL GROUP | Facility: MEDICAL CENTER | Age: 52
End: 2022-07-27
Attending: INTERNAL MEDICINE
Payer: MEDICAID

## 2022-07-27 ENCOUNTER — PHARMACY VISIT (OUTPATIENT)
Dept: PHARMACY | Facility: MEDICAL CENTER | Age: 52
End: 2022-07-27
Payer: COMMERCIAL

## 2022-07-27 VITALS
BODY MASS INDEX: 37.89 KG/M2 | SYSTOLIC BLOOD PRESSURE: 116 MMHG | HEART RATE: 89 BPM | WEIGHT: 194 LBS | DIASTOLIC BLOOD PRESSURE: 66 MMHG

## 2022-07-27 DIAGNOSIS — E11.9 TYPE 2 DIABETES MELLITUS WITHOUT COMPLICATION, WITHOUT LONG-TERM CURRENT USE OF INSULIN (HCC): ICD-10-CM

## 2022-07-27 PROCEDURE — RXMED WILLOW AMBULATORY MEDICATION CHARGE: Performed by: FAMILY MEDICINE

## 2022-07-27 PROCEDURE — 99213 OFFICE O/P EST LOW 20 MIN: CPT | Performed by: PHARMACIST

## 2022-07-27 RX ORDER — EMPAGLIFLOZIN, METFORMIN HYDROCHLORIDE 10; 1000 MG/1; MG/1
1 TABLET, EXTENDED RELEASE ORAL DAILY
Qty: 90 TABLET | Refills: 3 | Status: SHIPPED | OUTPATIENT
Start: 2022-07-27 | End: 2022-09-14

## 2022-07-27 RX ORDER — EMPAGLIFLOZIN, METFORMIN HYDROCHLORIDE 10; 1000 MG/1; MG/1
1 TABLET, EXTENDED RELEASE ORAL DAILY
Qty: 30 TABLET | Refills: 5 | Status: SHIPPED | OUTPATIENT
Start: 2022-07-27 | End: 2022-07-27 | Stop reason: SDUPTHER

## 2022-07-27 ASSESSMENT — FIBROSIS 4 INDEX: FIB4 SCORE: 0.54

## 2022-07-27 NOTE — PROGRESS NOTES
Primary care physician: RITCHIE Ragsdale    Reason for consult: Management of Uncontrolled Type 2 Diabetes    HPI:  Noelle Campoverde is a 52 y.o. old patient who comes in today for evaluation of above stated problem.    Most Recent HbA1c:   Lab Results   Component Value Date/Time    HBA1C 7.4 (A) 06/30/2022 10:00 AM    HBA1C 6.8 (H) 03/29/2022 09:49 AM    HBA1C 5.7 (H) 03/07/2019 07:34 AM       Diet- pt loves fruits but only eats 1-2 servings daily, she likes cereal for breakfast but I reminded daughter to choose options with less sugar, pt drinks plenty of water and very little soda once in awhile. She was encouraged to include more protein and vegetables in her diet.     Exercise- physical therapy 2 times weekly. Walks minimally with a cane. Her physical mobility is limited due to CIDP.    Daughter Corine is with patient and takes her to all her appointments. Pt states they recently got back from vacation so her diet was off track due to eating more fast foods. Overall, pt is watching diet her diet closely with the help of her daughter and is attending weekly physical therapy to improve mobility. She states being on Prednisone for over a year now, which I suspect may hinder weight loss despite making many changes to her diet.      Current Diabetes Regimen:  Tolerating  Metformin + SGLT-2 Inhibitor: Synjardy XR 10mg/1000mg      Blood Sugar Testing:  Test daily- readings looks great  7/12-7/26- 109,117,98,95,108,111,98  :  Hypoglycemia:  None      Labs: Reviewed    Physical Examination:  Vital signs: /66   Pulse 89   Wt 88 kg (194 lb)   LMP 03/08/2021   BMI 37.89 kg/m²  Body mass index is 37.89 kg/m².      Plan:    1) Cont current therapy  2) Increase protein/vegetable intake.  3) Refill synjardy  4) Monitor for wt gain with pt being on Prednisone    Return in about 5 weeks (around 8/31/2022).    Thank you for allowing me to participate in the care of this patient.    Astrid YOUNG  Bhaskar Knapp working under the guidance of Rosalino Garrett  07/27/22    CC:   RITCHIE Ragsdale

## 2022-07-28 ENCOUNTER — PHYSICAL THERAPY (OUTPATIENT)
Dept: PHYSICAL THERAPY | Facility: REHABILITATION | Age: 52
End: 2022-07-28
Attending: NURSE PRACTITIONER
Payer: MEDICAID

## 2022-07-28 DIAGNOSIS — G82.20 PARAPLEGIA (HCC): ICD-10-CM

## 2022-07-28 PROCEDURE — 97530 THERAPEUTIC ACTIVITIES: CPT

## 2022-07-28 PROCEDURE — 97116 GAIT TRAINING THERAPY: CPT

## 2022-07-28 PROCEDURE — 97110 THERAPEUTIC EXERCISES: CPT

## 2022-07-28 PROCEDURE — 97112 NEUROMUSCULAR REEDUCATION: CPT

## 2022-07-28 NOTE — OP THERAPY DAILY TREATMENT
"  Outpatient Physical Therapy  DAILY TREATMENT     [facility] Physical Therapy [place] [address]  [address]  [place] [address]  Phone:  [phone]  Fax:  [phone]    Date: 07/28/2022    Patient: Noelle Campoverde  YOB: 1970  MRN: [id number]     Time Calculation    Start time: 0930  Stop time: 1015 Time Calculation (min): 45 minutes         Chief Complaint: No chief complaint on file.    Visit #: 15    SUBJECTIVE:  Pt reports arriving tired today, notes the heat has been troublesome and she has not been very active this week. Reports mild pain in the L ankle and hip. Daughter mentioned pt has walked a lot one day with SPC around house which is why she has been more sedentary in the  the past few days.     OBJECTIVE:  Current objective measures:           Therapeutic Exercises (CPT 01026):     2. Bridge marching , 4 x 1 min    3. STS 19\" table no UE, to fatigue (about 20 x2) , focus on eccentric control    4. SL STS-decent, attempted SL decent onto 19\" plinth; pt requird toe touch assist with trunk rotation compensation; discontinued due to immediate decrease eccentric control    Therapeutic Treatments and Modalities:     1. Neuromuscular Re-education (CPT 53727)    Therapeutic Treatment and Modalities Summary: Ambulation with AD (SPC); gait belt donned; pt able to complete 4 laps around clinic with mild SOB and fatigue in bilateral LE  Ambulation without AD, gait belt donned with WC follow, 203x2 feet; pt tripped over L foot and recovered with hand on wall in hallway and therapist assist on gait belt    Time-based treatments/modalities:    Physical Therapy Timed Treatment Charges  Gait training minutes (CPT 84698): 15 minutes  Therapeutic activity minutes (CPT 54188): 10 minutes  Therapeutic exercise minutes (CPT 71552): 5 minutes    ASSESSMENT:   Response to treatment: Pt demonstrated increased fatigue on L LE > R LE with bridge marching today. Mild tremor noted in R UE " and visible muscle juddering in bilateral LE during sit to stands. Endurance and strength still continues to limit function, however, demod decent stabilty ambualting without AD during session. Pt had one instance of stumbling over L LE, catching on carpet but was able to recover with assist of therapist use of gait belt and hand against wall. Continue to increase LE strength and walking tolerance.     PLAN/RECOMMENDATIONS:   Plan for treatment: therapy treatment to continue next visit.  Planned interventions for next visit: continue with current treatment. SL eccentric sit to stands on higher plinth for SL strengthening      [] As the licensed therapist supervising this student, I was present during the entire treatment session directing the care and reviewing the assessment plan.  I reviewed all documentation prior to signing.

## 2022-08-03 ENCOUNTER — PHYSICAL THERAPY (OUTPATIENT)
Dept: PHYSICAL THERAPY | Facility: REHABILITATION | Age: 52
End: 2022-08-03
Attending: PSYCHIATRY & NEUROLOGY
Payer: MEDICAID

## 2022-08-03 DIAGNOSIS — G61.81 CIDP (CHRONIC INFLAMMATORY DEMYELINATING POLYNEUROPATHY) (HCC): ICD-10-CM

## 2022-08-03 DIAGNOSIS — G82.20 PARAPLEGIA (HCC): ICD-10-CM

## 2022-08-03 DIAGNOSIS — Z91.81 AT HIGH RISK FOR FALLS: ICD-10-CM

## 2022-08-03 DIAGNOSIS — R53.1 WEAKNESS: ICD-10-CM

## 2022-08-03 PROCEDURE — 97162 PT EVAL MOD COMPLEX 30 MIN: CPT

## 2022-08-03 PROCEDURE — 97542 WHEELCHAIR MNGMENT TRAINING: CPT

## 2022-08-03 SDOH — ECONOMIC STABILITY: HOUSING INSECURITY: ELEVATOR PRESENT: 0

## 2022-08-03 ASSESSMENT — ACTIVITIES OF DAILY LIVING (ADL)
SHOPPING: MODERATE ASSIST
DRESSING_LB_CURRENT_FUNCTION: INDEPENDENT
WASHING_LB_CURRENT_FUNCTION: MINIMUM ASSIST
WASHING_UPB_CURRENT_FUNCTION: INDEPENDENT
HAIR_CARE_CURRENT_FUNCTION: INDEPENDENT
EQUIPMENT_USED_WHILE_BATHING: SHOWER CHAIR
MAKEUP_CURRENT_FUNCTION: INDEPENDENT
BATHING_CURRENT_FUNCTION: MINIMUM ASSIST
CLOTHING_MANAGEMENT: INDEPENDENT
AMBULATION_WITH_ASSISTIVE_DEVICE: INDEPENDENT
TOILETING: INDEPENDENT
LIGHT HOUSEKEEPING: UNABLE
BRUSHING_TEETH_DENTURES_CURRENT_FUNCTION: INDEPENDENT
TOILETING_POSITION: SITTING
DRESSING_SOCKS_CURRENT_FUNCTION: UNABLE
CLEANSING: INDEPENDENT
WASHING_BACK_CURRENT_FUNCTION: MINIMUM ASSIST
PREPARING MEALS: STAND BY ASSIST
DRESSING_CURRENT_FUNCTION: INDEPENDENT
WASHING_HAIR_CURRENT_FUNCTION: MINIMUM ASSIST
LAUNDRY: UNABLE
BATHING_POSITION: SITTING
DRESSING_UB_CURRENT_FUNCTION: INDEPENDENT
FEEDING: INDEPENDENT

## 2022-08-03 ASSESSMENT — BALANCE ASSESSMENTS
TURN 360 DEGREES: 2
BALANCE - SITTING STATIC: NORMAL
LOOK OVER LEFT AND RIGHT SHOULDERS WHILE STANDING: 4
STANDING UNSUPPORTED: 2
STANDING UNSUPPORTED WITH FEET TOGETHER: 2
STANDING ON ONE LEG: 1
PLACE ALTERNATE FOOT ON STEP OR STOOL WHILE STANDING UNSUPPORTED: 1
TRANSFERS: 4
SITTING UNSUPPORTED: 4
BALANCE - STANDING STATIC: GOOD
WEIGHT SHIFT STANDING: FAIR
WEIGHT SHIFT SITTING: NORMAL
BALANCE - SITTING DYNAMIC: NORMAL
LONG VERSION TOTAL SCORE (MAX 56): 37
SITTING TO STANDING: 4
STANDING UNSUPPORTED ONE FOOT IN FRONT: 1
STANDING TO SITTING: 4
BALANCE - STANDING DYNAMIC: FAIR +
REACHING FORWARD WITH OUTSTRETCHED ARM WHILE STANDING: 3
PICK UP OBJECT FROM THE FLOOR FROM A STANDING POSITION: 2
STANDING UNSUPPORTED WITH EYES CLOSED: 3
LONG VERSION TOTAL SCORE (MAX 56): 37

## 2022-08-03 ASSESSMENT — ENCOUNTER SYMPTOMS
LOWER EXTREMITY EDEMA: 0
PAIN SCALE AT LOWEST: 0
QUALITY: SHOOTING
PAIN SCALE: 0
PAIN TIMING: INTERMITTENT
PAIN SCALE AT HIGHEST: 10
PRECIPITATING FACTORS - STAIRS: 0
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
## 2022-08-05 ENCOUNTER — OCCUPATIONAL THERAPY (OUTPATIENT)
Dept: OCCUPATIONAL THERAPY | Facility: REHABILITATION | Age: 52
End: 2022-08-05
Attending: NURSE PRACTITIONER
Payer: MEDICAID

## 2022-08-05 ENCOUNTER — APPOINTMENT (OUTPATIENT)
Dept: PHYSICAL THERAPY | Facility: REHABILITATION | Age: 52
End: 2022-08-05
Attending: PSYCHIATRY & NEUROLOGY
Payer: MEDICAID

## 2022-08-05 DIAGNOSIS — R53.1 WEAKNESS: ICD-10-CM

## 2022-08-05 DIAGNOSIS — G61.81 CIDP (CHRONIC INFLAMMATORY DEMYELINATING POLYNEUROPATHY) (HCC): ICD-10-CM

## 2022-08-05 DIAGNOSIS — Z91.81 AT HIGH RISK FOR FALLS: ICD-10-CM

## 2022-08-05 DIAGNOSIS — G82.20 PARAPLEGIA (HCC): ICD-10-CM

## 2022-08-05 PROCEDURE — 97110 THERAPEUTIC EXERCISES: CPT

## 2022-08-05 PROCEDURE — 97112 NEUROMUSCULAR REEDUCATION: CPT

## 2022-08-05 NOTE — OP THERAPY DAILY TREATMENT
Outpatient Physical Therapy  DAILY TREATMENT     [facility] Physical [address].  [address]  [place] [address]  Phone:  [phone]  Fax:  [phone]    Date: 08/05/2022    Patient: Noelle Campoverde  YOB: 1970  MRN: [id number]     Time Calculation    Start time: 1145  Stop time: 1227 Time Calculation (min): 42 minutes         Chief Complaint: Loss Of Balance and Difficulty Walking    Visit #: 16    SUBJECTIVE:  Able to cut food last night and make the soup. Because of all the standing smome soreness and feeling tired.  Finished round 3 IVIG yesterday.    OBJECTIVE:            Therapeutic Exercises (CPT 84953):     1. Timed walking for endurance, 250 feet, hurry cane, 1. 2:53, 2. 2:32 3. 2:13    2. *rests required due to complaints LE pain, limited rests between bouts to 1.5-2 min to emphsize endurance      Therapeutic Exercise Summary: [facility] ID [id number]  08/02/22-09/30/22  97110x 1, 97112 x 3  40 units = 10 visits    Therapeutic Treatments and Modalities:     1. Neuromuscular Re-education (CPT 84116)    Therapeutic Treatment and Modalities Summary: For improving reactive balance, weight shifting, SLS, and step length completed clock yourself ashley at 40spm 3 x 4 min. CGA throughout. Errors 1: 11, 2. 7, 3. 8. Did required max A on 2 occasions round 1, and 1 max A round 3 while attempting left posterior lateral stepping.    For improving dynamic balance, reaching inside/outside DARREN  Pt standing normal DARREN on airex with 26 sticky notes with letters of alphabet.  Pt instructed to identify letter in order A-Z grab with 1 UE and no UE support and then replace on wall. Including overhead, lowe and inside outside DARREN. Required 2.5 min to complete x 2 rounds. No severe lob but did utilize UE for support when reaching over head.    Time-based treatments/modalities:    Physical Therapy Timed Treatment Charges  Neuromusc re-ed, balance, coor, post minutes (CPT 62688): 30  minutes  Therapeutic exercise minutes (CPT 78631): 12 minutes          ASSESSMENT:   Response to treatment: Session somewhat limited by fatigue and pain with pt increased activity last evening however demonstrating good progress. Emphasize multi directional stepping for improving reactive balance and weight shifting with significant fatigue and mod difficulty. Most common errors occurring in posterolateral stepping with mod cues for weight shifting as well as increased step length to target.     PLAN/RECOMMENDATIONS:   Plan for treatment: therapy treatment to continue next visit.  Planned interventions for next visit: continue with current treatment.
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
## 2022-08-08 ENCOUNTER — OCCUPATIONAL THERAPY (OUTPATIENT)
Dept: OCCUPATIONAL THERAPY | Facility: REHABILITATION | Age: 52
End: 2022-08-08
Attending: NURSE PRACTITIONER
Payer: MEDICAID

## 2022-08-08 DIAGNOSIS — G61.81 CIDP (CHRONIC INFLAMMATORY DEMYELINATING POLYNEUROPATHY) (HCC): ICD-10-CM

## 2022-08-08 PROCEDURE — 97110 THERAPEUTIC EXERCISES: CPT

## 2022-08-08 NOTE — OP THERAPY DAILY TREATMENT
Outpatient Occupational Therapy  DAILY TREATMENT     [facility] Occupational Therapy [address].  [address]  [place] [address]  Phone:  [phone]  Fax:  [phone]    Date: 08/08/2022    Patient: Noelle Hobbs  YOB: 1970  MRN: [id number]     Time Calculation  Start time: 0845  Stop time: 0930 Time Calculation (min): 45 minutes         Chief Complaint: Extremity Weakness and Self Care Duties    Visit #: 13    SUBJECTIVE:  I was going to practice driving with my daughter this weekend, but I had bad tremors in my hands and feet.      OBJECTIVE:  Current objective measures:   Neurological Testing      Sensation      Wrist/Hand   Left   Diminished:Protective sensation and hot/cold  Absent: light touch, pin prick, sharp/dull discrimination, static two point discrimination, dynamic two point discrimination a     Right   Diminished: protective sensation and hot/col  Absent: light touch, pin prick, sharp/dull discrimination, static two point discrimination, dynamic two point discrimination      Active Range of Motion      Left Wrist   Normal active range of motion     Right Wrist   Normal active range of motion     Left Thumb     Normal active range of motion     Right Thumb     Normal active range of motion     Left Digits    Normal active range of motion     Right Digits   Normal active range of motion     Strength:       Left Wrist/Hand    (2nd hand position)     Trial 1: 35     Thumb Strength  Key/Lateral Pinch     Trial 1: 10  Palmar/Three-Point Pinch     Trial 1: 11     Right Wrist/Hand    (2nd hand position)     Trial 1: 34     Thumb Strength   Key/Lateral Pinch     Trial 1: 10  Palmar/Three-Point Pinch     Trial 1: 7     General Comments      Wrist/Hand Comments  9 hole peg test:  R=21 seconds  L=22 seconds     Activities of Daily Living:   Transfers/Mobility:    CGA/SBA with bed, chair, toilet and car.   Min A with shower  chair     Toileting:     Toileting: supervision    Hygiene: supervision     Clothing management: supervision      Bathing:     Bathing: Sup UB, SBA washing hair and Kay LB    Bathing position: sitting    Bathing assistive device(s): shower chair     Dressing:     Dressing upper body: min A    Dressing lower body: mod A    Socks: max A     Shoes: max A   Patient has not purchased AE for LB ADLs at this time.      Min A meal prep now while standing for several minutes and then rests on 4WW  UEFI= 39/80        Therapeutic Exercises (CPT 73314):     1.  simulator for pre-driving training    2. Dynamic standing , folding laundry while standing at elevated mat.  Able to tolerate for 6 minutes before requiring a rest break.      3. UBE while standing , Resistance 2 for 4/5 minutes and rest break required afterwards    Therapeutic Exercise Summary:  Patient a lot more confident using driving simulator.  Scenarios completed in city setting in all types of weather and night time driving as well.  Progressing well with this and ready to transition to driving with daughter.       Time-based treatments/modalities:  Therapeutic exercise minutes (CPT 88230): 45 minutes        Pain rating before treatment: 1  Pain rating after treatment: 4  Lower back   ASSESSMENT:   Response to treatment: Progressing well with therapy and increasing standing tolerance noted.      PLAN/RECOMMENDATIONS:   Plan for treatment: therapy treatment to continue next visit.  Planned interventions for next visit: continue with current treatment and therapeutic exercise (CPT 84128)

## 2022-08-09 NOTE — OP THERAPY DAILY TREATMENT
Outpatient Physical Therapy  DAILY TREATMENT     [facility] Physical [address].  [address]  [place] [address]  Phone:  [phone]  Fax:  [phone]    Date: 08/10/2022    Patient: Noelle Campoverde  YOB: 1970  MRN: [id number]     Time Calculation    Start time: 0830  Stop time: 0910 Time Calculation (min): 40 minutes         Chief Complaint: Weakness and Loss Of Balance    Visit #: 17    SUBJECTIVE:  Hip feeling ok today. Continues to walk in home with hurry cane    OBJECTIVE:            Therapeutic Exercises (CPT 20812):       Therapeutic Exercise Summary: Auth ID [id number]  08/02/22-09/30/22  97110x 1, 97112 x 3  40 units = 10 visits    Therapeutic Treatments and Modalities:     1. Neuromuscular Re-education (CPT 87964)    Therapeutic Treatment and Modalities Summary: For improving dynamic balance and stabilty   Tall kneeling on airex  Overhead press 5# B 3 x 15    For improving SL stabilty, core stabilty, and endurance  1/2 kneeling on airex, completed bilateral  Delt flies 5# BUE 2 x 15 SBA, occasional touch a, cues dec lateral trunk lean, increased difficulty    1/2 kneeling on airex bilateral reaching anteriorly outside DARREN, mod cues to facilitate weight shift/flexion to dec trunk compensation. Provided visual target o assist with max weight shift. Frequent min A for instability when returning to neutral with 1 instance mod A. HHA for positioning. 3 x 10 bilateral      Floor transfers x 3 SBA transition from quadruped, tall kneeling, half kneeling with use of UE on mat table.   Due to significant fatigue seated rests between sets and exercises. PRE 8/10 throughout  Time-based treatments/modalities:    Physical Therapy Timed Treatment Charges  Neuromusc re-ed, balance, coor, post minutes (CPT 40002): 40 minutes          ASSESSMENT:   Response to treatment: Emphasis of session on SL stabilty in half kneeling performing UE tasks and reaching to promote  improved balance and weight shifting. Significant fatigue requiring frequent rests. Better performance in left tall kneeling vs Rt tall kneeling. Mod inc in back pain per pt at end of session.     PLAN/RECOMMENDATIONS:   Plan for treatment: therapy treatment to continue next visit.  Planned interventions for next visit: continue with current treatment.

## 2022-08-10 ENCOUNTER — PHYSICAL THERAPY (OUTPATIENT)
Dept: PHYSICAL THERAPY | Facility: REHABILITATION | Age: 52
End: 2022-08-10
Attending: PSYCHIATRY & NEUROLOGY
Payer: MEDICAID

## 2022-08-10 DIAGNOSIS — G82.20 PARAPLEGIA (HCC): ICD-10-CM

## 2022-08-10 DIAGNOSIS — Z91.81 AT HIGH RISK FOR FALLS: ICD-10-CM

## 2022-08-10 DIAGNOSIS — G61.81 CIDP (CHRONIC INFLAMMATORY DEMYELINATING POLYNEUROPATHY) (HCC): ICD-10-CM

## 2022-08-10 DIAGNOSIS — R53.1 WEAKNESS: ICD-10-CM

## 2022-08-10 PROCEDURE — 97112 NEUROMUSCULAR REEDUCATION: CPT

## 2022-08-11 ENCOUNTER — OCCUPATIONAL THERAPY (OUTPATIENT)
Dept: OCCUPATIONAL THERAPY | Facility: REHABILITATION | Age: 52
End: 2022-08-11
Attending: NURSE PRACTITIONER
Payer: MEDICAID

## 2022-08-11 ENCOUNTER — OFFICE VISIT (OUTPATIENT)
Dept: MEDICAL GROUP | Facility: MEDICAL CENTER | Age: 52
End: 2022-08-11
Attending: NURSE PRACTITIONER
Payer: MEDICAID

## 2022-08-11 VITALS
SYSTOLIC BLOOD PRESSURE: 122 MMHG | OXYGEN SATURATION: 94 % | WEIGHT: 189 LBS | HEART RATE: 117 BPM | TEMPERATURE: 96.6 F | HEIGHT: 60 IN | DIASTOLIC BLOOD PRESSURE: 76 MMHG | RESPIRATION RATE: 16 BRPM | BODY MASS INDEX: 37.11 KG/M2

## 2022-08-11 DIAGNOSIS — L08.9 TOE INFECTION: ICD-10-CM

## 2022-08-11 DIAGNOSIS — G61.81 CIDP (CHRONIC INFLAMMATORY DEMYELINATING POLYNEUROPATHY) (HCC): ICD-10-CM

## 2022-08-11 DIAGNOSIS — L08.9 SKIN INFECTION: ICD-10-CM

## 2022-08-11 PROCEDURE — 99213 OFFICE O/P EST LOW 20 MIN: CPT | Performed by: NURSE PRACTITIONER

## 2022-08-11 PROCEDURE — 97110 THERAPEUTIC EXERCISES: CPT

## 2022-08-11 ASSESSMENT — ENCOUNTER SYMPTOMS
WHEEZING: 0
ABDOMINAL PAIN: 0
PALPITATIONS: 0
BLOOD IN STOOL: 0
CONSTIPATION: 0
SHORTNESS OF BREATH: 0
COUGH: 0
FEVER: 0
CHILLS: 0
WEIGHT LOSS: 0
DIARRHEA: 0

## 2022-08-11 ASSESSMENT — FIBROSIS 4 INDEX: FIB4 SCORE: 0.54

## 2022-08-11 NOTE — OP THERAPY DAILY TREATMENT
Outpatient Occupational Therapy  DAILY TREATMENT     [facility] Occupational Therapy [address].  [address]  [place] [address]  Phone:  [phone]  Fax:  [phone]    Date: 08/11/2022    Patient: Noelle Hobbs  YOB: 1970  MRN: [id number]     Time Calculation  Start time: 1100  Stop time: 1145 Time Calculation (min): 45 minutes         Chief Complaint: Extremity Weakness and Self Care Duties    Visit #: 14    SUBJECTIVE:  I was able to dress myself including my socks and shoes, but I did still need some help from my daughter for my bra.      OBJECTIVE:  Current objective measures:   Neurological Testing      Sensation      Wrist/Hand   Left   Diminished:Protective sensation and hot/cold  Absent: light touch, pin prick, sharp/dull discrimination, static two point discrimination, dynamic two point discrimination a     Right   Diminished: protective sensation and hot/col  Absent: light touch, pin prick, sharp/dull discrimination, static two point discrimination, dynamic two point discrimination      Active Range of Motion    Full AROM of both upper extremities     Strength:       Left Wrist/Hand    (2nd hand position)     Trial 1: 35     Thumb Strength  Key/Lateral Pinch     Trial 1: 10  Palmar/Three-Point Pinch     Trial 1: 11     Right Wrist/Hand    (2nd hand position)     Trial 1: 34     Thumb Strength   Key/Lateral Pinch     Trial 1: 10  Palmar/Three-Point Pinch     Trial 1: 7     General Comments      Wrist/Hand Comments  9 hole peg test:  R=21 seconds  L=22 seconds     Activities of Daily Living:   Transfers/Mobility:    CGA/SBA with bed, chair, toilet and car.   Min A with shower chair     Toileting:     Mod I     Bathing:     Bathing: set up/supervision    Bathing position: sitting    Bathing assistive device(s): shower chair     Dressing:     Dressing upper body: min A-for bra only    Dressing lower body: set up     Socks: Mod I     Shoes: Mod I       Min  A meal prep now while standing for several minutes and then rests on 4WW  UEFI= 39/80        Therapeutic Exercises (CPT 76663):     1.  simulator for pre-driving training    Therapeutic Exercise Summary:  Various scenarios completed in city mode looking at risky situations and crash avoidance.  Able to safely complete during the night, with average visibility and heavy rain.  Less shaking/tremors noted in lower extremities today and minimal in hands.  Patient has not yet practiced with daughter in vehicle as she feels she needs to build up her confidence.      Time-based treatments/modalities:  Therapeutic exercise minutes (CPT 00372): 45 minutes        Pain rating before treatment: 0  Pain rating after treatment: 0    ASSESSMENT:   Response to treatment: Increased independence utilizing the  simulator with minimal verbal cues from OTR.  Improving ADLs at home.      PLAN/RECOMMENDATIONS:   Plan for treatment: therapy treatment to continue next visit.  Planned interventions for next visit: continue with current treatment and therapeutic exercise (CPT 33472)

## 2022-08-12 NOTE — ASSESSMENT & PLAN NOTE
Ongoing tenderness on bilat great toes.  She denies erythema and warmth.  She has been keeping it clean with the assistance of her daughter.

## 2022-08-12 NOTE — PROGRESS NOTES
Chief Complaint   Patient presents with    Toe Injury       Subjective:     HPI:   Noelle Hobbs is a 52 y.o. female here to discuss the evaluation and management of:        Toe infection  Ongoing tenderness on bilat great toes.  She denies erythema and warmth.  She has been keeping it clean with the assistance of her daughter.    ROS  Review of Systems   Constitutional:  Negative for chills, fever, malaise/fatigue and weight loss.   Respiratory:  Negative for cough, shortness of breath and wheezing.    Cardiovascular:  Negative for chest pain, palpitations and leg swelling.   Gastrointestinal:  Negative for abdominal pain, blood in stool, constipation and diarrhea.       Allergies   Allergen Reactions    Latex Rash     hives       Current medicines (including changes today)  Current Outpatient Medications   Medication Sig Dispense Refill    mupirocin (BACTROBAN) 2 % Ointment Apply 1 Application topically 2 times a day. 22 g 0    Empagliflozin-metFORMIN HCl ER (SYNJARDY XR)  MG TABLET SR 24 HR Take 1 Tablet by mouth every day. 90 Tablet 3    omeprazole (PRILOSEC OTC) 20 MG tablet Take 1 Tablet by mouth every day. 30 Tablet 5    Blood Glucose Monitoring Suppl (BLOOD GLUCOSE MONITOR SYSTEM) w/Device Kit Use to test 2 times a day and as needed 1 Kit 0    Lancets 30G Misc Use to test twice daily 100 Each 11    glucose blood strip Use to test twice daily 100 Strip 11    Alcohol Swabs Use to test twice a day 100 Each 11    ergocalciferol (DRISDOL) 57877 UNIT capsule Take 1 Capsule by mouth every 7 days. 8 Capsule 0    mupirocin (BACTROBAN) 2 % Ointment Apply 1 Application topically 2 times a day. 22 g 0    ROPINIRole (REQUIP) 0.5 MG Tab       ROPINIRole (REQUIP) 0.5 MG Tab Take 0.5 mg by mouth 2 times a day.      traZODone (DESYREL) 50 MG Tab TAKE 1 TABLET BY MOUTH EVERY DAY AT BEDTIME AS NEEDED FOR INSOMNIA      atorvastatin (LIPITOR) 20 MG Tab Take 1 Tablet by mouth every evening. 30 Tablet 5     lisinopril (PRINIVIL) 5 MG Tab Take 1 Tablet by mouth every day. 30 Tablet 5    Misc. Devices Misc Please provide the patient with a walker with a seat. G61.81 1 Each 0    gabapentin (NEURONTIN) 100 MG Cap Take 200 mg by mouth 3 times a day.      predniSONE (DELTASONE) 50 MG Tab Take 50 mg by mouth every day.       No current facility-administered medications for this visit.       Social History     Tobacco Use    Smoking status: Some Days     Types: Cigarettes    Smokeless tobacco: Never    Tobacco comments:     3 cigarettes/day   Vaping Use    Vaping Use: Never used   Substance Use Topics    Alcohol use: No    Drug use: No       Patient Active Problem List    Diagnosis Date Noted    Toe infection 06/15/2022    Type 2 diabetes mellitus without complication, without long-term current use of insulin ([facility]) 05/17/2022    Gastroesophageal reflux disease 05/17/2022    Hyperlipidemia associated with type 2 diabetes mellitus ([facility]) 05/17/2022    Encounter to establish care 03/24/2022    CIDP (chronic inflammatory demyelinating polyneuropathy) ([facility]) 03/24/2022    Polyneuropathy due to medical condition ([facility]) 10/24/2021    Clear cell renal cell carcinoma, right ([facility]) 10/24/2021    Neuropathy 10/24/2021    H/O right radical nephrectomy 10/23/2021    LFT elevation 10/23/2021    Mixed stress and urge urinary incontinence 09/14/2021    Renal mass 09/14/2021    Vitamin D deficiency 04/01/2019    Anemia 04/01/2019    Vitamin B12 deficiency 04/01/2019    Smoker 04/01/2019    Neck pain 02/12/2019    BMI 28.0-28.9,adult 02/12/2019       Family History   Problem Relation Age of Onset    Osteoporosis Mother     Alzheimer's Disease Mother     Arthritis Mother     Diabetes Father     Heart Disease Father     Stroke Father         TIA    Cancer Other         one nephew leukemia, the other liver ca          Objective:     /76 (BP Location: Left arm, Patient Position: Sitting, BP Cuff Size: Large adult)   Pulse (!) 117   Temp 35.9 °C  (96.6 °F) (Temporal)   Resp 16   Ht 1.524 m (5')   Wt 85.7 kg (189 lb)   SpO2 94%  Body mass index is 36.91 kg/m².    Physical Exam:  Physical Exam  Constitutional:       General: She is not in acute distress.  HENT:      Head: Normocephalic.      Right Ear: Tympanic membrane and external ear normal.      Left Ear: Tympanic membrane and external ear normal.   Eyes:      Conjunctiva/sclera: Conjunctivae normal.      Pupils: Pupils are equal, round, and reactive to light.   Neck:      Trachea: No tracheal deviation.   Cardiovascular:      Rate and Rhythm: Normal rate and regular rhythm.      Heart sounds: Normal heart sounds.   Pulmonary:      Effort: Pulmonary effort is normal.      Breath sounds: Normal breath sounds.   Abdominal:      General: Bowel sounds are normal.      Palpations: Abdomen is soft.   Musculoskeletal:         General: Normal range of motion.      Cervical back: Normal range of motion and neck supple.   Feet:      Right foot:      Skin integrity: No erythema or warmth.      Toenail Condition: Right toenails are ingrown.      Left foot:      Skin integrity: No erythema or warmth.      Toenail Condition: Left toenails are ingrown.   Lymphadenopathy:      Head:      Right side of head: No preauricular adenopathy.      Left side of head: No preauricular adenopathy.      Cervical: No cervical adenopathy.   Skin:     General: Skin is warm and dry.   Neurological:      Mental Status: She is alert and oriented to person, place, and time.      Sensory: Sensation is intact.      Gait: Gait is intact.   Psychiatric:         Mood and Affect: Mood and affect normal.         Judgment: Judgment normal.       Assessment and Plan:     The following treatment plan was discussed:    1. Skin infection  mupirocin (BACTROBAN) 2 % Ointment  -Chronic problem, unstable.  Unfortunately removing the bilateral sidewalls of her toenails has not helped her issues.  Continue Bactroban.  Referral to podiatry for further  evaluation and treatment.      2. Toe infection  Referral to Podiatry          Any change or worsening of signs or symptoms, patient encouraged to follow-up or report to emergency room for further evaluation. Patient verbalizes understanding and agrees.    Follow-Up: Return in about 6 weeks (around 9/22/2022) for Routine follow up.      PLEASE NOTE: This dictation was created using voice recognition software. I have made every reasonable attempt to correct obvious errors, but I expect that there are errors of grammar and possibly content that I did not discover before finalizing the note.

## 2022-08-15 ENCOUNTER — PHYSICAL THERAPY (OUTPATIENT)
Dept: PHYSICAL THERAPY | Facility: REHABILITATION | Age: 52
End: 2022-08-15
Attending: PSYCHIATRY & NEUROLOGY
Payer: MEDICAID

## 2022-08-15 DIAGNOSIS — Z91.81 AT HIGH RISK FOR FALLS: ICD-10-CM

## 2022-08-15 DIAGNOSIS — G82.20 PARAPLEGIA (HCC): ICD-10-CM

## 2022-08-15 DIAGNOSIS — G61.81 CIDP (CHRONIC INFLAMMATORY DEMYELINATING POLYNEUROPATHY) (HCC): ICD-10-CM

## 2022-08-15 DIAGNOSIS — R53.1 WEAKNESS: ICD-10-CM

## 2022-08-15 PROCEDURE — 97110 THERAPEUTIC EXERCISES: CPT

## 2022-08-15 PROCEDURE — 97112 NEUROMUSCULAR REEDUCATION: CPT

## 2022-08-15 NOTE — OP THERAPY DAILY TREATMENT
"  Outpatient Physical Therapy  DAILY TREATMENT     [facility] Physical Therapy [address]  [address]  [place] [address]  Phone:  [phone]  Fax:  [phone]    Date: 08/15/2022    Patient: Noelle Campoverde  YOB: 1970  MRN: [id number]     Time Calculation    Start time: 1125  Stop time: 1206 Time Calculation (min): 41 minutes         Chief Complaint: Loss Of Balance and Weakness    Visit #: 18    SUBJECTIVE:  Pt reports continues to have most trouble standing for more than a few min as well as walking more than 5 min due to fatigue as well as LE \"shaking\" and pain.    OBJECTIVE:            Therapeutic Exercises (CPT 33754):     1. Ql stretch seated, 2 x 1 min B, added HEP    2. farmers carry, 3 x 3 min (250 feet) 5# db Lt UE, sig LE fatigue, mod pain      Therapeutic Exercise Summary: Auth ID [id number]  08/02/22-09/30/22  97110x 1, 97112 x 3  40 units = 10 visits    Therapeutic Treatments and Modalities:     1. Neuromuscular Re-education (CPT 75249)    Therapeutic Treatment and Modalities Summary: STS 19\" table on red wedge for incline 3 x 15 CGA  STS EC on red wedge. Max A x 3 events for posterior LOB 2 x 15    Standing dynamics balance on red wedge incline  CGA throughout, occasional max A for posterior LOB, mod difficulty for pt to respond to anterior weight shift, improved with tactile cues.   -horizontal head turns, frequent mod A for LOB x 1 min, vertical head turn x 1 min occasional mod A for LOB    Education for endurance improvements at home. Instructed to perform standing task as tolerated by pain fatigue approx 5 min until able to achieve easily then progress to 6 in. Same for gait.  Time-based treatments/modalities:    Physical Therapy Timed Treatment Charges  Neuromusc re-ed, balance, coor, post minutes (CPT 71602): 30 minutes  Therapeutic exercise minutes (CPT 26095): 11 minutes          ASSESSMENT:   Response to treatment: Encouraged endurance specific " training at home for gait and standing tolerance as this is most lingering concern. Significant time educating on importance of daily performance as well as how to progress/regress as needed for symptoms. Pt verbalized understanding.     PLAN/RECOMMENDATIONS:   Plan for treatment: therapy treatment to continue next visit.  Planned interventions for next visit: continue with current treatment.

## 2022-08-18 ENCOUNTER — OCCUPATIONAL THERAPY (OUTPATIENT)
Dept: OCCUPATIONAL THERAPY | Facility: REHABILITATION | Age: 52
End: 2022-08-18
Attending: NURSE PRACTITIONER
Payer: MEDICAID

## 2022-08-18 DIAGNOSIS — G61.81 CIDP (CHRONIC INFLAMMATORY DEMYELINATING POLYNEUROPATHY) (HCC): ICD-10-CM

## 2022-08-18 PROCEDURE — 97110 THERAPEUTIC EXERCISES: CPT

## 2022-08-18 NOTE — OP THERAPY DAILY TREATMENT
"  Outpatient Physical Therapy  DAILY TREATMENT     [facility] Physical Therapy [address].  [address]  [place] [address]  Phone:  [phone]  Fax:  [phone]    Date: 08/19/2022    Patient: Noelle Campoverde  YOB: 1970  MRN: [id number]     Time Calculation    Start time: 0915  Stop time: 0957 Time Calculation (min): 42 minutes         Chief Complaint: Weakness and Loss Of Balance    Visit #: 19    SUBJECTIVE:  No new complaints. Pt walked a lot yesterday, feeling tired.  Declined .     OBJECTIVE:            Therapeutic Exercises (CPT 53714):     1. Ql stretch seated, 2 x 1 min B, added HEP    2. bridge hold, 4 x 1 min      Therapeutic Exercise Summary: Auth ID [id number]  08/02/22-09/30/22  97110x 1, 97112 x 3  40 units = 10 visits    Therapeutic Treatments and Modalities:     1. Neuromuscular Re-education (CPT 47430)    Therapeutic Treatment and Modalities Summary: STS 19\" table on red wedge for incline x 15 SBA  Split STS red wedge incline no UE  3 x 12 BLE SBA  STS EC on red wedge x 10, Sba, 1 mod A for anterior LOB    Standing dynamics balance on red wedge incline  CGA throughout,  -horizontal head turns, frequent mod A for LOB x 1 min, vertical head turn x 1 min occasional mod A for LOB  -EC 2 x 1 min SBA    Time-based treatments/modalities:    Physical Therapy Timed Treatment Charges  Neuromusc re-ed, balance, coor, post minutes (CPT 83008): 30 minutes  Therapeutic exercise minutes (CPT 58016): 12 minutes          ASSESSMENT:   Response to treatment:   Demod improved stabilty on STS on inclined proprioceptive surface. Goods stabilty with split stance positioning as well. Demos fatigue and visible muscle control/contractions on proprioceptive surfaces. Discussed POC and will complete PN in two weeks to determine need for further PT vs HEP. Verbalized agreement.   PLAN/RECOMMENDATIONS:   Plan for treatment: therapy treatment to continue next " visit.  Planned interventions for next visit: continue with current treatment.

## 2022-08-18 NOTE — OP THERAPY PROGRESS SUMMARY
Outpatient Occupational Therapy  PROGRESS SUMMARY NOTE    [facility] Occupational Therapy [facility]  [address]  [address]  [place] [address]  Phone:  [phone]  Fax:  [phone]    Date of Visit: 08/18/2022    Patient: Noelle Hobbs  YOB: 1970  MRN: [id number]     Referring Provider: RITCHIE Ragsdale  [address] [facility]  [address]  [place] [address]   Referring Diagnosis Chronic inflammatory demyelinating polyneuritis [G61.81]     Visit Diagnoses     ICD-10-CM   1. CIDP (chronic inflammatory demyelinating polyneuropathy) ([facility])  G61.81       Rehab Potential: excellent    Progress Report Period: 7/19/22-8/18/22    Functional Assessment Used:  UEFI = 48/80          Objective Findings and Assessment:   Patient progression towards goals: Patient has continued to participate in OT 2 x a week and continues to progress with improved standing balance/tolerance, strength and improving function.  Patient has successfully completed use of driving simulator for pre-driving and has done very well and is ready to transition back to driving with daughter in car for first 5-10 drives to provide feedback and then, when ready, she can progress back to independent driving during the day, during quiet times, during good weather and avoid the freeway.    Patient would benefit from continued OT intervention to further enhance activity tolerance/endurance and level of function.    Objective findings and assessment details: Current objective measures:   Neurological Testing      Sensation      Wrist/Hand   Left   Diminished:Protective sensation and hot/cold  Absent: light touch, pin prick, sharp/dull discrimination, static two point discrimination, dynamic two point discrimination a     Right   Diminished: protective sensation and hot/col  Absent: light touch, pin prick, sharp/dull discrimination, static two point discrimination, dynamic two point discrimination      Active Range of Motion    Full AROM  of both upper extremities     Strength:       Left Wrist/Hand    (2nd hand position)     Trial 1: 36     Thumb Strength  Key/Lateral Pinch     Trial 1: 10  Palmar/Three-Point Pinch     Trial 1: 11     Right Wrist/Hand    (2nd hand position)     Trial 1: 40     Thumb Strength   Key/Lateral Pinch     Trial 1: 10  Palmar/Three-Point Pinch     Trial 1: 7     General Comments      Wrist/Hand Comments  9 hole peg test:  R=20 seconds  L=22 seconds     Activities of Daily Living:   Transfers/Mobility:   Mod I with bed, chair and car transfers  SBA/supervision with shower chair transfers     Toileting:     Mod I     Bathing:     Bathing: set up/supervision    Bathing position: sitting    Bathing assistive device(s): shower chair     Dressing:     Dressing upper body: min A-for bra only    Dressing lower body: set up     Socks: Mod I     Shoes: Mod I       Min A meal prep now while standing for several minutes and then rests on 4WW  Mobilizing with 4 point cane.     Goals:   Short Term Goals:   Patient will increase  strength to at least 45 pounds  pounds to enhance personal ADLs  Patient will be Mod I with shower chair transfers  Patient will be Set up/supervision LB dressing.  Patient will be set up/supervision  prep and light cooking tasks while standing    Short term goal timespan:  2-4 weeks    Long Term Goals:   Patient will improve  strength to at least 50 pounds to enhance personal ADLs.  Patient will be mod I with shower tasks.   Patient will be Mod I with LB dressing using AE and energy conservation techniques  Patient will be Mod I with meal prep and light cooking tasks while standing   Patient will be Mod I with buttons, zips and tying laces.   Patient will score at least 60/80 on the UEFI  Long term goal timespan:  4-6 weeks    Plan:   Planned therapy interventions:  Therapeutic Exercise (CPT 14006)  Other planned therapy interventions:  97110 x 3  Frequency:  1x week  Duration in weeks:   6  Duration in visits:  6    Referring provider co-signature:  I have reviewed this plan of care and my co-signature certifies the need for services.    Certification Period: 08/18/2022 to 10/06/22    Physician Signature: ________________________________ Date: ______________

## 2022-08-18 NOTE — OP THERAPY DAILY TREATMENT
Outpatient Occupational Therapy  DAILY TREATMENT     [facility] Occupational Therapy [address]  [address]  [place] [address]  Phone:  [phone]  Fax:  [phone]    Date: 08/18/2022    Patient: Noelle Hobbs  YOB: 1970  MRN: [id number]     Time Calculation  Start time: 1100  Stop time: 1145 Time Calculation (min): 45 minutes         Chief Complaint: Extremity Weakness and Self Care Duties    Visit #: 15    SUBJECTIVE:  I am standing a lot more when I am cooking.  Not so much when I fold laundry.  I need to work on that.    OBJECTIVE:  Current objective measures:   Neurological Testing      Sensation      Wrist/Hand   Left   Diminished:Protective sensation and hot/cold  Absent: light touch, pin prick, sharp/dull discrimination, static two point discrimination, dynamic two point discrimination a     Right   Diminished: protective sensation and hot/col  Absent: light touch, pin prick, sharp/dull discrimination, static two point discrimination, dynamic two point discrimination      Active Range of Motion    Full AROM of both upper extremities     Strength:       Left Wrist/Hand    (2nd hand position)     Trial 1: 36     Thumb Strength  Key/Lateral Pinch     Trial 1: 10  Palmar/Three-Point Pinch     Trial 1: 11     Right Wrist/Hand    (2nd hand position)     Trial 1: 40     Thumb Strength   Key/Lateral Pinch     Trial 1: 10  Palmar/Three-Point Pinch     Trial 1: 7     General Comments      Wrist/Hand Comments  9 hole peg test:  R=20 seconds  L=22 seconds     Activities of Daily Living:   Transfers/Mobility:   Mod I with bed, chair and car transfers  SBA/supervision with shower chair transfers     Toileting:     Mod I     Bathing:     Bathing: set up/supervision    Bathing position: sitting    Bathing assistive device(s): shower chair     Dressing:     Dressing upper body: min A-for bra only    Dressing lower body: set up     Socks: Mod I     Shoes: Mod I       Min  A meal prep now while standing for several minutes and then rests on 4WW  Mobilizing with 4 point cane.   UEFI= 48/80        Therapeutic Exercises (CPT 97434):     1. 9# digi-flex, 10 x  for each hand    2. medium resistance theraputty    3. fine motor manipulation and dexterity    4. UBE on level 2 while standing., 5 minutes without a rest    Therapeutic Exercise Summary:  Patient stated she feels she does need to keep practicing with the driving simulator as she feels she is ready to progress and needs to build up her confidence.      Time-based treatments/modalities:  Therapeutic exercise minutes (CPT 16740): 45 minutes        Pain rating before treatment: 0  Pain rating after treatment: 0    ASSESSMENT:   Response to treatment: continues to improve with therapy with improving endurance, stranding tolerance and function.  Progress note completed today to request more visits.      PLAN/RECOMMENDATIONS:   Plan for treatment: therapy treatment to continue next visit.  Planned interventions for next visit: continue with current treatment and therapeutic exercise (CPT 36784)

## 2022-08-19 ENCOUNTER — PHYSICAL THERAPY (OUTPATIENT)
Dept: PHYSICAL THERAPY | Facility: REHABILITATION | Age: 52
End: 2022-08-19
Attending: PSYCHIATRY & NEUROLOGY
Payer: MEDICAID

## 2022-08-19 DIAGNOSIS — R53.1 WEAKNESS: ICD-10-CM

## 2022-08-19 DIAGNOSIS — Z91.81 AT HIGH RISK FOR FALLS: ICD-10-CM

## 2022-08-19 DIAGNOSIS — G82.20 PARAPLEGIA (HCC): ICD-10-CM

## 2022-08-19 DIAGNOSIS — G61.81 CIDP (CHRONIC INFLAMMATORY DEMYELINATING POLYNEUROPATHY) (HCC): ICD-10-CM

## 2022-08-19 PROCEDURE — 97110 THERAPEUTIC EXERCISES: CPT

## 2022-08-19 PROCEDURE — 97112 NEUROMUSCULAR REEDUCATION: CPT

## 2022-08-22 ENCOUNTER — PHYSICAL THERAPY (OUTPATIENT)
Dept: PHYSICAL THERAPY | Facility: REHABILITATION | Age: 52
End: 2022-08-22
Attending: PSYCHIATRY & NEUROLOGY
Payer: MEDICAID

## 2022-08-22 DIAGNOSIS — G82.20 PARAPLEGIA (HCC): ICD-10-CM

## 2022-08-22 DIAGNOSIS — Z91.81 AT HIGH RISK FOR FALLS: ICD-10-CM

## 2022-08-22 DIAGNOSIS — R53.1 WEAKNESS: ICD-10-CM

## 2022-08-22 DIAGNOSIS — G61.81 CIDP (CHRONIC INFLAMMATORY DEMYELINATING POLYNEUROPATHY) (HCC): ICD-10-CM

## 2022-08-22 PROCEDURE — 97112 NEUROMUSCULAR REEDUCATION: CPT

## 2022-08-22 NOTE — OP THERAPY DAILY TREATMENT
Outpatient Physical Therapy  DAILY TREATMENT     [facility] Physical Therapy [address]  [address]  [place] [address]  Phone:  [phone]  Fax:  [phone]    Date: 08/22/2022    Patient: Noelle Campoverde  YOB: 1970  MRN: [id number]     Time Calculation    Start time: 1130  Stop time: 1211 Time Calculation (min): 41 minutes         Chief Complaint: Weakness    Visit #: 20  5/10  SUBJECTIVE:  Pt reports back pain increased after last session. Somewhat complaint with HEP bu unable to verbalize of demo any of exercises.  Declined .     OBJECTIVE:            Therapeutic Exercises (CPT 88724):       Therapeutic Exercise Summary: Auth ID [id number]  08/02/22-09/30/22  97110x 1, 97112 x 3  40 units = 10 visits    Therapeutic Treatments and Modalities:     1. Neuromuscular Re-education (CPT 89091)    Therapeutic Treatment and Modalities Summary: For improving LE coordination, stabilty, and quad control  Shuttle squats cord 1-5 3 x 15  SL shuttle squats cords 1-3 3 x 15 B, mod cues quad control end range  Shuttle squat BOSU ball side down 3 x 12 cords 1-4    Tall kneeling on BOSU ball side up, in front of mat for UE support  X 2 min total  X 2 min horizontal head turns, frequent UE use, occasional touch A  X2 min vertical had turns -almost constant need UE  X 2 min EC  Time-based treatments/modalities:    Physical Therapy Timed Treatment Charges  Neuromusc re-ed, balance, coor, post minutes (CPT 50399): 41 minutes          ASSESSMENT:   Response to treatment:   Emphasis of session on quad control for improved stabilty. Demod good carry over to quad control cues with reps and significant fatigue.  PLAN/RECOMMENDATIONS:   Plan for treatment: therapy treatment to continue next visit.  Planned interventions for next visit: continue with current treatment.

## 2022-08-26 ENCOUNTER — APPOINTMENT (OUTPATIENT)
Dept: PHYSICAL THERAPY | Facility: REHABILITATION | Age: 52
End: 2022-08-26
Attending: NURSE PRACTITIONER
Payer: MEDICAID

## 2022-09-01 ENCOUNTER — APPOINTMENT (OUTPATIENT)
Dept: OCCUPATIONAL THERAPY | Facility: REHABILITATION | Age: 52
End: 2022-09-01
Attending: NURSE PRACTITIONER
Payer: MEDICAID

## 2022-09-06 ENCOUNTER — APPOINTMENT (OUTPATIENT)
Dept: OCCUPATIONAL THERAPY | Facility: REHABILITATION | Age: 52
End: 2022-09-06
Attending: NURSE PRACTITIONER
Payer: MEDICAID

## 2022-09-08 ENCOUNTER — APPOINTMENT (OUTPATIENT)
Dept: OCCUPATIONAL THERAPY | Facility: REHABILITATION | Age: 52
End: 2022-09-08
Attending: NURSE PRACTITIONER
Payer: MEDICAID

## 2022-09-08 NOTE — OP THERAPY DAILY TREATMENT
Outpatient Occupational Therapy  DAILY TREATMENT     [facility] Occupational [address].  [address]  [place] [address]  Phone:  [phone]  Fax:  [phone]    Date: 09/08/2022    Patient: Noelle Hobbs  YOB: 1970  MRN: [id number]     Time Calculation                 Chief Complaint: No chief complaint on file.    Visit #: 16    SUBJECTIVE:  ***    OBJECTIVE:  Current objective measures:   Neurological Testing      Sensation      Wrist/Hand   Left   Diminished:Protective sensation and hot/cold  Absent: light touch, pin prick, sharp/dull discrimination, static two point discrimination, dynamic two point discrimination a     Right   Diminished: protective sensation and hot/col  Absent: light touch, pin prick, sharp/dull discrimination, static two point discrimination, dynamic two point discrimination      Active Range of Motion    Full AROM of both upper extremities     Strength:       Left Wrist/Hand    (2nd hand position)     Trial 1: 36     Thumb Strength  Key/Lateral Pinch     Trial 1: 10  Palmar/Three-Point Pinch     Trial 1: 11     Right Wrist/Hand    (2nd hand position)     Trial 1: 40     Thumb Strength   Key/Lateral Pinch     Trial 1: 10  Palmar/Three-Point Pinch     Trial 1: 7     General Comments      Wrist/Hand Comments  9 hole peg test:  R=20 seconds  L=22 seconds     Activities of Daily Living:   Transfers/Mobility:   Mod I with bed, chair and car transfers  SBA/supervision with shower chair transfers     Toileting:     Mod I     Bathing:     Bathing: set up/supervision    Bathing position: sitting    Bathing assistive device(s): shower chair     Dressing:     Dressing upper body: min A-for bra only    Dressing lower body: set up     Socks: Mod I     Shoes: Mod I       Min A meal prep now while standing for several minutes and then rests on 4WW  Mobilizing with 4 point cane.         Therapeutic Exercises (CPT 25395):     1. 9# digi-flex, 10 x  " for each hand    2. medium resistance theraputty    3. fine motor manipulation and dexterity    4. UBE on level 2 while standing., 5 minutes without a rest    Therapeutic Exercise Summary:       Time-based treatments/modalities:           Pain rating before treatment: {PAIN NUMBERS_1-10:91833}  Pain rating after treatment: {PAIN NUMBERS_1-10:87297}    ASSESSMENT:   Response to treatment: ***    PLAN/RECOMMENDATIONS:   Plan for treatment: {Therapy Plan:[id number]::\"therapy treatment to continue next visit\"}.  Planned interventions for next visit: {planned OT Interventions:120539380}         "

## 2022-09-14 ENCOUNTER — NON-PROVIDER VISIT (OUTPATIENT)
Dept: MEDICAL GROUP | Facility: MEDICAL CENTER | Age: 52
End: 2022-09-14
Attending: INTERNAL MEDICINE
Payer: MEDICAID

## 2022-09-14 VITALS
HEIGHT: 60 IN | HEART RATE: 98 BPM | BODY MASS INDEX: 37.34 KG/M2 | SYSTOLIC BLOOD PRESSURE: 115 MMHG | DIASTOLIC BLOOD PRESSURE: 78 MMHG | WEIGHT: 190.2 LBS

## 2022-09-14 DIAGNOSIS — E11.9 TYPE 2 DIABETES MELLITUS WITHOUT COMPLICATION, WITHOUT LONG-TERM CURRENT USE OF INSULIN (HCC): ICD-10-CM

## 2022-09-14 LAB
HBA1C MFR BLD: 6.2 % (ref 0–5.6)
INT CON NEG: ABNORMAL
INT CON POS: ABNORMAL

## 2022-09-14 PROCEDURE — 83036 HEMOGLOBIN GLYCOSYLATED A1C: CPT

## 2022-09-14 PROCEDURE — 99213 OFFICE O/P EST LOW 20 MIN: CPT | Performed by: PHARMACIST

## 2022-09-14 RX ORDER — EMPAGLIFLOZIN, METFORMIN HYDROCHLORIDE 25; 1000 MG/1; MG/1
1 TABLET, EXTENDED RELEASE ORAL DAILY
Qty: 30 TABLET | Refills: 5 | Status: SHIPPED | OUTPATIENT
Start: 2022-09-14 | End: 2023-03-08

## 2022-09-14 RX ORDER — ERGOCALCIFEROL 1.25 MG/1
50000 CAPSULE ORAL
Qty: 8 CAPSULE | Refills: 0 | Status: SHIPPED | OUTPATIENT
Start: 2022-09-14 | End: 2023-04-18

## 2022-09-14 ASSESSMENT — FIBROSIS 4 INDEX: FIB4 SCORE: 0.54

## 2022-09-14 NOTE — PROGRESS NOTES
New Patient Consult Note  Primary care physician: RITCHIE Ragsdale  In  10:30  Out 11:15    Reason for consult: Management of Uncontrolled Type 2 Diabetes    HPI:  Noelle Hobbs is a 52 y.o. old patient who comes in today for evaluation of above stated problem.    *required Occitan translation*    Pt is complaining of taking to many medication and having issue with stomach upset/acid.   Doesn't want any additional meds if possible. She is compliant on medication though and has made lifestyle changes. She has been eating more salads and reduced her bread. She says Mon-Fri she is really strict with herself, but on the weekend may have a cheat dessert/snack. She tries to be active but recently with the smoke has been sitting around at home.    Most Recent HbA1c:   Lab Results   Component Value Date/Time    HBA1C 6.2 (A) 09/14/2022 10:30 AM    HBA1C 7.4 (A) 06/30/2022 10:00 AM    HBA1C 6.8 (H) 03/29/2022 09:49 AM       Current Diabetes Regimen:  Metformin + SGLT-2 Inhibitor: empagliflozin-metformin(10/1000 mg) once daily     SMBG   Before Breakfast: 94, 95, 105, 98, 95, 99, 100, 90, 92, 80, 89, 79, 102, 112, 96, 99, 92, 92, 92, 110, 115, 110, 92, 94, 107  Before Lunch:  Before Dinner:  Before Bedtime:  Other times:  Hypoglycemia:  None      ROS:  Constitutional: No weight loss  Cardiac: No palpitations or racing heart  Resp: No shortness of breath  Neuro: No numbness or tinging in feet  Endo: No heat or cold intolerance, no polyuria or polydipsia  All other systems were reviewed and were negative.    Past Medical History:  Patient Active Problem List    Diagnosis Date Noted    Toe infection 06/15/2022    Type 2 diabetes mellitus without complication, without long-term current use of insulin (HCC) 05/17/2022    Gastroesophageal reflux disease 05/17/2022    Hyperlipidemia associated with type 2 diabetes mellitus (HCC) 05/17/2022    Encounter to establish care 03/24/2022    CIDP (chronic inflammatory  demyelinating polyneuropathy) (HCC) 03/24/2022    Polyneuropathy due to medical condition (HCC) 10/24/2021    Clear cell renal cell carcinoma, right (HCC) 10/24/2021    Neuropathy 10/24/2021    H/O right radical nephrectomy 10/23/2021    LFT elevation 10/23/2021    Mixed stress and urge urinary incontinence 09/14/2021    Renal mass 09/14/2021    Vitamin D deficiency 04/01/2019    Anemia 04/01/2019    Vitamin B12 deficiency 04/01/2019    Smoker 04/01/2019    Neck pain 02/12/2019    BMI 28.0-28.9,adult 02/12/2019       Past Surgical History:  Past Surgical History:   Procedure Laterality Date    NEPHRECTOMY ROBOTIC XI Right 10/20/2021    Procedure: NEPHRECTOMY, ROBOT-ASSISTED, USING DA CORNELIUS XI - RADICAL;  Surgeon: Jay Painting M.D.;  Location: SURGERY [facility];  Service: Gen Robotic    ORIF, ANKLE Right 9/9/2021    Procedure: ORIF, ANKLE;  Surgeon: Alexander Castillo M.D.;  [facility]: [facility]ERY [facility];  Service: Orthopedics    TUBAL LIGATION         Allergies:  Latex    Social History:  Social History     Socioeconomic History    Marital status:      Spouse name: Not on file    Number of children: Not on file    Years of education: Not on file    Highest education level: 8th grade   Occupational History    Not on file   Tobacco Use    Smoking status: Some Days     Types: Cigarettes    Smokeless tobacco: Never    Tobacco comments:     3 cigarettes/day   Vaping Use    Vaping Use: Never used   Substance and Sexual Activity    Alcohol use: No    Drug use: No    Sexual activity: Not Currently     Partners: Male     Comment:    Other Topics Concern    Not on file   Social History Narrative    Not on file     Social Determinants of Health     Financial Resource Strain: Low Risk     Difficulty of Paying Living Expenses: Not very hard   Food Insecurity: No Food Insecurity    Worried About Running Out of Food in the Last Year: Never true    Ran Out of Food in the Last Year: Never true   Transportation  Needs: No Transportation Needs    Lack of Transportation (Medical): No    Lack of Transportation (Non-Medical): No   Physical Activity: Inactive    Days of Exercise per Week: 0 days    Minutes of Exercise per Session: 0 min   Stress: Stress Concern Present    Feeling of Stress : To some extent   Social Connections: Socially Isolated    Frequency of Communication with Friends and Family: More than three times a week    Frequency of Social Gatherings with Friends and Family: More than three times a week    Attends [employer] Services: Never    Active Member of Clubs or Organizations: No    Attends Club or Organization Meetings: Never    Marital Status:    Intimate Partner Violence: Not on file   Housing Stability: Low Risk     Unable to Pay for Housing in the Last Year: No    Number of Places Lived in the Last Year: 2    Unstable Housing in the Last Year: No       Family History:  Family History   Problem Relation Age of Onset    Osteoporosis Mother     Alzheimer's Disease Mother     Arthritis Mother     Diabetes Father     Heart Disease Father     Stroke Father         TIA    Cancer Other         one nephew leukemia, the other liver ca       Medications:    Current Outpatient Medications:     Empagliflozin-metFORMIN HCl ER (SYNJARDY XR)  MG TABLET SR 24 HR, Take 1 Each by mouth every day., Disp: 30 Tablet, Rfl: 5    ergocalciferol (DRISDOL) 80416 UNIT capsule, Take 1 Capsule by mouth every 7 days., Disp: 8 Capsule, Rfl: 0    omeprazole (PRILOSEC OTC) 20 MG tablet, Take 1 Tablet by mouth every day., Disp: 30 Tablet, Rfl: 5    Blood Glucose Monitoring Suppl (BLOOD GLUCOSE MONITOR SYSTEM) w/Device Kit, Use to test 2 times a day and as needed, Disp: 1 Kit, Rfl: 0    Lancets 30G Misc, Use to test twice daily, Disp: 100 Each, Rfl: 11    glucose blood strip, Use to test twice daily, Disp: 100 Strip, Rfl: 11    Alcohol Swabs, Use to test twice a day, Disp: 100 Each, Rfl: 11    atorvastatin (LIPITOR) 20 MG Tab,  Take 1 Tablet by mouth every evening., Disp: 30 Tablet, Rfl: 5    lisinopril (PRINIVIL) 5 MG Tab, Take 1 Tablet by mouth every day., Disp: 30 Tablet, Rfl: 5    Misc. Devices Misc, Please provide the patient with a walker with a seat. G61.81, Disp: 1 Each, Rfl: 0    gabapentin (NEURONTIN) 100 MG Cap, Take 200 mg by mouth 3 times a day., Disp: , Rfl:     predniSONE (DELTASONE) 50 MG Tab, Take 50 mg by mouth every day., Disp: , Rfl:     ROPINIRole (REQUIP) 0.5 MG Tab, Take 0.5 mg by mouth 2 times a day., Disp: , Rfl:     Labs: Reviewed    Physical Examination:  Vital signs: /78   Pulse 98   Ht 1.524 m (5')   Wt 86.3 kg (190 lb 3.2 oz)   LMP 03/08/2021   BMI 37.15 kg/m²  Body mass index is 37.15 kg/m².  General: No apparent distress, cooperative  Eyes: No scleral icterus or discharge  ENMT: Normal on external inspection of nose, lips, normal thyroid exam  Neck: No abnormal masses on inspection  Resp: Normal effort, clear to auscultation bilaterally   CVS: Regular rate and rhythm, S1 S2 normal, no murmur   Extremities: No edema  Abdomen: abdominal obesity present  Neuro: Alert and oriented  Skin: No rash  Psych: Normal mood and affect, intact memory and able to make informed decisions    Plan:    There are no diagnoses linked to this encounter.    DM - Pt's SMBG shows improvement of her BG and is at goal with her FBG. Congratulated pt on reduction of a1c, She is willing to optimize jardiance dose. Will hold off on increasing metformin today due to tolerance.  - D/c Jardiane and metformin   Increase to Synjardy 25/1009mg QDAY  - Vitamin D ran out few weeks ago and did not retest. Reordered  - follow up labs ordered, get them done before next visit (in 2 months)    Return in about 9 weeks (around 11/16/2022).    Thank you for allowing me to participate in the care of this patient.    Niranjan Schulte, PharmD   09/14/22    CC:   RITCHIE Ragsdale

## 2022-09-19 ENCOUNTER — OCCUPATIONAL THERAPY (OUTPATIENT)
Dept: OCCUPATIONAL THERAPY | Facility: REHABILITATION | Age: 52
End: 2022-09-19
Attending: NURSE PRACTITIONER
Payer: MEDICAID

## 2022-09-19 DIAGNOSIS — G61.81 CIDP (CHRONIC INFLAMMATORY DEMYELINATING POLYNEUROPATHY) (HCC): ICD-10-CM

## 2022-09-19 PROCEDURE — 97110 THERAPEUTIC EXERCISES: CPT

## 2022-09-19 NOTE — OP THERAPY DISCHARGE SUMMARY
Outpatient Occupational Therapy  DISCHARGE SUMMARY NOTE    [facility] Occupational Therapy [facility]  [address]  [address]  [place] [address]  Phone:  [phone]  Fax:  [phone]    Date of Visit: 09/19/2022    Patient: Noelle Hobbs  YOB: 1970  MRN: [id number]     Referring Provider: RITCHIE Ragsdale  [address] [address]   Referring Diagnosis: No admission diagnoses are documented for this encounter.         Functional Assessment Used:  UEFI = 68/80        Your patient is being discharged from Occupational Therapy with the following comments:   Goals met    Comments:  Patient has done very well with therapy and now Mod I with all personal ADLs, cooking, laundry and light housework.  Successfully completed pre-driving training using our simulator.       Limitations Remaining:  Endurance    Recommendations:  Continue with MK Moyer/L    Date: 9/19/2022

## 2022-09-19 NOTE — OP THERAPY DAILY TREATMENT
Outpatient Occupational Therapy  DAILY TREATMENT     [facility] Occupational Therapy [address]  [address]  [place] [address]  Phone:  [phone]  Fax:  [phone]    Date: 09/19/2022    Patient: Noelle Hobbs  YOB: 1970  MRN: [id number]     Time Calculation  Start time: 0145  Stop time: 0230 Time Calculation (min): 45 minutes         Chief Complaint: Extremity Weakness and Self Care Duties    Visit #: 16    SUBJECTIVE:  I am doing everything at home now.  I am so happy    OBJECTIVE:  Current objective measures:   Neurological Testing      Sensation      Wrist/Hand   Left   Diminished:Protective sensation and hot/cold  Absent: light touch, pin prick, sharp/dull discrimination, static two point discrimination, dynamic two point discrimination a     Right   Diminished: protective sensation and hot/col  Absent: light touch, pin prick, sharp/dull discrimination, static two point discrimination, dynamic two point discrimination      Active Range of Motion    Full AROM of both upper extremities     Strength:       Left Wrist/Hand    (2nd hand position)     Trial 1: 40     Thumb Strength  Key/Lateral Pinch     Trial 1: 10  Palmar/Three-Point Pinch     Trial 1: 11     Right Wrist/Hand    (2nd hand position)     Trial 1: 44     Thumb Strength   Key/Lateral Pinch     Trial 1: 10  Palmar/Three-Point Pinch     Trial 1: 7     General Comments      Wrist/Hand Comments  9 hole peg test:  R=18 seconds  L=22 seconds     Activities of Daily Living:   Transfers/Mobility:   Mod I with bed, chair and car transfers  SBA/supervision with shower chair transfers     Toileting:     Mod I     Bathing:     Bathing: set up/supervision    Bathing position: sitting    Bathing assistive device(s): shower chair     Dressing:     Dressing upper body: min A-for bra only    Dressing lower body: set up     Socks: Mod I     Shoes: Mod I       Min A meal prep now while standing for several minutes  with cane nearby as needed.  Mobilizing with 4 point cane.         Therapeutic Exercises (CPT 08477):     1. 9# digi-flex, 10 x  for each hand    2. medium resistance theraputty    3. fine motor manipulation and dexterity, 9 hole peg test, grooved pegboard, manipulation of coins    4. flexion/extension exercise stick in horizontal and vertical positions.    5. 11# graded clip in lateral and 3 point prehension, 5 x for each prehension for each hand    6. velcro roll with cylindrical grasp, 5 x for each hand    7. UBE while standing, Resistance 2 for 6 minutes without a rest break    Therapeutic Exercise Summary:       Time-based treatments/modalities:  Therapeutic exercise minutes (CPT 05558): 45 minutes        Pain rating before treatment: 3  Pain rating after treatment: 3  Lower back.  ASSESSMENT:   Response to treatment: Patient has done very well with therapy and met almost all her goals.  To discharge from therapy today and continue with HEP    PLAN/RECOMMENDATIONS:   Plan for treatment: .No further treatment needed  Planned interventions for next visit:  Last therapy session today

## 2022-09-21 ENCOUNTER — APPOINTMENT (OUTPATIENT)
Dept: PHYSICAL THERAPY | Facility: REHABILITATION | Age: 52
End: 2022-09-21
Attending: NURSE PRACTITIONER
Payer: MEDICAID

## 2022-09-21 NOTE — OP THERAPY PROGRESS SUMMARY
Outpatient Physical Therapy  PROGRESS SUMMARY NOTE      [facility] Physical Therapy [facility]  [address]  [address]  [place] [address]  Phone:  [phone]  Fax:  [phone]    Date of Visit: 09/22/2022    Patient: Noelle Hobbs  YOB: 1970  MRN: [id number]     Referring Provider: RITCHIE Ragsdale  [address] [place] [address]   Referring Diagnosis Chronic inflammatory demyelinating polyneuritis [G61.81]     {No diagnosis found. (Refresh or delete this SmartLink)}    Rehab Potential: {excellent/good/fair/poor:61761}    Progress Report Period: ***    Functional Assessment Used        Progress Summary Details    Referring provider co-signature:  I have reviewed this plan of care and my co-signature certifies the need for services.     Certification Period: 09/22/2022 to {Future Date:24418}    Physician Signature: ________________________________ Date: ______________

## 2022-09-21 NOTE — OP THERAPY DAILY TREATMENT
Outpatient Physical Therapy  DAILY TREATMENT     [facility] Physical Therapy [address]  [address]  [place] [address]  Phone:  [phone]  Fax:  [phone]    Date: 09/22/2022    Patient: Noelle Campoverde  YOB: 1970  MRN: [id number]     Time Calculation    Start time: 1330  Stop time: 1411 Time Calculation (min): 41 minutes         Chief Complaint: Weakness, Loss Of Balance, and Difficulty Walking    Visit #: 21  6/10  SUBJECTIVE:  Pt reports ability to ambulate on indoor and outdoor surfaces independently with hurry cane. No falls. Able to negotiate stairs. Agreeable to DC.  Declined .     OBJECTIVE:  5STS: 12.53  seconds without UE assist and SBA (improved from 23.80 BUE assist CGA)     Wc<>mat transfer Independent with hands for balance     6MWT:1021 feet hurry cane mod I     Zapata Balance Total Score (0-56): 41 (previously 28) Completed without AD.          Therapeutic Exercises (CPT 79659):       Therapeutic Exercise Summary: Auth ID [id number]  08/02/22-09/30/22  97110x 1, 97112 x 3  40 units = 10 visits    Access Code: [email or web address]  URL: [email or web address]  Date: 09/22/2022  Prepared by:    Exercises  Supine Bridge - 1 x daily - 7 x weekly - 3 reps - 1 min hold  Half Kneeling Chop with Medicine Ball - 1 x daily - 7 x weekly - 3 sets - 10 reps  Bird Dog - 1 x daily - 7 x weekly - 3 sets - 10 reps  Standard Lunge - 1 x daily - 7 x weekly - 3 sets - 10 reps      Therapeutic Treatments and Modalities:     1. Neuromuscular Re-education (CPT 32067)    Therapeutic Treatment and Modalities Summary:   Goal reassessments   Bird dog  Time-based treatments/modalities:    Physical Therapy Timed Treatment Charges  Neuromusc re-ed, balance, coor, post minutes (CPT 39760): 41 minutes          ASSESSMENT:   Response to treatment:   Patient has completed 21 visits since start of care 5/2/2022 related to significant physical impairments secondary to diagnosis  CIDP. At this time pt has subjectively reported return to almost PLOF, with exception of need for use of cane. She otherwise has demonstrated excellent functional LE strength improvements with 5STS score from 23 sec at eval to 12 sec today. AS well as scoring lower fall risk on CANTOR. Pt provided DC HEP and educated on importance of continuing program to maximize pt function and dec risk of decline. Pt in agreement to DC.      Short Term Goals:   1. Pt will be compliant with HEP 3-5x per week for improving LE strength and stabilty. MET  2. Assess community ambulation and update goals as appropriate. MET     Short term goal time span:  2-4 weeks      Long Term Goals:    1. Pt will ambulate on sidewalk with LRAD x 100 feet CGA. MET  2. Pt will ambulate on level surfaces with LRAD x 200 feet mod I to improve independent ambulation in the home. MET  3. Pt will demonstrate improved functional LE strength with 5STS score improved 10 seconds from assessment. MET  4. Pt will score low fall risk on appropriate outcome measure. MET  Long term goal time span:  6-8 weeks  PLAN/RECOMMENDATIONS:   Plan for treatment: therapy treatment to continue next visit.  Planned interventions for next visit: continue with current treatment.

## 2022-09-22 ENCOUNTER — PHYSICAL THERAPY (OUTPATIENT)
Dept: PHYSICAL THERAPY | Facility: REHABILITATION | Age: 52
End: 2022-09-22
Attending: NURSE PRACTITIONER
Payer: MEDICAID

## 2022-09-22 DIAGNOSIS — R53.1 WEAKNESS: ICD-10-CM

## 2022-09-22 DIAGNOSIS — Z91.81 AT HIGH RISK FOR FALLS: ICD-10-CM

## 2022-09-22 DIAGNOSIS — G61.81 CIDP (CHRONIC INFLAMMATORY DEMYELINATING POLYNEUROPATHY) (HCC): ICD-10-CM

## 2022-09-22 DIAGNOSIS — G82.20 PARAPLEGIA (HCC): ICD-10-CM

## 2022-09-22 PROCEDURE — 97112 NEUROMUSCULAR REEDUCATION: CPT

## 2022-09-22 NOTE — OP THERAPY DISCHARGE SUMMARY
Outpatient Physical Therapy  DISCHARGE SUMMARY NOTE      [facility]  [address]  [address]  [place] [address]  Phone:  [phone]  Fax:  [phone]    Date of Visit: 09/22/2022    Patient: Noelle Hobbs  YOB: 1970  MRN: [id number]     Referring Provider: RITCHIE Ragsdale  [address]  [place] [address]   Referring Diagnosis Chronic inflammatory demyelinating polyneuritis [G61.81]         Functional Assessment Used        Your patient is being discharged from Physical Therapy with the following comments:   Goals met    Comments:  Patient has completed 21 visits since start of care 5/2/2022 related to significant physical impairments secondary to diagnosis CIDP. At this time pt has subjectively reported return to almost PLOF, with exception of need for use of cane. She otherwise has demonstrated excellent functional LE strength improvements with 5STS score from 23 sec at eval to 12 sec today. AS well as scoring lower fall risk on CANTOR. Pt provided DC HEP and educated on importance of continuing program to maximize pt function and dec risk of decline. Pt in agreement to DC.      Short Term Goals:   1. Pt will be compliant with HEP 3-5x per week for improving LE strength and stabilty. MET  2. Assess community ambulation and update goals as appropriate. MET     Short term goal time span:  2-4 weeks      Long Term Goals:    1. Pt will ambulate on sidewalk with LRAD x 100 feet CGA. MET  2. Pt will ambulate on level surfaces with LRAD x 200 feet mod I to improve independent ambulation in the home. MET  3. Pt will demonstrate improved functional LE strength with 5STS score improved 10 seconds from assessment. MET  4. Pt will score low fall risk on appropriate outcome measure. MET  Long term goal time span:  6-8 weeks    Ryder Solorio, PT, DPT    Date: 9/22/2022

## 2022-09-27 ENCOUNTER — APPOINTMENT (OUTPATIENT)
Dept: OCCUPATIONAL THERAPY | Facility: REHABILITATION | Age: 52
End: 2022-09-27
Attending: NURSE PRACTITIONER
Payer: MEDICAID

## 2022-10-04 ENCOUNTER — APPOINTMENT (OUTPATIENT)
Dept: PHYSICAL THERAPY | Facility: REHABILITATION | Age: 52
End: 2022-10-04
Attending: NURSE PRACTITIONER
Payer: MEDICAID

## 2022-10-06 ENCOUNTER — APPOINTMENT (OUTPATIENT)
Dept: PHYSICAL THERAPY | Facility: REHABILITATION | Age: 52
End: 2022-10-06
Attending: NURSE PRACTITIONER
Payer: MEDICAID

## 2022-10-10 ENCOUNTER — APPOINTMENT (OUTPATIENT)
Dept: PHYSICAL THERAPY | Facility: REHABILITATION | Age: 52
End: 2022-10-10
Attending: NURSE PRACTITIONER
Payer: MEDICAID

## 2022-10-11 ENCOUNTER — OFFICE VISIT (OUTPATIENT)
Dept: MEDICAL GROUP | Facility: MEDICAL CENTER | Age: 52
End: 2022-10-11
Attending: NURSE PRACTITIONER
Payer: MEDICAID

## 2022-10-11 VITALS
RESPIRATION RATE: 16 BRPM | OXYGEN SATURATION: 96 % | TEMPERATURE: 96.8 F | HEART RATE: 108 BPM | DIASTOLIC BLOOD PRESSURE: 76 MMHG | SYSTOLIC BLOOD PRESSURE: 108 MMHG | WEIGHT: 184 LBS | BODY MASS INDEX: 36.12 KG/M2 | HEIGHT: 60 IN

## 2022-10-11 DIAGNOSIS — E11.9 TYPE 2 DIABETES MELLITUS WITHOUT COMPLICATION, WITHOUT LONG-TERM CURRENT USE OF INSULIN (HCC): ICD-10-CM

## 2022-10-11 DIAGNOSIS — R05.1 ACUTE COUGH: ICD-10-CM

## 2022-10-11 DIAGNOSIS — G61.81 CIDP (CHRONIC INFLAMMATORY DEMYELINATING POLYNEUROPATHY) (HCC): ICD-10-CM

## 2022-10-11 DIAGNOSIS — Z13.21 ENCOUNTER FOR VITAMIN DEFICIENCY SCREENING: ICD-10-CM

## 2022-10-11 PROCEDURE — RXMED WILLOW AMBULATORY MEDICATION CHARGE: Performed by: FAMILY MEDICINE

## 2022-10-11 PROCEDURE — 99214 OFFICE O/P EST MOD 30 MIN: CPT | Performed by: NURSE PRACTITIONER

## 2022-10-11 PROCEDURE — 99213 OFFICE O/P EST LOW 20 MIN: CPT | Performed by: NURSE PRACTITIONER

## 2022-10-11 RX ORDER — PREDNISONE 10 MG/1
10 TABLET ORAL DAILY
COMMUNITY
Start: 2022-09-14 | End: 2023-04-18

## 2022-10-11 RX ORDER — PRIMIDONE 50 MG/1
TABLET ORAL
COMMUNITY
Start: 2022-09-14

## 2022-10-11 RX ORDER — BENZONATATE 100 MG/1
100-200 CAPSULE ORAL 3 TIMES DAILY PRN
Qty: 60 CAPSULE | Refills: 1 | Status: SHIPPED | OUTPATIENT
Start: 2022-10-11 | End: 2023-01-25 | Stop reason: SDUPTHER

## 2022-10-11 ASSESSMENT — ENCOUNTER SYMPTOMS
COUGH: 1
SHORTNESS OF BREATH: 0
ABDOMINAL PAIN: 0
FEVER: 0
DIARRHEA: 0
PALPITATIONS: 0
WHEEZING: 0
BLOOD IN STOOL: 0
CHILLS: 0
WEIGHT LOSS: 0
CONSTIPATION: 0

## 2022-10-11 ASSESSMENT — FIBROSIS 4 INDEX: FIB4 SCORE: 0.54

## 2022-10-11 NOTE — ASSESSMENT & PLAN NOTE
Her neurologist lowered her prednisone to 10 mg daily.  She is ambulating with a cane- no more wheelchair.

## 2022-10-12 ENCOUNTER — APPOINTMENT (OUTPATIENT)
Dept: PHYSICAL THERAPY | Facility: REHABILITATION | Age: 52
End: 2022-10-12
Attending: NURSE PRACTITIONER
Payer: MEDICAID

## 2022-10-12 NOTE — PROGRESS NOTES
Chief Complaint   Patient presents with    Cough       Subjective:     HPI:   Noelle Hobbs is a 52 y.o. female here to discuss the evaluation and management of:        CIDP (chronic inflammatory demyelinating polyneuropathy) ([facility])  Her neurologist lowered her prednisone to 10 mg daily.  She is ambulating with a cane- no more wheelchair.     Acute cough  Patient had a URI that started about a week ago with congestion, body aches, and cough.  Her body aches have resolved.  She does not have a fever.  She does have some mild congestion and a persistent cough.  She is requesting cough medication.    ROS  Review of Systems   Constitutional:  Negative for chills, fever, malaise/fatigue and weight loss.   Respiratory:  Positive for cough. Negative for shortness of breath and wheezing.    Cardiovascular:  Negative for chest pain, palpitations and leg swelling.   Gastrointestinal:  Negative for abdominal pain, blood in stool, constipation and diarrhea.       Allergies   Allergen Reactions    Latex Rash     hives       Current medicines (including changes today)  Current Outpatient Medications   Medication Sig Dispense Refill    benzonatate (TESSALON) 100 MG Cap Take 1-2 Capsules by mouth 3 times a day as needed for Cough. 60 Capsule 1    primidone (MYSOLINE) 50 MG Tab TAKE 1 TABLET BY MOUTH TWICE DAILY FOR TREMOR PREVENTION      predniSONE (DELTASONE) 10 MG Tab Take 10 mg by mouth every day.      Empagliflozin-metFORMIN HCl ER (SYNJARDY XR)  MG TABLET SR 24 HR Take 1 Each by mouth every day. 30 Tablet 5    ergocalciferol (DRISDOL) 80316 UNIT capsule Take 1 Capsule by mouth every 7 days. 8 Capsule 0    omeprazole (PRILOSEC OTC) 20 MG tablet Take 1 Tablet by mouth every day. 30 Tablet 5    Blood Glucose Monitoring Suppl (BLOOD GLUCOSE MONITOR SYSTEM) w/Device Kit Use to test 2 times a day and as needed 1 Kit 0    Lancets 30G Misc Use to test twice daily 100 Each 11    glucose blood strip Use to test twice  daily 100 Strip 11    Alcohol Swabs Use to test twice a day 100 Each 11    ROPINIRole (REQUIP) 0.5 MG Tab Take 0.5 mg by mouth 2 times a day.      atorvastatin (LIPITOR) 20 MG Tab Take 1 Tablet by mouth every evening. 30 Tablet 5    lisinopril (PRINIVIL) 5 MG Tab Take 1 Tablet by mouth every day. 30 Tablet 5    Misc. Devices Misc Please provide the patient with a walker with a seat. G61.81 1 Each 0    gabapentin (NEURONTIN) 100 MG Cap Take 200 mg by mouth 3 times a day.      predniSONE (DELTASONE) 50 MG Tab Take 50 mg by mouth every day.       No current facility-administered medications for this visit.       Social History     Tobacco Use    Smoking status: Some Days     Types: Cigarettes    Smokeless tobacco: Never    Tobacco comments:     3 cigarettes/day   Vaping Use    Vaping Use: Never used   Substance Use Topics    Alcohol use: No    Drug use: No       Patient Active Problem List    Diagnosis Date Noted    Acute cough 10/11/2022    Toe infection 06/15/2022    Type 2 diabetes mellitus without complication, without long-term current use of insulin ([facility]) 05/17/2022    Gastroesophageal reflux disease 05/17/2022    Hyperlipidemia associated with type 2 diabetes mellitus ([facility]) 05/17/2022    Encounter to establish care 03/24/2022    CIDP (chronic inflammatory demyelinating polyneuropathy) ([facility]) 03/24/2022    Polyneuropathy due to medical condition ([facility]) 10/24/2021    Clear cell renal cell carcinoma, right ([facility]) 10/24/2021    Neuropathy 10/24/2021    H/O right radical nephrectomy 10/23/2021    LFT elevation 10/23/2021    Mixed stress and urge urinary incontinence 09/14/2021    Renal mass 09/14/2021    Vitamin D deficiency 04/01/2019    Anemia 04/01/2019    Vitamin B12 deficiency 04/01/2019    Smoker 04/01/2019    Neck pain 02/12/2019    BMI 28.0-28.9,adult 02/12/2019       Family History   Problem Relation Age of Onset    Osteoporosis Mother     Alzheimer's Disease Mother     Arthritis Mother     Diabetes Father      Heart Disease Father     Stroke Father         TIA    Cancer Other         one nephew leukemia, the other liver ca          Objective:     /76   Pulse (!) 108   Temp 36 °C (96.8 °F)   Resp 16   Ht 1.524 m (5')   Wt 83.5 kg (184 lb)   SpO2 96%  Body mass index is 35.94 kg/m².    Physical Exam:  Physical Exam  Constitutional:       General: She is not in acute distress.  HENT:      Head: Normocephalic.      Right Ear: Tympanic membrane and external ear normal.      Left Ear: Tympanic membrane and external ear normal.   Eyes:      Conjunctiva/sclera: Conjunctivae normal.      Pupils: Pupils are equal, round, and reactive to light.   Neck:      Trachea: No tracheal deviation.   Cardiovascular:      Rate and Rhythm: Normal rate and regular rhythm.      Heart sounds: Normal heart sounds.   Pulmonary:      Effort: Pulmonary effort is normal.      Breath sounds: Normal breath sounds.   Abdominal:      General: Bowel sounds are normal.      Palpations: Abdomen is soft.   Musculoskeletal:         General: Normal range of motion.      Cervical back: Normal range of motion and neck supple.   Lymphadenopathy:      Head:      Right side of head: No preauricular adenopathy.      Left side of head: No preauricular adenopathy.      Cervical: No cervical adenopathy.   Skin:     General: Skin is warm and dry.   Neurological:      Mental Status: She is alert and oriented to person, place, and time.      Sensory: Sensation is intact.      Gait: Gait is intact.   Psychiatric:         Mood and Affect: Mood and affect normal.         Judgment: Judgment normal.       Assessment and Plan:     The following treatment plan was discussed:    1. Acute cough  benzonatate (TESSALON) 100 MG Cap  -New problem, improving.  Tessalon as needed for cough.      2. CIDP (chronic inflammatory demyelinating polyneuropathy) ([facility])  Chronic problem, improving.  Patient is able to ambulate today with just a cane-great improvement!!  Follow plan of  care per neurology      3. Type 2 diabetes mellitus without complication, without long-term current use of insulin ([facility])  HEMOGLOBIN A1C    Lipid Profile  -Chronic problem, stable.  Repeat labs in 6 weeks      4. Encounter for vitamin deficiency screening  VITAMIN D,25 HYDROXY (DEFICIENCY)          Any change or worsening of signs or symptoms, patient encouraged to follow-up or report to emergency room for further evaluation. Patient verbalizes understanding and agrees.    Follow-Up: Return in about 8 weeks (around 12/6/2022) for Routine follow up.      PLEASE NOTE: This dictation was created using voice recognition software. I have made every reasonable attempt to correct obvious errors, but I expect that there are errors of grammar and possibly content that I did not discover before finalizing the note.

## 2022-10-12 NOTE — ASSESSMENT & PLAN NOTE
Patient had a URI that started about a week ago with congestion, body aches, and cough.  Her body aches have resolved.  She does not have a fever.  She does have some mild congestion and a persistent cough.  She is requesting cough medication.

## 2022-10-14 ENCOUNTER — PHARMACY VISIT (OUTPATIENT)
Dept: PHARMACY | Facility: MEDICAL CENTER | Age: 52
End: 2022-10-14
Payer: COMMERCIAL

## 2022-10-17 ENCOUNTER — HOSPITAL ENCOUNTER (OUTPATIENT)
Dept: RADIOLOGY | Facility: MEDICAL CENTER | Age: 52
End: 2022-10-17
Payer: MEDICAID

## 2022-10-17 ENCOUNTER — APPOINTMENT (OUTPATIENT)
Dept: PHYSICAL THERAPY | Facility: REHABILITATION | Age: 52
End: 2022-10-17
Attending: NURSE PRACTITIONER
Payer: MEDICAID

## 2022-10-17 DIAGNOSIS — C64.1 MALIGNANT NEOPLASM OF RIGHT KIDNEY, EXCEPT RENAL PELVIS (HCC): ICD-10-CM

## 2022-10-17 PROCEDURE — 71046 X-RAY EXAM CHEST 2 VIEWS: CPT

## 2022-10-19 ENCOUNTER — APPOINTMENT (OUTPATIENT)
Dept: PHYSICAL THERAPY | Facility: REHABILITATION | Age: 52
End: 2022-10-19
Attending: NURSE PRACTITIONER
Payer: MEDICAID

## 2022-10-21 ENCOUNTER — HOSPITAL ENCOUNTER (OUTPATIENT)
Dept: LAB | Facility: MEDICAL CENTER | Age: 52
End: 2022-10-21
Attending: NURSE PRACTITIONER
Payer: MEDICAID

## 2022-10-21 DIAGNOSIS — E11.9 TYPE 2 DIABETES MELLITUS WITHOUT COMPLICATION, WITHOUT LONG-TERM CURRENT USE OF INSULIN (HCC): ICD-10-CM

## 2022-10-21 DIAGNOSIS — Z13.21 ENCOUNTER FOR VITAMIN DEFICIENCY SCREENING: ICD-10-CM

## 2022-10-21 LAB
25(OH)D3 SERPL-MCNC: 38 NG/ML (ref 30–100)
25(OH)D3 SERPL-MCNC: 39 NG/ML (ref 30–100)
CHOLEST SERPL-MCNC: 102 MG/DL (ref 100–199)
CREAT UR-MCNC: 61.58 MG/DL
EST. AVERAGE GLUCOSE BLD GHB EST-MCNC: 131 MG/DL
FASTING STATUS PATIENT QL REPORTED: NORMAL
HBA1C MFR BLD: 6.2 % (ref 4–5.6)
HDLC SERPL-MCNC: 42 MG/DL
LDLC SERPL CALC-MCNC: 29 MG/DL
MICROALBUMIN UR-MCNC: <1.2 MG/DL
MICROALBUMIN/CREAT UR: NORMAL MG/G (ref 0–30)
TRIGL SERPL-MCNC: 155 MG/DL (ref 0–149)

## 2022-10-21 PROCEDURE — 82570 ASSAY OF URINE CREATININE: CPT

## 2022-10-21 PROCEDURE — 82306 VITAMIN D 25 HYDROXY: CPT

## 2022-10-21 PROCEDURE — 36415 COLL VENOUS BLD VENIPUNCTURE: CPT

## 2022-10-21 PROCEDURE — 82306 VITAMIN D 25 HYDROXY: CPT | Mod: 91

## 2022-10-21 PROCEDURE — 83036 HEMOGLOBIN GLYCOSYLATED A1C: CPT

## 2022-10-21 PROCEDURE — 80061 LIPID PANEL: CPT

## 2022-10-21 PROCEDURE — 82043 UR ALBUMIN QUANTITATIVE: CPT

## 2022-10-24 ENCOUNTER — APPOINTMENT (OUTPATIENT)
Dept: PHYSICAL THERAPY | Facility: REHABILITATION | Age: 52
End: 2022-10-24
Attending: NURSE PRACTITIONER
Payer: MEDICAID

## 2022-10-28 ENCOUNTER — HOSPITAL ENCOUNTER (OUTPATIENT)
Dept: RADIOLOGY | Facility: MEDICAL CENTER | Age: 52
End: 2022-10-28
Payer: MEDICAID

## 2022-10-28 DIAGNOSIS — C64.1 MALIGNANT NEOPLASM OF RIGHT KIDNEY, EXCEPT RENAL PELVIS (HCC): ICD-10-CM

## 2022-10-28 PROCEDURE — 74177 CT ABD & PELVIS W/CONTRAST: CPT

## 2022-10-28 PROCEDURE — 700117 HCHG RX CONTRAST REV CODE 255

## 2022-10-28 RX ADMIN — IOHEXOL 75 ML: 350 INJECTION, SOLUTION INTRAVENOUS at 11:45

## 2022-10-31 ENCOUNTER — APPOINTMENT (OUTPATIENT)
Dept: PHYSICAL THERAPY | Facility: REHABILITATION | Age: 52
End: 2022-10-31
Attending: NURSE PRACTITIONER
Payer: MEDICAID

## 2022-11-04 ENCOUNTER — HOSPITAL ENCOUNTER (OUTPATIENT)
Dept: LAB | Facility: MEDICAL CENTER | Age: 52
End: 2022-11-04
Attending: UROLOGY
Payer: MEDICAID

## 2022-11-04 LAB
ANION GAP SERPL CALC-SCNC: 13 MMOL/L (ref 7–16)
BUN SERPL-MCNC: 13 MG/DL (ref 8–22)
CALCIUM SERPL-MCNC: 9.2 MG/DL (ref 8.5–10.5)
CHLORIDE SERPL-SCNC: 105 MMOL/L (ref 96–112)
CO2 SERPL-SCNC: 21 MMOL/L (ref 20–33)
CREAT SERPL-MCNC: 0.95 MG/DL (ref 0.5–1.4)
GFR SERPLBLD CREATININE-BSD FMLA CKD-EPI: 72 ML/MIN/1.73 M 2
GLUCOSE SERPL-MCNC: 90 MG/DL (ref 65–99)
POTASSIUM SERPL-SCNC: 3.7 MMOL/L (ref 3.6–5.5)
SODIUM SERPL-SCNC: 139 MMOL/L (ref 135–145)

## 2022-11-04 PROCEDURE — 80048 BASIC METABOLIC PNL TOTAL CA: CPT

## 2022-11-04 PROCEDURE — 36415 COLL VENOUS BLD VENIPUNCTURE: CPT

## 2022-11-16 ENCOUNTER — NON-PROVIDER VISIT (OUTPATIENT)
Dept: MEDICAL GROUP | Facility: MEDICAL CENTER | Age: 52
End: 2022-11-16
Attending: INTERNAL MEDICINE
Payer: MEDICAID

## 2022-11-16 VITALS
BODY MASS INDEX: 34.67 KG/M2 | WEIGHT: 176.6 LBS | HEART RATE: 88 BPM | SYSTOLIC BLOOD PRESSURE: 123 MMHG | DIASTOLIC BLOOD PRESSURE: 75 MMHG | HEIGHT: 60 IN

## 2022-11-16 ASSESSMENT — FIBROSIS 4 INDEX: FIB4 SCORE: 0.54

## 2022-11-16 NOTE — NON-PROVIDER
In: 10:30A  Out:11AM  Primary care physician: RITCHIE Ragsdale    Reason for consult: Management of Controlled Type 2 Diabetes    HPI:  Noelle Hobbs is a 52 y.o. old patient who comes in today for evaluation of above stated problem.    Pt comes in today with her daughter, who serves as . Pt is doing well, making positive changes to her diet, and is compliant with her medications.    Most Recent HbA1c:   Lab Results   Component Value Date/Time    HBA1C 6.2 (H) 10/21/2022 10:08 AM    HBA1C 6.2 (A) 09/14/2022 10:30 AM    HBA1C 7.4 (A) 06/30/2022 10:00 AM     Diet: Pt tries to eat dinner early before 6pm, eating more salads and reducing carbs.     Exercise: States her activity level has gone up. Although she walks with a cane, she  feels she can walk longer distances than before.    Current Diabetes Regimen: tolerating and compliant  Metformin + SGLT-2 Inhibitor: empagliflozin-metformin(10/1000 mg) once daily          Pt tests daily. Did not bring her meter, but states her am blood sugar is never above 118.  Before Breakfast:  Before Lunch:  Before Dinner:  Before Bedtime:  Other times:  Hypoglycemia:  None      ROS:  Constitutional: No weight loss  Cardiac: No palpitations or racing heart  Resp: No shortness of breath  Neuro: No numbness or tinging in feet  Endo: No heat or cold intolerance, no polyuria or polydipsia  All other systems were reviewed and were negative.    Past Medical History:  Patient Active Problem List    Diagnosis Date Noted    Acute cough 10/11/2022    Toe infection 06/15/2022    Type 2 diabetes mellitus without complication, without long-term current use of insulin ([facility]) 05/17/2022    Gastroesophageal reflux disease 05/17/2022    Hyperlipidemia associated with type 2 diabetes mellitus ([facility]) 05/17/2022    Encounter to establish care 03/24/2022    CIDP (chronic inflammatory demyelinating polyneuropathy) ([facility]) 03/24/2022    Polyneuropathy due to medical condition ([facility])  10/24/2021    Clear cell renal cell carcinoma, right ([facility]CC) 10/24/2021    Neuropathy 10/24/2021    H/O right radical nephrectomy 10/23/2021    LFT elevation 10/23/2021    Mixed stress and urge urinary incontinence 09/14/2021    Renal mass 09/14/2021    Vitamin D deficiency 04/01/2019    Anemia 04/01/2019    Vitamin B12 deficiency 04/01/2019    Smoker 04/01/2019    Neck pain 02/12/2019    BMI 28.0-28.9,adult 02/12/2019       Past Surgical History:  Past Surgical History:   Procedure Laterality Date    NEPHRECTOMY ROBOTIC XI Right 10/20/2021    Procedure: NEPHRECTOMY, ROBOT-ASSISTED, USING DA CORNELIUS XI - RADICAL;  Surgeon: Jay Painting M.D.;  Location: SURGERY [facility];  Service: Gen Robotic    ORIF, ANKLE Right 9/9/2021    Procedure: ORIF, ANKLE;  Surgeon: Alexander Castillo M.D.;  Location: SURGERY [facility];  Service: Orthopedics    TUBAL LIGATION         Allergies:  Latex    Social History:  Social History     Socioeconomic History    Marital status:      Spouse name: Not on file    Number of children: Not on file    Years of education: Not on file    Highest education level: 8th grade   Occupational History    Not on file   Tobacco Use    Smoking status: Some Days     Types: Cigarettes    Smokeless tobacco: Never    Tobacco comments:     3 cigarettes/day   Vaping Use    Vaping Use: Never used   Substance and Sexual Activity    Alcohol use: No    Drug use: No    Sexual activity: Not Currently     Partners: Male     Comment:    Other Topics Concern    Not on file   Social History Narrative    Not on file     Social Determinants of Health     Financial Resource Strain: Low Risk     Difficulty of Paying Living Expenses: Not very hard   Food Insecurity: No Food Insecurity    Worried About Running Out of Food in the Last Year: Never true    Ran Out of Food in the Last Year: Never true   Transportation Needs: No Transportation Needs    Lack of Transportation (Medical): No    Lack of  Transportation (Non-Medical): No   Physical Activity: Inactive    Days of Exercise per Week: 0 days    Minutes of Exercise per Session: 0 min   Stress: Stress Concern Present    Feeling of Stress : To some extent   Social Connections: Socially Isolated    Frequency of Communication with Friends and Family: More than three times a week    Frequency of Social Gatherings with Friends and Family: More than three times a week    Attends Jain Services: Never    Active Member of Clubs or Organizations: No    Attends Club or Organization Meetings: Never    Marital Status:    Intimate Partner Violence: Not on file   Housing Stability: Low Risk     Unable to Pay for Housing in the Last Year: No    Number of Places Lived in the Last Year: 2    Unstable Housing in the Last Year: No       Family History:  Family History   Problem Relation Age of Onset    Osteoporosis Mother     Alzheimer's Disease Mother     Arthritis Mother     Diabetes Father     Heart Disease Father     Stroke Father         TIA    Cancer Other         one nephew leukemia, the other liver ca       Medications:    Current Outpatient Medications:     atorvastatin (LIPITOR) 20 MG Tab, TAKE 1 TABLET BY MOUTH ONCE DAILY IN THE EVENING, Disp: 30 Tablet, Rfl: 11    lisinopril (PRINIVIL) 5 MG Tab, Take 1 tablet by mouth once daily, Disp: 30 Tablet, Rfl: 11    primidone (MYSOLINE) 50 MG Tab, TAKE 1 TABLET BY MOUTH TWICE DAILY FOR TREMOR PREVENTION, Disp: , Rfl:     predniSONE (DELTASONE) 10 MG Tab, Take 10 mg by mouth every day., Disp: , Rfl:     benzonatate (TESSALON) 100 MG Cap, Take 1-2 Capsules by mouth 3 times a day as needed for Cough., Disp: 60 Capsule, Rfl: 1    Empagliflozin-metFORMIN HCl ER (SYNJARDY XR)  MG TABLET SR 24 HR, Take 1 Each by mouth every day., Disp: 30 Tablet, Rfl: 5    ergocalciferol (DRISDOL) 81865 UNIT capsule, Take 1 Capsule by mouth every 7 days., Disp: 8 Capsule, Rfl: 0    omeprazole (PRILOSEC OTC) 20 MG tablet, Take  1 Tablet by mouth every day., Disp: 30 Tablet, Rfl: 5    Blood Glucose Monitoring Suppl (BLOOD GLUCOSE MONITOR SYSTEM) w/Device Kit, Use to test 2 times a day and as needed, Disp: 1 Kit, Rfl: 0    Lancets 30G Misc, Use to test twice daily, Disp: 100 Each, Rfl: 11    glucose blood strip, Use to test twice daily, Disp: 100 Strip, Rfl: 11    Alcohol Swabs, Use to test twice a day, Disp: 100 Each, Rfl: 11    ROPINIRole (REQUIP) 0.5 MG Tab, Take 0.5 mg by mouth 2 times a day., Disp: , Rfl:     Misc. Devices Misc, Please provide the patient with a walker with a seat. G61.81, Disp: 1 Each, Rfl: 0    gabapentin (NEURONTIN) 100 MG Cap, Take 200 mg by mouth 3 times a day., Disp: , Rfl:     predniSONE (DELTASONE) 50 MG Tab, Take 50 mg by mouth every day., Disp: , Rfl:     Labs: Reviewed    Physical Examination:  Vital signs: /75   Pulse 88   Ht 1.524 m (5')   Wt 80.1 kg (176 lb 9.6 oz)   LMP 03/08/2021   BMI 34.49 kg/m²  Body mass index is 34.49 kg/m².  General: No apparent distress, cooperative  Eyes: No scleral icterus or discharge  ENMT: Normal on external inspection of nose, lips, normal thyroid exam  Neck: No abnormal masses on inspection  Resp: Normal effort, clear to auscultation bilaterally   CVS: Regular rate and rhythm, S1 S2 normal, no murmur   Extremities: No edema  Abdomen: abdominal obesity present  Neuro: Alert and oriented  Skin: No rash  Psych: Normal mood and affect, intact memory and able to make informed decisions    Plan and assessment    1) Continue currenty DM therapy- pt tolerating and denies and GI s/e at this visit    2) A1C improved and at goal    3) Consider optimizing Synjardy dose, A1C on next visit      There are no diagnoses linked to this encounter.    No follow-ups on file.    Thank you for allowing me to participate in the care of this patient.    Astrid Knapp, PharmD working under the guidance of Rosalino Garrett  11/16/22    CC:   VERONIQUE RagsdaleRBLACK

## 2022-12-12 ENCOUNTER — TELEPHONE (OUTPATIENT)
Dept: MEDICAL GROUP | Facility: MEDICAL CENTER | Age: 52
End: 2022-12-12
Payer: MEDICAID

## 2022-12-29 ENCOUNTER — TELEPHONE (OUTPATIENT)
Dept: MEDICAL GROUP | Facility: MEDICAL CENTER | Age: 52
End: 2022-12-29
Payer: MEDICAID

## 2023-01-25 ENCOUNTER — PHARMACY VISIT (OUTPATIENT)
Dept: PHARMACY | Facility: MEDICAL CENTER | Age: 53
End: 2023-01-25
Payer: COMMERCIAL

## 2023-01-25 ENCOUNTER — OFFICE VISIT (OUTPATIENT)
Dept: MEDICAL GROUP | Facility: MEDICAL CENTER | Age: 53
End: 2023-01-25
Attending: NURSE PRACTITIONER
Payer: MEDICAID

## 2023-01-25 VITALS
HEIGHT: 60 IN | SYSTOLIC BLOOD PRESSURE: 102 MMHG | OXYGEN SATURATION: 97 % | RESPIRATION RATE: 17 BRPM | BODY MASS INDEX: 32.47 KG/M2 | DIASTOLIC BLOOD PRESSURE: 60 MMHG | TEMPERATURE: 98.2 F | HEART RATE: 110 BPM | WEIGHT: 165.4 LBS

## 2023-01-25 DIAGNOSIS — J02.9 SORE THROAT: ICD-10-CM

## 2023-01-25 DIAGNOSIS — R09.81 CONGESTION OF NASAL SINUS: ICD-10-CM

## 2023-01-25 DIAGNOSIS — J06.9 URI, ACUTE: ICD-10-CM

## 2023-01-25 DIAGNOSIS — R05.1 ACUTE COUGH: ICD-10-CM

## 2023-01-25 LAB
EXTERNAL QUALITY CONTROL: NORMAL
FLUAV+FLUBV AG SPEC QL IA: NORMAL
INT CON NEG: NEGATIVE
INT CON NEG: NEGATIVE
INT CON POS: POSITIVE
INT CON POS: POSITIVE
SARS-COV+SARS-COV-2 AG RESP QL IA.RAPID: NEGATIVE

## 2023-01-25 PROCEDURE — 99212 OFFICE O/P EST SF 10 MIN: CPT | Performed by: NURSE PRACTITIONER

## 2023-01-25 PROCEDURE — RXMED WILLOW AMBULATORY MEDICATION CHARGE: Performed by: FAMILY MEDICINE

## 2023-01-25 PROCEDURE — 87426 SARSCOV CORONAVIRUS AG IA: CPT | Performed by: NURSE PRACTITIONER

## 2023-01-25 PROCEDURE — 99214 OFFICE O/P EST MOD 30 MIN: CPT | Performed by: NURSE PRACTITIONER

## 2023-01-25 PROCEDURE — RXMED WILLOW AMBULATORY MEDICATION CHARGE: Performed by: NURSE PRACTITIONER

## 2023-01-25 PROCEDURE — 87804 INFLUENZA ASSAY W/OPTIC: CPT | Performed by: NURSE PRACTITIONER

## 2023-01-25 RX ORDER — GUAIFENESIN 600 MG/1
600 TABLET, EXTENDED RELEASE ORAL EVERY 12 HOURS
Qty: 28 TABLET | Refills: 1 | Status: SHIPPED | OUTPATIENT
Start: 2023-01-25 | End: 2023-04-18

## 2023-01-25 RX ORDER — BENZONATATE 100 MG/1
100-200 CAPSULE ORAL 3 TIMES DAILY PRN
Qty: 60 CAPSULE | Refills: 1 | Status: SHIPPED | OUTPATIENT
Start: 2023-01-25 | End: 2023-04-18

## 2023-01-25 RX ORDER — GUAIFENESIN 600 MG/1
600 TABLET, EXTENDED RELEASE ORAL EVERY 12 HOURS
Status: CANCELLED | OUTPATIENT
Start: 2023-01-25

## 2023-01-25 ASSESSMENT — PATIENT HEALTH QUESTIONNAIRE - PHQ9: CLINICAL INTERPRETATION OF PHQ2 SCORE: 0

## 2023-01-25 ASSESSMENT — FIBROSIS 4 INDEX: FIB4 SCORE: 0.54

## 2023-01-25 NOTE — NON-PROVIDER
Cough started Saturday and has progressively gotten worse. She is coughing up mucus that clear, yellow and thick. Her throat hurts from all the coughing. She denies any fevers. She is unaware is she's had any fevers due to going through menopause and her body always aches due to the CIPD. She has tried nyquil for the cough an congestion which only helps a little. She is able to keep food and fluids down. She denies chest pain, SOB, nausea or vomiting. She is voiding ok, even after her nephrectomy. Her sister was sick around new years. Other than that no sick contacts.       She takes IVIG for CIPD.       1. URI, acute  Acute, stable. She denies any fevers but is having a hard time sleeping due to the cough. Discussed taking the tessalon pearls to help with the cough along with warm fluids. COVID/FLU POCT was negative today. FU if symptoms worsen.   - benzonatate (TESSALON) 100 MG Cap; Take 1-2 Capsules by mouth 3 times a day as needed for Cough.  Dispense: 60 Capsule; Refill: 1    2. Acute cough  Acute, stable. She denies any fevers but is having a hard time sleeping due to the cough. Discussed taking the tessalon pearls to help with the cough along with warm fluids. Follow up precautions given.   - benzonatate (TESSALON) 100 MG Cap; Take 1-2 Capsules by mouth 3 times a day as needed for Cough.  Dispense: 60 Capsule; Refill: 1  - POCT SARS-COV Antigen HI (Symptomatic Only)  - POCT Influenza A/B  - benzocaine-menthol (CEPACOL SORE THROAT) 15-3.6 MG Lozenge; Dissolve 1 Lozenge in the mouth every 2 hours as needed (for cough).  Dispense: 18 Lozenge; Refill: 2  - guaiFENesin ER (MUCINEX) 600 MG TABLET SR 12 HR; Take 1 Tablet by mouth every 12 hours.  Dispense: 28 Tablet; Refill: 1    3. Congestion of nasal sinus  Acute, stable. She denies any fevers but is having a hard time sleeping due to the congestion. Discussed taking the Mucinex to help with the congestion along with warm fluids. Also increasing fluid intake  while taking the mucinex. COVID/FLU POCT was negative today. FU if symptoms worsen.   - POCT SARS-COV Antigen HI (Symptomatic Only)  - POCT Influenza A/B    4. Sore throat  Acute, stable. She denies any fevers but is having a hard time sleeping due to the cough and sore throat. Discussed taking the tessalon pearls to help with the sore throat along with warm fluids. COVID/FLU POCT was negative today. FU if symptoms worsen.   - benzonatate (TESSALON) 100 MG Cap; Take 1-2 Capsules by mouth 3 times a day as needed for Cough.  Dispense: 60 Capsule; Refill: 1  - benzocaine-menthol (CEPACOL SORE THROAT) 15-3.6 MG Lozenge; Dissolve 1 Lozenge in the mouth every 2 hours as needed (for cough).  Dispense: 18 Lozenge; Refill: 2  - POCT Influenza A/B  - POCT SARS-COV Antigen HI (Symptomatic only)  - POCT SARS-COV Antigen HI (Symptomatic Only)  - POCT Influenza A/B

## 2023-01-27 ENCOUNTER — TELEPHONE (OUTPATIENT)
Dept: MEDICAL GROUP | Facility: MEDICAL CENTER | Age: 53
End: 2023-01-27
Payer: MEDICAID

## 2023-01-27 NOTE — TELEPHONE ENCOUNTER
DOCUMENTATION OF PAR STATUS:    1. Name of Medication & Dose: Mucinex 600mg Tab     2. Name of Prescription Coverage Company & phone #: [facility]    3. Date Prior Auth Submitted: 1/27/2023    4. What information was given to obtain insurance decision? ICD 10 code    5. Prior Auth Status? Pending    6. Patient Notified: N\A

## 2023-01-29 NOTE — PROGRESS NOTES
No chief complaint on file.      Subjective:     HPI:   Noelle Hobbs is a 52 y.o. female here to discuss the evaluation and management of:      Problem   Acute Cough     Patient here with complaints of upper respiratory symptoms including cough, mucus and sore throat that started approximately 4 to 5 days ago.  Patient denied any fevers and has no known sick contacts that she is aware of.  Patient states is hard to know if she definitely did not have fevers as she is currently going through menopause.  Patient also has CIPD which makes it difficult for her to tell exactly when her symptoms started.  Patient has been trying over-the-counter medications such as NyQuil to help.  Patient is immunocompromised taking IVIG currently.         ROS  See HPI     Allergies   Allergen Reactions    Latex Rash     hives       Current medicines (including changes today)  Current Outpatient Medications   Medication Sig Dispense Refill    benzonatate (TESSALON) 100 MG Cap Take 1-2 Capsules by mouth 3 times a day as needed for Cough. 60 Capsule 1    benzocaine-menthol (CEPACOL SORE THROAT) 15-3.6 MG Lozenge Dissolve 1 Lozenge in the mouth every 2 hours as needed (for cough). 18 Lozenge 2    guaiFENesin ER (MUCINEX) 600 MG TABLET SR 12 HR Take 1 Tablet by mouth every 12 hours. 28 Tablet 1    atorvastatin (LIPITOR) 20 MG Tab TAKE 1 TABLET BY MOUTH ONCE DAILY IN THE EVENING 30 Tablet 11    lisinopril (PRINIVIL) 5 MG Tab Take 1 tablet by mouth once daily 30 Tablet 11    primidone (MYSOLINE) 50 MG Tab TAKE 1 TABLET BY MOUTH TWICE DAILY FOR TREMOR PREVENTION      predniSONE (DELTASONE) 10 MG Tab Take 10 mg by mouth every day.      Empagliflozin-metFORMIN HCl ER (SYNJARDY XR)  MG TABLET SR 24 HR Take 1 Each by mouth every day. 30 Tablet 5    ergocalciferol (DRISDOL) 80689 UNIT capsule Take 1 Capsule by mouth every 7 days. 8 Capsule 0    omeprazole (PRILOSEC OTC) 20 MG tablet Take 1 Tablet by mouth every day. 30 Tablet 5    Blood  Glucose Monitoring Suppl (BLOOD GLUCOSE MONITOR SYSTEM) w/Device Kit Use to test 2 times a day and as needed 1 Kit 0    Lancets 30G Misc Use to test twice daily 100 Each 11    glucose blood strip Use to test twice daily 100 Strip 11    Alcohol Swabs Use to test twice a day 100 Each 11    ROPINIRole (REQUIP) 0.5 MG Tab Take 0.5 mg by mouth 2 times a day.      Misc. Devices Misc Please provide the patient with a walker with a seat. G61.81 1 Each 0    gabapentin (NEURONTIN) 100 MG Cap Take 200 mg by mouth 3 times a day.      predniSONE (DELTASONE) 50 MG Tab Take 50 mg by mouth every day.       No current facility-administered medications for this visit.       Social History     Tobacco Use    Smoking status: Some Days     Types: Cigarettes    Smokeless tobacco: Never    Tobacco comments:     3 cigarettes/day   Vaping Use    Vaping Use: Never used   Substance Use Topics    Alcohol use: No    Drug use: No       Patient Active Problem List    Diagnosis Date Noted    Acute cough 10/11/2022    Toe infection 06/15/2022    Type 2 diabetes mellitus without complication, without long-term current use of insulin ([facility]) 05/17/2022    Gastroesophageal reflux disease 05/17/2022    Hyperlipidemia associated with type 2 diabetes mellitus ([facility]) 05/17/2022    Encounter to establish care 03/24/2022    CIDP (chronic inflammatory demyelinating polyneuropathy) ([facility]) 03/24/2022    Polyneuropathy due to medical condition ([facility]) 10/24/2021    Clear cell renal cell carcinoma, right ([facility]) 10/24/2021    Neuropathy 10/24/2021    H/O right radical nephrectomy 10/23/2021    LFT elevation 10/23/2021    Mixed stress and urge urinary incontinence 09/14/2021    Renal mass 09/14/2021    Vitamin D deficiency 04/01/2019    Anemia 04/01/2019    Vitamin B12 deficiency 04/01/2019    Smoker 04/01/2019    Neck pain 02/12/2019    BMI 28.0-28.9,adult 02/12/2019       Family History   Problem Relation Age of Onset    Osteoporosis Mother     Alzheimer's Disease  Mother     Arthritis Mother     Diabetes Father     Heart Disease Father     Stroke Father         TIA    Cancer Other         one nephew leukemia, the other liver ca          Objective:     /60 (BP Location: Right arm, Patient Position: Sitting, BP Cuff Size: Adult)   Pulse (!) 110   Temp 36.8 °C (98.2 °F) (Temporal)   Resp 17   Ht 1.524 m (5')   Wt 75 kg (165 lb 6.4 oz)   SpO2 97%  Body mass index is 32.3 kg/m².    Physical Exam:  Physical Exam  Vitals reviewed.   Constitutional:       General: She is awake.      Appearance: Normal appearance. She is well-developed.   HENT:      Head: Normocephalic.      Nose: Congestion present.      Mouth/Throat:      Mouth: Mucous membranes are moist.      Pharynx: Oropharynx is clear. Posterior oropharyngeal erythema present. No oropharyngeal exudate.   Eyes:      Conjunctiva/sclera: Conjunctivae normal.   Cardiovascular:      Rate and Rhythm: Normal rate and regular rhythm.      Heart sounds: Normal heart sounds.   Pulmonary:      Effort: Pulmonary effort is normal. No respiratory distress.      Breath sounds: Normal breath sounds. No wheezing.   Musculoskeletal:      Cervical back: Neck supple. No tenderness.   Lymphadenopathy:      Cervical: No cervical adenopathy.   Skin:     General: Skin is warm and dry.   Neurological:      Mental Status: She is alert and oriented to person, place, and time.   Psychiatric:         Mood and Affect: Mood normal.         Behavior: Behavior normal. Behavior is cooperative.            Assessment and Plan:     The following treatment plan was discussed:    Problem List Items Addressed This Visit       Acute cough     Acute-  Patient states she is having a hard time sleeping secondary to the cough so we will provide Tessalon 100 mg caps that she can take prior to bed.  POCT COVID, flu negative in clinic  Cepacol lozenges ordered  Patient encouraged to continue with Mucinex to help with expectoration  If patient has no improvement  in the next few days encouraged to return to clinic but this is likely viral syndrome that needs to run its course, encourage patient to rest, stay well-hydrated, and continue with over-the-counter medications.         Relevant Medications    benzonatate (TESSALON) 100 MG Cap    benzocaine-menthol (CEPACOL SORE THROAT) 15-3.6 MG Lozenge    guaiFENesin ER (MUCINEX) 600 MG TABLET SR 12 HR    Other Relevant Orders    POCT SARS-COV Antigen HI (Symptomatic Only) (Completed)    POCT Influenza A/B (Completed)     Other Visit Diagnoses       URI, acute        Relevant Medications    benzonatate (TESSALON) 100 MG Cap    Congestion of nasal sinus        Relevant Orders    POCT SARS-COV Antigen HI (Symptomatic Only) (Completed)    POCT Influenza A/B (Completed)    Sore throat        Relevant Medications    benzonatate (TESSALON) 100 MG Cap    benzocaine-menthol (CEPACOL SORE THROAT) 15-3.6 MG Lozenge    Other Relevant Orders    POCT SARS-COV Antigen HI (Symptomatic Only) (Completed)    POCT Influenza A/B (Completed)            Any change or worsening of signs or symptoms, patient encouraged to follow-up or report to emergency room for further evaluation. Patient verbalizes understanding and agrees.    Follow-Up: Follow up if no improvement or worsening symptoms      PLEASE NOTE: This dictation was created using voice recognition software. I have made every reasonable attempt to correct obvious errors, but I expect that there are errors of grammar and possibly content that I did not discover before finalizing the note.

## 2023-01-29 NOTE — ASSESSMENT & PLAN NOTE
Acute-  Patient states she is having a hard time sleeping secondary to the cough so we will provide Tessalon 100 mg caps that she can take prior to bed.  POCT COVID, flu negative in clinic  Cepacol lozenges ordered  Patient encouraged to continue with Mucinex to help with expectoration  If patient has no improvement in the next few days encouraged to return to clinic but this is likely viral syndrome that needs to run its course, encourage patient to rest, stay well-hydrated, and continue with over-the-counter medications.

## 2023-02-01 ENCOUNTER — NON-PROVIDER VISIT (OUTPATIENT)
Dept: MEDICAL GROUP | Facility: MEDICAL CENTER | Age: 53
End: 2023-02-01
Attending: INTERNAL MEDICINE
Payer: MEDICAID

## 2023-02-01 VITALS — BODY MASS INDEX: 32.81 KG/M2 | WEIGHT: 168 LBS

## 2023-02-01 DIAGNOSIS — E11.9 TYPE 2 DIABETES MELLITUS WITHOUT COMPLICATION, WITHOUT LONG-TERM CURRENT USE OF INSULIN (HCC): ICD-10-CM

## 2023-02-01 LAB
HBA1C MFR BLD: 6 % (ref ?–5.8)
POCT INT CON NEG: ABNORMAL
POCT INT CON POS: ABNORMAL

## 2023-02-01 PROCEDURE — 99213 OFFICE O/P EST LOW 20 MIN: CPT | Performed by: PHARMACIST

## 2023-02-01 PROCEDURE — 83036 HEMOGLOBIN GLYCOSYLATED A1C: CPT

## 2023-02-01 ASSESSMENT — FIBROSIS 4 INDEX: FIB4 SCORE: 0.54

## 2023-02-01 NOTE — NON-PROVIDER
In: 10:45a  Out:11:08a  Primary care physician: RITCHIE Ragsdale    Reason for consult: Management of Controlled Type 2 Diabetes    HPI:  Noelle Hobbs is a 52 y.o. old patient who comes in today for evaluation of above stated problem.    Pt comes in today with her daughter, who serves as . Pt is doing well, making positive changes to her diet, and is compliant with her medications. She continues to make process with most recent A1C at a 6.    Most Recent HbA1c:   Lab Results   Component Value Date/Time    HBA1C 6.2 (H) 10/21/2022 10:08 AM    HBA1C 6.2 (A) 09/14/2022 10:30 AM    HBA1C 7.4 (A) 06/30/2022 10:00 AM     Diet: Avoid eating late,  eats small portions , coffee with a little bit of sugar     Exercise: Walks to most places, walks with cane when walking longer distance         Current Diabetes Regimen:   Metformin + SGLT-2 Inhibitor: empagliflozin-metformin(10/1000 mg) once daily       Test occasionally - blood sugar within goal per pt  Before Breakfast:  Before Lunch:  Before Dinner:  Before Bedtime:  Other times:  Hypoglycemia:  None      ROS:  Constitutional: No weight loss  Cardiac: No palpitations or racing heart  Resp: No shortness of breath  Neuro: No numbness or tinging in feet  Endo: No heat or cold intolerance, no polyuria or polydipsia  All other systems were reviewed and were negative.    Past Medical History:  Patient Active Problem List    Diagnosis Date Noted    Acute cough 10/11/2022    Toe infection 06/15/2022    Type 2 diabetes mellitus without complication, without long-term current use of insulin ([facility]) 05/17/2022    Gastroesophageal reflux disease 05/17/2022    Hyperlipidemia associated with type 2 diabetes mellitus ([facility]) 05/17/2022    Encounter to establish care 03/24/2022    CIDP (chronic inflammatory demyelinating polyneuropathy) ([facility]) 03/24/2022    Polyneuropathy due to medical condition ([facility]) 10/24/2021    Clear cell renal cell carcinoma, right ([facility]) 10/24/2021     Neuropathy 10/24/2021    H/O right radical nephrectomy 10/23/2021    LFT elevation 10/23/2021    Mixed stress and urge urinary incontinence 09/14/2021    Renal mass 09/14/2021    Vitamin D deficiency 04/01/2019    Anemia 04/01/2019    Vitamin B12 deficiency 04/01/2019    Smoker 04/01/2019    Neck pain 02/12/2019    BMI 28.0-28.9,adult 02/12/2019       Past Surgical History:  Past Surgical History:   Procedure Laterality Date    NEPHRECTOMY ROBOTIC XI Right 10/20/2021    Procedure: NEPHRECTOMY, ROBOT-ASSISTED, USING DA CORNELIUS XI - RADICAL;  Surgeon: Jay Painting M.D.;  Location: SURGERY [facility];  Service: Gen Robotic    ORIF, ANKLE Right 9/9/2021    Procedure: ORIF, ANKLE;  Surgeon: Alexander Castillo M.D.;  Location: SURGERY [facility];  Service: Orthopedics    TUBAL LIGATION         Allergies:  Latex    Social History:  Social History     Socioeconomic History    Marital status:      Spouse name: Not on file    Number of children: Not on file    Years of education: Not on file    Highest education level: 8th grade   Occupational History    Not on file   Tobacco Use    Smoking status: Some Days     Types: Cigarettes    Smokeless tobacco: Never    Tobacco comments:     3 cigarettes/day   Vaping Use    Vaping Use: Never used   Substance and Sexual Activity    Alcohol use: No    Drug use: No    Sexual activity: Not Currently     Partners: Male     Comment:    Other Topics Concern    Not on file   Social History Narrative    Not on file     Social Determinants of Health     Financial Resource Strain: Low Risk     Difficulty of Paying Living Expenses: Not very hard   Food Insecurity: No Food Insecurity    Worried About Running Out of Food in the Last Year: Never true    Ran Out of Food in the Last Year: Never true   Transportation Needs: No Transportation Needs    Lack of Transportation (Medical): No    Lack of Transportation (Non-Medical): No   Physical Activity: Inactive    Days of Exercise  per Week: 0 days    Minutes of Exercise per Session: 0 min   Stress: Stress Concern Present    Feeling of Stress : To some extent   Social Connections: Socially Isolated    Frequency of Communication with Friends and Family: More than three times a week    Frequency of Social Gatherings with Friends and Family: More than three times a week    Attends [employer] Services: Never    Active Member of Clubs or Organizations: No    Attends Club or Organization Meetings: Never    Marital Status:    Intimate Partner Violence: Not on file   Housing Stability: Low Risk     Unable to Pay for Housing in the Last Year: No    Number of Places Lived in the Last Year: 2    Unstable Housing in the Last Year: No       Family History:  Family History   Problem Relation Age of Onset    Osteoporosis Mother     Alzheimer's Disease Mother     Arthritis Mother     Diabetes Father     Heart Disease Father     Stroke Father         TIA    Cancer Other         one nephew leukemia, the other liver ca       Medications:    Current Outpatient Medications:     benzonatate (TESSALON) 100 MG Cap, Take 1-2 Capsules by mouth 3 times a day as needed for Cough., Disp: 60 Capsule, Rfl: 1    benzocaine-menthol (CEPACOL SORE THROAT) 15-3.6 MG Lozenge, Dissolve 1 Lozenge in the mouth every 2 hours as needed (for cough)., Disp: 18 Lozenge, Rfl: 2    guaiFENesin ER (MUCINEX) 600 MG TABLET SR 12 HR, Take 1 Tablet by mouth every 12 hours., Disp: 28 Tablet, Rfl: 1    atorvastatin (LIPITOR) 20 MG Tab, TAKE 1 TABLET BY MOUTH ONCE DAILY IN THE EVENING, Disp: 30 Tablet, Rfl: 11    lisinopril (PRINIVIL) 5 MG Tab, Take 1 tablet by mouth once daily, Disp: 30 Tablet, Rfl: 11    primidone (MYSOLINE) 50 MG Tab, TAKE 1 TABLET BY MOUTH TWICE DAILY FOR TREMOR PREVENTION, Disp: , Rfl:     predniSONE (DELTASONE) 10 MG Tab, Take 10 mg by mouth every day., Disp: , Rfl:     Empagliflozin-metFORMIN HCl ER (SYNJARDY XR)  MG TABLET SR 24 HR, Take 1 Each by mouth  every day., Disp: 30 Tablet, Rfl: 5    ergocalciferol (DRISDOL) 95256 UNIT capsule, Take 1 Capsule by mouth every 7 days., Disp: 8 Capsule, Rfl: 0    omeprazole (PRILOSEC OTC) 20 MG tablet, Take 1 Tablet by mouth every day., Disp: 30 Tablet, Rfl: 5    Blood Glucose Monitoring Suppl (BLOOD GLUCOSE MONITOR SYSTEM) w/Device Kit, Use to test 2 times a day and as needed, Disp: 1 Kit, Rfl: 0    Lancets 30G Misc, Use to test twice daily, Disp: 100 Each, Rfl: 11    glucose blood strip, Use to test twice daily, Disp: 100 Strip, Rfl: 11    Alcohol Swabs, Use to test twice a day, Disp: 100 Each, Rfl: 11    ROPINIRole (REQUIP) 0.5 MG Tab, Take 0.5 mg by mouth 2 times a day., Disp: , Rfl:     Misc. Devices Misc, Please provide the patient with a walker with a seat. G61.81, Disp: 1 Each, Rfl: 0    gabapentin (NEURONTIN) 100 MG Cap, Take 200 mg by mouth 3 times a day., Disp: , Rfl:     predniSONE (DELTASONE) 50 MG Tab, Take 50 mg by mouth every day., Disp: , Rfl:     Labs: Reviewed    Physical Examination:  Vital signs: LMP 03/08/2021  There is no height or weight on file to calculate BMI.  General: No apparent distress, cooperative  Eyes: No scleral icterus or discharge  ENMT: Normal on external inspection of nose, lips, normal thyroid exam  Neck: No abnormal masses on inspection  Resp: Normal effort, clear to auscultation bilaterally   CVS: Regular rate and rhythm, S1 S2 normal, no murmur   Extremities: No edema  Abdomen: abdominal obesity present  Neuro: Alert and oriented  Skin: No rash  Psych: Normal mood and affect, intact memory and able to make informed decisions    Plan assessment    1) Pt doing great on Synjardy 25mg/100mg with current A1C at 6.  2) Check Vitamin D level  3) A1C on next visit    There are no diagnoses linked to this encounter.    No follow-ups on file.    Thank you for allowing me to participate in the care of this patient.    Astrid Knapp, PharmD 02/01/23    CC:   RITCHIE Ragsdale

## 2023-04-13 DIAGNOSIS — E11.9 TYPE 2 DIABETES MELLITUS WITHOUT COMPLICATION, WITHOUT LONG-TERM CURRENT USE OF INSULIN (HCC): ICD-10-CM

## 2023-04-13 RX ORDER — CALCIUM CITRATE/VITAMIN D3 200MG-6.25
TABLET ORAL
Qty: 100 STRIP | Refills: 11 | Status: CANCELLED | OUTPATIENT
Start: 2023-04-13

## 2023-04-14 ENCOUNTER — PHARMACY VISIT (OUTPATIENT)
Dept: PHARMACY | Facility: MEDICAL CENTER | Age: 53
End: 2023-04-14
Payer: COMMERCIAL

## 2023-04-14 DIAGNOSIS — E11.9 TYPE 2 DIABETES MELLITUS WITHOUT COMPLICATION, WITHOUT LONG-TERM CURRENT USE OF INSULIN (HCC): ICD-10-CM

## 2023-04-14 PROCEDURE — RXMED WILLOW AMBULATORY MEDICATION CHARGE: Performed by: FAMILY MEDICINE

## 2023-04-14 PROCEDURE — RXMED WILLOW AMBULATORY MEDICATION CHARGE: Performed by: NURSE PRACTITIONER

## 2023-04-14 RX ORDER — DIPHENHYDRAMINE HYDROCHLORIDE 25 MG/1
CAPSULE, LIQUID FILLED ORAL
Qty: 1 KIT | Refills: 0 | OUTPATIENT
Start: 2023-04-14

## 2023-04-18 ENCOUNTER — OFFICE VISIT (OUTPATIENT)
Dept: MEDICAL GROUP | Facility: MEDICAL CENTER | Age: 53
End: 2023-04-18
Attending: FAMILY MEDICINE
Payer: MEDICAID

## 2023-04-18 VITALS
DIASTOLIC BLOOD PRESSURE: 78 MMHG | HEIGHT: 60 IN | SYSTOLIC BLOOD PRESSURE: 124 MMHG | RESPIRATION RATE: 18 BRPM | BODY MASS INDEX: 31.22 KG/M2 | HEART RATE: 104 BPM | WEIGHT: 159 LBS | OXYGEN SATURATION: 95 % | TEMPERATURE: 97.9 F

## 2023-04-18 DIAGNOSIS — Z12.31 ENCOUNTER FOR SCREENING MAMMOGRAM FOR MALIGNANT NEOPLASM OF BREAST: ICD-10-CM

## 2023-04-18 DIAGNOSIS — E53.8 VITAMIN B12 DEFICIENCY: ICD-10-CM

## 2023-04-18 DIAGNOSIS — G61.81 CIDP (CHRONIC INFLAMMATORY DEMYELINATING POLYNEUROPATHY) (HCC): ICD-10-CM

## 2023-04-18 DIAGNOSIS — E11.9 TYPE 2 DIABETES MELLITUS WITHOUT COMPLICATION, WITHOUT LONG-TERM CURRENT USE OF INSULIN (HCC): ICD-10-CM

## 2023-04-18 DIAGNOSIS — Z12.11 SCREENING FOR COLON CANCER: ICD-10-CM

## 2023-04-18 DIAGNOSIS — Z90.5 H/O RIGHT RADICAL NEPHRECTOMY: ICD-10-CM

## 2023-04-18 DIAGNOSIS — R79.89 LFT ELEVATION: ICD-10-CM

## 2023-04-18 DIAGNOSIS — D64.9 ANEMIA, UNSPECIFIED TYPE: ICD-10-CM

## 2023-04-18 PROBLEM — G62.9 NEUROPATHY: Status: RESOLVED | Noted: 2021-10-24 | Resolved: 2023-04-18

## 2023-04-18 PROBLEM — N28.89 RENAL MASS: Status: RESOLVED | Noted: 2021-09-14 | Resolved: 2023-04-18

## 2023-04-18 PROBLEM — G63 POLYNEUROPATHY DUE TO MEDICAL CONDITION (HCC): Status: RESOLVED | Noted: 2021-10-24 | Resolved: 2023-04-18

## 2023-04-18 PROBLEM — R05.1 ACUTE COUGH: Status: RESOLVED | Noted: 2022-10-11 | Resolved: 2023-04-18

## 2023-04-18 PROBLEM — Z76.89 ENCOUNTER TO ESTABLISH CARE: Status: RESOLVED | Noted: 2022-03-24 | Resolved: 2023-04-18

## 2023-04-18 PROCEDURE — 90677 PCV20 VACCINE IM: CPT

## 2023-04-18 PROCEDURE — 99213 OFFICE O/P EST LOW 20 MIN: CPT | Performed by: FAMILY MEDICINE

## 2023-04-18 PROCEDURE — 99214 OFFICE O/P EST MOD 30 MIN: CPT | Performed by: FAMILY MEDICINE

## 2023-04-18 RX ORDER — AMOXICILLIN AND CLAVULANATE POTASSIUM 500; 125 MG/1; MG/1
TABLET, FILM COATED ORAL
COMMUNITY
End: 2023-04-18

## 2023-04-18 RX ORDER — TRAZODONE HYDROCHLORIDE 50 MG/1
TABLET ORAL
COMMUNITY
End: 2023-04-18

## 2023-04-18 RX ORDER — EMPAGLIFLOZIN, METFORMIN HYDROCHLORIDE 10; 1000 MG/1; MG/1
TABLET, EXTENDED RELEASE ORAL
COMMUNITY
End: 2023-07-19

## 2023-04-18 RX ORDER — LANCETS
EACH MISCELLANEOUS
COMMUNITY
End: 2023-12-12

## 2023-04-18 RX ORDER — PREDNISONE 5 MG/1
TABLET ORAL
COMMUNITY
Start: 2023-03-26

## 2023-04-18 RX ORDER — PREDNISONE 5 MG/1
TABLET ORAL
COMMUNITY
End: 2023-04-18

## 2023-04-18 RX ORDER — METFORMIN HYDROCHLORIDE 500 MG/1
TABLET, EXTENDED RELEASE ORAL
COMMUNITY
End: 2023-04-18

## 2023-04-18 RX ORDER — SULFAMETHOXAZOLE AND TRIMETHOPRIM 800; 160 MG/1; MG/1
TABLET ORAL
COMMUNITY
End: 2023-04-18

## 2023-04-18 RX ORDER — HYDROCODONE BITARTRATE AND ACETAMINOPHEN 5; 325 MG/1; MG/1
TABLET ORAL
COMMUNITY
End: 2023-04-18

## 2023-04-18 ASSESSMENT — FIBROSIS 4 INDEX: FIB4 SCORE: 0.55

## 2023-04-18 NOTE — ASSESSMENT & PLAN NOTE
Patient follows with urology.  She had a CT scan in October 2022 and has a follow-up 1 year after.
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
## 2023-04-18 NOTE — ASSESSMENT & PLAN NOTE
Has eye exam 6/5  Currently well controlled on medications, diet, exercise, weight loss, sees [facility] pharmacists regularly

## 2023-04-20 ENCOUNTER — APPOINTMENT (OUTPATIENT)
Dept: RADIOLOGY | Facility: MEDICAL CENTER | Age: 53
End: 2023-04-20
Attending: FAMILY MEDICINE
Payer: MEDICAID

## 2023-04-20 DIAGNOSIS — Z12.31 ENCOUNTER FOR SCREENING MAMMOGRAM FOR MALIGNANT NEOPLASM OF BREAST: ICD-10-CM

## 2023-04-20 PROCEDURE — 77067 SCR MAMMO BI INCL CAD: CPT

## 2023-04-26 ENCOUNTER — NON-PROVIDER VISIT (OUTPATIENT)
Dept: MEDICAL GROUP | Facility: MEDICAL CENTER | Age: 53
End: 2023-04-26
Attending: FAMILY MEDICINE
Payer: MEDICAID

## 2023-04-26 VITALS
BODY MASS INDEX: 30.63 KG/M2 | DIASTOLIC BLOOD PRESSURE: 87 MMHG | HEIGHT: 60 IN | WEIGHT: 156 LBS | SYSTOLIC BLOOD PRESSURE: 112 MMHG | HEART RATE: 97 BPM

## 2023-04-26 PROCEDURE — 99213 OFFICE O/P EST LOW 20 MIN: CPT | Performed by: PHARMACIST

## 2023-04-26 ASSESSMENT — FIBROSIS 4 INDEX: FIB4 SCORE: 0.55

## 2023-04-26 NOTE — NON-PROVIDER
In:9:15am  Out:9:35am  Primary care physician: Jacque Whyte M.D.    Reason for consult: Management of Controlled Type 2 Diabetes    HPI:  Noelle Hobbs is a 53 y.o. old patient who comes in today for evaluation of above stated problem.    Most Recent HbA1c:   Lab Results   Component Value Date/Time    HBA1C 6 (A) 02/01/2023 11:00 AM    HBA1C 6.2 (H) 10/21/2022 10:08 AM    HBA1C 6.2 (A) 09/14/2022 10:30 AM     Pt doing great. She continues to lose wt and recently started working again as a part time . Pt states she feels much better, has more energy, and is even more active. She is able to walk without her cane, although she still needs it at times for longer distances.     She follows a low carb diet but still eats what she wants in moderation. She eats dinner early around 5pm, and if she eats late, she tries to stick with salads.       Current Diabetes Regimen:   Metformin + SGLT-2 Inhibitor: empagliflozin-metformin(10/1000 mg) once daily       Before Breakfast:  Before Lunch:  Before Dinner:  Before Bedtime:  Other times:  Hypoglycemia:  None      ROS:  Constitutional: No weight loss  Cardiac: No palpitations or racing heart  Resp: No shortness of breath  Neuro: No numbness or tinging in feet  Endo: No heat or cold intolerance, no polyuria or polydipsia  All other systems were reviewed and were negative.    Past Medical History:  Patient Active Problem List    Diagnosis Date Noted    Toe infection 06/15/2022    Type 2 diabetes mellitus without complication, without long-term current use of insulin ([facility]) 05/17/2022    Gastroesophageal reflux disease 05/17/2022    Hyperlipidemia associated with type 2 diabetes mellitus ([facility]) 05/17/2022    CIDP (chronic inflammatory demyelinating polyneuropathy) ([facility]) 03/24/2022    Clear cell renal cell carcinoma, right ([facility]) 10/24/2021    H/O right radical nephrectomy 10/23/2021    LFT elevation 10/23/2021    Mixed stress and urge urinary incontinence 09/14/2021     Vitamin D deficiency 04/01/2019    Anemia 04/01/2019    Vitamin B12 deficiency 04/01/2019    Smoker 04/01/2019    Neck pain 02/12/2019    BMI 28.0-28.9,adult 02/12/2019       Past Surgical History:  Past Surgical History:   Procedure Laterality Date    NEPHRECTOMY ROBOTIC XI Right 10/20/2021    Procedure: NEPHRECTOMY, ROBOT-ASSISTED, USING DA CORNELIUS XI - RADICAL;  Surgeon: Jay Painting M.D.;  Location: SURGERY [facility];  Service: Gen Robotic    ORIF, ANKLE Right 09/09/2021    Procedure: ORIF, ANKLE;  Surgeon: Alexander Castillo M.D.;  Location: SURGERY [facility];  Service: Orthopedics    CHOLECYSTECTOMY  2005    TUBAL LIGATION         Allergies:  Latex    Social History:  Social History     Socioeconomic History    Marital status:      Spouse name: Not on file    Number of children: Not on file    Years of education: Not on file    Highest education level: 8th grade   Occupational History    Not on file   Tobacco Use    Smoking status: Some Days     Packs/day: 0.25     Years: 34.00     Pack years: 8.50     Types: Cigarettes    Smokeless tobacco: Never    Tobacco comments:     3 cigarettes/day   Vaping Use    Vaping Use: Never used   Substance and Sexual Activity    Alcohol use: No    Drug use: No    Sexual activity: Not Currently     Partners: Male     Comment:    Other Topics Concern    Not on file   Social History Narrative    Not on file     Social Determinants of Health     Financial Resource Strain: Not on file   Food Insecurity: Not on file   Transportation Needs: Not on file   Physical Activity: Not on file   Stress: Not on file   Social Connections: Not on file   Intimate Partner Violence: Not on file   Housing Stability: Not on file       Family History:  Family History   Problem Relation Age of Onset    Osteoporosis Mother     Alzheimer's Disease Mother     Arthritis Mother     Diabetes Father     Heart Disease Father     Stroke Father         TIA    Cancer Other         one nephew  leukemia, the other liver ca       Medications:    Current Outpatient Medications:     TechLite AST Lancets Misc, TechLITE Lancets 28 gauge, Disp: , Rfl:     glucose blood strip, True Metrix Glucose Test Strip, Disp: , Rfl:     mupirocin (BACTROBAN) 2 % Ointment, mupirocin 2 % topical ointment  APPLY 1 APPLICATION TOPICALLY TO AFFECTED AREA TWICE DAILY, Disp: , Rfl:     predniSONE (DELTASONE) 5 MG Tab, TAKE 1/2 (ONE-HALF) TABLET BY MOUTH EVERY OTHER DAY, Disp: , Rfl:     Empagliflozin-metFORMIN HCl ER (SYNJARDY XR)  MG TABLET SR 24 HR, Synjardy XR 10 mg-1,000 mg tablet, extended release  TAKE 1 TABLET BY MOUTH ONCE DAILY, Disp: , Rfl:     Blood Glucose Monitoring Suppl (BLOOD GLUCOSE MONITOR SYSTEM) w/Device Kit, Use to test 3 times a day and as needed, Disp: 1 Kit, Rfl: 0    atorvastatin (LIPITOR) 20 MG Tab, TAKE 1 TABLET BY MOUTH ONCE DAILY IN THE EVENING, Disp: 30 Tablet, Rfl: 11    lisinopril (PRINIVIL) 5 MG Tab, Take 1 tablet by mouth once daily, Disp: 30 Tablet, Rfl: 11    primidone (MYSOLINE) 50 MG Tab, TAKE 1 TABLET BY MOUTH TWICE DAILY FOR TREMOR PREVENTION, Disp: , Rfl:     omeprazole (PRILOSEC OTC) 20 MG tablet, Take 1 Tablet by mouth every day., Disp: 30 Tablet, Rfl: 5    Lancets 30G Misc, Use to test twice daily, Disp: 100 Each, Rfl: 11    glucose blood strip, Use to test twice daily, Disp: 100 Strip, Rfl: 11    Alcohol Swabs, Use to test twice a day, Disp: 100 Each, Rfl: 11    Misc. Devices Misc, Please provide the patient with a walker with a seat. G61.81, Disp: 1 Each, Rfl: 0    gabapentin (NEURONTIN) 100 MG Cap, Take 200 mg by mouth 3 times a day., Disp: , Rfl:     Labs: Reviewed    Physical Examination:  Vital signs: /87   Pulse 97   Ht 1.524 m (5')   Wt 70.8 kg (156 lb)   LMP 03/08/2021   BMI 30.47 kg/m²  Body mass index is 30.47 kg/m².  General: No apparent distress, cooperative  Eyes: No scleral icterus or discharge  ENMT: Normal on external inspection of nose, lips, normal  thyroid exam  Neck: No abnormal masses on inspection  Resp: Normal effort, clear to auscultation bilaterally   CVS: Regular rate and rhythm, S1 S2 normal, no murmur   Extremities: No edema  Abdomen: abdominal obesity present  Neuro: Alert and oriented  Skin: No rash  Psych: Normal mood and affect, intact memory and able to make informed decisions    Plan and assessment    1) Pt doing great, A1C at goal and continues to lose wt  2) Cont low carb diet and daily walking  3) No changes with dm therapy  4) A1c next visit     There are no diagnoses linked to this encounter.    Return in about 12 weeks (around 7/19/2023).    Thank you for allowing me to participate in the care of this patient.    Astrid Knapp, PharmD 04/26/23    CC:   Jacque Whyte M.D.

## 2023-07-19 ENCOUNTER — NON-PROVIDER VISIT (OUTPATIENT)
Dept: MEDICAL GROUP | Facility: MEDICAL CENTER | Age: 53
End: 2023-07-19
Attending: FAMILY MEDICINE
Payer: MEDICAID

## 2023-07-19 ENCOUNTER — TELEPHONE (OUTPATIENT)
Dept: MEDICAL GROUP | Facility: MEDICAL CENTER | Age: 53
End: 2023-07-19

## 2023-07-19 VITALS
SYSTOLIC BLOOD PRESSURE: 114 MMHG | HEART RATE: 81 BPM | BODY MASS INDEX: 30.59 KG/M2 | WEIGHT: 155.8 LBS | HEIGHT: 60 IN | DIASTOLIC BLOOD PRESSURE: 68 MMHG

## 2023-07-19 DIAGNOSIS — E11.9 TYPE 2 DIABETES MELLITUS WITHOUT COMPLICATION, WITHOUT LONG-TERM CURRENT USE OF INSULIN (HCC): ICD-10-CM

## 2023-07-19 LAB
HBA1C MFR BLD: 5.6 % (ref ?–5.8)
POCT INT CON NEG: NEGATIVE
POCT INT CON POS: POSITIVE

## 2023-07-19 PROCEDURE — 99213 OFFICE O/P EST LOW 20 MIN: CPT | Performed by: PHARMACIST

## 2023-07-19 PROCEDURE — 83036 HEMOGLOBIN GLYCOSYLATED A1C: CPT

## 2023-07-19 RX ORDER — EMPAGLIFLOZIN 25 MG/1
25 TABLET, FILM COATED ORAL DAILY
Qty: 30 TABLET | Refills: 5 | Status: SHIPPED | OUTPATIENT
Start: 2023-07-19 | End: 2023-08-18

## 2023-07-19 ASSESSMENT — FIBROSIS 4 INDEX: FIB4 SCORE: 0.55

## 2023-07-19 NOTE — TELEPHONE ENCOUNTER
MEDICATION PRIOR AUTHORIZATION NEEDED:    1. Name of Medication: jardiance     2. Requested By (Name of Pharmacy): [facility]      3. Is insurance on file current? yes    4. What is the name & phone number of the 3rd party payor? [employer]   Via cover my meds

## 2023-07-19 NOTE — PROGRESS NOTES
New Patient Consult Note  Primary care physician: Jacque Whyte M.D.  9:30-10:15a  Reason for consult: Management of Controlled Type 2 Diabetes    HPI:  Noelle Hobbs is a 53 y.o. old patient who comes in today for evaluation of above stated problem.    Pt hasn't had Synjardy xr for >1-2 month. She does not know the dose but says they are green. She started work at [employer] and doesn't get exercise outside of working. She did try to limit sugar and carbs in her diet, and she stopped eating late at night. Her dinners tend to just be salads.     Most Recent HbA1c:   Lab Results   Component Value Date/Time    HBA1C 5.6 07/19/2023 09:52 AM    HBA1C 6 (A) 02/01/2023 11:00 AM    HBA1C 6.2 (H) 10/21/2022 10:08 AM       Current Diabetes Regimen:  Metformin + SGLT-2 Inhibitor: Synjardy Xr 25mg/1000 qday ? Hasnt had for >1 months       SMBG - test 2-3 times a week   Before Breakfast:   Before Lunch:  Before Dinner:  Before Bedtime:  Other times:  Hypoglycemia:  n/a      ROS:  Constitutional: No weight loss  Cardiac: No palpitations or racing heart  Resp: No shortness of breath  Neuro: No numbness or tinging in feet  Endo: No heat or cold intolerance, no polyuria or polydipsia  All other systems were reviewed and were negative.    Past Medical History:  Patient Active Problem List    Diagnosis Date Noted    Toe infection 06/15/2022    Type 2 diabetes mellitus without complication, without long-term current use of insulin ([facility]) 05/17/2022    Gastroesophageal reflux disease 05/17/2022    Hyperlipidemia associated with type 2 diabetes mellitus ([facility]) 05/17/2022    CIDP (chronic inflammatory demyelinating polyneuropathy) ([facility]) 03/24/2022    Clear cell renal cell carcinoma, right ([facility]) 10/24/2021    H/O right radical nephrectomy 10/23/2021    LFT elevation 10/23/2021    Mixed stress and urge urinary incontinence 09/14/2021    Vitamin D deficiency 04/01/2019    Anemia 04/01/2019    Vitamin B12 deficiency 04/01/2019     Smoker 04/01/2019    Neck pain 02/12/2019    BMI 28.0-28.9,adult 02/12/2019       Past Surgical History:  Past Surgical History:   Procedure Laterality Date    NEPHRECTOMY ROBOTIC XI Right 10/20/2021    Procedure: NEPHRECTOMY, ROBOT-ASSISTED, USING DA CORNELIUS XI - RADICAL;  Surgeon: Jay Painting M.D.;  Location: SURGERY [facility];  Service: Gen Robotic    ORIF, ANKLE Right 09/09/2021    Procedure: ORIF, ANKLE;  Surgeon: Alexander Castillo M.D.;  Location: SURGERY [facility];  Service: Orthopedics    CHOLECYSTECTOMY  2005    TUBAL LIGATION         Allergies:  Latex    Social History:  Social History     Socioeconomic History    Marital status:      Spouse name: Not on file    Number of children: Not on file    Years of education: Not on file    Highest education level: 8th grade   Occupational History    Not on file   Tobacco Use    Smoking status: Some Days     Packs/day: 0.25     Years: 34.00     Pack years: 8.50     Types: Cigarettes    Smokeless tobacco: Never    Tobacco comments:     3 cigarettes/day   Vaping Use    Vaping Use: Never used   Substance and Sexual Activity    Alcohol use: No    Drug use: No    Sexual activity: Not Currently     Partners: Male     Comment:    Other Topics Concern    Not on file   Social History Narrative    Not on file     Social Determinants of Health     Financial Resource Strain: Low Risk  (3/24/2022)    Overall Financial Resource Strain (CARDIA)     Difficulty of Paying Living Expenses: Not very hard   Food Insecurity: No Food Insecurity (3/24/2022)    Hunger Vital Sign     Worried About Running Out of Food in the Last Year: Never true     Ran Out of Food in the Last Year: Never true   Transportation Needs: No Transportation Needs (3/24/2022)    PRAPARE - Transportation     Lack of Transportation (Medical): No     Lack of Transportation (Non-Medical): No   Physical Activity: Inactive (3/24/2022)    Exercise Vital Sign     Days of Exercise per Week: 0 days      Minutes of Exercise per Session: 0 min   Stress: Stress Concern Present (3/24/2022)    [place] [employer] - Occupational Stress Questionnaire     Feeling of Stress : To some extent   Social Connections: Socially Isolated (3/24/2022)    Social Connection and Isolation Panel [NHANES]     Frequency of Communication with Friends and Family: More than three times a week     Frequency of Social Gatherings with Friends and Family: More than three times a week     Attends Orthodox Services: Never     Active Member of Clubs or Organizations: No     Attends Club or Organization Meetings: Never     Marital Status:    Intimate Partner Violence: Not on file   Housing Stability: Low Risk  (3/24/2022)    Housing Stability Vital Sign     Unable to Pay for Housing in the Last Year: No     Number of Places Lived in the Last Year: 2     Unstable Housing in the Last Year: No       Family History:  Family History   Problem Relation Age of Onset    Osteoporosis Mother     Alzheimer's Disease Mother     Arthritis Mother     Diabetes Father     Heart Disease Father     Stroke Father         TIA    Cancer Other         one nephew leukemia, the other liver ca       Medications:    Current Outpatient Medications:     Empagliflozin (JARDIANCE) 25 MG Tab, Take 25 mg by mouth every day for 30 days., Disp: 30 Tablet, Rfl: 5    TechLite AST Lancets Misc, TechLITE Lancets 28 gauge, Disp: , Rfl:     glucose blood strip, True Metrix Glucose Test Strip, Disp: , Rfl:     mupirocin (BACTROBAN) 2 % Ointment, mupirocin 2 % topical ointment  APPLY 1 APPLICATION TOPICALLY TO AFFECTED AREA TWICE DAILY, Disp: , Rfl:     predniSONE (DELTASONE) 5 MG Tab, TAKE 1/2 (ONE-HALF) TABLET BY MOUTH EVERY OTHER DAY, Disp: , Rfl:     Blood Glucose Monitoring Suppl (BLOOD GLUCOSE MONITOR SYSTEM) w/Device Kit, Use to test 3 times a day and as needed, Disp: 1 Kit, Rfl: 0    atorvastatin (LIPITOR) 20 MG Tab, TAKE 1 TABLET BY MOUTH ONCE  DAILY IN THE EVENING, Disp: 30 Tablet, Rfl: 11    lisinopril (PRINIVIL) 5 MG Tab, Take 1 tablet by mouth once daily, Disp: 30 Tablet, Rfl: 11    primidone (MYSOLINE) 50 MG Tab, TAKE 1 TABLET BY MOUTH TWICE DAILY FOR TREMOR PREVENTION, Disp: , Rfl:     omeprazole (PRILOSEC OTC) 20 MG tablet, Take 1 Tablet by mouth every day., Disp: 30 Tablet, Rfl: 5    Lancets 30G Misc, Use to test twice daily, Disp: 100 Each, Rfl: 11    glucose blood strip, Use to test twice daily, Disp: 100 Strip, Rfl: 11    Alcohol Swabs, Use to test twice a day, Disp: 100 Each, Rfl: 11    Misc. Devices Misc, Please provide the patient with a walker with a seat. G61.81, Disp: 1 Each, Rfl: 0    gabapentin (NEURONTIN) 100 MG Cap, Take 200 mg by mouth 3 times a day., Disp: , Rfl:     Labs: Reviewed    Physical Examination:  Vital signs: /68   Pulse 81   Ht 1.524 m (5')   Wt 70.7 kg (155 lb 12.8 oz)   LMP 03/08/2021   BMI 30.43 kg/m²  Body mass index is 30.43 kg/m².  General: No apparent distress, cooperative  Eyes: No scleral icterus or discharge  ENMT: Normal on external inspection of nose, lips, normal thyroid exam  Neck: No abnormal masses on inspection  Resp: Normal effort, clear to auscultation bilaterally   CVS: Regular rate and rhythm, S1 S2 normal, no murmur   Extremities: No edema  Abdomen: abdominal obesity present  Neuro: Alert and oriented  Skin: No rash  Psych: Normal mood and affect, intact memory and able to make informed decisions    Plan:    There are no diagnoses linked to this encounter.    Return in about 4 months (around 11/14/2023).    DM -Patients A1c improved despite lack of medication, likely due to diet. Congratulated patient on this improvement and we agreed to reduce her medication (she complained of the size of the pill and wanted to take less pills now).  - Stop Synjardy. Take Jardiance 25mg qday.       Thank you for allowing me to participate in the care of this patient.    Niranjan Schulte, PharmD  07/19/23    CC:   Jacque Whyte M.D.

## 2023-08-04 NOTE — PROGRESS NOTES
Down at MRI.   Doxycycline Pregnancy And Lactation Text: This medication is Pregnancy Category D and not consider safe during pregnancy. It is also excreted in breast milk but is considered safe for shorter treatment courses. Benzoyl Peroxide Pregnancy And Lactation Text: This medication is Pregnancy Category C. It is unknown if benzoyl peroxide is excreted in breast milk. Tetracycline Counseling: Patient counseled regarding possible photosensitivity and increased risk for sunburn.  Patient instructed to avoid sunlight, if possible.  When exposed to sunlight, patients should wear protective clothing, sunglasses, and sunscreen.  The patient was instructed to call the office immediately if the following severe adverse effects occur:  hearing changes, easy bruising/bleeding, severe headache, or vision changes.  The patient verbalized understanding of the proper use and possible adverse effects of tetracycline.  All of the patient's questions and concerns were addressed. Patient understands to avoid pregnancy while on therapy due to potential birth defects. High Dose Vitamin A Pregnancy And Lactation Text: High dose vitamin A therapy is contraindicated during pregnancy and breast feeding. Benzoyl Peroxide Counseling: Patient counseled that medicine may cause skin irritation and bleach clothing.  In the event of skin irritation, the patient was advised to reduce the amount of the drug applied or use it less frequently.   The patient verbalized understanding of the proper use and possible adverse effects of benzoyl peroxide.  All of the patient's questions and concerns were addressed. Tazorac Counseling:  Patient advised that medication is irritating and drying.  Patient may need to apply sparingly and wash off after an hour before eventually leaving it on overnight.  The patient verbalized understanding of the proper use and possible adverse effects of tazorac.  All of the patient's questions and concerns were addressed. Topical Retinoid Pregnancy And Lactation Text: This medication is Pregnancy Category C. It is unknown if this medication is excreted in breast milk. Azithromycin Counseling:  I discussed with the patient the risks of azithromycin including but not limited to GI upset, allergic reaction, drug rash, diarrhea, and yeast infections. Bactrim Pregnancy And Lactation Text: This medication is Pregnancy Category D and is known to cause fetal risk.  It is also excreted in breast milk. Topical Sulfur Applications Pregnancy And Lactation Text: This medication is Pregnancy Category C and has an unknown safety profile during pregnancy. It is unknown if this topical medication is excreted in breast milk. Tetracycline Pregnancy And Lactation Text: This medication is Pregnancy Category D and not consider safe during pregnancy. It is also excreted in breast milk. Topical Sulfur Applications Counseling: Topical Sulfur Counseling: Patient counseled that this medication may cause skin irritation or allergic reactions.  In the event of skin irritation, the patient was advised to reduce the amount of the drug applied or use it less frequently.   The patient verbalized understanding of the proper use and possible adverse effects of topical sulfur application.  All of the patient's questions and concerns were addressed. Include Pregnancy/Lactation Warning?: No Minocycline Counseling: Patient advised regarding possible photosensitivity and discoloration of the teeth, skin, lips, tongue and gums.  Patient instructed to avoid sunlight, if possible.  When exposed to sunlight, patients should wear protective clothing, sunglasses, and sunscreen.  The patient was instructed to call the office immediately if the following severe adverse effects occur:  hearing changes, easy bruising/bleeding, severe headache, or vision changes.  The patient verbalized understanding of the proper use and possible adverse effects of minocycline.  All of the patient's questions and concerns were addressed. Doxycycline Counseling:  Patient counseled regarding possible photosensitivity and increased risk for sunburn.  Patient instructed to avoid sunlight, if possible.  When exposed to sunlight, patients should wear protective clothing, sunglasses, and sunscreen.  The patient was instructed to call the office immediately if the following severe adverse effects occur:  hearing changes, easy bruising/bleeding, severe headache, or vision changes.  The patient verbalized understanding of the proper use and possible adverse effects of doxycycline.  All of the patient's questions and concerns were addressed. Spironolactone Counseling: Patient advised regarding risks of diarrhea, abdominal pain, hyperkalemia, birth defects (for female patients), liver toxicity and renal toxicity. The patient may need blood work to monitor liver and kidney function and potassium levels while on therapy. The patient verbalized understanding of the proper use and possible adverse effects of spironolactone.  All of the patient's questions and concerns were addressed. Birth Control Pills Counseling: Birth Control Pill Counseling: I discussed with the patient the potential side effects of OCPs including but not limited to increased risk of stroke, heart attack, thrombophlebitis, deep venous thrombosis, hepatic adenomas, breast changes, GI upset, headaches, and depression.  The patient verbalized understanding of the proper use and possible adverse effects of OCPs. All of the patient's questions and concerns were addressed. Erythromycin Pregnancy And Lactation Text: This medication is Pregnancy Category B and is considered safe during pregnancy. It is also excreted in breast milk. Detail Level: Zone High Dose Vitamin A Counseling: Side effects reviewed, pt to contact office should one occur. Erythromycin Counseling:  I discussed with the patient the risks of erythromycin including but not limited to GI upset, allergic reaction, drug rash, diarrhea, increase in liver enzymes, and yeast infections. Topical Clindamycin Counseling: Patient counseled that this medication may cause skin irritation or allergic reactions.  In the event of skin irritation, the patient was advised to reduce the amount of the drug applied or use it less frequently.   The patient verbalized understanding of the proper use and possible adverse effects of clindamycin.  All of the patient's questions and concerns were addressed. Isotretinoin Pregnancy And Lactation Text: This medication is Pregnancy Category X and is considered extremely dangerous during pregnancy. It is unknown if it is excreted in breast milk. Spironolactone Pregnancy And Lactation Text: This medication can cause feminization of the male fetus and should be avoided during pregnancy. The active metabolite is also found in breast milk. Azithromycin Pregnancy And Lactation Text: This medication is considered safe during pregnancy and is also secreted in breast milk. Isotretinoin Counseling: Patient should get monthly blood tests, not donate blood, not drive at night if vision affected, not share medication, and not undergo elective surgery for 6 months after tx completed. Side effects reviewed, pt to contact office should one occur. Tazorac Pregnancy And Lactation Text: This medication is not safe during pregnancy. It is unknown if this medication is excreted in breast milk. Bactrim Counseling:  I discussed with the patient the risks of sulfa antibiotics including but not limited to GI upset, allergic reaction, drug rash, diarrhea, dizziness, photosensitivity, and yeast infections.  Rarely, more serious reactions can occur including but not limited to aplastic anemia, agranulocytosis, methemoglobinemia, blood dyscrasias, liver or kidney failure, lung infiltrates or desquamative/blistering drug rashes. Dapsone Pregnancy And Lactation Text: This medication is Pregnancy Category C and is not considered safe during pregnancy or breast feeding. Topical Clindamycin Pregnancy And Lactation Text: This medication is Pregnancy Category B and is considered safe during pregnancy. It is unknown if it is excreted in breast milk. Birth Control Pills Pregnancy And Lactation Text: This medication should be avoided if pregnant and for the first 30 days post-partum. Topical Retinoid counseling:  Patient advised to apply a pea-sized amount only at bedtime and wait 30 minutes after washing their face before applying.  If too drying, patient may add a non-comedogenic moisturizer. The patient verbalized understanding of the proper use and possible adverse effects of retinoids.  All of the patient's questions and concerns were addressed. Dapsone Counseling: I discussed with the patient the risks of dapsone including but not limited to hemolytic anemia, agranulocytosis, rashes, methemoglobinemia, kidney failure, peripheral neuropathy, headaches, GI upset, and liver toxicity.  Patients who start dapsone require monitoring including baseline LFTs and weekly CBCs for the first month, then every month thereafter.  The patient verbalized understanding of the proper use and possible adverse effects of dapsone.  All of the patient's questions and concerns were addressed. Winlevi Counseling:  I discussed with the patient the risks of topical clascoterone including but not limited to erythema, scaling, itching, and stinging. Patient voiced their understanding. Azelaic Acid Counseling: Patient counseled that medicine may cause skin irritation and to avoid applying near the eyes.  In the event of skin irritation, the patient was advised to reduce the amount of the drug applied or use it less frequently.   The patient verbalized understanding of the proper use and possible adverse effects of azelaic acid.  All of the patient's questions and concerns were addressed. Aklief counseling:  Patient advised to apply a pea-sized amount only at bedtime and wait 30 minutes after washing their face before applying.  If too drying, patient may add a non-comedogenic moisturizer.  The most commonly reported side effects including irritation, redness, scaling, dryness, stinging, burning, itching, and increased risk of sunburn.  The patient verbalized understanding of the proper use and possible adverse effects of retinoids.  All of the patient's questions and concerns were addressed. Sarecycline Counseling: Patient advised regarding possible photosensitivity and discoloration of the teeth, skin, lips, tongue and gums.  Patient instructed to avoid sunlight, if possible.  When exposed to sunlight, patients should wear protective clothing, sunglasses, and sunscreen.  The patient was instructed to call the office immediately if the following severe adverse effects occur:  hearing changes, easy bruising/bleeding, severe headache, or vision changes.  The patient verbalized understanding of the proper use and possible adverse effects of sarecycline.  All of the patient's questions and concerns were addressed. Winlevi Pregnancy And Lactation Text: This medication is considered safe during pregnancy and breastfeeding. Azelaic Acid Pregnancy And Lactation Text: This medication is considered safe during pregnancy and breast feeding. Aklief Pregnancy And Lactation Text: It is unknown if this medication is safe to use during pregnancy.  It is unknown if this medication is excreted in breast milk.  Breastfeeding women should use the topical cream on the smallest area of the skin for the shortest time needed while breastfeeding.  Do not apply to nipple and areola. Detail Level: Detailed

## 2023-09-07 ENCOUNTER — HOSPITAL ENCOUNTER (EMERGENCY)
Facility: MEDICAL CENTER | Age: 53
End: 2023-09-08
Attending: STUDENT IN AN ORGANIZED HEALTH CARE EDUCATION/TRAINING PROGRAM
Payer: MEDICAID

## 2023-09-07 DIAGNOSIS — N30.00 ACUTE CYSTITIS WITHOUT HEMATURIA: ICD-10-CM

## 2023-09-07 PROCEDURE — 93005 ELECTROCARDIOGRAM TRACING: CPT

## 2023-09-07 PROCEDURE — 99285 EMERGENCY DEPT VISIT HI MDM: CPT

## 2023-09-07 PROCEDURE — 93005 ELECTROCARDIOGRAM TRACING: CPT | Performed by: STUDENT IN AN ORGANIZED HEALTH CARE EDUCATION/TRAINING PROGRAM

## 2023-09-07 PROCEDURE — 36415 COLL VENOUS BLD VENIPUNCTURE: CPT

## 2023-09-07 ASSESSMENT — FIBROSIS 4 INDEX: FIB4 SCORE: 0.55

## 2023-09-08 ENCOUNTER — APPOINTMENT (OUTPATIENT)
Dept: RADIOLOGY | Facility: MEDICAL CENTER | Age: 53
End: 2023-09-08
Attending: STUDENT IN AN ORGANIZED HEALTH CARE EDUCATION/TRAINING PROGRAM
Payer: MEDICAID

## 2023-09-08 VITALS
RESPIRATION RATE: 20 BRPM | BODY MASS INDEX: 27.6 KG/M2 | WEIGHT: 150 LBS | SYSTOLIC BLOOD PRESSURE: 123 MMHG | TEMPERATURE: 97.9 F | OXYGEN SATURATION: 93 % | HEIGHT: 62 IN | HEART RATE: 99 BPM | DIASTOLIC BLOOD PRESSURE: 79 MMHG

## 2023-09-08 LAB
ALBUMIN SERPL BCP-MCNC: 4.3 G/DL (ref 3.2–4.9)
ALBUMIN/GLOB SERPL: 1.3 G/DL
ALP SERPL-CCNC: 150 U/L (ref 30–99)
ALT SERPL-CCNC: 45 U/L (ref 2–50)
ANION GAP SERPL CALC-SCNC: 15 MMOL/L (ref 7–16)
APPEARANCE UR: CLEAR
AST SERPL-CCNC: 34 U/L (ref 12–45)
BACTERIA #/AREA URNS HPF: ABNORMAL /HPF
BASOPHILS # BLD AUTO: 0.8 % (ref 0–1.8)
BASOPHILS # BLD: 0.09 K/UL (ref 0–0.12)
BILIRUB SERPL-MCNC: 0.3 MG/DL (ref 0.1–1.5)
BILIRUB UR QL STRIP.AUTO: NEGATIVE
BUN SERPL-MCNC: 18 MG/DL (ref 8–22)
CALCIUM ALBUM COR SERPL-MCNC: 8.9 MG/DL (ref 8.5–10.5)
CALCIUM SERPL-MCNC: 9.1 MG/DL (ref 8.5–10.5)
CHLORIDE SERPL-SCNC: 106 MMOL/L (ref 96–112)
CO2 SERPL-SCNC: 21 MMOL/L (ref 20–33)
COLOR UR: YELLOW
CREAT SERPL-MCNC: 1.24 MG/DL (ref 0.5–1.4)
EKG IMPRESSION: NORMAL
EOSINOPHIL # BLD AUTO: 0.21 K/UL (ref 0–0.51)
EOSINOPHIL NFR BLD: 1.8 % (ref 0–6.9)
EPI CELLS #/AREA URNS HPF: NEGATIVE /HPF
ERYTHROCYTE [DISTWIDTH] IN BLOOD BY AUTOMATED COUNT: 40.2 FL (ref 35.9–50)
GFR SERPLBLD CREATININE-BSD FMLA CKD-EPI: 52 ML/MIN/1.73 M 2
GLOBULIN SER CALC-MCNC: 3.3 G/DL (ref 1.9–3.5)
GLUCOSE SERPL-MCNC: 121 MG/DL (ref 65–99)
GLUCOSE UR STRIP.AUTO-MCNC: NEGATIVE MG/DL
HCT VFR BLD AUTO: 44.5 % (ref 37–47)
HGB BLD-MCNC: 14.5 G/DL (ref 12–16)
HYALINE CASTS #/AREA URNS LPF: ABNORMAL /LPF
IMM GRANULOCYTES # BLD AUTO: 0.02 K/UL (ref 0–0.11)
IMM GRANULOCYTES NFR BLD AUTO: 0.2 % (ref 0–0.9)
KETONES UR STRIP.AUTO-MCNC: ABNORMAL MG/DL
LACTATE SERPL-SCNC: 1.7 MMOL/L (ref 0.5–2)
LACTATE SERPL-SCNC: 2.2 MMOL/L (ref 0.5–2)
LACTATE SERPL-SCNC: 2.5 MMOL/L (ref 0.5–2)
LEUKOCYTE ESTERASE UR QL STRIP.AUTO: ABNORMAL
LYMPHOCYTES # BLD AUTO: 2.69 K/UL (ref 1–4.8)
LYMPHOCYTES NFR BLD: 23.6 % (ref 22–41)
MCH RBC QN AUTO: 26.6 PG (ref 27–33)
MCHC RBC AUTO-ENTMCNC: 32.6 G/DL (ref 32.2–35.5)
MCV RBC AUTO: 81.7 FL (ref 81.4–97.8)
MICRO URNS: ABNORMAL
MONOCYTES # BLD AUTO: 0.58 K/UL (ref 0–0.85)
MONOCYTES NFR BLD AUTO: 5.1 % (ref 0–13.4)
NEUTROPHILS # BLD AUTO: 7.82 K/UL (ref 1.82–7.42)
NEUTROPHILS NFR BLD: 68.5 % (ref 44–72)
NITRITE UR QL STRIP.AUTO: POSITIVE
NRBC # BLD AUTO: 0 K/UL
NRBC BLD-RTO: 0 /100 WBC (ref 0–0.2)
PH UR STRIP.AUTO: 5.5 [PH] (ref 5–8)
PLATELET # BLD AUTO: 284 K/UL (ref 164–446)
PMV BLD AUTO: 10.9 FL (ref 9–12.9)
POTASSIUM SERPL-SCNC: 3.7 MMOL/L (ref 3.6–5.5)
PROT SERPL-MCNC: 7.6 G/DL (ref 6–8.2)
PROT UR QL STRIP: NEGATIVE MG/DL
RBC # BLD AUTO: 5.45 M/UL (ref 4.2–5.4)
RBC # URNS HPF: ABNORMAL /HPF
RBC UR QL AUTO: NEGATIVE
SODIUM SERPL-SCNC: 142 MMOL/L (ref 135–145)
SP GR UR STRIP.AUTO: 1.02
TROPONIN T SERPL-MCNC: <6 NG/L (ref 6–19)
UROBILINOGEN UR STRIP.AUTO-MCNC: 0.2 MG/DL
WBC # BLD AUTO: 11.4 K/UL (ref 4.8–10.8)
WBC #/AREA URNS HPF: ABNORMAL /HPF

## 2023-09-08 PROCEDURE — 81001 URINALYSIS AUTO W/SCOPE: CPT

## 2023-09-08 PROCEDURE — 700105 HCHG RX REV CODE 258: Mod: UD | Performed by: STUDENT IN AN ORGANIZED HEALTH CARE EDUCATION/TRAINING PROGRAM

## 2023-09-08 PROCEDURE — 73630 X-RAY EXAM OF FOOT: CPT | Mod: LT

## 2023-09-08 PROCEDURE — 84484 ASSAY OF TROPONIN QUANT: CPT

## 2023-09-08 PROCEDURE — 85025 COMPLETE CBC W/AUTO DIFF WBC: CPT

## 2023-09-08 PROCEDURE — A9270 NON-COVERED ITEM OR SERVICE: HCPCS | Mod: UD | Performed by: STUDENT IN AN ORGANIZED HEALTH CARE EDUCATION/TRAINING PROGRAM

## 2023-09-08 PROCEDURE — 80053 COMPREHEN METABOLIC PANEL: CPT

## 2023-09-08 PROCEDURE — 700102 HCHG RX REV CODE 250 W/ 637 OVERRIDE(OP): Mod: UD | Performed by: STUDENT IN AN ORGANIZED HEALTH CARE EDUCATION/TRAINING PROGRAM

## 2023-09-08 PROCEDURE — 83605 ASSAY OF LACTIC ACID: CPT | Mod: 91

## 2023-09-08 PROCEDURE — 93005 ELECTROCARDIOGRAM TRACING: CPT | Performed by: STUDENT IN AN ORGANIZED HEALTH CARE EDUCATION/TRAINING PROGRAM

## 2023-09-08 RX ORDER — CEPHALEXIN 500 MG/1
500 CAPSULE ORAL 2 TIMES DAILY
Qty: 10 CAPSULE | Refills: 0 | Status: SHIPPED | OUTPATIENT
Start: 2023-09-08 | End: 2023-09-08 | Stop reason: SDUPTHER

## 2023-09-08 RX ORDER — CEPHALEXIN 500 MG/1
1000 CAPSULE ORAL ONCE
Status: COMPLETED | OUTPATIENT
Start: 2023-09-08 | End: 2023-09-08

## 2023-09-08 RX ORDER — SODIUM CHLORIDE, SODIUM LACTATE, POTASSIUM CHLORIDE, CALCIUM CHLORIDE 600; 310; 30; 20 MG/100ML; MG/100ML; MG/100ML; MG/100ML
1000 INJECTION, SOLUTION INTRAVENOUS ONCE
Status: COMPLETED | OUTPATIENT
Start: 2023-09-08 | End: 2023-09-08

## 2023-09-08 RX ORDER — CEPHALEXIN 500 MG/1
1000 CAPSULE ORAL 2 TIMES DAILY
Qty: 20 CAPSULE | Refills: 0 | Status: ACTIVE | OUTPATIENT
Start: 2023-09-08 | End: 2023-09-13

## 2023-09-08 RX ADMIN — SODIUM CHLORIDE, POTASSIUM CHLORIDE, SODIUM LACTATE AND CALCIUM CHLORIDE 1000 ML: 600; 310; 30; 20 INJECTION, SOLUTION INTRAVENOUS at 00:38

## 2023-09-08 RX ADMIN — SODIUM CHLORIDE, POTASSIUM CHLORIDE, SODIUM LACTATE AND CALCIUM CHLORIDE 1000 ML: 600; 310; 30; 20 INJECTION, SOLUTION INTRAVENOUS at 03:45

## 2023-09-08 RX ADMIN — CEPHALEXIN 1000 MG: 500 CAPSULE ORAL at 05:14

## 2023-09-08 NOTE — ED NOTES
Urine collected and to lab. Pt denies any needs at this time. Pt respirations even and unlabored. Call light within reach

## 2023-09-08 NOTE — ED TRIAGE NOTES
Chief Complaint   Patient presents with    Dizziness    GLF     Pt BIBA from home for c/o increased dizziness x2 hrs. PMH CIDP. Pt reports dizziness x 2 yrs d/t her CIDP, worse today. Pt reports she sustained a GLF after tripping over her feet, -LOC, -head strike, -thinners. Pt endorses pain to L foot, no deformity noted. BG on scene 114.     Pt arrives GCS 15, EKG done, PIV placed.

## 2023-09-08 NOTE — ED PROVIDER NOTES
ED Provider Note    CHIEF COMPLAINT  Chief Complaint   Patient presents with    Dizziness    GLF       EXTERNAL RECORDS REVIEWED  Outpatient Notes 4/18/2023 encounter for mammogram.  Patient mildly tachycardic at 104 at that time outpatient visit on 1/25/2023 patient seen for viral illness and congestion patient also tachycardic at 110 at that time    HPI/ROS  LIMITATION TO HISTORY   Select: Language Faroese,  Used   OUTSIDE HISTORIAN(S):  Family daughter    Noelle Hobbs is a 53 y.o. female who presents to the ED for vomiting and lightheadedness.  The patient had a fall in the home earlier today that she reports was related to her difficulties with ambulating secondary to CIDP.  She was recently placed back on steroids for worsening of her chronic demyelinating condition.  She denies injury during this fall and after being helped back up went to the porch to smoke a cigarette prior to going to sleep.  While sitting on the porch doing this she being became acutely nauseous and had an episode of vomiting of mucus.  The patient now reports feeling somewhat lightheaded however otherwise denies any symptoms.  She denies any new weakness or numbness from her baseline.    PAST MEDICAL HISTORY   has a past medical history of Ankle fracture, lateral malleolus, closed (9/1/2021), Chronic low back pain, CIDP (chronic inflammatory demyelinating polyneuropathy) ([facility]), Hypokalemia (10/24/2021), Ileus ([facility]) (10/23/2021), Myopia, Renal cancer, right ([facility]), Renal disorder, and Weight gain.    SURGICAL HISTORY   has a past surgical history that includes tubal ligation; orif, ankle (Right, 09/09/2021); nephrectomy robotic xi (Right, 10/20/2021); and cholecystectomy (2005).    FAMILY HISTORY  Family History   Problem Relation Age of Onset    Osteoporosis Mother     Alzheimer's Disease Mother     Arthritis Mother     Diabetes Father     Heart Disease Father     Stroke Father         TIA    Cancer Other         one nephew  "leukemia, the other liver ca       SOCIAL HISTORY  Social History     Tobacco Use    Smoking status: Some Days     Current packs/day: 0.25     Average packs/day: 0.3 packs/day for 34.0 years (8.5 ttl pk-yrs)     Types: Cigarettes    Smokeless tobacco: Never    Tobacco comments:     3 cigarettes/day   Vaping Use    Vaping Use: Never used   Substance and Sexual Activity    Alcohol use: No    Drug use: No    Sexual activity: Not Currently     Partners: Male     Comment:        CURRENT MEDICATIONS  Home Medications       Reviewed by Claudine Nieves R.N. (Registered Nurse) on 09/07/23 at 2356  Med List Status: Not Addressed     Medication Last Dose Status   Alcohol Swabs  Active   atorvastatin (LIPITOR) 20 MG Tab  Active   Blood Glucose Monitoring Suppl (BLOOD GLUCOSE MONITOR SYSTEM) w/Device Kit  Active   gabapentin (NEURONTIN) 100 MG Cap  Active   glucose blood strip  Active   glucose blood strip  Active   Lancets 30G Misc  Active   lisinopril (PRINIVIL) 5 MG Tab  Active   Misc. Devices Misc  Active   mupirocin (BACTROBAN) 2 % Ointment  Active   omeprazole (PRILOSEC OTC) 20 MG tablet  Active   predniSONE (DELTASONE) 5 MG Tab  Active   primidone (MYSOLINE) 50 MG Tab  Active   TechLite AST Lancets Misc  Active                    ALLERGIES  Allergies   Allergen Reactions    Latex Rash     hives       PHYSICAL EXAM  VITAL SIGNS: /73   Pulse (!) 102   Temp 36.4 °C (97.6 °F) (Temporal)   Resp 18   Ht 1.575 m (5' 2\")   Wt 68 kg (150 lb)   LMP 03/08/2021   SpO2 93%   BMI 27.44 kg/m²    Physical Exam  Vitals and nursing note reviewed.   Constitutional:       Appearance: She is well-developed.   HENT:      Head: Normocephalic.   Eyes:      Extraocular Movements: Extraocular movements intact.      Pupils: Pupils are equal, round, and reactive to light.   Cardiovascular:      Rate and Rhythm: Normal rate and regular rhythm.   Pulmonary:      Effort: Pulmonary effort is normal.      Breath sounds: " Normal breath sounds.   Abdominal:      Palpations: Abdomen is soft.      Tenderness: There is no abdominal tenderness.   Musculoskeletal:      Cervical back: Normal range of motion.   Neurological:      Mental Status: She is alert and oriented to person, place, and time.      Comments: 4/5 strength to flexion and extension of the upper extremities bilaterally  Gross loss of sensation to light touch below the knees bilaterally and below the elbows bilaterally.  Cranial nerves II through XII intact.  Extraocular movements intact no nystagmus present no finger-nose dysmetria           DIAGNOSTIC STUDIES / PROCEDURES  EKG  I have independently interpreted this EKG  Results for orders placed or performed during the hospital encounter of 23   EKG   Result Value Ref Range    Report       [facility] Emergency Dept.    Test Date:  2023  Pt Name:    FERNANDEZ HANSEN            Department: ER  MRN:        [id number]                      Room:       [facility]  Gender:     Female                       Technician: [id number]  :        1970                   Requested By:ER TRIAGE PROTOCOL  Order #:    [id number]                    Reading MD: Saud Solano    Measurements  Intervals                                Axis  Rate:       91                           P:          37  MA:         153                          QRS:        40  QRSD:       93                           T:          42  QT:         379  QTc:        467    Interpretive Statements  Sinus rhythm  Compared to ECG 2011 11:44:27  No significant changes  Electronically Signed On 2023 04:42:26 PDT by Saud Solano         LABS  Labs Reviewed   CBC WITH DIFFERENTIAL - Abnormal; Notable for the following components:       Result Value    WBC 11.4 (*)     RBC 5.45 (*)     MCH 26.6 (*)     Neutrophils (Absolute) 7.82 (*)     All other components within normal limits    Narrative:     Biotin intake of greater than 5 mg per day may  interfere with  troponin levels, causing false low values.   COMP METABOLIC PANEL - Abnormal; Notable for the following components:    Glucose 121 (*)     Alkaline Phosphatase 150 (*)     All other components within normal limits    Narrative:     Biotin intake of greater than 5 mg per day may interfere with  troponin levels, causing false low values.   LACTIC ACID - Abnormal; Notable for the following components:    Lactic Acid 2.5 (*)     All other components within normal limits    Narrative:     Biotin intake of greater than 5 mg per day may interfere with  troponin levels, causing false low values.   URINALYSIS - Abnormal; Notable for the following components:    Ketones Trace (*)     Nitrite Positive (*)     Leukocyte Esterase Small (*)     All other components within normal limits    Narrative:     Release to patient->Immediate   ESTIMATED GFR - Abnormal; Notable for the following components:    GFR (CKD-EPI) 52 (*)     All other components within normal limits    Narrative:     Biotin intake of greater than 5 mg per day may interfere with  troponin levels, causing false low values.   URINE MICROSCOPIC (W/UA) - Abnormal; Notable for the following components:    WBC 10-20 (*)     Bacteria Many (*)     All other components within normal limits    Narrative:     Release to patient->Immediate   LACTIC ACID - Abnormal; Notable for the following components:    Lactic Acid 2.2 (*)     All other components within normal limits   TROPONIN    Narrative:     Biotin intake of greater than 5 mg per day may interfere with  troponin levels, causing false low values.   LACTIC ACID         RADIOLOGY  I have independently interpreted the diagnostic imaging associated with this visit and am waiting the final reading from the radiologist.   My preliminary interpretation is as follows: Foot x-ray no acute process  Radiologist interpretation:   DX-FOOT-COMPLETE 3+ LEFT   Final Result         1.  No acute traumatic bony injury.             Saud Solano M.D. spent greater than 3 minutes with the patient explaining the importance of smoking cessation.         COURSE & MEDICAL DECISION MAKING    ED Observation Status? Yes; I am placing the patient in to an observation status due to a diagnostic uncertainty as well as therapeutic intensity. Patient placed in observation status at 0115 AM, 9/8/2023.     Observation plan is as follows: Follow-up lab and imaging work-up determine most appropriate disposition    Upon Reevaluation, the patient's condition has: Improved; and will be discharged.    Patient discharged from ED Observation status at 0600 am (Time) 9/8/2023 (Date).     INITIAL ASSESSMENT, COURSE AND PLAN  Care Narrative: 53-year-old female with a history of CIDP presents to the ED for an episode of vomiting with concomitant lightheadedness.  On exam the patient does appear dehydrated and is mildly tachycardic.  Otherwise her neurologic exam is completely nonfocal and appears at her documented baseline from prior neurology notes.  Differential diagnosis includes dehydration, orthostasis, UTI, gastritis, RADHA, electrolyte abnormality    We will will obtain work-up and also include x-ray of the foot as patient does have some mild swelling there and pain after her fall earlier.  No other signs of trauma on exam and no other complaints from patient    Work-up remarkable for UTI will give first dose of antibiotics here.  Patient did have mildly elevated lactic that I suspect is due to dehydration.  I do not suspect this is due to sepsis as the patient is clinically well-appearing and does not have any other clinical indicators of sepsis such as a leukocytosis, left shift, endorgan damage or SIRS criteria.  She is very mildly tachycardic and I suspect that is also due to dehydration and not sepsis.  She has also mildly tachycardic at prior outpatient visits at 104.  It appears to be her baseline.  Will rehydrate patient with IV fluids and recheck  lactic and vital signs.    Repeat lactic is within normal limits after IV rehydration reassessed patient and she is well-appearing and feeling much better.  Denies any lightheadedness at this time.  Discussed with patient return precautions for intractable vomiting or worsening of her symptoms.  Advised PCP follow-up.  Will place on Keflex for UTI      HYDRATION: Based on the patient's presentation of Dehydration the patient was given IV fluids. IV Hydration was used because oral hydration was not adequate alone. Upon recheck following hydration, the patient was improved.      DISPOSITION AND DISCUSSIONS    Escalation of care considered, and ultimately not performed:acute inpatient care management, however at this time, the patient is most appropriate for outpatient management    Barriers to care at this time, including but not limited to: .     Decision tools and prescription drugs considered including, but not limited to: Antibiotics Keflex for UTI .    FINAL DIAGNOSIS  1. Acute cystitis without hematuria           Electronically signed by: Saud Solano M.D., 9/8/2023 1:15 AM

## 2023-09-08 NOTE — ED NOTES
Pt denying dizziness at this time. Repeat lactic collected and sent to lab. Pt denies any further needs at this time. Call light within reach

## 2023-09-08 NOTE — ED NOTES
IVF started per MAR. Pt resting, respirations even and unlabored. Pt denies any needs at this time. Call light within reach

## 2023-09-08 NOTE — ED NOTES
Discharge education provided. Patient verbalizes understanding. Patient A/0x4. Patient discharged home to self care.

## 2023-10-11 ENCOUNTER — HOSPITAL ENCOUNTER (OUTPATIENT)
Dept: RADIOLOGY | Facility: MEDICAL CENTER | Age: 53
End: 2023-10-11
Attending: UROLOGY
Payer: MEDICAID

## 2023-10-11 DIAGNOSIS — C64.1 MALIGNANT NEOPLASM OF RIGHT KIDNEY, EXCEPT RENAL PELVIS (HCC): ICD-10-CM

## 2023-10-11 PROCEDURE — 71046 X-RAY EXAM CHEST 2 VIEWS: CPT

## 2023-10-18 ENCOUNTER — HOSPITAL ENCOUNTER (OUTPATIENT)
Dept: RADIOLOGY | Facility: MEDICAL CENTER | Age: 53
End: 2023-10-18
Attending: UROLOGY
Payer: MEDICAID

## 2023-10-18 DIAGNOSIS — C64.1 MALIGNANT NEOPLASM OF RIGHT KIDNEY, EXCEPT RENAL PELVIS (HCC): ICD-10-CM

## 2023-10-18 PROCEDURE — 74177 CT ABD & PELVIS W/CONTRAST: CPT

## 2023-10-18 PROCEDURE — 700117 HCHG RX CONTRAST REV CODE 255: Performed by: UROLOGY

## 2023-10-18 RX ADMIN — IOHEXOL 25 ML: 240 INJECTION, SOLUTION INTRATHECAL; INTRAVASCULAR; INTRAVENOUS; ORAL at 12:41

## 2023-10-18 RX ADMIN — IOHEXOL 100 ML: 350 INJECTION, SOLUTION INTRAVENOUS at 12:41

## 2023-11-09 NOTE — PATIENT INSTRUCTIONS
Diagnoses and all orders for this visit:    Need for vaccination  -     Flu Quad Inj >3 Year Pre-Filled (Preservative Free)  -     TDAP VACCINE =>8YO IM    Need for diphtheria-tetanus-pertussis (Tdap) vaccine  -     TDAP VACCINE =>8YO IM    DUB (dysfunctional uterine bleeding)    Neck pain  -     ibuprofen (MOTRIN) 600 MG Tab; Take 1 Tab by mouth every 8 hours as needed.    BMI 28.0-28.9,adult    Fatigue, unspecified type  -     CBC WITH DIFFERENTIAL; Future    Medication monitoring encounter  -     Comp Metabolic Panel; Future  -     TSH WITH REFLEX TO FT4; Future    Hyperlipidemia, unspecified hyperlipidemia type  -     Lipid Profile; Future    Hyperglycemia  -     HEMOGLOBIN A1C; Future    Localized swelling, mass or lump of neck  -     CT-SOFT TISSUE NECK W/O; Future  -     CT-HEAD W/O; Future    Right sided weakness  -     CT-HEAD W/O; Future    Slurred speech  -     CT-HEAD W/O; Future  -Will get stat CT head to rule out stroke and for right sided weakness and speech disturbance, will get urgent CT neck for mass on right side of neck. Will do fasting lab work, apply warm compress on neck and she is taking ibuprofen 600 mg which I told her to hold until CT head is done, we were able to get this done today but patient wants to wait and do tomorrow as she has kids, I explained to her the risks of waiting and we would like to see cause of her weakness and make sure no stroke, but she says she has had symptoms for awhile and want to wait. Counseled her to stop smoking due to it can be a cause of stroke. Will get fasting labwork and got vaccines today. Counseled her on the risks of not getting CT head done and signs and symptoms of stroke. She says she is feeling well and here with  Her daughter and is fine driving. She reports some right sided weakness and she is able to drive with this and I counseled her that if she has right sided weakness to not drive and let her daughter drive. ER precautions and stroke  prevention and precautions have been given. BP okay today.      Return in about 2 weeks (around 2/26/2019), or labs/meds/imaging.      Patient was seen for 60 minutes face to face of which, 30 minutes was spent counseling regarding right sided weakness, speech stuttering, vaccines and management as above.       Former smoker

## 2023-11-14 ENCOUNTER — PHARMACY VISIT (OUTPATIENT)
Dept: PHARMACY | Facility: MEDICAL CENTER | Age: 53
End: 2023-11-14
Payer: COMMERCIAL

## 2023-11-14 ENCOUNTER — NON-PROVIDER VISIT (OUTPATIENT)
Dept: MEDICAL GROUP | Facility: MEDICAL CENTER | Age: 53
End: 2023-11-14
Attending: FAMILY MEDICINE
Payer: MEDICAID

## 2023-11-14 VITALS
HEIGHT: 62 IN | SYSTOLIC BLOOD PRESSURE: 106 MMHG | WEIGHT: 158.9 LBS | BODY MASS INDEX: 29.24 KG/M2 | HEART RATE: 94 BPM | DIASTOLIC BLOOD PRESSURE: 60 MMHG

## 2023-11-14 DIAGNOSIS — E11.9 TYPE 2 DIABETES MELLITUS WITHOUT COMPLICATION, WITHOUT LONG-TERM CURRENT USE OF INSULIN (HCC): ICD-10-CM

## 2023-11-14 LAB
HBA1C MFR BLD: 5.5 % (ref ?–5.8)
POCT INT CON NEG: NEGATIVE
POCT INT CON POS: POSITIVE

## 2023-11-14 PROCEDURE — 83036 HEMOGLOBIN GLYCOSYLATED A1C: CPT

## 2023-11-14 PROCEDURE — 99213 OFFICE O/P EST LOW 20 MIN: CPT | Performed by: PHARMACIST

## 2023-11-14 PROCEDURE — RXMED WILLOW AMBULATORY MEDICATION CHARGE: Performed by: NURSE PRACTITIONER

## 2023-11-14 RX ORDER — CALCIUM CITRATE/VITAMIN D3 200MG-6.25
TABLET ORAL
Qty: 100 STRIP | Refills: 11 | OUTPATIENT
Start: 2023-11-14

## 2023-11-14 RX ORDER — EMPAGLIFLOZIN 10 MG/1
10 TABLET, FILM COATED ORAL DAILY
Qty: 30 TABLET | Refills: 5 | Status: SHIPPED | OUTPATIENT
Start: 2023-11-14 | End: 2023-12-12 | Stop reason: SDUPTHER

## 2023-11-14 ASSESSMENT — FIBROSIS 4 INDEX: FIB4 SCORE: 0.93

## 2023-11-14 NOTE — PROGRESS NOTES
New Patient Consult Note  Primary care physician: Jacque Whyte M.D.  2:30-2:45pm  Reason for consult: Management of Controlled Type 2 Diabetes    HPI:  Noelle Hobbs is a 53 y.o. old patient who comes in today for evaluation of above stated problem.    Pt says she was paralyzed for 2 months and was unable to move much during this time. She gained weight during this time. Pt could not explain why she was not taking any of her diabetes medication but just said her blood sugar has been good. She mostly focuses on diet.     She is still eating most of salads and chicken and some rice. If she eats carbs she eats it earlier in the day. She does not drinking a lot of water but drinks coffee. Discussed the importance of increasing water intake and she is willing.     Most Recent HbA1c:   Lab Results   Component Value Date/Time    HBA1C 5.5 11/14/2023 02:18 PM    HBA1C 5.6 07/19/2023 09:52 AM    HBA1C 6 (A) 02/01/2023 11:00 AM     Current Diabetes Regimen:  SGLT-2 Inhibitor:  None     SMBG: test 2-3 times a week  Before Breakfast: Low 100s  Hypoglycemia:  None      ROS:  Constitutional: No weight loss  Cardiac: No palpitations or racing heart  Resp: No shortness of breath  Neuro: No numbness or tinging in feet  Endo: No heat or cold intolerance, no polyuria or polydipsia  All other systems were reviewed and were negative.    Past Medical History:  Patient Active Problem List    Diagnosis Date Noted    Toe infection 06/15/2022    Type 2 diabetes mellitus without complication, without long-term current use of insulin ([facility]) 05/17/2022    Gastroesophageal reflux disease 05/17/2022    Hyperlipidemia associated with type 2 diabetes mellitus ([facility]) 05/17/2022    CIDP (chronic inflammatory demyelinating polyneuropathy) ([facility]) 03/24/2022    Clear cell renal cell carcinoma, right ([facility]) 10/24/2021    H/O right radical nephrectomy 10/23/2021    LFT elevation 10/23/2021    Mixed stress and urge urinary incontinence 09/14/2021     Vitamin D deficiency 04/01/2019    Anemia 04/01/2019    Vitamin B12 deficiency 04/01/2019    Smoker 04/01/2019    Neck pain 02/12/2019    BMI 28.0-28.9,adult 02/12/2019       Past Surgical History:  Past Surgical History:   Procedure Laterality Date    NEPHRECTOMY ROBOTIC XI Right 10/20/2021    Procedure: NEPHRECTOMY, ROBOT-ASSISTED, USING DA CORNELIUS XI - RADICAL;  Surgeon: Jay Painting M.D.;  Location: SURGERY [facility];  Service: Gen Robotic    ORIF, ANKLE Right 09/09/2021    Procedure: ORIF, ANKLE;  Surgeon: Alexander Castillo M.D.;  Location: SURGERY [facility];  Service: Orthopedics    CHOLECYSTECTOMY  2005    TUBAL LIGATION         Allergies:  Latex    Social History:  Social History     Socioeconomic History    Marital status:      Spouse name: Not on file    Number of children: Not on file    Years of education: Not on file    Highest education level: 8th grade   Occupational History    Not on file   Tobacco Use    Smoking status: Some Days     Current packs/day: 0.25     Average packs/day: 0.3 packs/day for 34.0 years (8.5 ttl pk-yrs)     Types: Cigarettes    Smokeless tobacco: Never    Tobacco comments:     3 cigarettes/day   Vaping Use    Vaping Use: Never used   Substance and Sexual Activity    Alcohol use: No    Drug use: No    Sexual activity: Not Currently     Partners: Male     Comment:    Other Topics Concern    Not on file   Social History Narrative    Not on file     Social Determinants of Health     Financial Resource Strain: Low Risk  (3/24/2022)    Overall Financial Resource Strain (CARDIA)     Difficulty of Paying Living Expenses: Not very hard   Food Insecurity: No Food Insecurity (3/24/2022)    Hunger Vital Sign     Worried About Running Out of Food in the Last Year: Never true     Ran Out of Food in the Last Year: Never true   Transportation Needs: No Transportation Needs (3/24/2022)    PRAPARE - Transportation     Lack of Transportation (Medical): No     Lack of  Transportation (Non-Medical): No   Physical Activity: Inactive (3/24/2022)    Exercise Vital Sign     Days of Exercise per Week: 0 days     Minutes of Exercise per Session: 0 min   Stress: Stress Concern Present (3/24/2022)    [employer] [place] [employer] - Occupational Stress Questionnaire     Feeling of Stress : To some extent   Social Connections: Socially Isolated (3/24/2022)    Social Connection and Isolation Panel [NHANES]     Frequency of Communication with Friends and Family: More than three times a week     Frequency of Social Gatherings with Friends and Family: More than three times a week     Attends Sikh Services: Never     Active Member of Clubs or Organizations: No     Attends Club or Organization Meetings: Never     Marital Status:    Intimate Partner Violence: Not on file   Housing Stability: Low Risk  (3/24/2022)    Housing Stability Vital Sign     Unable to Pay for Housing in the Last Year: No     Number of Places Lived in the Last Year: 2     Unstable Housing in the Last Year: No       Family History:  Family History   Problem Relation Age of Onset    Osteoporosis Mother     Alzheimer's Disease Mother     Arthritis Mother     Diabetes Father     Heart Disease Father     Stroke Father         TIA    Cancer Other         one nephew leukemia, the other liver ca       Medications:    Current Outpatient Medications:     Empagliflozin (JARDIANCE) 10 MG Tab tablet, Take 1 Tablet by mouth every day., Disp: 30 Tablet, Rfl: 5    glucose blood (TRUE METRIX BLOOD GLUCOSE TEST) strip, Use to test twice daily, Disp: 100 Strip, Rfl: 11    TechLite AST Lancets Misc, TechLITE Lancets 28 gauge, Disp: , Rfl:     glucose blood strip, True Metrix Glucose Test Strip, Disp: , Rfl:     mupirocin (BACTROBAN) 2 % Ointment, mupirocin 2 % topical ointment  APPLY 1 APPLICATION TOPICALLY TO AFFECTED AREA TWICE DAILY, Disp: , Rfl:     predniSONE (DELTASONE) 5 MG Tab, TAKE 1/2 (ONE-HALF) TABLET BY MOUTH  "EVERY OTHER DAY, Disp: , Rfl:     Blood Glucose Monitoring Suppl (BLOOD GLUCOSE MONITOR SYSTEM) w/Device Kit, Use to test 3 times a day and as needed, Disp: 1 Kit, Rfl: 0    atorvastatin (LIPITOR) 20 MG Tab, TAKE 1 TABLET BY MOUTH ONCE DAILY IN THE EVENING, Disp: 30 Tablet, Rfl: 11    lisinopril (PRINIVIL) 5 MG Tab, Take 1 tablet by mouth once daily, Disp: 30 Tablet, Rfl: 11    primidone (MYSOLINE) 50 MG Tab, TAKE 1 TABLET BY MOUTH TWICE DAILY FOR TREMOR PREVENTION, Disp: , Rfl:     omeprazole (PRILOSEC OTC) 20 MG tablet, Take 1 Tablet by mouth every day., Disp: 30 Tablet, Rfl: 5    Lancets 30G Misc, Use to test twice daily, Disp: 100 Each, Rfl: 11    Alcohol Swabs, Use to test twice a day, Disp: 100 Each, Rfl: 11    Misc. Devices Misc, Please provide the patient with a walker with a seat. G61.81, Disp: 1 Each, Rfl: 0    gabapentin (NEURONTIN) 100 MG Cap, Take 200 mg by mouth 3 times a day., Disp: , Rfl:     Labs: Reviewed    Physical Examination:  Vital signs: /60   Pulse 94   Ht 1.575 m (5' 2\")   Wt 72.1 kg (158 lb 14.4 oz)   LMP 03/08/2021   BMI 29.06 kg/m²  Body mass index is 29.06 kg/m².  General: No apparent distress, cooperative  Eyes: No scleral icterus or discharge  ENMT: Normal on external inspection of nose, lips, normal thyroid exam  Neck: No abnormal masses on inspection  Resp: Normal effort, clear to auscultation bilaterally   CVS: Regular rate and rhythm, S1 S2 normal, no murmur   Extremities: No edema  Abdomen: abdominal obesity present  Neuro: Alert and oriented  Skin: No rash  Psych: Normal mood and affect, intact memory and able to make informed decisions    Plan:    There are no diagnoses linked to this encounter.    Return in about 12 weeks (around 2/6/2024).    Dm -  Despite patient not taking any diabetes medication she still managed to keep her A1c at goal. Discussed that there are still benefits to being on a medication to help reduce blood sugar and prevent progression of " diabetes. She did fine on synjardy before, and I talked about how Jardiance has added benefits such as minor weight loss, cardiovascular/renal protection.    - Start jardiance 10mg qday   - increase water intake to 8-10 cups a day (add at least 1 glass of water with every meal and morning and before bedtime)  - labs due       Thank you for allowing me to participate in the care of this patient.    Niranjan Schulte, PharmD 11/14/23    CC:   Jacque Whyte M.D.

## 2023-11-17 ENCOUNTER — PHARMACY VISIT (OUTPATIENT)
Dept: PHARMACY | Facility: MEDICAL CENTER | Age: 53
End: 2023-11-17
Payer: COMMERCIAL

## 2023-11-17 PROCEDURE — RXMED WILLOW AMBULATORY MEDICATION CHARGE: Performed by: FAMILY MEDICINE

## 2023-12-12 ENCOUNTER — OFFICE VISIT (OUTPATIENT)
Dept: MEDICAL GROUP | Facility: MEDICAL CENTER | Age: 53
End: 2023-12-12
Attending: FAMILY MEDICINE
Payer: MEDICARE

## 2023-12-12 VITALS
BODY MASS INDEX: 29.63 KG/M2 | SYSTOLIC BLOOD PRESSURE: 118 MMHG | RESPIRATION RATE: 16 BRPM | DIASTOLIC BLOOD PRESSURE: 86 MMHG | OXYGEN SATURATION: 97 % | HEART RATE: 80 BPM | TEMPERATURE: 97.8 F | HEIGHT: 62 IN | WEIGHT: 161 LBS

## 2023-12-12 DIAGNOSIS — Z23 NEED FOR VACCINATION: ICD-10-CM

## 2023-12-12 DIAGNOSIS — E78.49 OTHER HYPERLIPIDEMIA: ICD-10-CM

## 2023-12-12 DIAGNOSIS — E11.9 TYPE 2 DIABETES MELLITUS WITHOUT COMPLICATION, WITHOUT LONG-TERM CURRENT USE OF INSULIN (HCC): ICD-10-CM

## 2023-12-12 PROCEDURE — 3074F SYST BP LT 130 MM HG: CPT | Performed by: FAMILY MEDICINE

## 2023-12-12 PROCEDURE — 3079F DIAST BP 80-89 MM HG: CPT | Performed by: FAMILY MEDICINE

## 2023-12-12 PROCEDURE — 99213 OFFICE O/P EST LOW 20 MIN: CPT | Performed by: FAMILY MEDICINE

## 2023-12-12 PROCEDURE — 90471 IMMUNIZATION ADMIN: CPT

## 2023-12-12 PROCEDURE — 99214 OFFICE O/P EST MOD 30 MIN: CPT | Performed by: FAMILY MEDICINE

## 2023-12-12 RX ORDER — LISINOPRIL 2.5 MG/1
2.5 TABLET ORAL DAILY
Qty: 90 TABLET | Refills: 1 | Status: SHIPPED | OUTPATIENT
Start: 2023-12-12

## 2023-12-12 RX ORDER — ATORVASTATIN CALCIUM 20 MG/1
20 TABLET, FILM COATED ORAL EVERY EVENING
Qty: 90 TABLET | Refills: 1 | Status: SHIPPED | OUTPATIENT
Start: 2023-12-12

## 2023-12-12 RX ORDER — EMPAGLIFLOZIN 10 MG/1
10 TABLET, FILM COATED ORAL DAILY
Qty: 90 TABLET | Refills: 1 | Status: SHIPPED | OUTPATIENT
Start: 2023-12-12 | End: 2024-02-06

## 2023-12-12 ASSESSMENT — FIBROSIS 4 INDEX: FIB4 SCORE: 0.93
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
## 2023-12-12 NOTE — LETTER
[facility]  Jacque Whyte M.D.  [address]  [place] [address]  Fax: [phone]   Authorization for Release/Disclosure of   Protected Health Information   Name: FERNANDEZ HANSEN : 1970 SSN: [id number]   Address: [place] [address] Phone:    [phone] (home)    I authorize the entity listed below to release/disclose the PHI below to:   [facility]/Jacque Whyte M.D. and Jacque Whyte M.D.   Provider or Entity Name:  [facility]   Address   City, State, [facility]   Phone:      Fax:     Reason for request: continuity of care   Information to be released:    [  ] LAST COLONOSCOPY,  including any PATH REPORT and follow-up  [  ] LAST FIT/COLOGUARD RESULT [  ] LAST DEXA  [  ] LAST MAMMOGRAM  [  ] LAST PAP  [  ] LAST LABS [  ] RETINA EXAM REPORT  [  ] IMMUNIZATION RECORDS  [ X ] Release all info      [  ] Check here and initial the line next to each item to release ALL health information INCLUDING  _____ Care and treatment for drug and / or alcohol abuse  _____ HIV testing, infection status, or AIDS  _____ Genetic Testing    DATES OF SERVICE OR TIME PERIOD TO BE DISCLOSED: _____________  I understand and acknowledge that:  * This Authorization may be revoked at any time by you in writing, except if your health information has already been used or disclosed.  * Your health information that will be used or disclosed as a result of you signing this authorization could be re-disclosed by the recipient. If this occurs, your re-disclosed health information may no longer be protected by State or Federal laws.  * You may refuse to sign this Authorization. Your refusal will not affect your ability to obtain treatment.  * This Authorization becomes effective upon signing and will  on (date) __________.      If no date is indicated, this Authorization will  one (1) year from the signature date.    Name: Fernandez Hansen  Signature: Date:   2023     PLEASE FAX  REQUESTED RECORDS BACK TO: [phone]

## 2023-12-12 NOTE — ASSESSMENT & PLAN NOTE
Follows with [facility] pharmacy  On jardiance for renal protection given she only has single kidney  Was on lisinoprli 5mg, presumably for renal protection. She has not been on it for a few months reports bp was ok.   Recent A1c was 5.5%  On chronic steroids  Needs reifll on jardiance

## 2024-01-17 RX ORDER — ACETAMINOPHEN 325 MG/1
650 TABLET ORAL ONCE
Status: CANCELLED | OUTPATIENT
Start: 2024-01-17 | End: 2024-01-17

## 2024-01-17 RX ORDER — DIPHENHYDRAMINE HCL 25 MG
25 TABLET ORAL ONCE
Status: CANCELLED | OUTPATIENT
Start: 2024-01-17 | End: 2024-01-17

## 2024-01-17 RX ORDER — METHYLPREDNISOLONE SODIUM SUCCINATE 125 MG/2ML
125 INJECTION, POWDER, LYOPHILIZED, FOR SOLUTION INTRAMUSCULAR; INTRAVENOUS ONCE
Status: CANCELLED | OUTPATIENT
Start: 2024-01-17 | End: 2024-01-17

## 2024-01-18 ENCOUNTER — OUTPATIENT INFUSION SERVICES (OUTPATIENT)
Dept: ONCOLOGY | Facility: MEDICAL CENTER | Age: 54
End: 2024-01-18
Attending: NURSE PRACTITIONER
Payer: MEDICARE

## 2024-01-18 VITALS
TEMPERATURE: 96.9 F | HEART RATE: 92 BPM | RESPIRATION RATE: 17 BRPM | SYSTOLIC BLOOD PRESSURE: 128 MMHG | HEIGHT: 61 IN | WEIGHT: 165.79 LBS | OXYGEN SATURATION: 96 % | DIASTOLIC BLOOD PRESSURE: 85 MMHG | BODY MASS INDEX: 31.3 KG/M2

## 2024-01-18 DIAGNOSIS — G61.81 CIDP (CHRONIC INFLAMMATORY DEMYELINATING POLYNEUROPATHY) (HCC): ICD-10-CM

## 2024-01-18 PROCEDURE — 96366 THER/PROPH/DIAG IV INF ADDON: CPT

## 2024-01-18 PROCEDURE — 700111 HCHG RX REV CODE 636 W/ 250 OVERRIDE (IP): Mod: JZ,UD | Performed by: NURSE PRACTITIONER

## 2024-01-18 PROCEDURE — A9270 NON-COVERED ITEM OR SERVICE: HCPCS | Mod: UD | Performed by: NURSE PRACTITIONER

## 2024-01-18 PROCEDURE — 96375 TX/PRO/DX INJ NEW DRUG ADDON: CPT

## 2024-01-18 PROCEDURE — 700102 HCHG RX REV CODE 250 W/ 637 OVERRIDE(OP): Mod: UD | Performed by: NURSE PRACTITIONER

## 2024-01-18 PROCEDURE — 96365 THER/PROPH/DIAG IV INF INIT: CPT

## 2024-01-18 RX ORDER — METHYLPREDNISOLONE SODIUM SUCCINATE 125 MG/2ML
125 INJECTION, POWDER, LYOPHILIZED, FOR SOLUTION INTRAMUSCULAR; INTRAVENOUS ONCE
Status: COMPLETED | OUTPATIENT
Start: 2024-01-18 | End: 2024-01-18

## 2024-01-18 RX ORDER — DIPHENHYDRAMINE HCL 25 MG
25 TABLET ORAL ONCE
Status: COMPLETED | OUTPATIENT
Start: 2024-01-18 | End: 2024-01-18

## 2024-01-18 RX ORDER — IMMUNE GLOBULIN INFUSION (HUMAN) 100 MG/ML
20 INJECTION, SOLUTION INTRAVENOUS; SUBCUTANEOUS ONCE
Status: CANCELLED
Start: 2024-01-19

## 2024-01-18 RX ORDER — ACETAMINOPHEN 325 MG/1
650 TABLET ORAL ONCE
Status: COMPLETED | OUTPATIENT
Start: 2024-01-18 | End: 2024-01-18

## 2024-01-18 RX ORDER — METHYLPREDNISOLONE SODIUM SUCCINATE 125 MG/2ML
125 INJECTION, POWDER, LYOPHILIZED, FOR SOLUTION INTRAMUSCULAR; INTRAVENOUS ONCE
Status: CANCELLED | OUTPATIENT
Start: 2024-02-15 | End: 2024-02-15

## 2024-01-18 RX ORDER — IMMUNE GLOBULIN INFUSION (HUMAN) 100 MG/ML
20 INJECTION, SOLUTION INTRAVENOUS; SUBCUTANEOUS ONCE
Status: CANCELLED
Start: 2024-01-18

## 2024-01-18 RX ORDER — ACETAMINOPHEN 325 MG/1
650 TABLET ORAL ONCE
Status: CANCELLED | OUTPATIENT
Start: 2024-02-15 | End: 2024-02-15

## 2024-01-18 RX ORDER — DIPHENHYDRAMINE HCL 25 MG
25 TABLET ORAL ONCE
Status: CANCELLED | OUTPATIENT
Start: 2024-02-15 | End: 2024-02-15

## 2024-01-18 RX ORDER — IMMUNE GLOBULIN INFUSION (HUMAN) 100 MG/ML
20 INJECTION, SOLUTION INTRAVENOUS; SUBCUTANEOUS ONCE
Status: COMPLETED | OUTPATIENT
Start: 2024-01-18 | End: 2024-01-18

## 2024-01-18 RX ADMIN — IMMUNE GLOBULIN INFUSION (HUMAN) 20 G: 100 INJECTION, SOLUTION INTRAVENOUS; SUBCUTANEOUS at 14:44

## 2024-01-18 RX ADMIN — DIPHENHYDRAMINE HYDROCHLORIDE 25 MG: 25 TABLET ORAL at 14:38

## 2024-01-18 RX ADMIN — ACETAMINOPHEN 650 MG: 325 TABLET ORAL at 14:38

## 2024-01-18 RX ADMIN — METHYLPREDNISOLONE SODIUM SUCCINATE 125 MG: 125 INJECTION, POWDER, FOR SOLUTION INTRAMUSCULAR; INTRAVENOUS at 14:38

## 2024-01-18 ASSESSMENT — FIBROSIS 4 INDEX: FIB4 SCORE: 0.93

## 2024-01-19 ENCOUNTER — OUTPATIENT INFUSION SERVICES (OUTPATIENT)
Dept: ONCOLOGY | Facility: MEDICAL CENTER | Age: 54
End: 2024-01-19
Attending: NURSE PRACTITIONER
Payer: MEDICARE

## 2024-01-19 VITALS
WEIGHT: 166.67 LBS | HEIGHT: 61 IN | SYSTOLIC BLOOD PRESSURE: 141 MMHG | RESPIRATION RATE: 17 BRPM | TEMPERATURE: 97.9 F | OXYGEN SATURATION: 97 % | BODY MASS INDEX: 31.47 KG/M2 | HEART RATE: 84 BPM | DIASTOLIC BLOOD PRESSURE: 85 MMHG

## 2024-01-19 DIAGNOSIS — G61.81 CIDP (CHRONIC INFLAMMATORY DEMYELINATING POLYNEUROPATHY) (HCC): ICD-10-CM

## 2024-01-19 PROCEDURE — 96375 TX/PRO/DX INJ NEW DRUG ADDON: CPT

## 2024-01-19 PROCEDURE — A9270 NON-COVERED ITEM OR SERVICE: HCPCS | Mod: UD | Performed by: NURSE PRACTITIONER

## 2024-01-19 PROCEDURE — 700102 HCHG RX REV CODE 250 W/ 637 OVERRIDE(OP): Mod: UD | Performed by: NURSE PRACTITIONER

## 2024-01-19 PROCEDURE — 700111 HCHG RX REV CODE 636 W/ 250 OVERRIDE (IP): Mod: JZ,UD | Performed by: NURSE PRACTITIONER

## 2024-01-19 PROCEDURE — 96365 THER/PROPH/DIAG IV INF INIT: CPT

## 2024-01-19 RX ORDER — METHYLPREDNISOLONE SODIUM SUCCINATE 125 MG/2ML
125 INJECTION, POWDER, LYOPHILIZED, FOR SOLUTION INTRAMUSCULAR; INTRAVENOUS ONCE
Status: COMPLETED | OUTPATIENT
Start: 2024-01-19 | End: 2024-01-19

## 2024-01-19 RX ORDER — DIPHENHYDRAMINE HCL 25 MG
25 TABLET ORAL ONCE
Status: CANCELLED | OUTPATIENT
Start: 2024-02-15 | End: 2024-02-15

## 2024-01-19 RX ORDER — METHYLPREDNISOLONE SODIUM SUCCINATE 125 MG/2ML
125 INJECTION, POWDER, LYOPHILIZED, FOR SOLUTION INTRAMUSCULAR; INTRAVENOUS ONCE
Status: CANCELLED | OUTPATIENT
Start: 2024-02-15 | End: 2024-02-15

## 2024-01-19 RX ORDER — IMMUNE GLOBULIN INFUSION (HUMAN) 100 MG/ML
20 INJECTION, SOLUTION INTRAVENOUS; SUBCUTANEOUS ONCE
Status: CANCELLED
Start: 2024-02-15 | End: 2024-02-15

## 2024-01-19 RX ORDER — DIPHENHYDRAMINE HCL 25 MG
25 TABLET ORAL ONCE
Status: COMPLETED | OUTPATIENT
Start: 2024-01-19 | End: 2024-01-19

## 2024-01-19 RX ORDER — ACETAMINOPHEN 325 MG/1
650 TABLET ORAL ONCE
Status: CANCELLED | OUTPATIENT
Start: 2024-02-15 | End: 2024-02-15

## 2024-01-19 RX ORDER — IMMUNE GLOBULIN INFUSION (HUMAN) 100 MG/ML
20 INJECTION, SOLUTION INTRAVENOUS; SUBCUTANEOUS ONCE
Status: COMPLETED | OUTPATIENT
Start: 2024-01-19 | End: 2024-01-19

## 2024-01-19 RX ORDER — ACETAMINOPHEN 325 MG/1
650 TABLET ORAL ONCE
Status: COMPLETED | OUTPATIENT
Start: 2024-01-19 | End: 2024-01-19

## 2024-01-19 RX ADMIN — ACETAMINOPHEN 650 MG: 325 TABLET ORAL at 14:14

## 2024-01-19 RX ADMIN — DIPHENHYDRAMINE HYDROCHLORIDE 25 MG: 25 TABLET ORAL at 14:14

## 2024-01-19 RX ADMIN — IMMUNE GLOBULIN INFUSION (HUMAN) 20 G: 100 INJECTION, SOLUTION INTRAVENOUS; SUBCUTANEOUS at 14:19

## 2024-01-19 RX ADMIN — METHYLPREDNISOLONE SODIUM SUCCINATE 125 MG: 125 INJECTION, POWDER, FOR SOLUTION INTRAMUSCULAR; INTRAVENOUS at 14:14

## 2024-01-19 ASSESSMENT — FIBROSIS 4 INDEX: FIB4 SCORE: 0.93

## 2024-01-19 NOTE — PROGRESS NOTES
Patient came into INF independently for IVIG infusion day 1/2. Orders and vitals signs reviewed assessment done. PIV started Pt tolerated well. Pre-treatment medications given as ordered. IVIG infused with appropriate rate changes per [facility] policy with max rate of 140ml/hr. PIV flushed post infusion with blood return noted. PIV removed with tip intact. Pt left in stable condition with next appointment confirmed.

## 2024-01-20 NOTE — PROGRESS NOTES
Pt presents to infusion center for D2 of 2 of monthly IVIG. No changes from yesterday. PIV started, brisk blood return observed. Pre-meds given. IVIG infused at max rate of 160 ml/hr. Pt tolerated well with no s/s of adverse reaction. PIV flushed and removed, gauze and coban dressing placed. Has next appt, emailed schedulers to request they be moved earlier in the day, left on foot to self care.

## 2024-02-06 ENCOUNTER — NON-PROVIDER VISIT (OUTPATIENT)
Dept: MEDICAL GROUP | Facility: MEDICAL CENTER | Age: 54
End: 2024-02-06
Attending: FAMILY MEDICINE
Payer: MEDICARE

## 2024-02-06 VITALS
HEIGHT: 61 IN | DIASTOLIC BLOOD PRESSURE: 92 MMHG | BODY MASS INDEX: 31.34 KG/M2 | HEART RATE: 101 BPM | WEIGHT: 166 LBS | SYSTOLIC BLOOD PRESSURE: 133 MMHG

## 2024-02-06 DIAGNOSIS — E11.9 TYPE 2 DIABETES MELLITUS WITHOUT COMPLICATION, WITHOUT LONG-TERM CURRENT USE OF INSULIN (HCC): ICD-10-CM

## 2024-02-06 LAB
HBA1C MFR BLD: 6.1 % (ref ?–5.8)
POCT INT CON NEG: ABNORMAL
POCT INT CON POS: ABNORMAL

## 2024-02-06 PROCEDURE — 83036 HEMOGLOBIN GLYCOSYLATED A1C: CPT

## 2024-02-06 PROCEDURE — 99213 OFFICE O/P EST LOW 20 MIN: CPT | Performed by: PHARMACIST

## 2024-02-06 PROCEDURE — RXMED WILLOW AMBULATORY MEDICATION CHARGE: Performed by: FAMILY MEDICINE

## 2024-02-06 RX ORDER — EMPAGLIFLOZIN, METFORMIN HYDROCHLORIDE 25; 1000 MG/1; MG/1
1 TABLET, EXTENDED RELEASE ORAL DAILY
Qty: 30 TABLET | Refills: 1 | Status: SHIPPED | OUTPATIENT
Start: 2024-02-06

## 2024-02-06 ASSESSMENT — FIBROSIS 4 INDEX: FIB4 SCORE: 0.93

## 2024-02-06 NOTE — NON-PROVIDER
New Patient Consult Note  Primary care physician: Jacque Whyte M.D.    Reason for consult: Management of Controlled Type 2 Diabetes    HPI:  Noelle Hobbs is a 53 y.o. old patient who comes in today for evaluation of above stated problem.    Most Recent HbA1c:   Lab Results   Component Value Date/Time    HBA1C 6.1 (A) 02/06/2024 09:05 AM    HBA1C 5.5 11/14/2023 02:18 PM    HBA1C 5.6 07/19/2023 09:52 AM     Pt states she was not on her Jardiance for awhile due to not having refills and didn't follow up with pharmacy. Her A1C did increase at this visit most likely due to her being off Jardiance for a few months but weight remains the same.    Pt currently doesn't work but is very active with her grand kids and running errands.    Drinks mainly water and sometimes soda or coffee.      Check bg 1-2 times a week under with readings typically < 100      Current Diabetes Regimen:  SGLT-2 Inhibitor:  Jardiance 10mg daily   Before Breakfast:  Before Lunch:  Before Dinner:  Before Bedtime:  Other times:  Hypoglycemia:  None      ROS:  Constitutional: No weight loss  Cardiac: No palpitations or racing heart  Resp: No shortness of breath  Neuro: No numbness or tinging in feet  Endo: No heat or cold intolerance, no polyuria or polydipsia  All other systems were reviewed and were negative.    Past Medical History:  Patient Active Problem List    Diagnosis Date Noted    Toe infection 06/15/2022    Type 2 diabetes mellitus without complication, without long-term current use of insulin ([facility]) 05/17/2022    Gastroesophageal reflux disease 05/17/2022    Other hyperlipidemia 05/17/2022    CIDP (chronic inflammatory demyelinating polyneuropathy) ([facility]) 03/24/2022    Clear cell renal cell carcinoma, right ([facility]) 10/24/2021    H/O right radical nephrectomy 10/23/2021    LFT elevation 10/23/2021    Mixed stress and urge urinary incontinence 09/14/2021    Vitamin D deficiency 04/01/2019    Anemia 04/01/2019    Vitamin B12 deficiency  04/01/2019    Smoker 04/01/2019    Neck pain 02/12/2019    BMI 28.0-28.9,adult 02/12/2019       Past Surgical History:  Past Surgical History:   Procedure Laterality Date    NEPHRECTOMY ROBOTIC XI Right 10/20/2021    Procedure: NEPHRECTOMY, ROBOT-ASSISTED, USING DA CORNELIUS XI - RADICAL;  Surgeon: Jay Painting M.D.;  Location: SURGERY [facility];  Service: Gen Robotic    ORIF, ANKLE Right 09/09/2021    Procedure: ORIF, ANKLE;  Surgeon: Alexander Castillo M.D.;  Location: SURGERY [facility];  Service: Orthopedics    CHOLECYSTECTOMY  2005    TUBAL LIGATION         Allergies:  Latex    Social History:  Social History     Socioeconomic History    Marital status:      Spouse name: Not on file    Number of children: Not on file    Years of education: Not on file    Highest education level: 8th grade   Occupational History    Not on file   Tobacco Use    Smoking status: Some Days     Current packs/day: 0.25     Average packs/day: 0.3 packs/day for 34.0 years (8.5 ttl pk-yrs)     Types: Cigarettes    Smokeless tobacco: Never    Tobacco comments:     3 cigarettes/day   Vaping Use    Vaping Use: Never used   Substance and Sexual Activity    Alcohol use: No    Drug use: No    Sexual activity: Not Currently     Partners: Male     Comment:    Other Topics Concern    Not on file   Social History Narrative    Not on file     Social Determinants of Health     Financial Resource Strain: Low Risk  (3/24/2022)    Overall Financial Resource Strain (CARDIA)     Difficulty of Paying Living Expenses: Not very hard   Food Insecurity: No Food Insecurity (3/24/2022)    Hunger Vital Sign     Worried About Running Out of Food in the Last Year: Never true     Ran Out of Food in the Last Year: Never true   Transportation Needs: No Transportation Needs (3/24/2022)    PRAPARE - Transportation     Lack of Transportation (Medical): No     Lack of Transportation (Non-Medical): No   Physical Activity: Inactive (3/24/2022)     Exercise Vital Sign     Days of Exercise per Week: 0 days     Minutes of Exercise per Session: 0 min   Stress: Stress Concern Present (3/24/2022)    [employer] - Occupational Stress Questionnaire     Feeling of Stress : To some extent   Social Connections: Socially Isolated (3/24/2022)    Social Connection and Isolation Panel [NHANES]     Frequency of Communication with Friends and Family: More than three times a week     Frequency of Social Gatherings with Friends and Family: More than three times a week     Attends Mormon Services: Never     Active Member of Clubs or Organizations: No     Attends Club or Organization Meetings: Never     Marital Status:    Intimate Partner Violence: Not on file   Housing Stability: Low Risk  (3/24/2022)    Housing Stability Vital Sign     Unable to Pay for Housing in the Last Year: No     Number of Places Lived in the Last Year: 2     Unstable Housing in the Last Year: No       Family History:  Family History   Problem Relation Age of Onset    Osteoporosis Mother     Alzheimer's Disease Mother     Arthritis Mother     Diabetes Father     Heart Disease Father     Stroke Father         TIA    Cancer Other         one nephew leukemia, the other liver ca       Medications:    Current Outpatient Medications:     Empagliflozin-metFORMIN HCl ER (SYNJARDY XR)  MG TABLET SR 24 HR, Take 1 Tablet by mouth every day., Disp: 30 Tablet, Rfl: 1    lisinopril (PRINIVIL) 2.5 MG Tab, Take 1 Tablet by mouth every day., Disp: 90 Tablet, Rfl: 1    atorvastatin (LIPITOR) 20 MG Tab, Take 1 Tablet by mouth every evening., Disp: 90 Tablet, Rfl: 1    glucose blood (TRUE METRIX BLOOD GLUCOSE TEST) strip, Use to test twice daily, Disp: 100 Strip, Rfl: 11    glucose blood strip, True Metrix Glucose Test Strip, Disp: , Rfl:     predniSONE (DELTASONE) 5 MG Tab, TAKE 1/2 (ONE-HALF) TABLET BY MOUTH EVERY OTHER DAY (Patient not taking: Reported on 12/12/2023), Disp:  ", Rfl:     Blood Glucose Monitoring Suppl (BLOOD GLUCOSE MONITOR SYSTEM) w/Device Kit, Use to test 3 times a day and as needed, Disp: 1 Kit, Rfl: 0    primidone (MYSOLINE) 50 MG Tab, TAKE 1 TABLET BY MOUTH TWICE DAILY FOR TREMOR PREVENTION (Patient not taking: Reported on 12/12/2023), Disp: , Rfl:     omeprazole (PRILOSEC OTC) 20 MG tablet, Take 1 Tablet by mouth every day. (Patient not taking: Reported on 12/12/2023), Disp: 30 Tablet, Rfl: 5    Labs: Reviewed    Physical Examination:  Vital signs: BP (!) 133/92   Pulse (!) 101   Ht 1.549 m (5' 1\")   Wt 75.3 kg (166 lb)   LMP 03/08/2021   BMI 31.37 kg/m²  Body mass index is 31.37 kg/m².  General: No apparent distress, cooperative  Eyes: No scleral icterus or discharge  ENMT: Normal on external inspection of nose, lips, normal thyroid exam  Neck: No abnormal masses on inspection  Resp: Normal effort, clear to auscultation bilaterally   CVS: Regular rate and rhythm, S1 S2 normal, no murmur   Extremities: No edema  Abdomen: abdominal obesity present  Neuro: Alert and oriented  Skin: No rash  Psych: Normal mood and affect, intact memory and able to make informed decisions    Plan and assessment     1) A1C increase from 5.5 to 6.1    2) Change Jardiance to Synjardy XR 25/1000mg daily    2) Pt to watch her diet more closely and decrease carb intake       There are no diagnoses linked to this encounter.    Return in about 4 weeks (around 3/5/2024).    Thank you for allowing me to participate in the care of this patient.    Astrid Knapp, PharmD 02/06/24    CC:   Jacque Whyte M.D.       "

## 2024-02-07 ENCOUNTER — PHARMACY VISIT (OUTPATIENT)
Dept: PHARMACY | Facility: MEDICAL CENTER | Age: 54
End: 2024-02-07
Payer: COMMERCIAL

## 2024-02-15 ENCOUNTER — OUTPATIENT INFUSION SERVICES (OUTPATIENT)
Dept: ONCOLOGY | Facility: MEDICAL CENTER | Age: 54
End: 2024-02-15
Attending: NURSE PRACTITIONER
Payer: MEDICARE

## 2024-02-15 VITALS
HEART RATE: 96 BPM | TEMPERATURE: 97.5 F | WEIGHT: 166.45 LBS | HEIGHT: 61 IN | SYSTOLIC BLOOD PRESSURE: 130 MMHG | RESPIRATION RATE: 18 BRPM | BODY MASS INDEX: 31.43 KG/M2 | DIASTOLIC BLOOD PRESSURE: 79 MMHG | OXYGEN SATURATION: 98 %

## 2024-02-15 DIAGNOSIS — G61.81 CIDP (CHRONIC INFLAMMATORY DEMYELINATING POLYNEUROPATHY) (HCC): ICD-10-CM

## 2024-02-15 PROCEDURE — A9270 NON-COVERED ITEM OR SERVICE: HCPCS | Mod: UD | Performed by: NURSE PRACTITIONER

## 2024-02-15 PROCEDURE — 700111 HCHG RX REV CODE 636 W/ 250 OVERRIDE (IP): Mod: JZ,JG,UD | Performed by: NURSE PRACTITIONER

## 2024-02-15 PROCEDURE — 700102 HCHG RX REV CODE 250 W/ 637 OVERRIDE(OP): Mod: UD | Performed by: NURSE PRACTITIONER

## 2024-02-15 PROCEDURE — 96375 TX/PRO/DX INJ NEW DRUG ADDON: CPT

## 2024-02-15 PROCEDURE — 96366 THER/PROPH/DIAG IV INF ADDON: CPT

## 2024-02-15 PROCEDURE — 96365 THER/PROPH/DIAG IV INF INIT: CPT

## 2024-02-15 RX ORDER — IMMUNE GLOBULIN INFUSION (HUMAN) 100 MG/ML
20 INJECTION, SOLUTION INTRAVENOUS; SUBCUTANEOUS ONCE
Status: CANCELLED
Start: 2024-03-14 | End: 2024-03-14

## 2024-02-15 RX ORDER — METHYLPREDNISOLONE SODIUM SUCCINATE 125 MG/2ML
125 INJECTION, POWDER, LYOPHILIZED, FOR SOLUTION INTRAMUSCULAR; INTRAVENOUS ONCE
Status: CANCELLED | OUTPATIENT
Start: 2024-03-14 | End: 2024-03-14

## 2024-02-15 RX ORDER — ACETAMINOPHEN 325 MG/1
650 TABLET ORAL ONCE
Status: CANCELLED | OUTPATIENT
Start: 2024-03-14 | End: 2024-03-14

## 2024-02-15 RX ORDER — DIPHENHYDRAMINE HCL 25 MG
25 TABLET ORAL ONCE
Status: CANCELLED | OUTPATIENT
Start: 2024-03-14 | End: 2024-03-14

## 2024-02-15 RX ORDER — DIPHENHYDRAMINE HCL 25 MG
25 TABLET ORAL ONCE
Status: COMPLETED | OUTPATIENT
Start: 2024-02-15 | End: 2024-02-15

## 2024-02-15 RX ORDER — METHYLPREDNISOLONE SODIUM SUCCINATE 125 MG/2ML
125 INJECTION, POWDER, LYOPHILIZED, FOR SOLUTION INTRAMUSCULAR; INTRAVENOUS ONCE
Status: COMPLETED | OUTPATIENT
Start: 2024-02-15 | End: 2024-02-15

## 2024-02-15 RX ORDER — ACETAMINOPHEN 325 MG/1
650 TABLET ORAL ONCE
Status: COMPLETED | OUTPATIENT
Start: 2024-02-15 | End: 2024-02-15

## 2024-02-15 RX ORDER — IMMUNE GLOBULIN INFUSION (HUMAN) 100 MG/ML
20 INJECTION, SOLUTION INTRAVENOUS; SUBCUTANEOUS ONCE
Status: COMPLETED | OUTPATIENT
Start: 2024-02-15 | End: 2024-02-15

## 2024-02-15 RX ADMIN — METHYLPREDNISOLONE SODIUM SUCCINATE 125 MG: 125 INJECTION, POWDER, FOR SOLUTION INTRAMUSCULAR; INTRAVENOUS at 15:58

## 2024-02-15 RX ADMIN — ACETAMINOPHEN 650 MG: 325 TABLET ORAL at 15:59

## 2024-02-15 RX ADMIN — DIPHENHYDRAMINE HYDROCHLORIDE 25 MG: 25 TABLET ORAL at 15:59

## 2024-02-15 RX ADMIN — IMMUNE GLOBULIN INFUSION (HUMAN) 20 G: 100 INJECTION, SOLUTION INTRAVENOUS; SUBCUTANEOUS at 16:14

## 2024-02-15 ASSESSMENT — FIBROSIS 4 INDEX: FIB4 SCORE: 0.95

## 2024-02-16 ENCOUNTER — OUTPATIENT INFUSION SERVICES (OUTPATIENT)
Dept: ONCOLOGY | Facility: MEDICAL CENTER | Age: 54
End: 2024-02-16
Attending: NURSE PRACTITIONER
Payer: MEDICARE

## 2024-02-16 VITALS
OXYGEN SATURATION: 96 % | RESPIRATION RATE: 18 BRPM | TEMPERATURE: 97.5 F | SYSTOLIC BLOOD PRESSURE: 123 MMHG | DIASTOLIC BLOOD PRESSURE: 83 MMHG | HEIGHT: 61 IN | BODY MASS INDEX: 31.51 KG/M2 | WEIGHT: 166.89 LBS | HEART RATE: 90 BPM

## 2024-02-16 DIAGNOSIS — G61.81 CIDP (CHRONIC INFLAMMATORY DEMYELINATING POLYNEUROPATHY) (HCC): ICD-10-CM

## 2024-02-16 PROCEDURE — A9270 NON-COVERED ITEM OR SERVICE: HCPCS | Mod: UD | Performed by: NURSE PRACTITIONER

## 2024-02-16 PROCEDURE — 700111 HCHG RX REV CODE 636 W/ 250 OVERRIDE (IP): Mod: JZ,UD | Performed by: NURSE PRACTITIONER

## 2024-02-16 PROCEDURE — 700102 HCHG RX REV CODE 250 W/ 637 OVERRIDE(OP): Mod: UD | Performed by: NURSE PRACTITIONER

## 2024-02-16 PROCEDURE — 96375 TX/PRO/DX INJ NEW DRUG ADDON: CPT

## 2024-02-16 PROCEDURE — 96365 THER/PROPH/DIAG IV INF INIT: CPT

## 2024-02-16 RX ORDER — IMMUNE GLOBULIN INFUSION (HUMAN) 100 MG/ML
20 INJECTION, SOLUTION INTRAVENOUS; SUBCUTANEOUS ONCE
Status: CANCELLED
Start: 2024-03-15 | End: 2024-03-15

## 2024-02-16 RX ORDER — METHYLPREDNISOLONE SODIUM SUCCINATE 125 MG/2ML
125 INJECTION, POWDER, LYOPHILIZED, FOR SOLUTION INTRAMUSCULAR; INTRAVENOUS ONCE
Status: COMPLETED | OUTPATIENT
Start: 2024-02-16 | End: 2024-02-16

## 2024-02-16 RX ORDER — ACETAMINOPHEN 325 MG/1
650 TABLET ORAL ONCE
Status: CANCELLED | OUTPATIENT
Start: 2024-03-15 | End: 2024-03-15

## 2024-02-16 RX ORDER — ACETAMINOPHEN 325 MG/1
650 TABLET ORAL ONCE
Status: COMPLETED | OUTPATIENT
Start: 2024-02-16 | End: 2024-02-16

## 2024-02-16 RX ORDER — METHYLPREDNISOLONE SODIUM SUCCINATE 125 MG/2ML
125 INJECTION, POWDER, LYOPHILIZED, FOR SOLUTION INTRAMUSCULAR; INTRAVENOUS ONCE
Status: CANCELLED | OUTPATIENT
Start: 2024-03-15 | End: 2024-03-15

## 2024-02-16 RX ORDER — IMMUNE GLOBULIN INFUSION (HUMAN) 100 MG/ML
20 INJECTION, SOLUTION INTRAVENOUS; SUBCUTANEOUS ONCE
Status: COMPLETED | OUTPATIENT
Start: 2024-02-16 | End: 2024-02-16

## 2024-02-16 RX ORDER — DIPHENHYDRAMINE HCL 25 MG
25 TABLET ORAL ONCE
Status: CANCELLED | OUTPATIENT
Start: 2024-03-15 | End: 2024-03-15

## 2024-02-16 RX ORDER — DIPHENHYDRAMINE HCL 25 MG
25 TABLET ORAL ONCE
Status: COMPLETED | OUTPATIENT
Start: 2024-02-16 | End: 2024-02-16

## 2024-02-16 RX ADMIN — DIPHENHYDRAMINE HYDROCHLORIDE 25 MG: 25 TABLET ORAL at 16:38

## 2024-02-16 RX ADMIN — IMMUNE GLOBULIN INFUSION (HUMAN) 20 G: 100 INJECTION, SOLUTION INTRAVENOUS; SUBCUTANEOUS at 16:57

## 2024-02-16 RX ADMIN — ACETAMINOPHEN 650 MG: 325 TABLET ORAL at 16:38

## 2024-02-16 RX ADMIN — METHYLPREDNISOLONE SODIUM SUCCINATE 125 MG: 125 INJECTION, POWDER, FOR SOLUTION INTRAMUSCULAR; INTRAVENOUS at 16:38

## 2024-02-16 ASSESSMENT — FIBROSIS 4 INDEX: FIB4 SCORE: 0.95

## 2024-02-16 NOTE — PROGRESS NOTES
Pt arrived ambulatory to IS for IVIG. Pt denied fever, signs or symptoms of infection or acute illness today. POC discussed and pt verbalized understanding.     PIV established; pt tolerated well. Premedications administered per MAR; IVIG infused as ordered. Rate titrated per pharmacy rate sheet. Pt tolerated well and without incident.No signs or symptoms of reaction or complications noted. PIV flushed and removed with needle intact; pt tolerated well. Gauze and coban applied to site.     Follow-up care discussed and next appointments confirmed for pt; pt verbalized understanding. Pt discharged home to self care in no apparent distress at this time.

## 2024-02-17 NOTE — PROGRESS NOTES
Pt presents to infusion center for D2 of 2 of monthly IVIG. No changes from yesterday. PIV started, brisk blood return observed. Pre-meds given. IVIG infused at max rate of 160 ml/hr. Pt tolerated well with no s/s of adverse reaction. PIV flushed and removed, gauze and coban dressing placed. Emailed schedulers for next appts, left on foot to self care.

## 2024-03-05 ENCOUNTER — NON-PROVIDER VISIT (OUTPATIENT)
Dept: MEDICAL GROUP | Facility: MEDICAL CENTER | Age: 54
End: 2024-03-05
Attending: FAMILY MEDICINE
Payer: MEDICARE

## 2024-03-05 VITALS
WEIGHT: 166 LBS | HEIGHT: 61 IN | BODY MASS INDEX: 31.34 KG/M2 | SYSTOLIC BLOOD PRESSURE: 116 MMHG | DIASTOLIC BLOOD PRESSURE: 79 MMHG | HEART RATE: 83 BPM

## 2024-03-05 DIAGNOSIS — E11.9 TYPE 2 DIABETES MELLITUS WITHOUT COMPLICATION, WITHOUT LONG-TERM CURRENT USE OF INSULIN (HCC): ICD-10-CM

## 2024-03-05 PROCEDURE — 99213 OFFICE O/P EST LOW 20 MIN: CPT | Performed by: PHARMACIST

## 2024-03-05 RX ORDER — EMPAGLIFLOZIN, METFORMIN HYDROCHLORIDE 25; 1000 MG/1; MG/1
1 TABLET, EXTENDED RELEASE ORAL DAILY
Qty: 30 TABLET | Refills: 5 | Status: SHIPPED | OUTPATIENT
Start: 2024-03-05

## 2024-03-05 ASSESSMENT — FIBROSIS 4 INDEX: FIB4 SCORE: 0.95

## 2024-03-05 NOTE — PROGRESS NOTES
New Patient Consult Note  Primary care physician: Jacque Whyte M.D.  3:20-4:05  Reason for consult: Management of controlled Type 2 Diabetes    HPI:  Noelle Hobbs is a 54 y.o. old patient who comes in today for evaluation of above stated problem.    Is doing well, but has had no changes to diet and exercise and has not lost any weight. She is taking the new medication, synjardy Xr with no issues with side effects.     See's a foot doctor regularly but has extensive numbness in both feet and often doesn't feel much. They cut her toe nails and monitor her for infection, etc.      Most Recent HbA1c:   Lab Results   Component Value Date/Time    HBA1C 6.1 (A) 02/06/2024 09:05 AM    HBA1C 5.5 11/14/2023 02:18 PM    HBA1C 5.6 07/19/2023 09:52 AM       Current Diabetes Regimen:  Metformin + SGLT-2 Inhibitor: Synjardy xr 25/1000mg qday       SMBG   Before Breakfast: 3 days ago 106, usually    Before Lunch:  Before Dinner:  Before Bedtime:  Other times:  Hypoglycemia:  None      Monofilament testing with a 10 gram force: sensation intact: No;   3/10 sensations felt, very minimal   intact bilaterally  Visual Inspection: Feet without maceration, ulcers, fissures.  Pedal pulses: intact bilaterally         ROS:  Constitutional: No weight loss  Cardiac: No palpitations or racing heart  Resp: No shortness of breath  Neuro: No numbness or tinging in feet  Endo: No heat or cold intolerance, no polyuria or polydipsia  All other systems were reviewed and were negative.    Past Medical History:  Patient Active Problem List    Diagnosis Date Noted    Toe infection 06/15/2022    Type 2 diabetes mellitus without complication, without long-term current use of insulin ([facility]) 05/17/2022    Gastroesophageal reflux disease 05/17/2022    Other hyperlipidemia 05/17/2022    CIDP (chronic inflammatory demyelinating polyneuropathy) ([facility]) 03/24/2022    Clear cell renal cell carcinoma, right ([facility]) 10/24/2021    H/O right radical  nephrectomy 10/23/2021    LFT elevation 10/23/2021    Mixed stress and urge urinary incontinence 09/14/2021    Vitamin D deficiency 04/01/2019    Anemia 04/01/2019    Vitamin B12 deficiency 04/01/2019    Smoker 04/01/2019    Neck pain 02/12/2019    BMI 28.0-28.9,adult 02/12/2019       Past Surgical History:  Past Surgical History:   Procedure Laterality Date    NEPHRECTOMY ROBOTIC XI Right 10/20/2021    Procedure: NEPHRECTOMY, ROBOT-ASSISTED, USING DA CORNELIUS XI - RADICAL;  Surgeon: Jay Painting M.D.;  Location: SURGERY [facility];  Service: Gen Robotic    ORIF, ANKLE Right 09/09/2021    Procedure: ORIF, ANKLE;  Surgeon: Alexander Castillo M.D.;  Location: SURGERY [facility];  Service: Orthopedics    CHOLECYSTECTOMY  2005    TUBAL LIGATION         Allergies:  Latex    Social History:  Social History     Socioeconomic History    Marital status:      Spouse name: Not on file    Number of children: Not on file    Years of education: Not on file    Highest education level: 8th grade   Occupational History    Not on file   Tobacco Use    Smoking status: Some Days     Current packs/day: 0.25     Average packs/day: 0.3 packs/day for 34.0 years (8.5 ttl pk-yrs)     Types: Cigarettes    Smokeless tobacco: Never    Tobacco comments:     3 cigarettes/day   Vaping Use    Vaping Use: Never used   Substance and Sexual Activity    Alcohol use: No    Drug use: No    Sexual activity: Not Currently     Partners: Male     Comment:    Other Topics Concern    Not on file   Social History Narrative    Not on file     Social Determinants of Health     Financial Resource Strain: Low Risk  (3/24/2022)    Overall Financial Resource Strain (CARDIA)     Difficulty of Paying Living Expenses: Not very hard   Food Insecurity: No Food Insecurity (3/24/2022)    Hunger Vital Sign     Worried About Running Out of Food in the Last Year: Never true     Ran Out of Food in the Last Year: Never true   Transportation Needs: No  Transportation Needs (3/24/2022)    PRAPARE - Transportation     Lack of Transportation (Medical): No     Lack of Transportation (Non-Medical): No   Physical Activity: Inactive (3/24/2022)    Exercise Vital Sign     Days of Exercise per Week: 0 days     Minutes of Exercise per Session: 0 min   Stress: Stress Concern Present (3/24/2022)    [employer] - Occupational Stress Questionnaire     Feeling of Stress : To some extent   Social Connections: Socially Isolated (3/24/2022)    Social Connection and Isolation Panel [NHANES]     Frequency of Communication with Friends and Family: More than three times a week     Frequency of Social Gatherings with Friends and Family: More than three times a week     Attends Faith Services: Never     Active Member of Clubs or Organizations: No     Attends Club or Organization Meetings: Never     Marital Status:    Intimate Partner Violence: Not on file   Housing Stability: Low Risk  (3/24/2022)    Housing Stability Vital Sign     Unable to Pay for Housing in the Last Year: No     Number of Places Lived in the Last Year: 2     Unstable Housing in the Last Year: No       Family History:  Family History   Problem Relation Age of Onset    Osteoporosis Mother     Alzheimer's Disease Mother     Arthritis Mother     Diabetes Father     Heart Disease Father     Stroke Father         TIA    Cancer Other         one nephew leukemia, the other liver ca       Medications:    Current Outpatient Medications:     Empagliflozin-metFORMIN HCl ER (SYNJARDY XR)  MG TABLET SR 24 HR, Take 1 tablet by mouth every day., Disp: 30 Tablet, Rfl: 5    lisinopril (PRINIVIL) 2.5 MG Tab, Take 1 Tablet by mouth every day., Disp: 90 Tablet, Rfl: 1    atorvastatin (LIPITOR) 20 MG Tab, Take 1 Tablet by mouth every evening., Disp: 90 Tablet, Rfl: 1    glucose blood (TRUE METRIX BLOOD GLUCOSE TEST) strip, Use to test twice daily, Disp: 100 Strip, Rfl: 11    glucose blood  "strip, True Metrix Glucose Test Strip, Disp: , Rfl:     predniSONE (DELTASONE) 5 MG Tab, , Disp: , Rfl:     Blood Glucose Monitoring Suppl (BLOOD GLUCOSE MONITOR SYSTEM) w/Device Kit, Use to test 3 times a day and as needed, Disp: 1 Kit, Rfl: 0    primidone (MYSOLINE) 50 MG Tab, , Disp: , Rfl:     omeprazole (PRILOSEC OTC) 20 MG tablet, Take 1 Tablet by mouth every day., Disp: 30 Tablet, Rfl: 5    Labs: Reviewed    Physical Examination:  Vital signs: /79   Pulse 83   Ht 1.549 m (5' 1\")   Wt 75.3 kg (166 lb)   LMP 03/08/2021   BMI 31.37 kg/m²  Body mass index is 31.37 kg/m².  General: No apparent distress, cooperative  Eyes: No scleral icterus or discharge  ENMT: Normal on external inspection of nose, lips, normal thyroid exam  Neck: No abnormal masses on inspection  Resp: Normal effort, clear to auscultation bilaterally   CVS: Regular rate and rhythm, S1 S2 normal, no murmur   Extremities: No edema  Abdomen: abdominal obesity present  Neuro: Alert and oriented  Skin: No rash  Psych: Normal mood and affect, intact memory and able to make informed decisions    Plan:    There are no diagnoses linked to this encounter.    Return in about 4 months (around 6/27/2024).  Dm - Pt is doing well based on her SMBG and I'd only assume since he's been consistent that the A1c should be the same if not improved on Synjardy. Main issue is neuropathy in her feet but she gets this examined regularly with her foot doctor. She is due for labs.  - Continue current therapy       Thank you for allowing me to participate in the care of this patient.    Niranjan Schulte, PharmD   03/05/24    CC:   Jacque Whyte M.D.      "

## 2024-03-16 ENCOUNTER — OUTPATIENT INFUSION SERVICES (OUTPATIENT)
Dept: ONCOLOGY | Facility: MEDICAL CENTER | Age: 54
End: 2024-03-16
Attending: NURSE PRACTITIONER
Payer: MEDICARE

## 2024-03-16 VITALS
HEART RATE: 88 BPM | RESPIRATION RATE: 18 BRPM | SYSTOLIC BLOOD PRESSURE: 122 MMHG | DIASTOLIC BLOOD PRESSURE: 88 MMHG | BODY MASS INDEX: 31.32 KG/M2 | OXYGEN SATURATION: 97 % | HEIGHT: 62 IN | WEIGHT: 170.19 LBS | TEMPERATURE: 97 F

## 2024-03-16 DIAGNOSIS — G61.81 CIDP (CHRONIC INFLAMMATORY DEMYELINATING POLYNEUROPATHY) (HCC): ICD-10-CM

## 2024-03-16 PROCEDURE — 96366 THER/PROPH/DIAG IV INF ADDON: CPT

## 2024-03-16 PROCEDURE — 700111 HCHG RX REV CODE 636 W/ 250 OVERRIDE (IP): Mod: JZ,UD | Performed by: NURSE PRACTITIONER

## 2024-03-16 PROCEDURE — 96365 THER/PROPH/DIAG IV INF INIT: CPT

## 2024-03-16 PROCEDURE — 700102 HCHG RX REV CODE 250 W/ 637 OVERRIDE(OP): Mod: UD | Performed by: NURSE PRACTITIONER

## 2024-03-16 PROCEDURE — 96375 TX/PRO/DX INJ NEW DRUG ADDON: CPT

## 2024-03-16 PROCEDURE — A9270 NON-COVERED ITEM OR SERVICE: HCPCS | Mod: UD | Performed by: NURSE PRACTITIONER

## 2024-03-16 RX ORDER — ACETAMINOPHEN 325 MG/1
650 TABLET ORAL ONCE
Status: CANCELLED | OUTPATIENT
Start: 2024-04-13 | End: 2024-04-13

## 2024-03-16 RX ORDER — IMMUNE GLOBULIN INFUSION (HUMAN) 100 MG/ML
20 INJECTION, SOLUTION INTRAVENOUS; SUBCUTANEOUS ONCE
Status: COMPLETED | OUTPATIENT
Start: 2024-03-16 | End: 2024-03-16

## 2024-03-16 RX ORDER — METHYLPREDNISOLONE SODIUM SUCCINATE 125 MG/2ML
125 INJECTION, POWDER, LYOPHILIZED, FOR SOLUTION INTRAMUSCULAR; INTRAVENOUS ONCE
Status: CANCELLED | OUTPATIENT
Start: 2024-04-13 | End: 2024-04-13

## 2024-03-16 RX ORDER — IMMUNE GLOBULIN INFUSION (HUMAN) 100 MG/ML
20 INJECTION, SOLUTION INTRAVENOUS; SUBCUTANEOUS ONCE
Status: CANCELLED
Start: 2024-04-13 | End: 2024-04-13

## 2024-03-16 RX ORDER — METHYLPREDNISOLONE SODIUM SUCCINATE 125 MG/2ML
125 INJECTION, POWDER, LYOPHILIZED, FOR SOLUTION INTRAMUSCULAR; INTRAVENOUS ONCE
Status: COMPLETED | OUTPATIENT
Start: 2024-03-16 | End: 2024-03-16

## 2024-03-16 RX ORDER — ACETAMINOPHEN 325 MG/1
650 TABLET ORAL ONCE
Status: COMPLETED | OUTPATIENT
Start: 2024-03-16 | End: 2024-03-16

## 2024-03-16 RX ORDER — DIPHENHYDRAMINE HCL 25 MG
25 TABLET ORAL ONCE
Status: COMPLETED | OUTPATIENT
Start: 2024-03-16 | End: 2024-03-16

## 2024-03-16 RX ORDER — DIPHENHYDRAMINE HCL 25 MG
25 TABLET ORAL ONCE
Status: CANCELLED | OUTPATIENT
Start: 2024-04-13 | End: 2024-04-13

## 2024-03-16 RX ADMIN — METHYLPREDNISOLONE SODIUM SUCCINATE 125 MG: 125 INJECTION, POWDER, FOR SOLUTION INTRAMUSCULAR; INTRAVENOUS at 07:26

## 2024-03-16 RX ADMIN — ACETAMINOPHEN 650 MG: 325 TABLET ORAL at 07:26

## 2024-03-16 RX ADMIN — DIPHENHYDRAMINE HYDROCHLORIDE 25 MG: 25 TABLET ORAL at 07:26

## 2024-03-16 RX ADMIN — IMMUNE GLOBULIN INFUSION (HUMAN) 20 G: 100 INJECTION, SOLUTION INTRAVENOUS; SUBCUTANEOUS at 07:30

## 2024-03-16 ASSESSMENT — FIBROSIS 4 INDEX: FIB4 SCORE: 0.95

## 2024-03-16 NOTE — PROGRESS NOTES
Pt arrived to IS, ambulatory, for D1 of 2 IVIG. Pt voices no new complaints. 24g PIV established in the R-hand, positive blood return noted. Pre-medications administered with no complications. IVIG infused with no s/sx of adverse reaction, titrated to max rate of 160 mL/hr per rate sheet. PIV flushed and removed per pt request. Pt left IS with no s/sx of distress. Follow up appointment confirmed.

## 2024-03-17 ENCOUNTER — OUTPATIENT INFUSION SERVICES (OUTPATIENT)
Dept: ONCOLOGY | Facility: MEDICAL CENTER | Age: 54
End: 2024-03-17
Attending: NURSE PRACTITIONER
Payer: MEDICARE

## 2024-03-17 VITALS
WEIGHT: 169.53 LBS | OXYGEN SATURATION: 99 % | DIASTOLIC BLOOD PRESSURE: 93 MMHG | TEMPERATURE: 98.2 F | HEART RATE: 85 BPM | RESPIRATION RATE: 18 BRPM | HEIGHT: 62 IN | SYSTOLIC BLOOD PRESSURE: 137 MMHG | BODY MASS INDEX: 31.2 KG/M2

## 2024-03-17 DIAGNOSIS — G61.81 CIDP (CHRONIC INFLAMMATORY DEMYELINATING POLYNEUROPATHY) (HCC): ICD-10-CM

## 2024-03-17 PROCEDURE — 700111 HCHG RX REV CODE 636 W/ 250 OVERRIDE (IP): Mod: JZ,JG,UD | Performed by: NURSE PRACTITIONER

## 2024-03-17 PROCEDURE — A9270 NON-COVERED ITEM OR SERVICE: HCPCS | Mod: UD | Performed by: NURSE PRACTITIONER

## 2024-03-17 PROCEDURE — 96365 THER/PROPH/DIAG IV INF INIT: CPT

## 2024-03-17 PROCEDURE — 700102 HCHG RX REV CODE 250 W/ 637 OVERRIDE(OP): Mod: UD | Performed by: NURSE PRACTITIONER

## 2024-03-17 PROCEDURE — 96366 THER/PROPH/DIAG IV INF ADDON: CPT

## 2024-03-17 PROCEDURE — 96375 TX/PRO/DX INJ NEW DRUG ADDON: CPT

## 2024-03-17 RX ORDER — ACETAMINOPHEN 325 MG/1
650 TABLET ORAL ONCE
Status: COMPLETED | OUTPATIENT
Start: 2024-03-17 | End: 2024-03-17

## 2024-03-17 RX ORDER — METHYLPREDNISOLONE SODIUM SUCCINATE 125 MG/2ML
125 INJECTION, POWDER, LYOPHILIZED, FOR SOLUTION INTRAMUSCULAR; INTRAVENOUS ONCE
OUTPATIENT
Start: 2024-04-07 | End: 2024-04-07

## 2024-03-17 RX ORDER — IMMUNE GLOBULIN INFUSION (HUMAN) 100 MG/ML
20 INJECTION, SOLUTION INTRAVENOUS; SUBCUTANEOUS ONCE
Status: COMPLETED | OUTPATIENT
Start: 2024-03-17 | End: 2024-03-17

## 2024-03-17 RX ORDER — IMMUNE GLOBULIN INFUSION (HUMAN) 100 MG/ML
20 INJECTION, SOLUTION INTRAVENOUS; SUBCUTANEOUS ONCE
Start: 2024-04-07 | End: 2024-04-07

## 2024-03-17 RX ORDER — DIPHENHYDRAMINE HCL 25 MG
25 TABLET ORAL ONCE
Status: COMPLETED | OUTPATIENT
Start: 2024-03-17 | End: 2024-03-17

## 2024-03-17 RX ORDER — METHYLPREDNISOLONE SODIUM SUCCINATE 125 MG/2ML
125 INJECTION, POWDER, LYOPHILIZED, FOR SOLUTION INTRAMUSCULAR; INTRAVENOUS ONCE
Status: COMPLETED | OUTPATIENT
Start: 2024-03-17 | End: 2024-03-17

## 2024-03-17 RX ORDER — ACETAMINOPHEN 325 MG/1
650 TABLET ORAL ONCE
OUTPATIENT
Start: 2024-04-07 | End: 2024-04-07

## 2024-03-17 RX ORDER — DIPHENHYDRAMINE HCL 25 MG
25 TABLET ORAL ONCE
OUTPATIENT
Start: 2024-04-07 | End: 2024-04-07

## 2024-03-17 RX ADMIN — DIPHENHYDRAMINE HYDROCHLORIDE 25 MG: 25 TABLET ORAL at 11:51

## 2024-03-17 RX ADMIN — METHYLPREDNISOLONE SODIUM SUCCINATE 125 MG: 125 INJECTION, POWDER, FOR SOLUTION INTRAMUSCULAR; INTRAVENOUS at 11:57

## 2024-03-17 RX ADMIN — ACETAMINOPHEN 650 MG: 325 TABLET ORAL at 11:51

## 2024-03-17 RX ADMIN — IMMUNE GLOBULIN INFUSION (HUMAN) 20 G: 100 INJECTION, SOLUTION INTRAVENOUS; SUBCUTANEOUS at 12:04

## 2024-03-17 ASSESSMENT — FIBROSIS 4 INDEX: FIB4 SCORE: 0.95

## 2024-03-17 NOTE — PROGRESS NOTES
Pt arrived ambulatory for day 2 IVIG, denies c/o, states she tolerated yesterday's infusion well.  PIV started in LAC with good blood return, oral/IV premeds given. IVIG infused per rate sheet at 160cc/hr, pt tolerated well, no reaction noted.  Reviewed next month's appts, pt verbalized understanding and will f/u as scheduled.

## 2024-04-13 ENCOUNTER — OUTPATIENT INFUSION SERVICES (OUTPATIENT)
Dept: ONCOLOGY | Facility: MEDICAL CENTER | Age: 54
End: 2024-04-13
Attending: NURSE PRACTITIONER
Payer: MEDICARE

## 2024-04-13 VITALS
OXYGEN SATURATION: 91 % | TEMPERATURE: 97.2 F | HEIGHT: 61 IN | BODY MASS INDEX: 32.05 KG/M2 | DIASTOLIC BLOOD PRESSURE: 85 MMHG | SYSTOLIC BLOOD PRESSURE: 128 MMHG | RESPIRATION RATE: 18 BRPM | HEART RATE: 87 BPM | WEIGHT: 169.75 LBS

## 2024-04-13 DIAGNOSIS — G61.81 CIDP (CHRONIC INFLAMMATORY DEMYELINATING POLYNEUROPATHY) (HCC): ICD-10-CM

## 2024-04-13 PROCEDURE — 700102 HCHG RX REV CODE 250 W/ 637 OVERRIDE(OP): Mod: UD | Performed by: NURSE PRACTITIONER

## 2024-04-13 PROCEDURE — 96366 THER/PROPH/DIAG IV INF ADDON: CPT

## 2024-04-13 PROCEDURE — A9270 NON-COVERED ITEM OR SERVICE: HCPCS | Mod: UD | Performed by: NURSE PRACTITIONER

## 2024-04-13 PROCEDURE — 700111 HCHG RX REV CODE 636 W/ 250 OVERRIDE (IP): Mod: JZ,UD | Performed by: NURSE PRACTITIONER

## 2024-04-13 PROCEDURE — 96375 TX/PRO/DX INJ NEW DRUG ADDON: CPT

## 2024-04-13 PROCEDURE — 96365 THER/PROPH/DIAG IV INF INIT: CPT

## 2024-04-13 RX ORDER — ACETAMINOPHEN 325 MG/1
650 TABLET ORAL ONCE
Status: COMPLETED | OUTPATIENT
Start: 2024-04-13 | End: 2024-04-13

## 2024-04-13 RX ORDER — DIPHENHYDRAMINE HCL 25 MG
25 TABLET ORAL ONCE
Status: COMPLETED | OUTPATIENT
Start: 2024-04-13 | End: 2024-04-13

## 2024-04-13 RX ORDER — ACETAMINOPHEN 325 MG/1
650 TABLET ORAL ONCE
Status: CANCELLED | OUTPATIENT
Start: 2024-04-20 | End: 2024-04-20

## 2024-04-13 RX ORDER — DIPHENHYDRAMINE HCL 25 MG
25 TABLET ORAL ONCE
Status: CANCELLED | OUTPATIENT
Start: 2024-04-20 | End: 2024-04-20

## 2024-04-13 RX ORDER — METHYLPREDNISOLONE SODIUM SUCCINATE 125 MG/2ML
125 INJECTION, POWDER, LYOPHILIZED, FOR SOLUTION INTRAMUSCULAR; INTRAVENOUS ONCE
Status: CANCELLED | OUTPATIENT
Start: 2024-04-20 | End: 2024-04-20

## 2024-04-13 RX ORDER — METHYLPREDNISOLONE SODIUM SUCCINATE 125 MG/2ML
125 INJECTION, POWDER, LYOPHILIZED, FOR SOLUTION INTRAMUSCULAR; INTRAVENOUS ONCE
Status: COMPLETED | OUTPATIENT
Start: 2024-04-13 | End: 2024-04-13

## 2024-04-13 RX ORDER — IMMUNE GLOBULIN INFUSION (HUMAN) 100 MG/ML
20 INJECTION, SOLUTION INTRAVENOUS; SUBCUTANEOUS ONCE
Status: COMPLETED | OUTPATIENT
Start: 2024-04-13 | End: 2024-04-13

## 2024-04-13 RX ORDER — IMMUNE GLOBULIN INFUSION (HUMAN) 100 MG/ML
20 INJECTION, SOLUTION INTRAVENOUS; SUBCUTANEOUS ONCE
Status: CANCELLED
Start: 2024-04-20 | End: 2024-04-20

## 2024-04-13 RX ADMIN — ACETAMINOPHEN 650 MG: 325 TABLET ORAL at 13:57

## 2024-04-13 RX ADMIN — DIPHENHYDRAMINE HYDROCHLORIDE 25 MG: 25 TABLET ORAL at 13:57

## 2024-04-13 RX ADMIN — METHYLPREDNISOLONE SODIUM SUCCINATE 125 MG: 125 INJECTION, POWDER, FOR SOLUTION INTRAMUSCULAR; INTRAVENOUS at 13:58

## 2024-04-13 RX ADMIN — IMMUNE GLOBULIN INFUSION (HUMAN) 20 G: 100 INJECTION, SOLUTION INTRAVENOUS; SUBCUTANEOUS at 14:02

## 2024-04-13 ASSESSMENT — FIBROSIS 4 INDEX: FIB4 SCORE: 0.95

## 2024-04-13 NOTE — PROGRESS NOTES
Pt arrived ambulatory to [facility] for Day 1/2 IVIG infusion for CIDP. POC discussed with pt and she agrees with plan.     PIV established, brisk blood return noted. Pt medicated per MAR. IVIG titrated per rate sheet. Pt tolerated treatment without s/s adverse reaction. PIV dc'd catheter tip intact, gauze and coban dressing applied.     Pt discharged to self care, [facility]. Pt's next appointment conformed tomorrow at 1415.

## 2024-04-14 ENCOUNTER — OUTPATIENT INFUSION SERVICES (OUTPATIENT)
Dept: ONCOLOGY | Facility: MEDICAL CENTER | Age: 54
End: 2024-04-14
Attending: NURSE PRACTITIONER
Payer: MEDICARE

## 2024-04-14 VITALS
RESPIRATION RATE: 18 BRPM | BODY MASS INDEX: 32.42 KG/M2 | WEIGHT: 171.74 LBS | HEIGHT: 61 IN | TEMPERATURE: 97.8 F | OXYGEN SATURATION: 97 % | DIASTOLIC BLOOD PRESSURE: 88 MMHG | SYSTOLIC BLOOD PRESSURE: 142 MMHG | HEART RATE: 87 BPM

## 2024-04-14 DIAGNOSIS — G61.81 CIDP (CHRONIC INFLAMMATORY DEMYELINATING POLYNEUROPATHY) (HCC): ICD-10-CM

## 2024-04-14 PROCEDURE — 700111 HCHG RX REV CODE 636 W/ 250 OVERRIDE (IP): Mod: JZ,JG,UD | Performed by: NURSE PRACTITIONER

## 2024-04-14 PROCEDURE — 96365 THER/PROPH/DIAG IV INF INIT: CPT

## 2024-04-14 PROCEDURE — 700102 HCHG RX REV CODE 250 W/ 637 OVERRIDE(OP): Mod: UD | Performed by: NURSE PRACTITIONER

## 2024-04-14 PROCEDURE — 96375 TX/PRO/DX INJ NEW DRUG ADDON: CPT

## 2024-04-14 PROCEDURE — A9270 NON-COVERED ITEM OR SERVICE: HCPCS | Mod: UD | Performed by: NURSE PRACTITIONER

## 2024-04-14 RX ORDER — METHYLPREDNISOLONE SODIUM SUCCINATE 125 MG/2ML
125 INJECTION, POWDER, LYOPHILIZED, FOR SOLUTION INTRAMUSCULAR; INTRAVENOUS ONCE
Status: COMPLETED | OUTPATIENT
Start: 2024-04-14 | End: 2024-04-14

## 2024-04-14 RX ORDER — DIPHENHYDRAMINE HCL 25 MG
25 TABLET ORAL ONCE
OUTPATIENT
Start: 2024-05-05 | End: 2024-05-05

## 2024-04-14 RX ORDER — IMMUNE GLOBULIN INFUSION (HUMAN) 100 MG/ML
20 INJECTION, SOLUTION INTRAVENOUS; SUBCUTANEOUS ONCE
Status: COMPLETED | OUTPATIENT
Start: 2024-04-14 | End: 2024-04-14

## 2024-04-14 RX ORDER — IMMUNE GLOBULIN INFUSION (HUMAN) 100 MG/ML
20 INJECTION, SOLUTION INTRAVENOUS; SUBCUTANEOUS ONCE
Start: 2024-05-05 | End: 2024-05-05

## 2024-04-14 RX ORDER — ACETAMINOPHEN 325 MG/1
650 TABLET ORAL ONCE
Status: COMPLETED | OUTPATIENT
Start: 2024-04-14 | End: 2024-04-14

## 2024-04-14 RX ORDER — DIPHENHYDRAMINE HCL 25 MG
25 TABLET ORAL ONCE
Status: COMPLETED | OUTPATIENT
Start: 2024-04-14 | End: 2024-04-14

## 2024-04-14 RX ORDER — ACETAMINOPHEN 325 MG/1
650 TABLET ORAL ONCE
OUTPATIENT
Start: 2024-05-05 | End: 2024-05-05

## 2024-04-14 RX ORDER — METHYLPREDNISOLONE SODIUM SUCCINATE 125 MG/2ML
125 INJECTION, POWDER, LYOPHILIZED, FOR SOLUTION INTRAMUSCULAR; INTRAVENOUS ONCE
OUTPATIENT
Start: 2024-05-05 | End: 2024-05-05

## 2024-04-14 RX ADMIN — IMMUNE GLOBULIN INFUSION (HUMAN) 20 G: 100 INJECTION, SOLUTION INTRAVENOUS; SUBCUTANEOUS at 14:54

## 2024-04-14 RX ADMIN — ACETAMINOPHEN 650 MG: 325 TABLET ORAL at 14:45

## 2024-04-14 RX ADMIN — METHYLPREDNISOLONE SODIUM SUCCINATE 125 MG: 125 INJECTION, POWDER, FOR SOLUTION INTRAMUSCULAR; INTRAVENOUS at 14:45

## 2024-04-14 RX ADMIN — DIPHENHYDRAMINE HYDROCHLORIDE 25 MG: 25 TABLET ORAL at 14:46

## 2024-04-14 ASSESSMENT — FIBROSIS 4 INDEX: FIB4 SCORE: 0.95

## 2024-04-14 NOTE — PROGRESS NOTES
Noelle came into infusion services today for day 2 of 2 of IVIG. No complaints. PIV started in the left AC, +blood return, flushed well. Pre-meds given as prescribed. IVIG started at subsequent rate of 160ml/hr via Gammagard titration scale ended at 240ml/hr. Tolerated well. PIV removed, covered with gauze and coban. Pt has future appointments. Discharged to self care.

## 2024-05-10 DIAGNOSIS — E78.49 OTHER HYPERLIPIDEMIA: ICD-10-CM

## 2024-05-10 RX ORDER — ATORVASTATIN CALCIUM 20 MG/1
20 TABLET, FILM COATED ORAL EVERY EVENING
Qty: 90 TABLET | Refills: 1 | Status: SHIPPED | OUTPATIENT
Start: 2024-05-10

## 2024-05-10 NOTE — TELEPHONE ENCOUNTER
Received request via: Pharmacy    Was the patient seen in the last year in this department? Yes    Does the patient have an active prescription (recently filled or refills available) for medication(s) requested? No    Pharmacy Name: [facility].    Does the patient have [facility] and need 100 day supply (blood pressure, diabetes and cholesterol meds only)? Patient does not have [facility]

## 2024-05-11 ENCOUNTER — OUTPATIENT INFUSION SERVICES (OUTPATIENT)
Dept: ONCOLOGY | Facility: MEDICAL CENTER | Age: 54
End: 2024-05-11
Attending: NURSE PRACTITIONER
Payer: MEDICARE

## 2024-05-11 VITALS
OXYGEN SATURATION: 96 % | BODY MASS INDEX: 31.97 KG/M2 | HEIGHT: 61 IN | WEIGHT: 169.31 LBS | SYSTOLIC BLOOD PRESSURE: 111 MMHG | TEMPERATURE: 97.7 F | HEART RATE: 108 BPM | RESPIRATION RATE: 20 BRPM | DIASTOLIC BLOOD PRESSURE: 68 MMHG

## 2024-05-11 DIAGNOSIS — G61.81 CIDP (CHRONIC INFLAMMATORY DEMYELINATING POLYNEUROPATHY) (HCC): ICD-10-CM

## 2024-05-11 RX ORDER — DIPHENHYDRAMINE HCL 25 MG
25 TABLET ORAL ONCE
Status: COMPLETED | OUTPATIENT
Start: 2024-05-11 | End: 2024-05-11

## 2024-05-11 RX ORDER — ACETAMINOPHEN 325 MG/1
650 TABLET ORAL ONCE
Status: COMPLETED | OUTPATIENT
Start: 2024-05-11 | End: 2024-05-11

## 2024-05-11 RX ORDER — IMMUNE GLOBULIN INFUSION (HUMAN) 100 MG/ML
20 INJECTION, SOLUTION INTRAVENOUS; SUBCUTANEOUS ONCE
Status: CANCELLED
Start: 2024-05-18 | End: 2024-05-18

## 2024-05-11 RX ORDER — IMMUNE GLOBULIN INFUSION (HUMAN) 100 MG/ML
20 INJECTION, SOLUTION INTRAVENOUS; SUBCUTANEOUS ONCE
Status: COMPLETED | OUTPATIENT
Start: 2024-05-11 | End: 2024-05-11

## 2024-05-11 RX ORDER — DIPHENHYDRAMINE HCL 25 MG
25 TABLET ORAL ONCE
Status: CANCELLED | OUTPATIENT
Start: 2024-05-18 | End: 2024-05-18

## 2024-05-11 RX ORDER — METHYLPREDNISOLONE SODIUM SUCCINATE 125 MG/2ML
125 INJECTION, POWDER, LYOPHILIZED, FOR SOLUTION INTRAMUSCULAR; INTRAVENOUS ONCE
Status: COMPLETED | OUTPATIENT
Start: 2024-05-11 | End: 2024-05-11

## 2024-05-11 RX ORDER — ACETAMINOPHEN 325 MG/1
650 TABLET ORAL ONCE
Status: CANCELLED | OUTPATIENT
Start: 2024-05-18 | End: 2024-05-18

## 2024-05-11 RX ORDER — METHYLPREDNISOLONE SODIUM SUCCINATE 125 MG/2ML
125 INJECTION, POWDER, LYOPHILIZED, FOR SOLUTION INTRAMUSCULAR; INTRAVENOUS ONCE
Status: CANCELLED | OUTPATIENT
Start: 2024-05-18 | End: 2024-05-18

## 2024-05-11 RX ADMIN — IMMUNE GLOBULIN INFUSION (HUMAN) 20 G: 100 INJECTION, SOLUTION INTRAVENOUS; SUBCUTANEOUS at 13:58

## 2024-05-11 RX ADMIN — DIPHENHYDRAMINE HYDROCHLORIDE 25 MG: 25 TABLET ORAL at 13:50

## 2024-05-11 RX ADMIN — ACETAMINOPHEN 650 MG: 325 TABLET ORAL at 13:50

## 2024-05-11 RX ADMIN — METHYLPREDNISOLONE SODIUM SUCCINATE 125 MG: 125 INJECTION, POWDER, FOR SOLUTION INTRAMUSCULAR; INTRAVENOUS at 13:51

## 2024-05-11 ASSESSMENT — FIBROSIS 4 INDEX: FIB4 SCORE: 0.95

## 2024-05-11 NOTE — PROGRESS NOTES
Noelle came into infusion services today for day 1 of 2 of IVIG. No complaints. PIV started in the right hand, +blood return, flushed well. Pre-meds given as prescribed. IVIG started via Gammagard titration scale ended at 240ml/hr. Tolerated well. PIV removed per pt request, covered with gauze and coban. Pt has future appointments. Discharged to self care.

## 2024-05-12 ENCOUNTER — OUTPATIENT INFUSION SERVICES (OUTPATIENT)
Dept: ONCOLOGY | Facility: MEDICAL CENTER | Age: 54
End: 2024-05-12
Attending: NURSE PRACTITIONER
Payer: MEDICARE

## 2024-05-12 VITALS
HEIGHT: 61 IN | SYSTOLIC BLOOD PRESSURE: 115 MMHG | DIASTOLIC BLOOD PRESSURE: 68 MMHG | BODY MASS INDEX: 32.42 KG/M2 | HEART RATE: 93 BPM | WEIGHT: 171.74 LBS | OXYGEN SATURATION: 97 % | TEMPERATURE: 98 F | RESPIRATION RATE: 20 BRPM

## 2024-05-12 DIAGNOSIS — G61.81 CIDP (CHRONIC INFLAMMATORY DEMYELINATING POLYNEUROPATHY) (HCC): ICD-10-CM

## 2024-05-12 RX ORDER — METHYLPREDNISOLONE SODIUM SUCCINATE 125 MG/2ML
125 INJECTION, POWDER, LYOPHILIZED, FOR SOLUTION INTRAMUSCULAR; INTRAVENOUS ONCE
OUTPATIENT
Start: 2024-06-02 | End: 2024-06-02

## 2024-05-12 RX ORDER — ACETAMINOPHEN 325 MG/1
650 TABLET ORAL ONCE
OUTPATIENT
Start: 2024-06-02 | End: 2024-06-02

## 2024-05-12 RX ORDER — DIPHENHYDRAMINE HCL 25 MG
25 TABLET ORAL ONCE
OUTPATIENT
Start: 2024-06-02 | End: 2024-06-02

## 2024-05-12 RX ORDER — IMMUNE GLOBULIN INFUSION (HUMAN) 100 MG/ML
20 INJECTION, SOLUTION INTRAVENOUS; SUBCUTANEOUS ONCE
Start: 2024-06-02 | End: 2024-06-02

## 2024-05-12 RX ORDER — DIPHENHYDRAMINE HCL 25 MG
25 TABLET ORAL ONCE
Status: COMPLETED | OUTPATIENT
Start: 2024-05-12 | End: 2024-05-12

## 2024-05-12 RX ORDER — IMMUNE GLOBULIN INFUSION (HUMAN) 100 MG/ML
20 INJECTION, SOLUTION INTRAVENOUS; SUBCUTANEOUS ONCE
Status: COMPLETED | OUTPATIENT
Start: 2024-05-12 | End: 2024-05-12

## 2024-05-12 RX ORDER — METHYLPREDNISOLONE SODIUM SUCCINATE 125 MG/2ML
125 INJECTION, POWDER, LYOPHILIZED, FOR SOLUTION INTRAMUSCULAR; INTRAVENOUS ONCE
Status: COMPLETED | OUTPATIENT
Start: 2024-05-12 | End: 2024-05-12

## 2024-05-12 RX ORDER — ACETAMINOPHEN 325 MG/1
650 TABLET ORAL ONCE
Status: COMPLETED | OUTPATIENT
Start: 2024-05-12 | End: 2024-05-12

## 2024-05-12 RX ADMIN — ACETAMINOPHEN 650 MG: 325 TABLET ORAL at 14:26

## 2024-05-12 RX ADMIN — DIPHENHYDRAMINE HYDROCHLORIDE 25 MG: 25 TABLET ORAL at 14:26

## 2024-05-12 RX ADMIN — METHYLPREDNISOLONE SODIUM SUCCINATE 125 MG: 125 INJECTION, POWDER, FOR SOLUTION INTRAMUSCULAR; INTRAVENOUS at 14:30

## 2024-05-12 RX ADMIN — IMMUNE GLOBULIN INFUSION (HUMAN) 20 G: 100 INJECTION, SOLUTION INTRAVENOUS; SUBCUTANEOUS at 14:30

## 2024-05-12 ASSESSMENT — FIBROSIS 4 INDEX: FIB4 SCORE: 0.95

## 2024-05-13 NOTE — PROGRESS NOTES
Pt presents to [facility] for IVIG infusion. PIV established in LAC; brisk blood return noted and pt tolerated well. IVIG infused per protocol with titration and no s/s of adverse reactions. PIV flushed and brisk blood return observed and then removed; gauze/coband dressing applied. Next appointment confirmed and education provided. Pt discharged to self care with all personal belongings and in NAD.

## 2024-05-31 DIAGNOSIS — E11.9 TYPE 2 DIABETES MELLITUS WITHOUT COMPLICATION, WITHOUT LONG-TERM CURRENT USE OF INSULIN (HCC): ICD-10-CM

## 2024-05-31 RX ORDER — LISINOPRIL 2.5 MG/1
2.5 TABLET ORAL DAILY
Qty: 90 TABLET | Refills: 1 | Status: SHIPPED | OUTPATIENT
Start: 2024-05-31

## 2024-05-31 NOTE — TELEPHONE ENCOUNTER
Received request via: Pharmacy    Was the patient seen in the last year in this department? Yes    Does the patient have an active prescription (recently filled or refills available) for medication(s) requested? No    Pharmacy Name: [facility]    Does the patient have [facility] and need 100 day supply (blood pressure, diabetes and cholesterol meds only)? Patient does not have [facility]    Future Appointments         Provider [facility]    6/10/2024 1:30 PM [facility]    6/11/2024 2:45 PM [facility]    7/7/2024 2:15 PM [facility]    7/10/2024 11:20 AM (Arrive by 11:05 AM) Jacque Whyte M.D. [facility]    7/14/2024 2:30 PM [facility]    7/17/2024 11:00 AM (Arrive by 10:45 AM) [facility] PHARMACIST [facility]

## 2024-06-10 ENCOUNTER — OUTPATIENT INFUSION SERVICES (OUTPATIENT)
Dept: ONCOLOGY | Facility: MEDICAL CENTER | Age: 54
End: 2024-06-10
Attending: NURSE PRACTITIONER
Payer: MEDICARE

## 2024-06-10 VITALS
HEIGHT: 61 IN | SYSTOLIC BLOOD PRESSURE: 128 MMHG | RESPIRATION RATE: 18 BRPM | WEIGHT: 167.55 LBS | BODY MASS INDEX: 31.63 KG/M2 | TEMPERATURE: 97 F | DIASTOLIC BLOOD PRESSURE: 88 MMHG | OXYGEN SATURATION: 97 % | HEART RATE: 112 BPM

## 2024-06-10 DIAGNOSIS — G61.81 CIDP (CHRONIC INFLAMMATORY DEMYELINATING POLYNEUROPATHY) (HCC): ICD-10-CM

## 2024-06-10 PROCEDURE — A9270 NON-COVERED ITEM OR SERVICE: HCPCS | Mod: UD | Performed by: NURSE PRACTITIONER

## 2024-06-10 PROCEDURE — 700111 HCHG RX REV CODE 636 W/ 250 OVERRIDE (IP): Mod: JZ,JG,UD | Performed by: NURSE PRACTITIONER

## 2024-06-10 PROCEDURE — 700102 HCHG RX REV CODE 250 W/ 637 OVERRIDE(OP): Mod: UD | Performed by: NURSE PRACTITIONER

## 2024-06-10 PROCEDURE — 96365 THER/PROPH/DIAG IV INF INIT: CPT

## 2024-06-10 PROCEDURE — 96366 THER/PROPH/DIAG IV INF ADDON: CPT

## 2024-06-10 PROCEDURE — 96375 TX/PRO/DX INJ NEW DRUG ADDON: CPT

## 2024-06-10 RX ORDER — ACETAMINOPHEN 325 MG/1
650 TABLET ORAL ONCE
Status: CANCELLED | OUTPATIENT
Start: 2024-07-01 | End: 2024-07-01

## 2024-06-10 RX ORDER — IMMUNE GLOBULIN INFUSION (HUMAN) 100 MG/ML
20 INJECTION, SOLUTION INTRAVENOUS; SUBCUTANEOUS ONCE
Status: COMPLETED | OUTPATIENT
Start: 2024-06-10 | End: 2024-06-10

## 2024-06-10 RX ORDER — DIPHENHYDRAMINE HCL 25 MG
25 TABLET ORAL ONCE
Status: CANCELLED | OUTPATIENT
Start: 2024-07-01 | End: 2024-07-01

## 2024-06-10 RX ORDER — IMMUNE GLOBULIN INFUSION (HUMAN) 100 MG/ML
20 INJECTION, SOLUTION INTRAVENOUS; SUBCUTANEOUS ONCE
Status: CANCELLED
Start: 2024-07-01 | End: 2024-07-01

## 2024-06-10 RX ORDER — DIPHENHYDRAMINE HCL 25 MG
25 TABLET ORAL ONCE
Status: COMPLETED | OUTPATIENT
Start: 2024-06-10 | End: 2024-06-10

## 2024-06-10 RX ORDER — ACETAMINOPHEN 325 MG/1
650 TABLET ORAL ONCE
Status: COMPLETED | OUTPATIENT
Start: 2024-06-10 | End: 2024-06-10

## 2024-06-10 RX ORDER — METHYLPREDNISOLONE SODIUM SUCCINATE 125 MG/2ML
125 INJECTION, POWDER, LYOPHILIZED, FOR SOLUTION INTRAMUSCULAR; INTRAVENOUS ONCE
Status: COMPLETED | OUTPATIENT
Start: 2024-06-10 | End: 2024-06-10

## 2024-06-10 RX ORDER — METHYLPREDNISOLONE SODIUM SUCCINATE 125 MG/2ML
125 INJECTION, POWDER, LYOPHILIZED, FOR SOLUTION INTRAMUSCULAR; INTRAVENOUS ONCE
Status: CANCELLED | OUTPATIENT
Start: 2024-07-01 | End: 2024-07-01

## 2024-06-10 RX ADMIN — METHYLPREDNISOLONE SODIUM SUCCINATE 125 MG: 125 INJECTION, POWDER, FOR SOLUTION INTRAMUSCULAR; INTRAVENOUS at 13:50

## 2024-06-10 RX ADMIN — ACETAMINOPHEN 650 MG: 325 TABLET ORAL at 13:50

## 2024-06-10 RX ADMIN — IMMUNE GLOBULIN INFUSION (HUMAN) 20 G: 100 INJECTION, SOLUTION INTRAVENOUS; SUBCUTANEOUS at 14:05

## 2024-06-10 RX ADMIN — DIPHENHYDRAMINE HYDROCHLORIDE 25 MG: 25 TABLET ORAL at 13:50

## 2024-06-10 ASSESSMENT — FIBROSIS 4 INDEX: FIB4 SCORE: 0.95

## 2024-06-10 NOTE — PROGRESS NOTES
Patient returns for Day 1 out of 2 of IVIG infusion. Patient was ambulatory and does not report any changes from las visit. PIV was obtained on the right hand, 24G, 1 attempt. Premedications were given, refer to MAR. IVIG infused as ordered. Rate increased per protocol, max rate at 240 mL/hr.  Patient tolerated well and without incident. No signs or symptoms of adverse reaction observed or expressed. PIV was removed and site covered with coban and gauze. Next appointment scheduled. DC'd home to self care in good condition. Patient returns back tomorrow.

## 2024-06-11 ENCOUNTER — OUTPATIENT INFUSION SERVICES (OUTPATIENT)
Dept: ONCOLOGY | Facility: MEDICAL CENTER | Age: 54
End: 2024-06-11
Attending: NURSE PRACTITIONER
Payer: MEDICARE

## 2024-06-11 VITALS
TEMPERATURE: 98 F | WEIGHT: 167.55 LBS | RESPIRATION RATE: 18 BRPM | BODY MASS INDEX: 31.63 KG/M2 | HEART RATE: 76 BPM | DIASTOLIC BLOOD PRESSURE: 81 MMHG | SYSTOLIC BLOOD PRESSURE: 121 MMHG | OXYGEN SATURATION: 98 % | HEIGHT: 61 IN

## 2024-06-11 DIAGNOSIS — G61.81 CIDP (CHRONIC INFLAMMATORY DEMYELINATING POLYNEUROPATHY) (HCC): ICD-10-CM

## 2024-06-11 PROCEDURE — A9270 NON-COVERED ITEM OR SERVICE: HCPCS | Mod: UD | Performed by: NURSE PRACTITIONER

## 2024-06-11 PROCEDURE — 96375 TX/PRO/DX INJ NEW DRUG ADDON: CPT

## 2024-06-11 PROCEDURE — 700102 HCHG RX REV CODE 250 W/ 637 OVERRIDE(OP): Mod: UD | Performed by: NURSE PRACTITIONER

## 2024-06-11 PROCEDURE — 96365 THER/PROPH/DIAG IV INF INIT: CPT

## 2024-06-11 PROCEDURE — 700111 HCHG RX REV CODE 636 W/ 250 OVERRIDE (IP): Mod: JZ,UD | Performed by: NURSE PRACTITIONER

## 2024-06-11 RX ORDER — METHYLPREDNISOLONE SODIUM SUCCINATE 125 MG/2ML
125 INJECTION, POWDER, LYOPHILIZED, FOR SOLUTION INTRAMUSCULAR; INTRAVENOUS ONCE
Status: COMPLETED | OUTPATIENT
Start: 2024-06-11 | End: 2024-06-11

## 2024-06-11 RX ORDER — IMMUNE GLOBULIN INFUSION (HUMAN) 100 MG/ML
20 INJECTION, SOLUTION INTRAVENOUS; SUBCUTANEOUS ONCE
Start: 2024-07-08 | End: 2024-07-08

## 2024-06-11 RX ORDER — DIPHENHYDRAMINE HCL 25 MG
25 TABLET ORAL ONCE
OUTPATIENT
Start: 2024-07-08 | End: 2024-07-08

## 2024-06-11 RX ORDER — DIPHENHYDRAMINE HCL 25 MG
25 TABLET ORAL ONCE
Status: COMPLETED | OUTPATIENT
Start: 2024-06-11 | End: 2024-06-11

## 2024-06-11 RX ORDER — METHYLPREDNISOLONE SODIUM SUCCINATE 125 MG/2ML
125 INJECTION, POWDER, LYOPHILIZED, FOR SOLUTION INTRAMUSCULAR; INTRAVENOUS ONCE
OUTPATIENT
Start: 2024-07-08 | End: 2024-07-08

## 2024-06-11 RX ORDER — ACETAMINOPHEN 325 MG/1
650 TABLET ORAL ONCE
Status: COMPLETED | OUTPATIENT
Start: 2024-06-11 | End: 2024-06-11

## 2024-06-11 RX ORDER — ACETAMINOPHEN 325 MG/1
650 TABLET ORAL ONCE
OUTPATIENT
Start: 2024-07-08 | End: 2024-07-08

## 2024-06-11 RX ORDER — IMMUNE GLOBULIN INFUSION (HUMAN) 100 MG/ML
20 INJECTION, SOLUTION INTRAVENOUS; SUBCUTANEOUS ONCE
Status: COMPLETED | OUTPATIENT
Start: 2024-06-11 | End: 2024-06-11

## 2024-06-11 RX ADMIN — METHYLPREDNISOLONE SODIUM SUCCINATE 125 MG: 125 INJECTION, POWDER, FOR SOLUTION INTRAMUSCULAR; INTRAVENOUS at 15:02

## 2024-06-11 RX ADMIN — DIPHENHYDRAMINE HYDROCHLORIDE 25 MG: 25 TABLET ORAL at 15:02

## 2024-06-11 RX ADMIN — IMMUNE GLOBULIN INFUSION (HUMAN) 20 G: 100 INJECTION, SOLUTION INTRAVENOUS; SUBCUTANEOUS at 15:22

## 2024-06-11 RX ADMIN — ACETAMINOPHEN 650 MG: 325 TABLET ORAL at 15:02

## 2024-06-11 ASSESSMENT — FIBROSIS 4 INDEX: FIB4 SCORE: 0.95

## 2024-06-12 NOTE — PROGRESS NOTES
Noelle presents to infusion for day 2/2 of monthly IVIG for CIDP. Patient reports feeling well today with no major complaints, has tolerated previous treatments well.     PIV started with positive blood return.   Pre-treatment meds given as ordered.     IVIG given at 240ml/hr, patient tolerated well with no adverse effects.     PIV flushed post infusion with positive blood return, d/ruchi with tip intact. Patient left in stable condition, knows when to return for next appt.

## 2024-07-09 ENCOUNTER — OUTPATIENT INFUSION SERVICES (OUTPATIENT)
Dept: ONCOLOGY | Facility: MEDICAL CENTER | Age: 54
End: 2024-07-09
Attending: NURSE PRACTITIONER
Payer: MEDICARE

## 2024-07-09 ENCOUNTER — HOSPITAL ENCOUNTER (OUTPATIENT)
Dept: LAB | Facility: MEDICAL CENTER | Age: 54
End: 2024-07-09
Attending: FAMILY MEDICINE
Payer: MEDICARE

## 2024-07-09 VITALS
HEART RATE: 98 BPM | WEIGHT: 171.74 LBS | TEMPERATURE: 97.4 F | RESPIRATION RATE: 18 BRPM | OXYGEN SATURATION: 99 % | SYSTOLIC BLOOD PRESSURE: 140 MMHG | DIASTOLIC BLOOD PRESSURE: 82 MMHG | HEIGHT: 61 IN | BODY MASS INDEX: 32.42 KG/M2

## 2024-07-09 DIAGNOSIS — G61.81 CIDP (CHRONIC INFLAMMATORY DEMYELINATING POLYNEUROPATHY) (HCC): ICD-10-CM

## 2024-07-09 DIAGNOSIS — E11.9 TYPE 2 DIABETES MELLITUS WITHOUT COMPLICATION, WITHOUT LONG-TERM CURRENT USE OF INSULIN (HCC): ICD-10-CM

## 2024-07-09 LAB
25(OH)D3 SERPL-MCNC: 22 NG/ML (ref 30–100)
ALBUMIN SERPL BCP-MCNC: 3.7 G/DL (ref 3.2–4.9)
ALBUMIN/GLOB SERPL: 1.2 G/DL
ALP SERPL-CCNC: 137 U/L (ref 30–99)
ALT SERPL-CCNC: 12 U/L (ref 2–50)
ANION GAP SERPL CALC-SCNC: 13 MMOL/L (ref 7–16)
AST SERPL-CCNC: 9 U/L (ref 12–45)
BILIRUB SERPL-MCNC: 0.3 MG/DL (ref 0.1–1.5)
BUN SERPL-MCNC: 16 MG/DL (ref 8–22)
CALCIUM ALBUM COR SERPL-MCNC: 8.8 MG/DL (ref 8.5–10.5)
CALCIUM SERPL-MCNC: 8.6 MG/DL (ref 8.5–10.5)
CHLORIDE SERPL-SCNC: 108 MMOL/L (ref 96–112)
CHOLEST SERPL-MCNC: 129 MG/DL (ref 100–199)
CO2 SERPL-SCNC: 19 MMOL/L (ref 20–33)
CREAT SERPL-MCNC: 0.8 MG/DL (ref 0.5–1.4)
CREAT UR-MCNC: 137.45 MG/DL
FASTING STATUS PATIENT QL REPORTED: NORMAL
GFR SERPLBLD CREATININE-BSD FMLA CKD-EPI: 88 ML/MIN/1.73 M 2
GLOBULIN SER CALC-MCNC: 3 G/DL (ref 1.9–3.5)
GLUCOSE SERPL-MCNC: 90 MG/DL (ref 65–99)
HDLC SERPL-MCNC: 53 MG/DL
LDLC SERPL CALC-MCNC: 47 MG/DL
MICROALBUMIN UR-MCNC: <1.2 MG/DL
MICROALBUMIN/CREAT UR: NORMAL MG/G (ref 0–30)
POTASSIUM SERPL-SCNC: 3.8 MMOL/L (ref 3.6–5.5)
PROT SERPL-MCNC: 6.7 G/DL (ref 6–8.2)
SODIUM SERPL-SCNC: 140 MMOL/L (ref 135–145)
TRIGL SERPL-MCNC: 146 MG/DL (ref 0–149)

## 2024-07-09 PROCEDURE — 700102 HCHG RX REV CODE 250 W/ 637 OVERRIDE(OP): Mod: UD | Performed by: NURSE PRACTITIONER

## 2024-07-09 PROCEDURE — 82306 VITAMIN D 25 HYDROXY: CPT | Mod: GA

## 2024-07-09 PROCEDURE — 96366 THER/PROPH/DIAG IV INF ADDON: CPT

## 2024-07-09 PROCEDURE — 700111 HCHG RX REV CODE 636 W/ 250 OVERRIDE (IP): Mod: JZ,UD | Performed by: NURSE PRACTITIONER

## 2024-07-09 PROCEDURE — 96365 THER/PROPH/DIAG IV INF INIT: CPT

## 2024-07-09 PROCEDURE — 80053 COMPREHEN METABOLIC PANEL: CPT

## 2024-07-09 PROCEDURE — 36415 COLL VENOUS BLD VENIPUNCTURE: CPT

## 2024-07-09 PROCEDURE — 82043 UR ALBUMIN QUANTITATIVE: CPT

## 2024-07-09 PROCEDURE — A9270 NON-COVERED ITEM OR SERVICE: HCPCS | Mod: UD | Performed by: NURSE PRACTITIONER

## 2024-07-09 PROCEDURE — 80061 LIPID PANEL: CPT

## 2024-07-09 PROCEDURE — 96375 TX/PRO/DX INJ NEW DRUG ADDON: CPT

## 2024-07-09 PROCEDURE — 82570 ASSAY OF URINE CREATININE: CPT

## 2024-07-09 RX ORDER — DIPHENHYDRAMINE HCL 25 MG
25 TABLET ORAL ONCE
Status: COMPLETED | OUTPATIENT
Start: 2024-07-09 | End: 2024-07-09

## 2024-07-09 RX ORDER — METHYLPREDNISOLONE SODIUM SUCCINATE 125 MG/2ML
125 INJECTION, POWDER, LYOPHILIZED, FOR SOLUTION INTRAMUSCULAR; INTRAVENOUS ONCE
Status: CANCELLED | OUTPATIENT
Start: 2024-07-09 | End: 2024-07-09

## 2024-07-09 RX ORDER — METHYLPREDNISOLONE SODIUM SUCCINATE 125 MG/2ML
125 INJECTION, POWDER, LYOPHILIZED, FOR SOLUTION INTRAMUSCULAR; INTRAVENOUS ONCE
Status: COMPLETED | OUTPATIENT
Start: 2024-07-09 | End: 2024-07-09

## 2024-07-09 RX ORDER — ACETAMINOPHEN 325 MG/1
650 TABLET ORAL ONCE
Status: CANCELLED | OUTPATIENT
Start: 2024-07-09 | End: 2024-07-09

## 2024-07-09 RX ORDER — ACETAMINOPHEN 325 MG/1
650 TABLET ORAL ONCE
Status: COMPLETED | OUTPATIENT
Start: 2024-07-09 | End: 2024-07-09

## 2024-07-09 RX ORDER — DIPHENHYDRAMINE HCL 25 MG
25 TABLET ORAL ONCE
Status: CANCELLED | OUTPATIENT
Start: 2024-07-09 | End: 2024-07-09

## 2024-07-09 RX ADMIN — METHYLPREDNISOLONE SODIUM SUCCINATE 125 MG: 125 INJECTION, POWDER, FOR SOLUTION INTRAMUSCULAR; INTRAVENOUS at 14:18

## 2024-07-09 RX ADMIN — ACETAMINOPHEN 650 MG: 325 TABLET ORAL at 14:18

## 2024-07-09 RX ADMIN — DIPHENHYDRAMINE HYDROCHLORIDE 25 MG: 25 TABLET ORAL at 14:18

## 2024-07-09 RX ADMIN — IMMUNE GLOBULIN (HUMAN) 20 G: 10 INJECTION INTRAVENOUS; SUBCUTANEOUS at 14:40

## 2024-07-09 ASSESSMENT — FIBROSIS 4 INDEX: FIB4 SCORE: 0.48

## 2024-07-10 ENCOUNTER — OUTPATIENT INFUSION SERVICES (OUTPATIENT)
Dept: ONCOLOGY | Facility: MEDICAL CENTER | Age: 54
End: 2024-07-10
Attending: NURSE PRACTITIONER
Payer: MEDICARE

## 2024-07-10 ENCOUNTER — OFFICE VISIT (OUTPATIENT)
Dept: MEDICAL GROUP | Facility: MEDICAL CENTER | Age: 54
End: 2024-07-10
Attending: FAMILY MEDICINE
Payer: MEDICARE

## 2024-07-10 VITALS
HEIGHT: 61 IN | RESPIRATION RATE: 14 BRPM | DIASTOLIC BLOOD PRESSURE: 88 MMHG | TEMPERATURE: 98.3 F | SYSTOLIC BLOOD PRESSURE: 116 MMHG | OXYGEN SATURATION: 98 % | WEIGHT: 173 LBS | HEART RATE: 80 BPM | BODY MASS INDEX: 32.66 KG/M2

## 2024-07-10 VITALS
HEIGHT: 61 IN | HEART RATE: 97 BPM | RESPIRATION RATE: 18 BRPM | DIASTOLIC BLOOD PRESSURE: 89 MMHG | SYSTOLIC BLOOD PRESSURE: 137 MMHG | TEMPERATURE: 98 F | OXYGEN SATURATION: 98 % | BODY MASS INDEX: 32.38 KG/M2 | WEIGHT: 171.52 LBS

## 2024-07-10 DIAGNOSIS — J34.89 NASAL LESION: ICD-10-CM

## 2024-07-10 DIAGNOSIS — Z12.11 SCREENING FOR COLON CANCER: ICD-10-CM

## 2024-07-10 DIAGNOSIS — L91.8 SKIN TAG: ICD-10-CM

## 2024-07-10 DIAGNOSIS — G61.81 CIDP (CHRONIC INFLAMMATORY DEMYELINATING POLYNEUROPATHY) (HCC): ICD-10-CM

## 2024-07-10 DIAGNOSIS — E11.9 TYPE 2 DIABETES MELLITUS WITHOUT COMPLICATION, WITHOUT LONG-TERM CURRENT USE OF INSULIN (HCC): ICD-10-CM

## 2024-07-10 DIAGNOSIS — Z23 NEED FOR VACCINATION: ICD-10-CM

## 2024-07-10 LAB
HBA1C MFR BLD: 6 % (ref ?–5.8)
POCT INT CON NEG: NEGATIVE
POCT INT CON POS: POSITIVE

## 2024-07-10 PROCEDURE — 96375 TX/PRO/DX INJ NEW DRUG ADDON: CPT

## 2024-07-10 PROCEDURE — A9270 NON-COVERED ITEM OR SERVICE: HCPCS | Mod: UD | Performed by: NURSE PRACTITIONER

## 2024-07-10 PROCEDURE — 99214 OFFICE O/P EST MOD 30 MIN: CPT | Performed by: FAMILY MEDICINE

## 2024-07-10 PROCEDURE — 96365 THER/PROPH/DIAG IV INF INIT: CPT

## 2024-07-10 PROCEDURE — 11200 RMVL SKIN TAGS UP TO&INC 15: CPT | Performed by: FAMILY MEDICINE

## 2024-07-10 PROCEDURE — 700111 HCHG RX REV CODE 636 W/ 250 OVERRIDE (IP): Mod: JZ,JG,UD | Performed by: NURSE PRACTITIONER

## 2024-07-10 PROCEDURE — 700102 HCHG RX REV CODE 250 W/ 637 OVERRIDE(OP): Mod: UD | Performed by: NURSE PRACTITIONER

## 2024-07-10 PROCEDURE — 83036 HEMOGLOBIN GLYCOSYLATED A1C: CPT | Performed by: FAMILY MEDICINE

## 2024-07-10 PROCEDURE — 99214 OFFICE O/P EST MOD 30 MIN: CPT | Mod: 25 | Performed by: FAMILY MEDICINE

## 2024-07-10 RX ORDER — DIPHENHYDRAMINE HCL 25 MG
25 TABLET ORAL ONCE
Status: COMPLETED | OUTPATIENT
Start: 2024-07-10 | End: 2024-07-10

## 2024-07-10 RX ORDER — DIPHENHYDRAMINE HCL 25 MG
25 TABLET ORAL ONCE
OUTPATIENT
Start: 2024-07-10 | End: 2024-07-10

## 2024-07-10 RX ORDER — METHYLPREDNISOLONE SODIUM SUCCINATE 125 MG/2ML
125 INJECTION, POWDER, LYOPHILIZED, FOR SOLUTION INTRAMUSCULAR; INTRAVENOUS ONCE
OUTPATIENT
Start: 2024-07-10 | End: 2024-07-10

## 2024-07-10 RX ORDER — ACETAMINOPHEN 325 MG/1
650 TABLET ORAL ONCE
OUTPATIENT
Start: 2024-07-10 | End: 2024-07-10

## 2024-07-10 RX ORDER — METHYLPREDNISOLONE SODIUM SUCCINATE 125 MG/2ML
125 INJECTION, POWDER, LYOPHILIZED, FOR SOLUTION INTRAMUSCULAR; INTRAVENOUS ONCE
Status: COMPLETED | OUTPATIENT
Start: 2024-07-10 | End: 2024-07-10

## 2024-07-10 RX ORDER — ACETAMINOPHEN 325 MG/1
650 TABLET ORAL ONCE
Status: COMPLETED | OUTPATIENT
Start: 2024-07-10 | End: 2024-07-10

## 2024-07-10 RX ADMIN — METHYLPREDNISOLONE SODIUM SUCCINATE 125 MG: 125 INJECTION, POWDER, FOR SOLUTION INTRAMUSCULAR; INTRAVENOUS at 15:16

## 2024-07-10 RX ADMIN — IMMUNE GLOBULIN (HUMAN) 20 G: 10 INJECTION INTRAVENOUS; SUBCUTANEOUS at 15:36

## 2024-07-10 RX ADMIN — DIPHENHYDRAMINE HYDROCHLORIDE 25 MG: 25 TABLET ORAL at 15:15

## 2024-07-10 RX ADMIN — ACETAMINOPHEN 650 MG: 325 TABLET ORAL at 15:15

## 2024-07-10 ASSESSMENT — FIBROSIS 4 INDEX
FIB4 SCORE: 0.48
FIB4 SCORE: 0.48

## 2024-07-10 ASSESSMENT — PATIENT HEALTH QUESTIONNAIRE - PHQ9: CLINICAL INTERPRETATION OF PHQ2 SCORE: 0

## 2024-07-17 ENCOUNTER — NON-PROVIDER VISIT (OUTPATIENT)
Dept: MEDICAL GROUP | Facility: MEDICAL CENTER | Age: 54
End: 2024-07-17
Attending: FAMILY MEDICINE
Payer: MEDICARE

## 2024-07-17 VITALS
SYSTOLIC BLOOD PRESSURE: 121 MMHG | HEART RATE: 97 BPM | BODY MASS INDEX: 31.72 KG/M2 | DIASTOLIC BLOOD PRESSURE: 75 MMHG | WEIGHT: 168 LBS | HEIGHT: 61 IN

## 2024-07-17 DIAGNOSIS — E11.9 TYPE 2 DIABETES MELLITUS WITHOUT COMPLICATION, WITHOUT LONG-TERM CURRENT USE OF INSULIN (HCC): ICD-10-CM

## 2024-07-17 PROCEDURE — 99213 OFFICE O/P EST LOW 20 MIN: CPT | Performed by: PHARMACIST

## 2024-07-17 RX ORDER — EMPAGLIFLOZIN, METFORMIN HYDROCHLORIDE 25; 1000 MG/1; MG/1
1 TABLET, EXTENDED RELEASE ORAL DAILY
Qty: 30 TABLET | Refills: 5 | Status: SHIPPED | OUTPATIENT
Start: 2024-07-17

## 2024-07-17 RX ORDER — ERGOCALCIFEROL 1.25 MG/1
50000 CAPSULE ORAL
Qty: 4 CAPSULE | Refills: 2 | Status: SHIPPED | OUTPATIENT
Start: 2024-07-17

## 2024-07-17 ASSESSMENT — FIBROSIS 4 INDEX: FIB4 SCORE: 0.48

## 2024-08-06 ENCOUNTER — APPOINTMENT (OUTPATIENT)
Dept: ONCOLOGY | Facility: MEDICAL CENTER | Age: 54
End: 2024-08-06
Attending: NURSE PRACTITIONER
Payer: MEDICARE

## 2024-08-07 ENCOUNTER — APPOINTMENT (OUTPATIENT)
Dept: ONCOLOGY | Facility: MEDICAL CENTER | Age: 54
End: 2024-08-07
Attending: NURSE PRACTITIONER
Payer: MEDICARE

## 2024-08-13 ENCOUNTER — OFFICE VISIT (OUTPATIENT)
Dept: MEDICAL GROUP | Facility: MEDICAL CENTER | Age: 54
End: 2024-08-13
Attending: NURSE PRACTITIONER
Payer: MEDICAID

## 2024-08-13 VITALS
DIASTOLIC BLOOD PRESSURE: 82 MMHG | HEART RATE: 91 BPM | OXYGEN SATURATION: 96 % | BODY MASS INDEX: 28.17 KG/M2 | TEMPERATURE: 96.5 F | SYSTOLIC BLOOD PRESSURE: 122 MMHG | WEIGHT: 165 LBS | HEIGHT: 64 IN | RESPIRATION RATE: 16 BRPM

## 2024-08-13 DIAGNOSIS — L29.9 ITCHING: ICD-10-CM

## 2024-08-13 DIAGNOSIS — E11.9 TYPE 2 DIABETES MELLITUS WITHOUT COMPLICATION, WITHOUT LONG-TERM CURRENT USE OF INSULIN (HCC): ICD-10-CM

## 2024-08-13 PROCEDURE — 92250 FUNDUS PHOTOGRAPHY W/I&R: CPT | Performed by: NURSE PRACTITIONER

## 2024-08-13 PROCEDURE — 3074F SYST BP LT 130 MM HG: CPT | Performed by: NURSE PRACTITIONER

## 2024-08-13 PROCEDURE — 3079F DIAST BP 80-89 MM HG: CPT | Performed by: NURSE PRACTITIONER

## 2024-08-13 PROCEDURE — 99213 OFFICE O/P EST LOW 20 MIN: CPT | Performed by: NURSE PRACTITIONER

## 2024-08-13 PROCEDURE — 99214 OFFICE O/P EST MOD 30 MIN: CPT | Performed by: NURSE PRACTITIONER

## 2024-08-13 RX ORDER — HYDROXYZINE HYDROCHLORIDE 25 MG/1
25 TABLET, FILM COATED ORAL 3 TIMES DAILY PRN
Qty: 90 TABLET | Refills: 0 | Status: SHIPPED | OUTPATIENT
Start: 2024-08-13

## 2024-08-13 ASSESSMENT — FIBROSIS 4 INDEX: FIB4 SCORE: 0.48

## 2024-08-16 LAB — RETINAL SCREEN: NEGATIVE

## 2024-08-23 NOTE — PROGRESS NOTES
Verbal consent was acquired by the patient to use [employer] ambient listening note generation during this visit     Chief Complaint   Patient presents with    Itching     All over body / for about 1 weeks.    Head Pain     Right side , it hurt more in the afternoon.       Subjective:     HPI:   History of Present Illness  The patient presents for evaluation of itchiness. She is accompanied by an .    She has been experiencing widespread body itchiness for over a week. Despite being allergic to latex, she has not introduced any substances with latex or altered her diet. Her medical condition includes numbness in parts of her body, leading to occasional bleeding. The cause of the itchiness is unknown. The itchiness is localized to every part of her body. She also reports neck pain, particularly in the half of her neck, half of her head, and half of her face. This condition has been present for years, but recent days have been a daily occurrence. She denies any injury that could have exacerbated the itchiness. Recently, she was suspected to have a stroke, but an MRI was normal. The pain, which she usually experiences, has now become a daily occurrence, particularly in the afternoons. Her neurologist informed her of a virus that traveled through her blood to her nervous system, which she believes is causing her neck pain. She typically receives monthly medication through IV, but has not done so due to a change in her insurance. She suspects this may be causing her to feel paralyzed and the symptoms. After two months of medication, she experienced a second episode of paralysis. Her son, who previously worked on a similar situation, passed away. She typically sees her neurologist every two months. She uses a cane for mobility due to fatigue and her legs do not respond, causing her to fall. She broke her ankle in 04/2023. She works five hours a day, which allows her body to calm down. She also reports dry  skin, for which she showers in cold water. She has creams at home, but finds them sticky.    ALLERGIES  She is allergic to LATEX.        No problems updated.    ROS  See HPI     Allergies   Allergen Reactions    Latex Rash     hives       Current medicines (including changes today)  Current Outpatient Medications   Medication Sig Dispense Refill    hydrOXYzine HCl (ATARAX) 25 MG Tab Take 1 Tablet by mouth 3 times a day as needed for Itching. 90 Tablet 0    ergocalciferol (DRISDOL) 58091 UNIT capsule Take 1 Capsule by mouth every 7 days. 4 Capsule 2    Empagliflozin-metFORMIN HCl ER (SYNJARDY XR)  MG TABLET SR 24 HR Take 1 tablet by mouth every day. 30 Tablet 5    NON SPECIFIED Shingrix #1 1 Each 0    lisinopril (PRINIVIL) 2.5 MG Tab Take 1 tablet by mouth once daily 90 Tablet 1    atorvastatin (LIPITOR) 20 MG Tab TAKE 1 TABLET BY MOUTH ONCE DAILY IN THE EVENING 90 Tablet 1    glucose blood (TRUE METRIX BLOOD GLUCOSE TEST) strip Use to test twice daily 100 Strip 11    glucose blood strip True Metrix Glucose Test Strip      predniSONE (DELTASONE) 5 MG Tab       Blood Glucose Monitoring Suppl (BLOOD GLUCOSE MONITOR SYSTEM) w/Device Kit Use to test 3 times a day and as needed 1 Kit 0    primidone (MYSOLINE) 50 MG Tab       omeprazole (PRILOSEC OTC) 20 MG tablet Take 1 Tablet by mouth every day. 30 Tablet 5     No current facility-administered medications for this visit.       Social History     Tobacco Use    Smoking status: Some Days     Current packs/day: 0.25     Average packs/day: 0.3 packs/day for 34.0 years (8.5 ttl pk-yrs)     Types: Cigarettes    Smokeless tobacco: Never    Tobacco comments:     3 cigarettes/day   Vaping Use    Vaping status: Never Used   Substance Use Topics    Alcohol use: No    Drug use: No       Patient Active Problem List    Diagnosis Date Noted    Toe infection 06/15/2022    Type 2 diabetes mellitus without complication, without long-term current use of insulin ([facility]) 05/17/2022     "Gastroesophageal reflux disease 05/17/2022    Other hyperlipidemia 05/17/2022    CIDP (chronic inflammatory demyelinating polyneuropathy) ([facility]) 03/24/2022    Clear cell renal cell carcinoma, right ([facility]) 10/24/2021    H/O right radical nephrectomy 10/23/2021    LFT elevation 10/23/2021    Mixed stress and urge urinary incontinence 09/14/2021    Vitamin D deficiency 04/01/2019    Anemia 04/01/2019    Vitamin B12 deficiency 04/01/2019    Smoker 04/01/2019    Neck pain 02/12/2019    BMI 28.0-28.9,adult 02/12/2019       Family History   Problem Relation Age of Onset    Osteoporosis Mother     Alzheimer's Disease Mother     Arthritis Mother     Diabetes Father     Heart Disease Father     Stroke Father         TIA    Cancer Other         one nephew leukemia, the other liver ca          Objective:     /82 (BP Location: Left arm, Patient Position: Sitting, BP Cuff Size: Adult)   Pulse 91   Temp 35.8 °C (96.5 °F) (Temporal)   Resp 16   Ht 1.626 m (5' 4\")   Wt 74.8 kg (165 lb)   SpO2 96%  Body mass index is 28.32 kg/m².    Physical Exam:  Physical Exam  Vitals reviewed.   Constitutional:       General: She is awake.      Appearance: Normal appearance. She is well-developed.   HENT:      Head: Normocephalic.   Eyes:      Conjunctiva/sclera: Conjunctivae normal.   Cardiovascular:      Rate and Rhythm: Normal rate.   Pulmonary:      Effort: Pulmonary effort is normal. No respiratory distress.   Musculoskeletal:      Cervical back: Neck supple.   Skin:     General: Skin is warm and dry.   Neurological:      Mental Status: She is alert and oriented to person, place, and time.   Psychiatric:         Mood and Affect: Mood normal.         Behavior: Behavior normal. Behavior is cooperative.              Assessment and Plan:     The following treatment plan was discussed:    Problem List Items Addressed This Visit       Type 2 diabetes mellitus without complication, without long-term current use of insulin ([facility])    Relevant " Orders    POCT Retinal Eye Exam (Completed)     Other Visit Diagnoses       Itching        Relevant Medications    hydrOXYzine HCl (ATARAX) 25 MG Tab            Assessment & Plan  1. Pruritus.  The MRI in 02/2021 revealed degenerative changes in her neck, which could be due to arthritis or nerve pinching on the right side of her head. She was advised to return to her neurologist to discuss her symptoms not being well-managed without her medication and to explore an alternative covered by her insurance. A prescription for itching medication was provided to be taken up to 3 times a day as needed. She was advised to take the first dose at night to assess her response. If it does not cause excessive fatigue, she can take it during the day as well. If it causes drowsiness, she can try reducing the dose in half. She was advised to apply extra lotion and increase her water intake as it appears that she has dry skin. Samples of body wash/ lotion were provided. She was also advised to take colder showers and to change her body wash to a hydrating body wash. She will follow up as needed if her symptoms do not resolve in 2 weeks with changes in place. She had recent blood work less than a month ago that did not show any liver abnormalities and she is not jaundiced so I do not feel that this is secondary to any liver conditions.     Any change or worsening of signs or symptoms, patient encouraged to follow-up or report to emergency room for further evaluation. Patient verbalizes understanding and agrees.      PLEASE NOTE: This dictation was created using voice recognition software. I have made every reasonable attempt to correct obvious errors, but I expect that there are errors of grammar and possibly content that I did not discover before finalizing the note.

## 2024-08-26 ENCOUNTER — PATIENT MESSAGE (OUTPATIENT)
Dept: HEALTH INFORMATION MANAGEMENT | Facility: OTHER | Age: 54
End: 2024-08-26

## 2024-10-01 ENCOUNTER — OUTPATIENT INFUSION SERVICES (OUTPATIENT)
Dept: ONCOLOGY | Facility: MEDICAL CENTER | Age: 54
End: 2024-10-01
Attending: NURSE PRACTITIONER
Payer: MEDICARE

## 2024-10-01 VITALS
BODY MASS INDEX: 30.97 KG/M2 | TEMPERATURE: 98.1 F | HEIGHT: 61 IN | WEIGHT: 164.02 LBS | DIASTOLIC BLOOD PRESSURE: 91 MMHG | RESPIRATION RATE: 18 BRPM | HEART RATE: 103 BPM | OXYGEN SATURATION: 98 % | SYSTOLIC BLOOD PRESSURE: 150 MMHG

## 2024-10-01 DIAGNOSIS — G61.81 CIDP (CHRONIC INFLAMMATORY DEMYELINATING POLYNEUROPATHY) (HCC): ICD-10-CM

## 2024-10-01 PROCEDURE — 700105 HCHG RX REV CODE 258: Mod: UD | Performed by: NURSE PRACTITIONER

## 2024-10-01 PROCEDURE — 700102 HCHG RX REV CODE 250 W/ 637 OVERRIDE(OP): Mod: UD | Performed by: NURSE PRACTITIONER

## 2024-10-01 PROCEDURE — A9270 NON-COVERED ITEM OR SERVICE: HCPCS | Mod: UD | Performed by: NURSE PRACTITIONER

## 2024-10-01 PROCEDURE — 96365 THER/PROPH/DIAG IV INF INIT: CPT

## 2024-10-01 PROCEDURE — 96375 TX/PRO/DX INJ NEW DRUG ADDON: CPT

## 2024-10-01 PROCEDURE — 96366 THER/PROPH/DIAG IV INF ADDON: CPT

## 2024-10-01 PROCEDURE — 700111 HCHG RX REV CODE 636 W/ 250 OVERRIDE (IP): Mod: JZ,UD | Performed by: NURSE PRACTITIONER

## 2024-10-01 RX ORDER — METHYLPREDNISOLONE SODIUM SUCCINATE 125 MG/2ML
125 INJECTION, POWDER, LYOPHILIZED, FOR SOLUTION INTRAMUSCULAR; INTRAVENOUS ONCE
Status: CANCELLED | OUTPATIENT
Start: 2024-10-22 | End: 2024-10-22

## 2024-10-01 RX ORDER — METHYLPREDNISOLONE SODIUM SUCCINATE 125 MG/2ML
125 INJECTION, POWDER, LYOPHILIZED, FOR SOLUTION INTRAMUSCULAR; INTRAVENOUS ONCE
Status: COMPLETED | OUTPATIENT
Start: 2024-10-01 | End: 2024-10-01

## 2024-10-01 RX ORDER — ACETAMINOPHEN 325 MG/1
650 TABLET ORAL ONCE
Status: CANCELLED | OUTPATIENT
Start: 2024-10-22 | End: 2024-10-22

## 2024-10-01 RX ORDER — ACETAMINOPHEN 325 MG/1
650 TABLET ORAL ONCE
Status: COMPLETED | OUTPATIENT
Start: 2024-10-01 | End: 2024-10-01

## 2024-10-01 RX ADMIN — METHYLPREDNISOLONE SODIUM SUCCINATE 125 MG: 125 INJECTION, POWDER, FOR SOLUTION INTRAMUSCULAR; INTRAVENOUS at 15:19

## 2024-10-01 RX ADMIN — IMMUNE GLOBULIN (HUMAN) 20 G: 10 INJECTION INTRAVENOUS; SUBCUTANEOUS at 15:35

## 2024-10-01 RX ADMIN — DIPHENHYDRAMINE HYDROCHLORIDE 50 MG: 50 INJECTION INTRAMUSCULAR; INTRAVENOUS at 15:21

## 2024-10-01 RX ADMIN — ACETAMINOPHEN 650 MG: 325 TABLET ORAL at 15:13

## 2024-10-01 ASSESSMENT — FIBROSIS 4 INDEX: FIB4 SCORE: 0.48

## 2024-10-02 ENCOUNTER — OUTPATIENT INFUSION SERVICES (OUTPATIENT)
Dept: ONCOLOGY | Facility: MEDICAL CENTER | Age: 54
End: 2024-10-02
Attending: NURSE PRACTITIONER
Payer: MEDICARE

## 2024-10-02 VITALS
RESPIRATION RATE: 18 BRPM | OXYGEN SATURATION: 98 % | TEMPERATURE: 97 F | HEART RATE: 88 BPM | SYSTOLIC BLOOD PRESSURE: 155 MMHG | DIASTOLIC BLOOD PRESSURE: 91 MMHG | HEIGHT: 61 IN | WEIGHT: 165.79 LBS | BODY MASS INDEX: 31.3 KG/M2

## 2024-10-02 DIAGNOSIS — G61.81 CIDP (CHRONIC INFLAMMATORY DEMYELINATING POLYNEUROPATHY) (HCC): ICD-10-CM

## 2024-10-02 PROCEDURE — 96366 THER/PROPH/DIAG IV INF ADDON: CPT

## 2024-10-02 PROCEDURE — 700111 HCHG RX REV CODE 636 W/ 250 OVERRIDE (IP): Mod: JZ,UD | Performed by: NURSE PRACTITIONER

## 2024-10-02 PROCEDURE — 700102 HCHG RX REV CODE 250 W/ 637 OVERRIDE(OP): Mod: UD | Performed by: NURSE PRACTITIONER

## 2024-10-02 PROCEDURE — 96375 TX/PRO/DX INJ NEW DRUG ADDON: CPT

## 2024-10-02 PROCEDURE — 96365 THER/PROPH/DIAG IV INF INIT: CPT

## 2024-10-02 PROCEDURE — A9270 NON-COVERED ITEM OR SERVICE: HCPCS | Mod: UD | Performed by: NURSE PRACTITIONER

## 2024-10-02 RX ORDER — ACETAMINOPHEN 325 MG/1
650 TABLET ORAL ONCE
Status: COMPLETED | OUTPATIENT
Start: 2024-10-02 | End: 2024-10-02

## 2024-10-02 RX ORDER — METHYLPREDNISOLONE SODIUM SUCCINATE 125 MG/2ML
125 INJECTION, POWDER, LYOPHILIZED, FOR SOLUTION INTRAMUSCULAR; INTRAVENOUS ONCE
Status: COMPLETED | OUTPATIENT
Start: 2024-10-02 | End: 2024-10-02

## 2024-10-02 RX ORDER — METHYLPREDNISOLONE SODIUM SUCCINATE 125 MG/2ML
125 INJECTION, POWDER, LYOPHILIZED, FOR SOLUTION INTRAMUSCULAR; INTRAVENOUS ONCE
Status: CANCELLED | OUTPATIENT
Start: 2024-10-22 | End: 2024-10-22

## 2024-10-02 RX ORDER — ACETAMINOPHEN 325 MG/1
650 TABLET ORAL ONCE
Status: CANCELLED | OUTPATIENT
Start: 2024-10-22 | End: 2024-10-22

## 2024-10-02 RX ADMIN — ACETAMINOPHEN 650 MG: 325 TABLET ORAL at 15:05

## 2024-10-02 RX ADMIN — IMMUNE GLOBULIN (HUMAN) 20 G: 10 INJECTION INTRAVENOUS; SUBCUTANEOUS at 15:10

## 2024-10-02 RX ADMIN — METHYLPREDNISOLONE SODIUM SUCCINATE 125 MG: 125 INJECTION, POWDER, FOR SOLUTION INTRAMUSCULAR; INTRAVENOUS at 15:05

## 2024-10-02 ASSESSMENT — FIBROSIS 4 INDEX: FIB4 SCORE: 0.48

## 2024-10-11 ENCOUNTER — OFFICE VISIT (OUTPATIENT)
Dept: MEDICAL GROUP | Facility: MEDICAL CENTER | Age: 54
End: 2024-10-11
Attending: FAMILY MEDICINE
Payer: MEDICARE

## 2024-10-11 ENCOUNTER — HOSPITAL ENCOUNTER (OUTPATIENT)
Facility: MEDICAL CENTER | Age: 54
End: 2024-10-11
Attending: FAMILY MEDICINE
Payer: MEDICARE

## 2024-10-11 VITALS
HEIGHT: 61 IN | DIASTOLIC BLOOD PRESSURE: 70 MMHG | SYSTOLIC BLOOD PRESSURE: 100 MMHG | RESPIRATION RATE: 14 BRPM | OXYGEN SATURATION: 96 % | WEIGHT: 165 LBS | HEART RATE: 100 BPM | BODY MASS INDEX: 31.15 KG/M2 | TEMPERATURE: 97.9 F

## 2024-10-11 DIAGNOSIS — Z12.4 CERVICAL CANCER SCREENING: ICD-10-CM

## 2024-10-11 DIAGNOSIS — N64.4 BREAST TENDERNESS IN FEMALE: ICD-10-CM

## 2024-10-11 PROCEDURE — 88175 CYTOPATH C/V AUTO FLUID REDO: CPT

## 2024-10-11 PROCEDURE — G0101 CA SCREEN;PELVIC/BREAST EXAM: HCPCS | Performed by: FAMILY MEDICINE

## 2024-10-11 PROCEDURE — 87491 CHLMYD TRACH DNA AMP PROBE: CPT | Mod: GA

## 2024-10-11 PROCEDURE — 99213 OFFICE O/P EST LOW 20 MIN: CPT | Performed by: FAMILY MEDICINE

## 2024-10-11 PROCEDURE — 3078F DIAST BP <80 MM HG: CPT | Performed by: FAMILY MEDICINE

## 2024-10-11 PROCEDURE — 87624 HPV HI-RISK TYP POOLED RSLT: CPT

## 2024-10-11 PROCEDURE — 99213 OFFICE O/P EST LOW 20 MIN: CPT | Mod: 25 | Performed by: FAMILY MEDICINE

## 2024-10-11 PROCEDURE — 3074F SYST BP LT 130 MM HG: CPT | Performed by: FAMILY MEDICINE

## 2024-10-11 PROCEDURE — 87591 N.GONORRHOEAE DNA AMP PROB: CPT | Mod: GA

## 2024-10-11 ASSESSMENT — FIBROSIS 4 INDEX: FIB4 SCORE: 0.48

## 2024-10-12 ENCOUNTER — HOSPITAL ENCOUNTER (OUTPATIENT)
Dept: RADIOLOGY | Facility: MEDICAL CENTER | Age: 54
End: 2024-10-12
Attending: UROLOGY
Payer: MEDICARE

## 2024-10-12 DIAGNOSIS — C64.1 MALIGNANT NEOPLASM OF RIGHT KIDNEY, EXCEPT RENAL PELVIS (HCC): ICD-10-CM

## 2024-10-12 PROCEDURE — 71046 X-RAY EXAM CHEST 2 VIEWS: CPT

## 2024-10-17 LAB
C TRACH RRNA CVX QL NAA+PROBE: NEGATIVE
CYTOLOGIST CVX/VAG CYTO: NORMAL
CYTOLOGY CVX/VAG DOC CYTO: NORMAL
CYTOLOGY CVX/VAG DOC THIN PREP: NORMAL
HPV I/H RISK 4 DNA CVX QL PROBE+SIG AMP: NEGATIVE
N GONORRHOEA RRNA CVX QL NAA+PROBE: NEGATIVE
NOTE NL11727A: NORMAL
OTHER STN SPEC: NORMAL
STAT OF ADQ CVX/VAG CYTO-IMP: NORMAL

## 2024-10-21 ENCOUNTER — HOSPITAL ENCOUNTER (OUTPATIENT)
Dept: RADIOLOGY | Facility: MEDICAL CENTER | Age: 54
End: 2024-10-21
Attending: UROLOGY
Payer: MEDICARE

## 2024-10-21 DIAGNOSIS — C64.1 MALIGNANT NEOPLASM OF RIGHT KIDNEY, EXCEPT RENAL PELVIS (HCC): ICD-10-CM

## 2024-10-21 PROCEDURE — 700117 HCHG RX CONTRAST REV CODE 255: Mod: UD | Performed by: UROLOGY

## 2024-10-21 PROCEDURE — 71260 CT THORAX DX C+: CPT

## 2024-10-21 RX ADMIN — IOHEXOL 50 ML: 240 INJECTION, SOLUTION INTRATHECAL; INTRAVASCULAR; INTRAVENOUS; ORAL at 11:49

## 2024-10-21 RX ADMIN — IOHEXOL 80 ML: 350 INJECTION, SOLUTION INTRAVENOUS at 11:49

## 2024-10-28 ENCOUNTER — HOSPITAL ENCOUNTER (OUTPATIENT)
Dept: RADIOLOGY | Facility: MEDICAL CENTER | Age: 54
End: 2024-10-28
Attending: FAMILY MEDICINE
Payer: MEDICAID

## 2024-10-28 DIAGNOSIS — N64.4 BREAST TENDERNESS IN FEMALE: ICD-10-CM

## 2024-10-28 PROCEDURE — G0279 TOMOSYNTHESIS, MAMMO: HCPCS

## 2024-10-29 ENCOUNTER — OUTPATIENT INFUSION SERVICES (OUTPATIENT)
Dept: ONCOLOGY | Facility: MEDICAL CENTER | Age: 54
End: 2024-10-29
Attending: NURSE PRACTITIONER
Payer: MEDICARE

## 2024-10-29 VITALS
WEIGHT: 165.12 LBS | OXYGEN SATURATION: 95 % | HEART RATE: 83 BPM | BODY MASS INDEX: 31.2 KG/M2 | TEMPERATURE: 97.4 F | SYSTOLIC BLOOD PRESSURE: 156 MMHG | RESPIRATION RATE: 18 BRPM | DIASTOLIC BLOOD PRESSURE: 89 MMHG

## 2024-10-29 DIAGNOSIS — G61.81 CIDP (CHRONIC INFLAMMATORY DEMYELINATING POLYNEUROPATHY) (HCC): ICD-10-CM

## 2024-10-29 PROCEDURE — 700102 HCHG RX REV CODE 250 W/ 637 OVERRIDE(OP): Mod: UD | Performed by: NURSE PRACTITIONER

## 2024-10-29 PROCEDURE — 700111 HCHG RX REV CODE 636 W/ 250 OVERRIDE (IP): Mod: JZ,JG,UD | Performed by: NURSE PRACTITIONER

## 2024-10-29 PROCEDURE — 96365 THER/PROPH/DIAG IV INF INIT: CPT

## 2024-10-29 PROCEDURE — A9270 NON-COVERED ITEM OR SERVICE: HCPCS | Mod: UD | Performed by: NURSE PRACTITIONER

## 2024-10-29 PROCEDURE — 96375 TX/PRO/DX INJ NEW DRUG ADDON: CPT

## 2024-10-29 PROCEDURE — 96366 THER/PROPH/DIAG IV INF ADDON: CPT

## 2024-10-29 RX ORDER — METHYLPREDNISOLONE SODIUM SUCCINATE 125 MG/2ML
125 INJECTION, POWDER, LYOPHILIZED, FOR SOLUTION INTRAMUSCULAR; INTRAVENOUS ONCE
Status: CANCELLED | OUTPATIENT
Start: 2024-11-26 | End: 2024-11-26

## 2024-10-29 RX ORDER — METHYLPREDNISOLONE SODIUM SUCCINATE 125 MG/2ML
125 INJECTION, POWDER, LYOPHILIZED, FOR SOLUTION INTRAMUSCULAR; INTRAVENOUS ONCE
Status: COMPLETED | OUTPATIENT
Start: 2024-10-29 | End: 2024-10-29

## 2024-10-29 RX ORDER — DIPHENHYDRAMINE HYDROCHLORIDE 50 MG/ML
50 INJECTION INTRAMUSCULAR; INTRAVENOUS ONCE
Status: COMPLETED | OUTPATIENT
Start: 2024-10-29 | End: 2024-10-29

## 2024-10-29 RX ORDER — ACETAMINOPHEN 325 MG/1
650 TABLET ORAL ONCE
Status: COMPLETED | OUTPATIENT
Start: 2024-10-29 | End: 2024-10-29

## 2024-10-29 RX ORDER — ACETAMINOPHEN 325 MG/1
650 TABLET ORAL ONCE
Status: CANCELLED | OUTPATIENT
Start: 2024-11-26 | End: 2024-11-26

## 2024-10-29 RX ADMIN — METHYLPREDNISOLONE SODIUM SUCCINATE 125 MG: 125 INJECTION, POWDER, FOR SOLUTION INTRAMUSCULAR; INTRAVENOUS at 15:17

## 2024-10-29 RX ADMIN — IMMUNE GLOBULIN (HUMAN) 20 G: 10 INJECTION INTRAVENOUS; SUBCUTANEOUS at 15:33

## 2024-10-29 RX ADMIN — DIPHENHYDRAMINE HYDROCHLORIDE 50 MG: 50 INJECTION, SOLUTION INTRAMUSCULAR; INTRAVENOUS at 15:24

## 2024-10-29 RX ADMIN — ACETAMINOPHEN 650 MG: 325 TABLET ORAL at 15:19

## 2024-10-29 ASSESSMENT — FIBROSIS 4 INDEX: FIB4 SCORE: 0.48

## 2024-10-30 ENCOUNTER — OUTPATIENT INFUSION SERVICES (OUTPATIENT)
Dept: ONCOLOGY | Facility: MEDICAL CENTER | Age: 54
End: 2024-10-30
Attending: NURSE PRACTITIONER
Payer: MEDICARE

## 2024-10-30 VITALS
BODY MASS INDEX: 31.55 KG/M2 | OXYGEN SATURATION: 99 % | RESPIRATION RATE: 18 BRPM | HEART RATE: 90 BPM | DIASTOLIC BLOOD PRESSURE: 86 MMHG | TEMPERATURE: 97.7 F | WEIGHT: 167.11 LBS | SYSTOLIC BLOOD PRESSURE: 144 MMHG | HEIGHT: 61 IN

## 2024-10-30 DIAGNOSIS — G61.81 CIDP (CHRONIC INFLAMMATORY DEMYELINATING POLYNEUROPATHY) (HCC): ICD-10-CM

## 2024-10-30 PROCEDURE — 700102 HCHG RX REV CODE 250 W/ 637 OVERRIDE(OP): Mod: UD | Performed by: NURSE PRACTITIONER

## 2024-10-30 PROCEDURE — 96365 THER/PROPH/DIAG IV INF INIT: CPT

## 2024-10-30 PROCEDURE — A9270 NON-COVERED ITEM OR SERVICE: HCPCS | Mod: UD | Performed by: NURSE PRACTITIONER

## 2024-10-30 PROCEDURE — 96375 TX/PRO/DX INJ NEW DRUG ADDON: CPT

## 2024-10-30 PROCEDURE — 700111 HCHG RX REV CODE 636 W/ 250 OVERRIDE (IP): Mod: JZ,UD | Performed by: NURSE PRACTITIONER

## 2024-10-30 RX ORDER — METHYLPREDNISOLONE SODIUM SUCCINATE 125 MG/2ML
125 INJECTION, POWDER, LYOPHILIZED, FOR SOLUTION INTRAMUSCULAR; INTRAVENOUS ONCE
OUTPATIENT
Start: 2024-11-27 | End: 2024-11-27

## 2024-10-30 RX ORDER — ACETAMINOPHEN 325 MG/1
650 TABLET ORAL ONCE
Status: COMPLETED | OUTPATIENT
Start: 2024-10-30 | End: 2024-10-30

## 2024-10-30 RX ORDER — ACETAMINOPHEN 325 MG/1
650 TABLET ORAL ONCE
OUTPATIENT
Start: 2024-11-27 | End: 2024-11-27

## 2024-10-30 RX ORDER — METHYLPREDNISOLONE SODIUM SUCCINATE 125 MG/2ML
125 INJECTION, POWDER, LYOPHILIZED, FOR SOLUTION INTRAMUSCULAR; INTRAVENOUS ONCE
Status: COMPLETED | OUTPATIENT
Start: 2024-10-30 | End: 2024-10-30

## 2024-10-30 RX ORDER — DIPHENHYDRAMINE HYDROCHLORIDE 50 MG/ML
50 INJECTION INTRAMUSCULAR; INTRAVENOUS ONCE
Status: COMPLETED | OUTPATIENT
Start: 2024-10-30 | End: 2024-10-30

## 2024-10-30 RX ADMIN — ACETAMINOPHEN 650 MG: 325 TABLET ORAL at 14:34

## 2024-10-30 RX ADMIN — DIPHENHYDRAMINE HYDROCHLORIDE 50 MG: 50 INJECTION, SOLUTION INTRAMUSCULAR; INTRAVENOUS at 14:35

## 2024-10-30 RX ADMIN — IMMUNE GLOBULIN (HUMAN) 20 G: 10 INJECTION INTRAVENOUS; SUBCUTANEOUS at 14:35

## 2024-10-30 RX ADMIN — METHYLPREDNISOLONE SODIUM SUCCINATE 125 MG: 125 INJECTION, POWDER, FOR SOLUTION INTRAMUSCULAR; INTRAVENOUS at 14:35

## 2024-10-30 ASSESSMENT — FIBROSIS 4 INDEX: FIB4 SCORE: 0.48

## 2024-11-04 DIAGNOSIS — E78.49 OTHER HYPERLIPIDEMIA: ICD-10-CM

## 2024-11-04 RX ORDER — ATORVASTATIN CALCIUM 20 MG/1
20 TABLET, FILM COATED ORAL EVERY EVENING
Qty: 100 TABLET | Refills: 1 | Status: SHIPPED | OUTPATIENT
Start: 2024-11-04 | End: 2024-11-25

## 2024-11-04 NOTE — TELEPHONE ENCOUNTER
Pt has had OV within the 12 month protocol and lipid panel is current. 6 month supply sent to pharmacy.   Lab Results   Component Value Date/Time    CHOLSTRLTOT 129 07/09/2024 09:57 AM    LDL 47 07/09/2024 09:57 AM    HDL 53 07/09/2024 09:57 AM    TRIGLYCERIDE 146 07/09/2024 09:57 AM       Lab Results   Component Value Date/Time    SODIUM 140 07/09/2024 09:57 AM    POTASSIUM 3.8 07/09/2024 09:57 AM    CHLORIDE 108 07/09/2024 09:57 AM    CO2 19 (L) 07/09/2024 09:57 AM    GLUCOSE 90 07/09/2024 09:57 AM    BUN 16 07/09/2024 09:57 AM    CREATININE 0.80 07/09/2024 09:57 AM    BUNCREATRAT 18 03/07/2019 07:34 AM     Lab Results   Component Value Date/Time    ALKPHOSPHAT 137 (H) 07/09/2024 09:57 AM    ASTSGOT 9 (L) 07/09/2024 09:57 AM    ALTSGPT 12 07/09/2024 09:57 AM    TBILIRUBIN 0.3 07/09/2024 09:57 AM        Yolanda Diaz, Clinical Pharmacist, CDE, CACP  Managed Care Pharmacist for [facility]

## 2024-11-12 ENCOUNTER — HOSPITAL ENCOUNTER (OUTPATIENT)
Dept: LAB | Facility: MEDICAL CENTER | Age: 54
End: 2024-11-12
Attending: UROLOGY
Payer: MEDICARE

## 2024-11-12 PROCEDURE — 80053 COMPREHEN METABOLIC PANEL: CPT

## 2024-11-12 PROCEDURE — 36415 COLL VENOUS BLD VENIPUNCTURE: CPT

## 2024-11-13 LAB
ALBUMIN SERPL BCP-MCNC: 4 G/DL (ref 3.2–4.9)
ALBUMIN/GLOB SERPL: 1.1 G/DL
ALP SERPL-CCNC: 138 U/L (ref 30–99)
ALT SERPL-CCNC: 23 U/L (ref 2–50)
ANION GAP SERPL CALC-SCNC: 9 MMOL/L (ref 7–16)
AST SERPL-CCNC: 24 U/L (ref 12–45)
BILIRUB SERPL-MCNC: 0.3 MG/DL (ref 0.1–1.5)
BUN SERPL-MCNC: 18 MG/DL (ref 8–22)
CALCIUM ALBUM COR SERPL-MCNC: 9.5 MG/DL (ref 8.5–10.5)
CALCIUM SERPL-MCNC: 9.5 MG/DL (ref 8.5–10.5)
CHLORIDE SERPL-SCNC: 108 MMOL/L (ref 96–112)
CO2 SERPL-SCNC: 21 MMOL/L (ref 20–33)
CREAT SERPL-MCNC: 0.9 MG/DL (ref 0.5–1.4)
GFR SERPLBLD CREATININE-BSD FMLA CKD-EPI: 76 ML/MIN/1.73 M 2
GLOBULIN SER CALC-MCNC: 3.6 G/DL (ref 1.9–3.5)
GLUCOSE SERPL-MCNC: 81 MG/DL (ref 65–99)
POTASSIUM SERPL-SCNC: 4.8 MMOL/L (ref 3.6–5.5)
PROT SERPL-MCNC: 7.6 G/DL (ref 6–8.2)
SODIUM SERPL-SCNC: 138 MMOL/L (ref 135–145)

## 2024-11-14 ENCOUNTER — TELEPHONE (OUTPATIENT)
Dept: HEALTH INFORMATION MANAGEMENT | Facility: OTHER | Age: 54
End: 2024-11-14
Payer: MEDICARE

## 2024-11-14 NOTE — PATIENT INSTRUCTIONS
Diagnoses and all orders for this visit:    Prediabetes    Smoker    Anemia, unspecified type    BMI 28.0-28.9,adult    Administrative encounter    -She has had all of her vaccines except the pneumonia vaccine which she will get done later when she has her health insurance back.  When she gets her health insurance back and she will go for lab work and come for her follow-up appointment.  Please look at patient instruction sheet to work on the prediabetes and take her iron 3 times a day and good job on weaning down to 2 to 3 cigarettes a day and she has 7 mg of nicotine patches as needed to take for smoking sensation.  Lab work is in the computer and she can get this done 1 week before she sees me in the future.  She can work on weight loss about 5 pounds a month as her body mass index is 28 and we would like to be at 25 and to prevent diabetes she could use apps such as lose it, my fitness tracker or weight watchers and lose about 5 pounds a month with decreasing carbohydrates, fast food or fried food or sugary drinks and portion control and meal planning and increasing protein and fiber.  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  She is going be adopting all 4 of her daughters kids.  Her daughter has leukemia and so she is getting treatment for that and is unable to care for the kids so she is going to adopt the 4 children of her daughter.  She has good social support system and would encourage her to get additional resources from the community for respect care and work on her diet and exercise and quit smoking.    Return in about 2 months (around 8/13/2019), or labs/prediabetes/anemia/smoking cessation/BMI.        Prediabetes  Prediabetes is the condition of having a blood sugar (blood glucose)  level that is higher than it should be, but not high enough for you to be diagnosed with type 2 diabetes. Having prediabetes puts you at risk for developing type 2 diabetes (type 2 diabetes mellitus). Prediabetes may be called impaired glucose tolerance or impaired fasting glucose.  Prediabetes usually does not cause symptoms. Your health care provider can diagnose this condition with blood tests. You may be tested for prediabetes if you are overweight and if you have at least one other risk factor for prediabetes.  Risk factors for prediabetes include:  · Having a family member with type 2 diabetes.  · Being overweight or obese.  · Being older than age 45.  · Being of American-, -American, /, or / descent.  · Having an inactive (sedentary) lifestyle.  · Having a history of gestational diabetes or polycystic ovarian syndrome (PCOS).  · Having low levels of good cholesterol (HDL-C) or high levels of blood fats (triglycerides).  · Having high blood pressure.  What is blood glucose and how is blood glucose measured?     Blood glucose refers to the amount of glucose in your bloodstream. Glucose comes from eating foods that contain sugars and starches (carbohydrates) that the body breaks down into glucose. Your blood glucose level may be measured in mg/dL (milligrams per deciliter) or mmol/L (millimoles per liter). Your blood glucose may be checked with one or more of the following blood tests:  · A fasting blood glucose (FBG) test. You will not be allowed to eat (you will fast) for at least 8 hours before a blood sample is taken.  ¨ A normal range for FBG is  mg/dl (3.9-5.6 mmol/L).  · An A1c (hemoglobin A1c) blood test. This test provides information about blood glucose control over the previous 2?3 months.  · An oral glucose tolerance test (OGTT). This test measures your blood glucose twice:  ¨ After fasting. This is your baseline level.  ¨ Two hours after you  drink a beverage that contains glucose.  You may be diagnosed with prediabetes:  · If your FBG is 100?125 mg/dL (5.6-6.9 mmol/L).  · If your A1c level is 5.7?6.4%.  · If your OGGT result is 140?199 mg/dL (7.8-11 mmol/L).  These blood tests may be repeated to confirm your diagnosis.  What happens if blood glucose is too high?  The pancreas produces a hormone (insulin) that helps move glucose from the bloodstream into cells. When cells in the body do not respond properly to insulin that the body makes (insulin resistance), excess glucose builds up in the blood instead of going into cells. As a result, high blood glucose (hyperglycemia) can develop, which can cause many complications. This is a symptom of prediabetes.  What can happen if blood glucose stays higher than normal for a long time?  Having high blood glucose for a long time is dangerous. Too much glucose in your blood can damage your nerves and blood vessels. Long-term damage can lead to complications from diabetes, which may include:  · Heart disease.  · Stroke.  · Blindness.  · Kidney disease.  · Depression.  · Poor circulation in the feet and legs, which could lead to surgical removal (amputation) in severe cases.  How can prediabetes be prevented from turning into type 2 diabetes?     To help prevent type 2 diabetes, take the following actions:  · Be physically active.  ¨ Do moderate-intensity physical activity for at least 30 minutes on at least 5 days of the week, or as much as told by your health care provider. This could be brisk walking, biking, or water aerobics.  ¨ Ask your health care provider what activities are safe for you. A mix of physical activities may be best, such as walking, swimming, cycling, and strength training.  · Lose weight as told by your health care provider.  ¨ Losing 5-7% of your body weight can reverse insulin resistance.  ¨ Your health care provider can determine how much weight loss is best for you and can help you lose  stretcher weight safely.  · Follow a healthy meal plan. This includes eating lean proteins, complex carbohydrates, fresh fruits and vegetables, low-fat dairy products, and healthy fats.  ¨ Follow instructions from your health care provider about eating or drinking restrictions.  ¨ Make an appointment to see a diet and nutrition specialist (registered dietitian) to help you create a healthy eating plan that is right for you.  · Do not smoke or use any tobacco products, such as cigarettes, chewing tobacco, and e-cigarettes. If you need help quitting, ask your health care provider.  · Take over-the-counter and prescription medicines as told by your health care provider. You may be prescribed medicines that help lower the risk of type 2 diabetes.  This information is not intended to replace advice given to you by your health care provider. Make sure you discuss any questions you have with your health care provider.      Preventing Type 2 Diabetes Mellitus  Type 2 diabetes (type 2 diabetes mellitus) is a long-term (chronic) disease that affects blood sugar (glucose) levels. Normally, a hormone called insulin allows glucose to enter cells in the body. The cells use glucose for energy. In type 2 diabetes, one or both of these problems may be present:  · The body does not make enough insulin.  · The body does not respond properly to insulin that it makes (insulin resistance).  Insulin resistance or lack of insulin causes excess glucose to build up in the blood instead of going into cells. As a result, high blood glucose (hyperglycemia) develops, which can cause many complications. Being overweight or obese and having an inactive (sedentary) lifestyle can increase your risk for diabetes. Type 2 diabetes can be delayed or prevented by making certain nutrition and lifestyle changes.  What nutrition changes can be made?  · Eat healthy meals and snacks regularly. Keep a healthy snack with you for when you get hungry between meals, such as  fruit or a handful of nuts.  · Eat lean meats and proteins that are low in saturated fats, such as chicken, fish, egg whites, and beans. Avoid processed meats.  · Eat plenty of fruits and vegetables and plenty of grains that have not been processed (whole grains). It is recommended that you eat:  ¨ 1½?2 cups of fruit every day.  ¨ 2½?3 cups of vegetables every day.  ¨ 6?8 oz of whole grains every day, such as oats, whole wheat, bulgur, brown rice, quinoa, and millet.  · Eat low-fat dairy products, such as milk, yogurt, and cheese.  · Eat foods that contain healthy fats, such as nuts, avocado, olive oil, and canola oil.  · Drink water throughout the day. Avoid drinks that contain added sugar, such as soda or sweet tea.  · Follow instructions from your health care provider about specific eating or drinking restrictions.  · Control how much food you eat at a time (portion size).  ¨ Check food labels to find out the serving sizes of foods.  ¨ Use a kitchen scale to weigh amounts of foods.  · Saute or steam food instead of frying it. Cook with water or broth instead of oils or butter.  · Limit your intake of:  ¨ Salt (sodium). Have no more than 1 tsp (2,400 mg) of sodium a day. If you have heart disease or high blood pressure, have less than ½?¾ tsp (1,500 mg) of sodium a day.  ¨ Saturated fat. This is fat that is solid at room temperature, such as butter or fat on meat.  What lifestyle changes can be made?   Activity  · Do moderate-intensity physical activity for at least 30 minutes on at least 5 days of the week, or as much as told by your health care provider.  · Ask your health care provider what activities are safe for you. A mix of physical activities may be best, such as walking, swimming, cycling, and strength training.  · Try to add physical activity into your day. For example:  ¨ Park in spots that are farther away than usual, so that you walk more. For example, park in a far corner of the parking lot when you  go to the office or the grocery store.  ¨ Take a walk during your lunch break.  ¨ Use stairs instead of elevators or escalators.  Weight Loss  · Lose weight as directed. Your health care provider can determine how much weight loss is best for you and can help you lose weight safely.  · If you are overweight or obese, you may be instructed to lose at least 5?7 % of your body weight.  Alcohol and Tobacco   · Limit alcohol intake to no more than 1 drink a day for nonpregnant women and 2 drinks a day for men. One drink equals 12 oz of beer, 5 oz of wine, or 1½ oz of hard liquor.  · Do not use any tobacco products, such as cigarettes, chewing tobacco, and e-cigarettes. If you need help quitting, ask your health care provider.  Work With Your Health Care Provider  · Have your blood glucose tested regularly, as told by your health care provider.  · Discuss your risk factors and how you can reduce your risk for diabetes.  · Get screening tests as told by your health care provider. You may have screening tests regularly, especially if you have certain risk factors for type 2 diabetes.  · Make an appointment with a diet and nutrition specialist (registered dietitian). A registered dietitian can help you make a healthy eating plan and can help you understand portion sizes and food labels.  Why are these changes important?  · It is possible to prevent or delay type 2 diabetes and related health problems by making lifestyle and nutrition changes.  · It can be difficult to recognize signs of type 2 diabetes. The best way to avoid possible damage to your body is to take actions to prevent the disease before you develop symptoms.  What can happen if changes are not made?  · Your blood glucose levels may keep increasing. Having high blood glucose for a long time is dangerous. Too much glucose in your blood can damage your blood vessels, heart, kidneys, nerves, and eyes.  · You may develop prediabetes or type 2 diabetes. Type 2  diabetes can lead to many chronic health problems and complications, such as:  ¨ Heart disease.  ¨ Stroke.  ¨ Blindness.  ¨ Kidney disease.  ¨ Depression.  ¨ Poor circulation in the feet and legs, which could lead to surgical removal (amputation) in severe cases.  Where to find support:  · Ask your health care provider to recommend a registered dietitian, diabetes educator, or weight loss program.  · Look for local or online weight loss groups.  · Join a gym, fitness club, or outdoor activity group, such as a walking club.  Where to find more information:  To learn more about diabetes and diabetes prevention, visit:  · American Diabetes Association (ADA): www.diabetes.org  · National [place] of Diabetes and Digestive and Kidney Diseases: www.niddk.nih.gov/health-information/diabetes  To learn more about healthy eating, visit:  · The U.S. Department of Agriculture (USDA), Choose My Plate: www.[employer].gov/food-groups  · Office of Disease Prevention and Health Promotion (ODPHP), Dietary Guidelines: www.health.gov/dietaryguidelines  Summary  · You can reduce your risk for type 2 diabetes by increasing your physical activity, eating healthy foods, and losing weight as directed.  · Talk with your health care provider about your risk for type 2 diabetes. Ask about any blood tests or screening tests that you need to have.  This information is not intended to replace advice given to you by your health care provider. Make sure you discuss any questions you have with your health care provider.  Document Released: 04/10/2017 Document Revised: 05/25/2017 Document Reviewed: 02/07/2017  [employer] Interactive Patient Education © 2017 [employer]

## 2024-11-18 DIAGNOSIS — E11.9 TYPE 2 DIABETES MELLITUS WITHOUT COMPLICATION, WITHOUT LONG-TERM CURRENT USE OF INSULIN (HCC): ICD-10-CM

## 2024-11-18 NOTE — TELEPHONE ENCOUNTER
Received request via: Pharmacy    Was the patient seen in the last year in this department? Yes    Does the patient have an active prescription (recently filled or refills available) for medication(s) requested? No    Pharmacy Name: [facility].    Does the patient have long-term Plus and need 100-day supply? (This applies to ALL medications) Patient does not have SCP

## 2024-11-20 ENCOUNTER — NON-PROVIDER VISIT (OUTPATIENT)
Dept: MEDICAL GROUP | Facility: MEDICAL CENTER | Age: 54
End: 2024-11-20
Attending: FAMILY MEDICINE
Payer: MEDICARE

## 2024-11-20 VITALS
BODY MASS INDEX: 30.36 KG/M2 | HEIGHT: 62 IN | HEART RATE: 90 BPM | WEIGHT: 165 LBS | SYSTOLIC BLOOD PRESSURE: 125 MMHG | DIASTOLIC BLOOD PRESSURE: 73 MMHG

## 2024-11-20 DIAGNOSIS — E11.9 TYPE 2 DIABETES MELLITUS WITHOUT COMPLICATION, WITHOUT LONG-TERM CURRENT USE OF INSULIN (HCC): ICD-10-CM

## 2024-11-20 LAB
HBA1C MFR BLD: 5.8 % (ref ?–5.8)
POCT INT CON NEG: NEGATIVE
POCT INT CON POS: POSITIVE

## 2024-11-20 PROCEDURE — 83036 HEMOGLOBIN GLYCOSYLATED A1C: CPT

## 2024-11-20 PROCEDURE — 99213 OFFICE O/P EST LOW 20 MIN: CPT | Performed by: PHARMACIST

## 2024-11-20 RX ORDER — LISINOPRIL 2.5 MG/1
2.5 TABLET ORAL DAILY
Qty: 90 TABLET | Refills: 1 | Status: SHIPPED | OUTPATIENT
Start: 2024-11-20

## 2024-11-20 RX ORDER — SEMAGLUTIDE 0.68 MG/ML
0.25 INJECTION, SOLUTION SUBCUTANEOUS
Qty: 3 ML | Refills: 5 | Status: SHIPPED | OUTPATIENT
Start: 2024-11-20

## 2024-11-20 RX ORDER — EMPAGLIFLOZIN, METFORMIN HYDROCHLORIDE 25; 1000 MG/1; MG/1
1 TABLET, EXTENDED RELEASE ORAL DAILY
Qty: 30 TABLET | Refills: 5 | Status: SHIPPED | OUTPATIENT
Start: 2024-11-20

## 2024-11-20 ASSESSMENT — FIBROSIS 4 INDEX: FIB4 SCORE: 0.93

## 2024-11-20 NOTE — PROGRESS NOTES
New Patient Consult Note  Primary care physician: Jacque Whyte M.D.  3:00-3:45pm   Reason for consult: Management of Controlled Type 2 Diabetes    HPI:  Noelle Hobbs is a 54 y.o. old patient who comes in today for evaluation of above stated problem.      Pt is experiencing numbness in her hands which she says is because she hasn't had her GAMUNEX medication. She has been compliant with her diabetes medication.     She is asking for help to loose weight because she says the steroids make her hungry and difficult to loose weight. She is frustrated with her current weight but can't exercise much due to pain/numbness from not having her GAMUNEX.      Diet:  Will eat whatever but  Does try to limit her beans and rice and eats vegetables   Occasional Fruits: Nectarine, banana, apples   Cereal in the morning   Skips lunch   Dinner - chicken, eggs, Vegetables/salad, radish,   Occasional beans or rice but not too much       Exercise    - No regular exercise regimen   - Not working right now because of her numbness in legs and hands, but she keeps active with taking care of her grand children or chores around the house       Smokes 3-4 cigarettes a day   Discussed quitting but not ready to quit completely         Most Recent HbA1c:   Lab Results   Component Value Date/Time    HBA1C 5.8 11/20/2024 03:21 PM    HBA1C 6.0 (A) 07/10/2024 11:25 AM    HBA1C 6.1 (A) 02/06/2024 09:05 AM               Current Diabetes Regimen:  Metformin + SGLT-2 Inhibitor: Synjardy Xr25/1000mg qday       SMBG -   Before Breakfast:    Before Lunch:  Before Dinner:  Before Bedtime:  Other times:  Hypoglycemia:  Occasional      ROS:  Constitutional: No weight loss  Cardiac: No palpitations or racing heart  Resp: No shortness of breath  Neuro: No numbness or tinging in feet  Endo: No heat or cold intolerance, no polyuria or polydipsia  All other systems were reviewed and were negative.    Past Medical History:  Patient Active Problem List     Diagnosis Date Noted    Toe infection 06/15/2022    Type 2 diabetes mellitus without complication, without long-term current use of insulin ([facility]) 05/17/2022    Gastroesophageal reflux disease 05/17/2022    Other hyperlipidemia 05/17/2022    CIDP (chronic inflammatory demyelinating polyneuropathy) ([facility]) 03/24/2022    H/O right radical nephrectomy 10/23/2021    LFT elevation 10/23/2021    Mixed stress and urge urinary incontinence 09/14/2021    Vitamin D deficiency 04/01/2019    Anemia 04/01/2019    Vitamin B12 deficiency 04/01/2019    Smoker 04/01/2019    Neck pain 02/12/2019    BMI 28.0-28.9,adult 02/12/2019       Past Surgical History:  Past Surgical History:   Procedure Laterality Date    NEPHRECTOMY ROBOTIC XI Right 10/20/2021    Procedure: NEPHRECTOMY, ROBOT-ASSISTED, USING DA CORNELIUS XI - RADICAL;  Surgeon: Jay Painting M.D.;  Location: SURGERY [facility];  Service: Gen Robotic    ORIF, ANKLE Right 09/09/2021    Procedure: ORIF, ANKLE;  Surgeon: Alexander Castillo M.D.;  Location: SURGERY [facility];  Service: Orthopedics    CHOLECYSTECTOMY  2005    TUBAL LIGATION         Allergies:  Latex    Social History:  Social History     Socioeconomic History    Marital status:      Spouse name: Not on file    Number of children: Not on file    Years of education: Not on file    Highest education level: 8th grade   Occupational History    Not on file   Tobacco Use    Smoking status: Some Days     Current packs/day: 0.25     Average packs/day: 0.3 packs/day for 34.0 years (8.5 ttl pk-yrs)     Types: Cigarettes    Smokeless tobacco: Never    Tobacco comments:     3 cigarettes/day   Vaping Use    Vaping status: Never Used   Substance and Sexual Activity    Alcohol use: No    Drug use: No    Sexual activity: Not Currently     Partners: Male     Comment:    Other Topics Concern    Not on file   Social History Narrative    Not on file     Social Drivers of Health     Financial Resource Strain: Low Risk   (3/24/2022)    Overall Financial Resource Strain (CARDIA)     Difficulty of Paying Living Expenses: Not very hard   Food Insecurity: No Food Insecurity (3/24/2022)    Hunger Vital Sign     Worried About Running Out of Food in the Last Year: Never true     Ran Out of Food in the Last Year: Never true   Transportation Needs: No Transportation Needs (3/24/2022)    PRAPARE - Transportation     Lack of Transportation (Medical): No     Lack of Transportation (Non-Medical): No   Physical Activity: Inactive (3/24/2022)    Exercise Vital Sign     Days of Exercise per Week: 0 days     Minutes of Exercise per Session: 0 min   Stress: Stress Concern Present (3/24/2022)    [employer] - Occupational Stress Questionnaire     Feeling of Stress : To some extent   Social Connections: Socially Isolated (3/24/2022)    Social Connection and Isolation Panel [NHANES]     Frequency of Communication with Friends and Family: More than three times a week     Frequency of Social Gatherings with Friends and Family: More than three times a week     Attends Yazdanism Services: Never     Active Member of Clubs or Organizations: No     Attends Club or Organization Meetings: Never     Marital Status:    Intimate Partner Violence: Not on file   Housing Stability: Low Risk  (3/24/2022)    Housing Stability Vital Sign     Unable to Pay for Housing in the Last Year: No     Number of Places Lived in the Last Year: 2     Unstable Housing in the Last Year: No       Family History:  Family History   Problem Relation Age of Onset    Osteoporosis Mother     Alzheimer's Disease Mother     Arthritis Mother     Diabetes Father     Heart Disease Father     Stroke Father         TIA    Cancer Other         one nephew leukemia, the other liver ca       Medications:    Current Outpatient Medications:     Empagliflozin-metFORMIN HCl ER (SYNJARDY XR)  MG TABLET SR 24 HR, Take 1 tablet by mouth every day., Disp: 30 Tablet,  "Rfl: 5    Semaglutide,0.25 or 0.5MG/DOS, (OZEMPIC, 0.25 OR 0.5 MG/DOSE,) 2 MG/3ML Solution Pen-injector, Inject 0.25 mg under the skin every 7 days. After 4 weeks may increase to 0.5mg weekly., Disp: 3 mL, Rfl: 5    lisinopril (PRINIVIL) 2.5 MG Tab, Take 1 tablet by mouth once daily, Disp: 90 Tablet, Rfl: 1    atorvastatin (LIPITOR) 20 MG Tab, Take 1 Tablet by mouth every evening., Disp: 100 Tablet, Rfl: 1    hydrOXYzine HCl (ATARAX) 25 MG Tab, Take 1 Tablet by mouth 3 times a day as needed for Itching., Disp: 90 Tablet, Rfl: 0    ergocalciferol (DRISDOL) 17637 UNIT capsule, Take 1 Capsule by mouth every 7 days., Disp: 4 Capsule, Rfl: 2    NON SPECIFIED, Shingrix #1 (Patient not taking: Reported on 10/1/2024), Disp: 1 Each, Rfl: 0    glucose blood (TRUE METRIX BLOOD GLUCOSE TEST) strip, Use to test twice daily, Disp: 100 Strip, Rfl: 11    glucose blood strip, True Metrix Glucose Test Strip, Disp: , Rfl:     predniSONE (DELTASONE) 5 MG Tab, , Disp: , Rfl:     Blood Glucose Monitoring Suppl (BLOOD GLUCOSE MONITOR SYSTEM) w/Device Kit, Use to test 3 times a day and as needed, Disp: 1 Kit, Rfl: 0    primidone (MYSOLINE) 50 MG Tab, , Disp: , Rfl:     omeprazole (PRILOSEC OTC) 20 MG tablet, Take 1 Tablet by mouth every day., Disp: 30 Tablet, Rfl: 5    Labs: Reviewed    Physical Examination:  Vital signs: /73   Pulse 90   Ht 1.575 m (5' 2\")   Wt 74.8 kg (165 lb)   LMP 03/08/2021   BMI 30.18 kg/m²  Body mass index is 30.18 kg/m².  General: No apparent distress, cooperative  Eyes: No scleral icterus or discharge  ENMT: Normal on external inspection of nose, lips, normal thyroid exam  Neck: No abnormal masses on inspection  Resp: Normal effort, clear to auscultation bilaterally   CVS: Regular rate and rhythm, S1 S2 normal, no murmur   Extremities: No edema  Abdomen: abdominal obesity present  Neuro: Alert and oriented  Skin: No rash  Psych: Normal mood and affect, intact memory and able to make informed " decisions    Plan:    There are no diagnoses linked to this encounter.    Return in about 14 weeks (around 2/26/2025).      DM - Patients A1c remains well controls and FBG at target. Will give ozempic for additional DM benefit and weight loss.   - Ozempic 0.25mg weekly, may icnrease to 0.5mg qweekly after 1 month if not seeing results and tolerating   - continue synjardy XR 25/1000mg qday     Thank you for allowing me to participate in the care of this patient.    Niranjan Schulte, PharmD 11/20/24    CC:   Jacque Whyte M.D.

## 2024-11-25 DIAGNOSIS — E78.49 OTHER HYPERLIPIDEMIA: ICD-10-CM

## 2024-11-25 RX ORDER — ATORVASTATIN CALCIUM 20 MG/1
20 TABLET, FILM COATED ORAL EVERY EVENING
Qty: 100 TABLET | Refills: 1 | Status: SHIPPED | OUTPATIENT
Start: 2024-11-25

## 2024-11-26 NOTE — TELEPHONE ENCOUNTER
Pt has had OV within the 12 month protocol and lipid panel is current. 6 month supply of statin sent to pharmacy.   Lab Results   Component Value Date/Time    CHOLSTRLTOT 129 07/09/2024 09:57 AM    LDL 47 07/09/2024 09:57 AM    HDL 53 07/09/2024 09:57 AM    TRIGLYCERIDE 146 07/09/2024 09:57 AM       Lab Results   Component Value Date/Time    SODIUM 138 11/12/2024 09:03 AM    POTASSIUM 4.8 11/12/2024 09:03 AM    CHLORIDE 108 11/12/2024 09:03 AM    CO2 21 11/12/2024 09:03 AM    GLUCOSE 81 11/12/2024 09:03 AM    BUN 18 11/12/2024 09:03 AM    CREATININE 0.90 11/12/2024 09:03 AM    BUNCREATRAT 18 03/07/2019 07:34 AM     Lab Results   Component Value Date/Time    ALKPHOSPHAT 138 (H) 11/12/2024 09:03 AM    ASTSGOT 24 11/12/2024 09:03 AM    ALTSGPT 23 11/12/2024 09:03 AM    TBILIRUBIN 0.3 11/12/2024 09:03 AM      Yolanda Diaz, Clinical Pharmacist, CDE, CACP  Managed Care Pharmacist for [facility] and [facility]

## 2024-11-28 ENCOUNTER — OUTPATIENT INFUSION SERVICES (OUTPATIENT)
Dept: ONCOLOGY | Facility: MEDICAL CENTER | Age: 54
End: 2024-11-28
Attending: NURSE PRACTITIONER
Payer: MEDICARE

## 2024-11-28 VITALS
HEART RATE: 105 BPM | TEMPERATURE: 98 F | DIASTOLIC BLOOD PRESSURE: 86 MMHG | OXYGEN SATURATION: 100 % | BODY MASS INDEX: 30.8 KG/M2 | RESPIRATION RATE: 18 BRPM | HEIGHT: 61 IN | WEIGHT: 163.14 LBS | SYSTOLIC BLOOD PRESSURE: 127 MMHG

## 2024-11-28 DIAGNOSIS — G61.81 CIDP (CHRONIC INFLAMMATORY DEMYELINATING POLYNEUROPATHY) (HCC): ICD-10-CM

## 2024-11-28 PROCEDURE — 700102 HCHG RX REV CODE 250 W/ 637 OVERRIDE(OP): Performed by: NURSE PRACTITIONER

## 2024-11-28 PROCEDURE — 96366 THER/PROPH/DIAG IV INF ADDON: CPT

## 2024-11-28 PROCEDURE — 96375 TX/PRO/DX INJ NEW DRUG ADDON: CPT

## 2024-11-28 PROCEDURE — 700111 HCHG RX REV CODE 636 W/ 250 OVERRIDE (IP): Mod: JZ | Performed by: NURSE PRACTITIONER

## 2024-11-28 PROCEDURE — 96365 THER/PROPH/DIAG IV INF INIT: CPT

## 2024-11-28 PROCEDURE — A9270 NON-COVERED ITEM OR SERVICE: HCPCS | Performed by: NURSE PRACTITIONER

## 2024-11-28 RX ORDER — METHYLPREDNISOLONE SODIUM SUCCINATE 125 MG/2ML
125 INJECTION, POWDER, LYOPHILIZED, FOR SOLUTION INTRAMUSCULAR; INTRAVENOUS ONCE
Status: CANCELLED | OUTPATIENT
Start: 2024-12-26 | End: 2024-12-26

## 2024-11-28 RX ORDER — METHYLPREDNISOLONE SODIUM SUCCINATE 125 MG/2ML
125 INJECTION, POWDER, LYOPHILIZED, FOR SOLUTION INTRAMUSCULAR; INTRAVENOUS ONCE
Status: COMPLETED | OUTPATIENT
Start: 2024-11-28 | End: 2024-11-28

## 2024-11-28 RX ORDER — DIPHENHYDRAMINE HYDROCHLORIDE 50 MG/ML
50 INJECTION INTRAMUSCULAR; INTRAVENOUS ONCE
Status: COMPLETED | OUTPATIENT
Start: 2024-11-28 | End: 2024-11-28

## 2024-11-28 RX ORDER — ACETAMINOPHEN 325 MG/1
650 TABLET ORAL ONCE
Status: COMPLETED | OUTPATIENT
Start: 2024-11-28 | End: 2024-11-28

## 2024-11-28 RX ORDER — ACETAMINOPHEN 325 MG/1
650 TABLET ORAL ONCE
Status: CANCELLED | OUTPATIENT
Start: 2024-12-26 | End: 2024-12-26

## 2024-11-28 RX ADMIN — DIPHENHYDRAMINE HYDROCHLORIDE 50 MG: 50 INJECTION, SOLUTION INTRAMUSCULAR; INTRAVENOUS at 11:27

## 2024-11-28 RX ADMIN — ACETAMINOPHEN 650 MG: 325 TABLET ORAL at 11:23

## 2024-11-28 RX ADMIN — IMMUNE GLOBULIN (HUMAN) 20 G: 10 INJECTION INTRAVENOUS; SUBCUTANEOUS at 11:30

## 2024-11-28 RX ADMIN — METHYLPREDNISOLONE SODIUM SUCCINATE 125 MG: 125 INJECTION, POWDER, FOR SOLUTION INTRAMUSCULAR; INTRAVENOUS at 11:23

## 2024-11-28 ASSESSMENT — FIBROSIS 4 INDEX: FIB4 SCORE: 0.93

## 2024-11-28 NOTE — PROGRESS NOTES
Pt arrived ambulatory to [facility] for Day 1/2 IVIG for CIDP. POC discussed with pt and she agrees with plan.     PIV established, brisk blood return noted. Pt medicated per MAR. Pre-meds given. IVIG titrated per rate sheet. Pt tolerated treatment without s/s adverse reaction. PIV dc'd catheter tip intact, gauze and coban dressing applied.     Pt discharged to self care, [facility]. Pt's next appointment confirmed 11/29/2024.

## 2024-11-29 ENCOUNTER — OUTPATIENT INFUSION SERVICES (OUTPATIENT)
Dept: ONCOLOGY | Facility: MEDICAL CENTER | Age: 54
End: 2024-11-29
Attending: NURSE PRACTITIONER
Payer: MEDICARE

## 2024-11-29 VITALS
OXYGEN SATURATION: 98 % | BODY MASS INDEX: 32.01 KG/M2 | TEMPERATURE: 97 F | SYSTOLIC BLOOD PRESSURE: 126 MMHG | RESPIRATION RATE: 18 BRPM | WEIGHT: 169.53 LBS | DIASTOLIC BLOOD PRESSURE: 82 MMHG | HEIGHT: 61 IN | HEART RATE: 81 BPM

## 2024-11-29 DIAGNOSIS — G61.81 CIDP (CHRONIC INFLAMMATORY DEMYELINATING POLYNEUROPATHY) (HCC): ICD-10-CM

## 2024-11-29 PROCEDURE — 700102 HCHG RX REV CODE 250 W/ 637 OVERRIDE(OP): Performed by: NURSE PRACTITIONER

## 2024-11-29 PROCEDURE — 96375 TX/PRO/DX INJ NEW DRUG ADDON: CPT

## 2024-11-29 PROCEDURE — 96365 THER/PROPH/DIAG IV INF INIT: CPT

## 2024-11-29 PROCEDURE — 700111 HCHG RX REV CODE 636 W/ 250 OVERRIDE (IP): Mod: JZ | Performed by: NURSE PRACTITIONER

## 2024-11-29 PROCEDURE — A9270 NON-COVERED ITEM OR SERVICE: HCPCS | Performed by: NURSE PRACTITIONER

## 2024-11-29 RX ORDER — ACETAMINOPHEN 325 MG/1
650 TABLET ORAL ONCE
OUTPATIENT
Start: 2024-12-26 | End: 2024-12-26

## 2024-11-29 RX ORDER — METHYLPREDNISOLONE SODIUM SUCCINATE 125 MG/2ML
125 INJECTION, POWDER, LYOPHILIZED, FOR SOLUTION INTRAMUSCULAR; INTRAVENOUS ONCE
OUTPATIENT
Start: 2024-12-26 | End: 2024-12-26

## 2024-11-29 RX ORDER — METHYLPREDNISOLONE SODIUM SUCCINATE 125 MG/2ML
125 INJECTION, POWDER, LYOPHILIZED, FOR SOLUTION INTRAMUSCULAR; INTRAVENOUS ONCE
Status: COMPLETED | OUTPATIENT
Start: 2024-11-29 | End: 2024-11-29

## 2024-11-29 RX ORDER — ACETAMINOPHEN 325 MG/1
650 TABLET ORAL ONCE
Status: COMPLETED | OUTPATIENT
Start: 2024-11-29 | End: 2024-11-29

## 2024-11-29 RX ORDER — DIPHENHYDRAMINE HYDROCHLORIDE 50 MG/ML
50 INJECTION INTRAMUSCULAR; INTRAVENOUS ONCE
Status: COMPLETED | OUTPATIENT
Start: 2024-11-29 | End: 2024-11-29

## 2024-11-29 RX ADMIN — IMMUNE GLOBULIN (HUMAN) 20 G: 10 INJECTION INTRAVENOUS; SUBCUTANEOUS at 14:50

## 2024-11-29 RX ADMIN — DIPHENHYDRAMINE HYDROCHLORIDE 50 MG: 50 INJECTION, SOLUTION INTRAMUSCULAR; INTRAVENOUS at 14:39

## 2024-11-29 RX ADMIN — METHYLPREDNISOLONE SODIUM SUCCINATE 125 MG: 125 INJECTION, POWDER, FOR SOLUTION INTRAMUSCULAR; INTRAVENOUS at 14:31

## 2024-11-29 RX ADMIN — ACETAMINOPHEN 650 MG: 325 TABLET ORAL at 14:30

## 2024-11-29 ASSESSMENT — FIBROSIS 4 INDEX: FIB4 SCORE: 0.93

## 2024-11-29 NOTE — PROGRESS NOTES
Noelle presented to [facility]fusion [facility] for day 2 of 2 of IVIG. Pt reported no new concerns today. PIV started to right FA, brisk blood return observed and flushed easily. IVIG infused per Gamunex-Arledia rate sheet to a subsequent rate of 160 ml/hr. Pt tolerated well with no s/sx of adverse reaction. PIV removed, gauze and Coban dressing placed. Pt has future appointments. Pt discharged to home in good condition.

## 2024-12-26 ENCOUNTER — OUTPATIENT INFUSION SERVICES (OUTPATIENT)
Dept: ONCOLOGY | Facility: MEDICAL CENTER | Age: 54
End: 2024-12-26
Attending: NURSE PRACTITIONER
Payer: MEDICARE

## 2024-12-26 VITALS
HEIGHT: 61 IN | HEART RATE: 90 BPM | BODY MASS INDEX: 30.8 KG/M2 | RESPIRATION RATE: 18 BRPM | OXYGEN SATURATION: 97 % | DIASTOLIC BLOOD PRESSURE: 87 MMHG | SYSTOLIC BLOOD PRESSURE: 138 MMHG | WEIGHT: 163.14 LBS | TEMPERATURE: 97.7 F

## 2024-12-26 DIAGNOSIS — G61.81 CIDP (CHRONIC INFLAMMATORY DEMYELINATING POLYNEUROPATHY) (HCC): ICD-10-CM

## 2024-12-26 PROCEDURE — 700102 HCHG RX REV CODE 250 W/ 637 OVERRIDE(OP): Mod: UD | Performed by: NURSE PRACTITIONER

## 2024-12-26 PROCEDURE — 96365 THER/PROPH/DIAG IV INF INIT: CPT

## 2024-12-26 PROCEDURE — A9270 NON-COVERED ITEM OR SERVICE: HCPCS | Mod: UD | Performed by: NURSE PRACTITIONER

## 2024-12-26 PROCEDURE — 96375 TX/PRO/DX INJ NEW DRUG ADDON: CPT

## 2024-12-26 PROCEDURE — 700111 HCHG RX REV CODE 636 W/ 250 OVERRIDE (IP): Mod: JZ,UD | Performed by: NURSE PRACTITIONER

## 2024-12-26 PROCEDURE — 96366 THER/PROPH/DIAG IV INF ADDON: CPT

## 2024-12-26 RX ORDER — ACETAMINOPHEN 325 MG/1
650 TABLET ORAL ONCE
Status: COMPLETED | OUTPATIENT
Start: 2024-12-26 | End: 2024-12-26

## 2024-12-26 RX ORDER — ACETAMINOPHEN 325 MG/1
650 TABLET ORAL ONCE
Status: CANCELLED | OUTPATIENT
Start: 2025-01-23 | End: 2025-01-23

## 2024-12-26 RX ORDER — METHYLPREDNISOLONE SODIUM SUCCINATE 125 MG/2ML
125 INJECTION, POWDER, LYOPHILIZED, FOR SOLUTION INTRAMUSCULAR; INTRAVENOUS ONCE
Status: COMPLETED | OUTPATIENT
Start: 2024-12-26 | End: 2024-12-26

## 2024-12-26 RX ORDER — METHYLPREDNISOLONE SODIUM SUCCINATE 125 MG/2ML
125 INJECTION, POWDER, LYOPHILIZED, FOR SOLUTION INTRAMUSCULAR; INTRAVENOUS ONCE
Status: CANCELLED | OUTPATIENT
Start: 2025-01-23 | End: 2025-01-23

## 2024-12-26 RX ORDER — DIPHENHYDRAMINE HYDROCHLORIDE 50 MG/ML
50 INJECTION INTRAMUSCULAR; INTRAVENOUS ONCE
Status: COMPLETED | OUTPATIENT
Start: 2024-12-26 | End: 2024-12-26

## 2024-12-26 RX ADMIN — IMMUNE GLOBULIN (HUMAN) 20 G: 10 INJECTION INTRAVENOUS; SUBCUTANEOUS at 14:43

## 2024-12-26 RX ADMIN — METHYLPREDNISOLONE SODIUM SUCCINATE 125 MG: 125 INJECTION, POWDER, FOR SOLUTION INTRAMUSCULAR; INTRAVENOUS at 14:33

## 2024-12-26 RX ADMIN — DIPHENHYDRAMINE HYDROCHLORIDE 50 MG: 50 INJECTION, SOLUTION INTRAMUSCULAR; INTRAVENOUS at 14:33

## 2024-12-26 RX ADMIN — ACETAMINOPHEN 650 MG: 325 TABLET ORAL at 14:33

## 2024-12-26 ASSESSMENT — FIBROSIS 4 INDEX: FIB4 SCORE: 0.93

## 2024-12-27 ENCOUNTER — OUTPATIENT INFUSION SERVICES (OUTPATIENT)
Dept: ONCOLOGY | Facility: MEDICAL CENTER | Age: 54
End: 2024-12-27
Attending: NURSE PRACTITIONER
Payer: MEDICARE

## 2024-12-27 VITALS
DIASTOLIC BLOOD PRESSURE: 80 MMHG | TEMPERATURE: 96.7 F | WEIGHT: 164.9 LBS | SYSTOLIC BLOOD PRESSURE: 130 MMHG | HEIGHT: 61 IN | OXYGEN SATURATION: 97 % | BODY MASS INDEX: 31.13 KG/M2 | RESPIRATION RATE: 18 BRPM | HEART RATE: 85 BPM

## 2024-12-27 DIAGNOSIS — G61.81 CIDP (CHRONIC INFLAMMATORY DEMYELINATING POLYNEUROPATHY) (HCC): ICD-10-CM

## 2024-12-27 PROCEDURE — 700111 HCHG RX REV CODE 636 W/ 250 OVERRIDE (IP): Mod: JZ,UD | Performed by: NURSE PRACTITIONER

## 2024-12-27 PROCEDURE — 96365 THER/PROPH/DIAG IV INF INIT: CPT

## 2024-12-27 PROCEDURE — 96374 THER/PROPH/DIAG INJ IV PUSH: CPT

## 2024-12-27 PROCEDURE — 96375 TX/PRO/DX INJ NEW DRUG ADDON: CPT

## 2024-12-27 PROCEDURE — 700102 HCHG RX REV CODE 250 W/ 637 OVERRIDE(OP): Mod: UD | Performed by: NURSE PRACTITIONER

## 2024-12-27 PROCEDURE — A9270 NON-COVERED ITEM OR SERVICE: HCPCS | Mod: UD | Performed by: NURSE PRACTITIONER

## 2024-12-27 RX ORDER — ACETAMINOPHEN 325 MG/1
650 TABLET ORAL ONCE
Status: COMPLETED | OUTPATIENT
Start: 2024-12-27 | End: 2024-12-27

## 2024-12-27 RX ORDER — METHYLPREDNISOLONE SODIUM SUCCINATE 125 MG/2ML
125 INJECTION, POWDER, LYOPHILIZED, FOR SOLUTION INTRAMUSCULAR; INTRAVENOUS ONCE
OUTPATIENT
Start: 2025-01-24 | End: 2025-01-24

## 2024-12-27 RX ORDER — METHYLPREDNISOLONE SODIUM SUCCINATE 125 MG/2ML
125 INJECTION, POWDER, LYOPHILIZED, FOR SOLUTION INTRAMUSCULAR; INTRAVENOUS ONCE
Status: COMPLETED | OUTPATIENT
Start: 2024-12-27 | End: 2024-12-27

## 2024-12-27 RX ORDER — ACETAMINOPHEN 325 MG/1
650 TABLET ORAL ONCE
OUTPATIENT
Start: 2025-01-24 | End: 2025-01-24

## 2024-12-27 RX ORDER — DIPHENHYDRAMINE HYDROCHLORIDE 50 MG/ML
50 INJECTION INTRAMUSCULAR; INTRAVENOUS ONCE
Status: COMPLETED | OUTPATIENT
Start: 2024-12-27 | End: 2024-12-27

## 2024-12-27 RX ADMIN — ACETAMINOPHEN 650 MG: 325 TABLET ORAL at 15:22

## 2024-12-27 RX ADMIN — DIPHENHYDRAMINE HYDROCHLORIDE 50 MG: 50 INJECTION, SOLUTION INTRAMUSCULAR; INTRAVENOUS at 15:28

## 2024-12-27 RX ADMIN — IMMUNE GLOBULIN (HUMAN) 20 G: 10 INJECTION INTRAVENOUS; SUBCUTANEOUS at 15:41

## 2024-12-27 RX ADMIN — METHYLPREDNISOLONE SODIUM SUCCINATE 125 MG: 125 INJECTION, POWDER, FOR SOLUTION INTRAMUSCULAR; INTRAVENOUS at 15:24

## 2024-12-27 ASSESSMENT — FIBROSIS 4 INDEX: FIB4 SCORE: 0.93

## 2024-12-27 NOTE — PROGRESS NOTES
Noelle presents to infusion for Q4 week day 1/2 IVIG for CIDP. Patient reports feeling well today with no major complaints, has tolerated previous treatments well.     PIV started with positive blood return.   Pre-treatment meds given as ordered.     IVIG titrated per MAR starting at 42 and gradually increasing to max rate of 168, patient tolerated well with no adverse effects.     PIV flushed post infusion with positive blood return, removed with tip intact. Patient left in stable condition, knows when to return for next appointment.

## 2024-12-28 NOTE — PROGRESS NOTES
Noelle presented to Infusion Services for day 2 of 2 of IVIG. Pt reported no new concerns today. PIV started to left AC, brisk blood return observed and flushed easily. IVIG infused per subsequent rate of 168 ml/hr. Pt tolerated well with no s/sx of adverse reaction. PIV removed, gauze and Coban dressing placed. Pt has future appointments. Pt discharged to home in good condition.

## 2025-01-23 ENCOUNTER — OUTPATIENT INFUSION SERVICES (OUTPATIENT)
Dept: ONCOLOGY | Facility: MEDICAL CENTER | Age: 55
End: 2025-01-23
Attending: NURSE PRACTITIONER
Payer: MEDICARE

## 2025-01-23 VITALS
BODY MASS INDEX: 30.26 KG/M2 | HEART RATE: 83 BPM | RESPIRATION RATE: 18 BRPM | WEIGHT: 160.27 LBS | SYSTOLIC BLOOD PRESSURE: 149 MMHG | DIASTOLIC BLOOD PRESSURE: 97 MMHG | OXYGEN SATURATION: 96 % | TEMPERATURE: 97.9 F | HEIGHT: 61 IN

## 2025-01-23 DIAGNOSIS — G61.81 CIDP (CHRONIC INFLAMMATORY DEMYELINATING POLYNEUROPATHY) (HCC): ICD-10-CM

## 2025-01-23 PROCEDURE — A9270 NON-COVERED ITEM OR SERVICE: HCPCS | Mod: UD | Performed by: NURSE PRACTITIONER

## 2025-01-23 PROCEDURE — 700102 HCHG RX REV CODE 250 W/ 637 OVERRIDE(OP): Mod: UD | Performed by: NURSE PRACTITIONER

## 2025-01-23 PROCEDURE — 96375 TX/PRO/DX INJ NEW DRUG ADDON: CPT

## 2025-01-23 PROCEDURE — 96366 THER/PROPH/DIAG IV INF ADDON: CPT

## 2025-01-23 PROCEDURE — 96365 THER/PROPH/DIAG IV INF INIT: CPT

## 2025-01-23 PROCEDURE — 700111 HCHG RX REV CODE 636 W/ 250 OVERRIDE (IP): Mod: JZ,UD,TB | Performed by: NURSE PRACTITIONER

## 2025-01-23 RX ORDER — ACETAMINOPHEN 325 MG/1
650 TABLET ORAL ONCE
Status: COMPLETED | OUTPATIENT
Start: 2025-01-23 | End: 2025-01-23

## 2025-01-23 RX ORDER — DIPHENHYDRAMINE HYDROCHLORIDE 50 MG/ML
50 INJECTION INTRAMUSCULAR; INTRAVENOUS ONCE
Status: COMPLETED | OUTPATIENT
Start: 2025-01-23 | End: 2025-01-23

## 2025-01-23 RX ORDER — ACETAMINOPHEN 325 MG/1
650 TABLET ORAL ONCE
Status: CANCELLED | OUTPATIENT
Start: 2025-02-20 | End: 2025-02-20

## 2025-01-23 RX ORDER — METHYLPREDNISOLONE SODIUM SUCCINATE 125 MG/2ML
125 INJECTION, POWDER, LYOPHILIZED, FOR SOLUTION INTRAMUSCULAR; INTRAVENOUS ONCE
Status: CANCELLED | OUTPATIENT
Start: 2025-02-20 | End: 2025-02-20

## 2025-01-23 RX ORDER — METHYLPREDNISOLONE SODIUM SUCCINATE 125 MG/2ML
125 INJECTION, POWDER, LYOPHILIZED, FOR SOLUTION INTRAMUSCULAR; INTRAVENOUS ONCE
Status: COMPLETED | OUTPATIENT
Start: 2025-01-23 | End: 2025-01-23

## 2025-01-23 RX ADMIN — IMMUNE GLOBULIN (HUMAN) 20 G: 10 INJECTION INTRAVENOUS; SUBCUTANEOUS at 15:31

## 2025-01-23 RX ADMIN — DIPHENHYDRAMINE HYDROCHLORIDE 50 MG: 50 INJECTION, SOLUTION INTRAMUSCULAR; INTRAVENOUS at 15:11

## 2025-01-23 RX ADMIN — METHYLPREDNISOLONE SODIUM SUCCINATE 125 MG: 125 INJECTION, POWDER, FOR SOLUTION INTRAMUSCULAR; INTRAVENOUS at 15:00

## 2025-01-23 RX ADMIN — ACETAMINOPHEN 650 MG: 325 TABLET ORAL at 15:00

## 2025-01-23 ASSESSMENT — FIBROSIS 4 INDEX: FIB4 SCORE: 0.93

## 2025-01-24 ENCOUNTER — OUTPATIENT INFUSION SERVICES (OUTPATIENT)
Dept: ONCOLOGY | Facility: MEDICAL CENTER | Age: 55
End: 2025-01-24
Attending: NURSE PRACTITIONER
Payer: MEDICARE

## 2025-01-24 VITALS
SYSTOLIC BLOOD PRESSURE: 134 MMHG | DIASTOLIC BLOOD PRESSURE: 90 MMHG | TEMPERATURE: 97.2 F | HEIGHT: 61 IN | HEART RATE: 85 BPM | OXYGEN SATURATION: 98 % | WEIGHT: 160.72 LBS | RESPIRATION RATE: 18 BRPM | BODY MASS INDEX: 30.34 KG/M2

## 2025-01-24 DIAGNOSIS — G61.81 CIDP (CHRONIC INFLAMMATORY DEMYELINATING POLYNEUROPATHY) (HCC): ICD-10-CM

## 2025-01-24 PROCEDURE — 700102 HCHG RX REV CODE 250 W/ 637 OVERRIDE(OP): Mod: UD | Performed by: NURSE PRACTITIONER

## 2025-01-24 PROCEDURE — 700111 HCHG RX REV CODE 636 W/ 250 OVERRIDE (IP): Mod: JZ,UD | Performed by: NURSE PRACTITIONER

## 2025-01-24 PROCEDURE — 96375 TX/PRO/DX INJ NEW DRUG ADDON: CPT

## 2025-01-24 PROCEDURE — A9270 NON-COVERED ITEM OR SERVICE: HCPCS | Mod: UD | Performed by: NURSE PRACTITIONER

## 2025-01-24 PROCEDURE — 96365 THER/PROPH/DIAG IV INF INIT: CPT

## 2025-01-24 RX ORDER — ACETAMINOPHEN 325 MG/1
650 TABLET ORAL ONCE
Status: COMPLETED | OUTPATIENT
Start: 2025-01-24 | End: 2025-01-24

## 2025-01-24 RX ORDER — DIPHENHYDRAMINE HYDROCHLORIDE 50 MG/ML
50 INJECTION INTRAMUSCULAR; INTRAVENOUS ONCE
Status: COMPLETED | OUTPATIENT
Start: 2025-01-24 | End: 2025-01-24

## 2025-01-24 RX ORDER — METHYLPREDNISOLONE SODIUM SUCCINATE 125 MG/2ML
125 INJECTION, POWDER, LYOPHILIZED, FOR SOLUTION INTRAMUSCULAR; INTRAVENOUS ONCE
OUTPATIENT
Start: 2025-02-21 | End: 2025-02-21

## 2025-01-24 RX ORDER — ACETAMINOPHEN 325 MG/1
650 TABLET ORAL ONCE
OUTPATIENT
Start: 2025-02-21 | End: 2025-02-21

## 2025-01-24 RX ORDER — METHYLPREDNISOLONE SODIUM SUCCINATE 125 MG/2ML
125 INJECTION, POWDER, LYOPHILIZED, FOR SOLUTION INTRAMUSCULAR; INTRAVENOUS ONCE
Status: COMPLETED | OUTPATIENT
Start: 2025-01-24 | End: 2025-01-24

## 2025-01-24 RX ADMIN — METHYLPREDNISOLONE SODIUM SUCCINATE 125 MG: 125 INJECTION, POWDER, FOR SOLUTION INTRAMUSCULAR; INTRAVENOUS at 15:14

## 2025-01-24 RX ADMIN — ACETAMINOPHEN 650 MG: 325 TABLET ORAL at 15:14

## 2025-01-24 RX ADMIN — DIPHENHYDRAMINE HYDROCHLORIDE 50 MG: 50 INJECTION, SOLUTION INTRAMUSCULAR; INTRAVENOUS at 15:14

## 2025-01-24 RX ADMIN — IMMUNE GLOBULIN (HUMAN) 20 G: 10 INJECTION INTRAVENOUS; SUBCUTANEOUS at 15:24

## 2025-01-24 ASSESSMENT — FIBROSIS 4 INDEX: FIB4 SCORE: 0.93

## 2025-01-25 NOTE — PROGRESS NOTES
Noelle presents to infusion for Q4 week day 2/2 IVIG for CIDP. Patient reports feeling well today with no major complaints, has tolerated previous treatments well.      PIV started with positive blood return.   Pre-treatment meds given as ordered.      IVIG started per MAR at a rate of 168.      PIV flushed post infusion with positive blood return, removed with tip intact. Patient left in stable condition, knows when to return for next appointment.

## 2025-02-07 DIAGNOSIS — L29.9 ITCHING: ICD-10-CM

## 2025-02-07 NOTE — TELEPHONE ENCOUNTER
Received request via: Pharmacy    Was the patient seen in the last year in this department? Yes    Does the patient have an active prescription (recently filled or refills available) for medication(s) requested? No    Pharmacy Name: [facility]    Does the patient have nursing home Plus and need 100-day supply? (This applies to ALL medications) Patient does not have [facility]    Future Appointments         Provider Department [place]    2/20/2025 2:30 PM [facility]    [facility]    2/26/2025 9:30 AM (Arrive by 9:15 AM) [facility] PHARMACIST [facility]    4/4/2025 3:20 PM (Arrive by 3:05 PM) Jacque Whyte M.D. [facility]

## 2025-02-11 RX ORDER — HYDROXYZINE HYDROCHLORIDE 25 MG/1
25 TABLET, FILM COATED ORAL 3 TIMES DAILY PRN
Qty: 90 TABLET | Refills: 3 | Status: SHIPPED | OUTPATIENT
Start: 2025-02-11

## 2025-02-17 ENCOUNTER — PATIENT MESSAGE (OUTPATIENT)
Dept: HEALTH INFORMATION MANAGEMENT | Facility: OTHER | Age: 55
End: 2025-02-17

## 2025-02-20 ENCOUNTER — OUTPATIENT INFUSION SERVICES (OUTPATIENT)
Dept: ONCOLOGY | Facility: MEDICAL CENTER | Age: 55
End: 2025-02-20
Attending: NURSE PRACTITIONER
Payer: MEDICARE

## 2025-02-20 VITALS
OXYGEN SATURATION: 98 % | RESPIRATION RATE: 18 BRPM | WEIGHT: 159.83 LBS | BODY MASS INDEX: 30.18 KG/M2 | TEMPERATURE: 97.2 F | HEART RATE: 90 BPM | HEIGHT: 61 IN | SYSTOLIC BLOOD PRESSURE: 133 MMHG | DIASTOLIC BLOOD PRESSURE: 88 MMHG

## 2025-02-20 DIAGNOSIS — G61.81 CIDP (CHRONIC INFLAMMATORY DEMYELINATING POLYNEUROPATHY) (HCC): ICD-10-CM

## 2025-02-20 PROCEDURE — 700111 HCHG RX REV CODE 636 W/ 250 OVERRIDE (IP): Mod: JZ,UD,TB | Performed by: NURSE PRACTITIONER

## 2025-02-20 PROCEDURE — A9270 NON-COVERED ITEM OR SERVICE: HCPCS | Mod: UD | Performed by: NURSE PRACTITIONER

## 2025-02-20 PROCEDURE — 96365 THER/PROPH/DIAG IV INF INIT: CPT

## 2025-02-20 PROCEDURE — 96375 TX/PRO/DX INJ NEW DRUG ADDON: CPT

## 2025-02-20 PROCEDURE — 96366 THER/PROPH/DIAG IV INF ADDON: CPT

## 2025-02-20 PROCEDURE — 700102 HCHG RX REV CODE 250 W/ 637 OVERRIDE(OP): Mod: UD | Performed by: NURSE PRACTITIONER

## 2025-02-20 RX ORDER — METHYLPREDNISOLONE SODIUM SUCCINATE 125 MG/2ML
125 INJECTION, POWDER, LYOPHILIZED, FOR SOLUTION INTRAMUSCULAR; INTRAVENOUS ONCE
Status: CANCELLED | OUTPATIENT
Start: 2025-03-20 | End: 2025-03-20

## 2025-02-20 RX ORDER — ACETAMINOPHEN 325 MG/1
650 TABLET ORAL ONCE
Status: CANCELLED | OUTPATIENT
Start: 2025-03-20 | End: 2025-03-20

## 2025-02-20 RX ORDER — ACETAMINOPHEN 325 MG/1
650 TABLET ORAL ONCE
Status: COMPLETED | OUTPATIENT
Start: 2025-02-20 | End: 2025-02-20

## 2025-02-20 RX ORDER — METHYLPREDNISOLONE SODIUM SUCCINATE 125 MG/2ML
125 INJECTION, POWDER, LYOPHILIZED, FOR SOLUTION INTRAMUSCULAR; INTRAVENOUS ONCE
Status: COMPLETED | OUTPATIENT
Start: 2025-02-20 | End: 2025-02-20

## 2025-02-20 RX ORDER — DIPHENHYDRAMINE HYDROCHLORIDE 50 MG/ML
50 INJECTION INTRAMUSCULAR; INTRAVENOUS ONCE
Status: COMPLETED | OUTPATIENT
Start: 2025-02-20 | End: 2025-02-20

## 2025-02-20 RX ADMIN — METHYLPREDNISOLONE SODIUM SUCCINATE 125 MG: 125 INJECTION, POWDER, FOR SOLUTION INTRAMUSCULAR; INTRAVENOUS at 14:55

## 2025-02-20 RX ADMIN — IMMUNE GLOBULIN (HUMAN) 20 G: 10 INJECTION INTRAVENOUS; SUBCUTANEOUS at 15:28

## 2025-02-20 RX ADMIN — ACETAMINOPHEN 650 MG: 325 TABLET ORAL at 14:55

## 2025-02-20 RX ADMIN — DIPHENHYDRAMINE HYDROCHLORIDE 50 MG: 50 INJECTION INTRAMUSCULAR; INTRAVENOUS at 14:55

## 2025-02-20 ASSESSMENT — FIBROSIS 4 INDEX: FIB4 SCORE: 0.95

## 2025-02-21 ENCOUNTER — OUTPATIENT INFUSION SERVICES (OUTPATIENT)
Dept: ONCOLOGY | Facility: MEDICAL CENTER | Age: 55
End: 2025-02-21
Attending: NURSE PRACTITIONER
Payer: MEDICARE

## 2025-02-21 VITALS
OXYGEN SATURATION: 98 % | SYSTOLIC BLOOD PRESSURE: 123 MMHG | BODY MASS INDEX: 31.01 KG/M2 | TEMPERATURE: 96.5 F | HEIGHT: 61 IN | RESPIRATION RATE: 18 BRPM | DIASTOLIC BLOOD PRESSURE: 80 MMHG | WEIGHT: 164.24 LBS | HEART RATE: 97 BPM

## 2025-02-21 DIAGNOSIS — G61.81 CIDP (CHRONIC INFLAMMATORY DEMYELINATING POLYNEUROPATHY) (HCC): ICD-10-CM

## 2025-02-21 PROCEDURE — 700111 HCHG RX REV CODE 636 W/ 250 OVERRIDE (IP): Mod: JZ,UD,TB | Performed by: NURSE PRACTITIONER

## 2025-02-21 PROCEDURE — 96365 THER/PROPH/DIAG IV INF INIT: CPT

## 2025-02-21 PROCEDURE — 700102 HCHG RX REV CODE 250 W/ 637 OVERRIDE(OP): Mod: UD | Performed by: NURSE PRACTITIONER

## 2025-02-21 PROCEDURE — A9270 NON-COVERED ITEM OR SERVICE: HCPCS | Mod: UD | Performed by: NURSE PRACTITIONER

## 2025-02-21 PROCEDURE — 96375 TX/PRO/DX INJ NEW DRUG ADDON: CPT

## 2025-02-21 RX ORDER — ACETAMINOPHEN 325 MG/1
650 TABLET ORAL ONCE
Status: COMPLETED | OUTPATIENT
Start: 2025-02-21 | End: 2025-02-21

## 2025-02-21 RX ORDER — METHYLPREDNISOLONE SODIUM SUCCINATE 125 MG/2ML
125 INJECTION, POWDER, LYOPHILIZED, FOR SOLUTION INTRAMUSCULAR; INTRAVENOUS ONCE
Status: COMPLETED | OUTPATIENT
Start: 2025-02-21 | End: 2025-02-21

## 2025-02-21 RX ORDER — ACETAMINOPHEN 325 MG/1
650 TABLET ORAL ONCE
OUTPATIENT
Start: 2025-03-21 | End: 2025-03-21

## 2025-02-21 RX ORDER — DIPHENHYDRAMINE HYDROCHLORIDE 50 MG/ML
50 INJECTION INTRAMUSCULAR; INTRAVENOUS ONCE
Status: COMPLETED | OUTPATIENT
Start: 2025-02-21 | End: 2025-02-21

## 2025-02-21 RX ORDER — METHYLPREDNISOLONE SODIUM SUCCINATE 125 MG/2ML
125 INJECTION, POWDER, LYOPHILIZED, FOR SOLUTION INTRAMUSCULAR; INTRAVENOUS ONCE
OUTPATIENT
Start: 2025-03-21 | End: 2025-03-21

## 2025-02-21 RX ADMIN — ACETAMINOPHEN 650 MG: 325 TABLET ORAL at 15:10

## 2025-02-21 RX ADMIN — METHYLPREDNISOLONE SODIUM SUCCINATE 125 MG: 125 INJECTION, POWDER, FOR SOLUTION INTRAMUSCULAR; INTRAVENOUS at 15:12

## 2025-02-21 RX ADMIN — DIPHENHYDRAMINE HYDROCHLORIDE 50 MG: 50 INJECTION INTRAMUSCULAR; INTRAVENOUS at 15:10

## 2025-02-21 RX ADMIN — IMMUNE GLOBULIN (HUMAN) 20 G: 10 INJECTION INTRAVENOUS; SUBCUTANEOUS at 15:26

## 2025-02-21 ASSESSMENT — FIBROSIS 4 INDEX: FIB4 SCORE: 0.95

## 2025-02-26 ENCOUNTER — NON-PROVIDER VISIT (OUTPATIENT)
Dept: MEDICAL GROUP | Facility: MEDICAL CENTER | Age: 55
End: 2025-02-26
Attending: FAMILY MEDICINE
Payer: MEDICARE

## 2025-02-26 VITALS
HEIGHT: 61 IN | WEIGHT: 159.7 LBS | SYSTOLIC BLOOD PRESSURE: 130 MMHG | HEART RATE: 94 BPM | DIASTOLIC BLOOD PRESSURE: 85 MMHG | BODY MASS INDEX: 30.15 KG/M2

## 2025-02-26 DIAGNOSIS — E11.9 TYPE 2 DIABETES MELLITUS WITHOUT COMPLICATION, WITHOUT LONG-TERM CURRENT USE OF INSULIN (HCC): ICD-10-CM

## 2025-02-26 LAB
HBA1C MFR BLD: 5.8 % (ref ?–5.8)
POCT INT CON NEG: NEGATIVE
POCT INT CON POS: POSITIVE

## 2025-02-26 PROCEDURE — 99213 OFFICE O/P EST LOW 20 MIN: CPT | Performed by: PHARMACIST

## 2025-02-26 PROCEDURE — 83036 HEMOGLOBIN GLYCOSYLATED A1C: CPT

## 2025-02-26 RX ORDER — SEMAGLUTIDE 0.68 MG/ML
0.5 INJECTION, SOLUTION SUBCUTANEOUS
Qty: 3 ML | Refills: 5 | Status: SHIPPED | OUTPATIENT
Start: 2025-02-26

## 2025-02-26 RX ORDER — EMPAGLIFLOZIN, METFORMIN HYDROCHLORIDE 25; 1000 MG/1; MG/1
1 TABLET, EXTENDED RELEASE ORAL DAILY
Qty: 30 TABLET | Refills: 5 | Status: SHIPPED | OUTPATIENT
Start: 2025-02-26

## 2025-02-26 ASSESSMENT — FIBROSIS 4 INDEX: FIB4 SCORE: 0.95

## 2025-02-26 NOTE — PROGRESS NOTES
New Patient Consult Note  Primary care physician: Jacque Whyte M.D.    Reason for consult: Management of Controlled Type 2 Diabetes    HPI:  Noelle Hobbs is a 55 y.o. old patient who comes in today for evaluation of above stated problem.    Pt says ozempic has reduced appetite but she doesn't know how its affecting her BG as she stopped checking. She did say if she eat very greasy food on ozempic she feels very sick so she had to reduce this too. Sometimes she still likes to indulge in things like tamales and rice, but she does try to make an effort to eat healthier in general and limit this.  She is still smoking but is trying to slowly reduce to 1-3 a day depending on mood. Her main exercise continues to just be walking.      Discussed that her target weight is <138 lbs.           Most Recent HbA1c:   Lab Results   Component Value Date/Time    HBA1C 5.8 02/26/2025 09:37 AM    HBA1C 5.8 11/20/2024 03:21 PM    HBA1C 6.0 (A) 07/10/2024 11:25 AM           Current Diabetes Regimen:  GLP-1 Agent: ozempic 0.25mg weekly   Metformin + SGLT-2 Inhibitor: Synjardy XR 25/1000mg QDAY           SMBG  - not checking regularly   Before Breakfast:    Before Lunch:  Before Dinner:  Before Bedtime:  Other times:  Hypoglycemia:  None      ROS:  Constitutional: No weight loss  Cardiac: No palpitations or racing heart  Resp: No shortness of breath  Neuro: No numbness or tinging in feet  Endo: No heat or cold intolerance, no polyuria or polydipsia  All other systems were reviewed and were negative.    Past Medical History:  Patient Active Problem List    Diagnosis Date Noted    Toe infection 06/15/2022    Type 2 diabetes mellitus without complication, without long-term current use of insulin ([facility]) 05/17/2022    Gastroesophageal reflux disease 05/17/2022    Other hyperlipidemia 05/17/2022    CIDP (chronic inflammatory demyelinating polyneuropathy) ([facility]) 03/24/2022    H/O right radical nephrectomy 10/23/2021    LFT elevation  10/23/2021    Mixed stress and urge urinary incontinence 09/14/2021    Vitamin D deficiency 04/01/2019    Anemia 04/01/2019    Vitamin B12 deficiency 04/01/2019    Smoker 04/01/2019    Neck pain 02/12/2019    BMI 28.0-28.9,adult 02/12/2019       Past Surgical History:  Past Surgical History:   Procedure Laterality Date    NEPHRECTOMY ROBOTIC XI Right 10/20/2021    Procedure: NEPHRECTOMY, ROBOT-ASSISTED, USING DA CORNELIUS XI - RADICAL;  Surgeon: Jay Painting M.D.;  Location: SURGERY [facility];  Service: Gen Robotic    ORIF, ANKLE Right 09/09/2021    Procedure: ORIF, ANKLE;  Surgeon: Alexander Castillo M.D.;  Location: SURGERY [facility];  Service: Orthopedics    CHOLECYSTECTOMY  2005    TUBAL LIGATION         Allergies:  Latex    Social History:  Social History     Socioeconomic History    Marital status:      Spouse name: Not on file    Number of children: Not on file    Years of education: Not on file    Highest education level: 8th grade   Occupational History    Not on file   Tobacco Use    Smoking status: Some Days     Current packs/day: 0.25     Average packs/day: 0.3 packs/day for 34.0 years (8.5 ttl pk-yrs)     Types: Cigarettes    Smokeless tobacco: Never    Tobacco comments:     3 cigarettes/day   Vaping Use    Vaping status: Never Used   Substance and Sexual Activity    Alcohol use: No    Drug use: No    Sexual activity: Not Currently     Partners: Male     Comment:    Other Topics Concern    Not on file   Social History Narrative    Not on file     Social Drivers of Health     Financial Resource Strain: Low Risk  (3/24/2022)    Overall Financial Resource Strain (CARDIA)     Difficulty of Paying Living Expenses: Not very hard   Food Insecurity: No Food Insecurity (3/24/2022)    Hunger Vital Sign     Worried About Running Out of Food in the Last Year: Never true     Ran Out of Food in the Last Year: Never true   Transportation Needs: No Transportation Needs (3/24/2022)    PRAPARE -  Transportation     Lack of Transportation (Medical): No     Lack of Transportation (Non-Medical): No   Physical Activity: Inactive (3/24/2022)    Exercise Vital Sign     Days of Exercise per Week: 0 days     Minutes of Exercise per Session: 0 min   Stress: Stress Concern Present (3/24/2022)    [employer] - Occupational Stress Questionnaire     Feeling of Stress : To some extent   Social Connections: Socially Isolated (3/24/2022)    Social Connection and Isolation Panel [NHANES]     Frequency of Communication with Friends and Family: More than three times a week     Frequency of Social Gatherings with Friends and Family: More than three times a week     Attends Yazidi Services: Never     Active Member of Clubs or Organizations: No     Attends Club or Organization Meetings: Never     Marital Status:    Intimate Partner Violence: Not on file   Housing Stability: Low Risk  (3/24/2022)    Housing Stability Vital Sign     Unable to Pay for Housing in the Last Year: No     Number of Places Lived in the Last Year: 2     Unstable Housing in the Last Year: No       Family History:  Family History   Problem Relation Age of Onset    Osteoporosis Mother     Alzheimer's Disease Mother     Arthritis Mother     Diabetes Father     Heart Disease Father     Stroke Father         TIA    Cancer Other         one nephew leukemia, the other liver ca       Medications:    Current Outpatient Medications:     Semaglutide,0.25 or 0.5MG/DOS, (OZEMPIC, 0.25 OR 0.5 MG/DOSE,) 2 MG/3ML Solution Pen-injector, Inject 0.5 mg under the skin every 7 days., Disp: 3 mL, Rfl: 5    Empagliflozin-metFORMIN HCl ER (SYNJARDY XR)  MG TABLET SR 24 HR, Take 1 tablet by mouth every day., Disp: 30 Tablet, Rfl: 5    atorvastatin (LIPITOR) 20 MG Tab, Take 1 Tablet by mouth every evening., Disp: 100 Tablet, Rfl: 1    lisinopril (PRINIVIL) 2.5 MG Tab, Take 1 tablet by mouth once daily, Disp: 90 Tablet, Rfl: 1     "ergocalciferol (DRISDOL) 20283 UNIT capsule, Take 1 Capsule by mouth every 7 days., Disp: 4 Capsule, Rfl: 2    glucose blood (TRUE METRIX BLOOD GLUCOSE TEST) strip, Use to test twice daily, Disp: 100 Strip, Rfl: 11    glucose blood strip, True Metrix Glucose Test Strip, Disp: , Rfl:     Blood Glucose Monitoring Suppl (BLOOD GLUCOSE MONITOR SYSTEM) w/Device Kit, Use to test 3 times a day and as needed, Disp: 1 Kit, Rfl: 0    omeprazole (PRILOSEC OTC) 20 MG tablet, Take 1 Tablet by mouth every day., Disp: 30 Tablet, Rfl: 5    hydrOXYzine HCl (ATARAX) 25 MG Tab, TAKE 1 TABLET BY MOUTH THREE TIMES DAILY AS NEEDED FOR ITCHING, Disp: 90 Tablet, Rfl: 3    NON SPECIFIED, Shingrix #1 (Patient not taking: Reported on 10/1/2024), Disp: 1 Each, Rfl: 0    predniSONE (DELTASONE) 5 MG Tab, , Disp: , Rfl:     primidone (MYSOLINE) 50 MG Tab, , Disp: , Rfl:     Labs: Reviewed    Physical Examination:  Vital signs: /85   Pulse 94   Ht 1.549 m (5' 1\")   Wt 72.4 kg (159 lb 11.2 oz)   LMP 03/08/2021   BMI 30.18 kg/m²  Body mass index is 30.18 kg/m².  General: No apparent distress, cooperative  Eyes: No scleral icterus or discharge  ENMT: Normal on external inspection of nose, lips, normal thyroid exam  Neck: No abnormal masses on inspection  Resp: Normal effort, clear to auscultation bilaterally   CVS: Regular rate and rhythm, S1 S2 normal, no murmur   Extremities: No edema  Abdomen: abdominal obesity present  Neuro: Alert and oriented  Skin: No rash  Psych: Normal mood and affect, intact memory and able to make informed decisions    Plan:    There are no diagnoses linked to this encounter.    Return in about 4 months (around 6/26/2025).    Dm - patients A1c remains in a great range and did manage to loose about 6 pounds on low dose ozempic. Will continue current therapy and recommend she keep trying to loose weight and reduce portion sizes.   - Labs ordered     Thank you for allowing me to participate in the care of this " patient.    Niranjan Schulte, PharmD 02/26/25    CC:   Jacque Whyte M.D.

## 2025-02-28 ENCOUNTER — RESULTS FOLLOW-UP (OUTPATIENT)
Dept: MEDICAL GROUP | Facility: MEDICAL CENTER | Age: 55
End: 2025-02-28

## 2025-03-05 ENCOUNTER — TELEPHONE (OUTPATIENT)
Dept: HEALTH INFORMATION MANAGEMENT | Facility: OTHER | Age: 55
End: 2025-03-05
Payer: MEDICARE

## 2025-03-20 ENCOUNTER — OUTPATIENT INFUSION SERVICES (OUTPATIENT)
Dept: ONCOLOGY | Facility: MEDICAL CENTER | Age: 55
End: 2025-03-20
Attending: NURSE PRACTITIONER
Payer: MEDICARE

## 2025-03-20 VITALS
SYSTOLIC BLOOD PRESSURE: 119 MMHG | OXYGEN SATURATION: 97 % | WEIGHT: 161.6 LBS | HEIGHT: 61 IN | RESPIRATION RATE: 16 BRPM | DIASTOLIC BLOOD PRESSURE: 80 MMHG | BODY MASS INDEX: 30.51 KG/M2 | HEART RATE: 101 BPM | TEMPERATURE: 97.9 F

## 2025-03-20 DIAGNOSIS — G61.81 CIDP (CHRONIC INFLAMMATORY DEMYELINATING POLYNEUROPATHY) (HCC): ICD-10-CM

## 2025-03-20 PROCEDURE — 96365 THER/PROPH/DIAG IV INF INIT: CPT

## 2025-03-20 PROCEDURE — 96366 THER/PROPH/DIAG IV INF ADDON: CPT

## 2025-03-20 PROCEDURE — 700102 HCHG RX REV CODE 250 W/ 637 OVERRIDE(OP): Mod: UD | Performed by: NURSE PRACTITIONER

## 2025-03-20 PROCEDURE — 700111 HCHG RX REV CODE 636 W/ 250 OVERRIDE (IP): Mod: JZ,UD,TB | Performed by: NURSE PRACTITIONER

## 2025-03-20 PROCEDURE — 96375 TX/PRO/DX INJ NEW DRUG ADDON: CPT

## 2025-03-20 PROCEDURE — A9270 NON-COVERED ITEM OR SERVICE: HCPCS | Mod: UD | Performed by: NURSE PRACTITIONER

## 2025-03-20 RX ORDER — DIPHENHYDRAMINE HYDROCHLORIDE 50 MG/ML
50 INJECTION, SOLUTION INTRAMUSCULAR; INTRAVENOUS ONCE
Status: COMPLETED | OUTPATIENT
Start: 2025-03-20 | End: 2025-03-20

## 2025-03-20 RX ORDER — METHYLPREDNISOLONE SODIUM SUCCINATE 125 MG/2ML
125 INJECTION, POWDER, LYOPHILIZED, FOR SOLUTION INTRAMUSCULAR; INTRAVENOUS ONCE
Status: CANCELLED | OUTPATIENT
Start: 2025-04-17 | End: 2025-04-17

## 2025-03-20 RX ORDER — ACETAMINOPHEN 325 MG/1
650 TABLET ORAL ONCE
Status: CANCELLED | OUTPATIENT
Start: 2025-04-17 | End: 2025-04-17

## 2025-03-20 RX ORDER — METHYLPREDNISOLONE SODIUM SUCCINATE 125 MG/2ML
125 INJECTION, POWDER, LYOPHILIZED, FOR SOLUTION INTRAMUSCULAR; INTRAVENOUS ONCE
Status: COMPLETED | OUTPATIENT
Start: 2025-03-20 | End: 2025-03-20

## 2025-03-20 RX ORDER — ACETAMINOPHEN 325 MG/1
650 TABLET ORAL ONCE
Status: COMPLETED | OUTPATIENT
Start: 2025-03-20 | End: 2025-03-20

## 2025-03-20 RX ADMIN — ACETAMINOPHEN 650 MG: 325 TABLET ORAL at 15:26

## 2025-03-20 RX ADMIN — METHYLPREDNISOLONE SODIUM SUCCINATE 125 MG: 125 INJECTION, POWDER, FOR SOLUTION INTRAMUSCULAR; INTRAVENOUS at 15:27

## 2025-03-20 RX ADMIN — DIPHENHYDRAMINE HYDROCHLORIDE 50 MG: 50 INJECTION INTRAMUSCULAR; INTRAVENOUS at 15:30

## 2025-03-20 RX ADMIN — IMMUNE GLOBULIN (HUMAN) 20 G: 10 INJECTION INTRAVENOUS; SUBCUTANEOUS at 15:40

## 2025-03-20 ASSESSMENT — FIBROSIS 4 INDEX: FIB4 SCORE: 0.95

## 2025-03-20 NOTE — PROGRESS NOTES
Patient presents to infusion for Q4week, day 1 of 2 IVIG infusion for CIPD. Patient reports feeling well today with no major complaints, has tolerated previous treatments well.     PIV started with positive blood return.   Pre-treatment meds given as ordered.     IVIG titrated per MAR starting at 42 mL/hour and gradually increasing to max rate of 140 mL/hour, patient tolerated well with no adverse effects.     PIV flushed post infusion with positive blood return, discontinued with tip intact. Patient discharged in stable condition, knows when to return for next appointment tomorrow.

## 2025-03-21 ENCOUNTER — OUTPATIENT INFUSION SERVICES (OUTPATIENT)
Dept: ONCOLOGY | Facility: MEDICAL CENTER | Age: 55
End: 2025-03-21
Attending: NURSE PRACTITIONER
Payer: MEDICARE

## 2025-03-21 VITALS
BODY MASS INDEX: 30.97 KG/M2 | DIASTOLIC BLOOD PRESSURE: 78 MMHG | TEMPERATURE: 97.9 F | SYSTOLIC BLOOD PRESSURE: 127 MMHG | WEIGHT: 164.02 LBS | OXYGEN SATURATION: 98 % | HEART RATE: 89 BPM | RESPIRATION RATE: 17 BRPM | HEIGHT: 61 IN

## 2025-03-21 DIAGNOSIS — G61.81 CIDP (CHRONIC INFLAMMATORY DEMYELINATING POLYNEUROPATHY) (HCC): ICD-10-CM

## 2025-03-21 PROCEDURE — 700102 HCHG RX REV CODE 250 W/ 637 OVERRIDE(OP): Mod: UD | Performed by: NURSE PRACTITIONER

## 2025-03-21 PROCEDURE — 700111 HCHG RX REV CODE 636 W/ 250 OVERRIDE (IP): Mod: JZ,UD | Performed by: NURSE PRACTITIONER

## 2025-03-21 PROCEDURE — 96365 THER/PROPH/DIAG IV INF INIT: CPT

## 2025-03-21 PROCEDURE — 96366 THER/PROPH/DIAG IV INF ADDON: CPT

## 2025-03-21 PROCEDURE — 96375 TX/PRO/DX INJ NEW DRUG ADDON: CPT

## 2025-03-21 PROCEDURE — A9270 NON-COVERED ITEM OR SERVICE: HCPCS | Mod: UD | Performed by: NURSE PRACTITIONER

## 2025-03-21 RX ORDER — ACETAMINOPHEN 325 MG/1
650 TABLET ORAL ONCE
OUTPATIENT
Start: 2025-04-18 | End: 2025-04-18

## 2025-03-21 RX ORDER — ACETAMINOPHEN 325 MG/1
650 TABLET ORAL ONCE
Status: COMPLETED | OUTPATIENT
Start: 2025-03-21 | End: 2025-03-21

## 2025-03-21 RX ORDER — DIPHENHYDRAMINE HYDROCHLORIDE 50 MG/ML
50 INJECTION, SOLUTION INTRAMUSCULAR; INTRAVENOUS ONCE
Status: COMPLETED | OUTPATIENT
Start: 2025-03-21 | End: 2025-03-21

## 2025-03-21 RX ORDER — METHYLPREDNISOLONE SODIUM SUCCINATE 125 MG/2ML
125 INJECTION, POWDER, LYOPHILIZED, FOR SOLUTION INTRAMUSCULAR; INTRAVENOUS ONCE
OUTPATIENT
Start: 2025-04-18 | End: 2025-04-18

## 2025-03-21 RX ORDER — METHYLPREDNISOLONE SODIUM SUCCINATE 125 MG/2ML
125 INJECTION, POWDER, LYOPHILIZED, FOR SOLUTION INTRAMUSCULAR; INTRAVENOUS ONCE
Status: COMPLETED | OUTPATIENT
Start: 2025-03-21 | End: 2025-03-21

## 2025-03-21 RX ADMIN — METHYLPREDNISOLONE SODIUM SUCCINATE 125 MG: 125 INJECTION, POWDER, FOR SOLUTION INTRAMUSCULAR; INTRAVENOUS at 15:30

## 2025-03-21 RX ADMIN — ACETAMINOPHEN 650 MG: 325 TABLET ORAL at 15:25

## 2025-03-21 RX ADMIN — IMMUNE GLOBULIN (HUMAN) 20 G: 10 INJECTION INTRAVENOUS; SUBCUTANEOUS at 15:36

## 2025-03-21 RX ADMIN — DIPHENHYDRAMINE HYDROCHLORIDE 50 MG: 50 INJECTION INTRAMUSCULAR; INTRAVENOUS at 15:36

## 2025-03-21 ASSESSMENT — FIBROSIS 4 INDEX: FIB4 SCORE: 0.95

## 2025-03-21 NOTE — PROGRESS NOTES
Ms Hobbs is here today for Q4 week day 1/2 IVIG for CIDP. Patient reports feeling well today with no major complaints, has tolerated previous treatments well.      PIV started with positive blood return.   Pre-treatment meds given as ordered.      IVIG started per MAR at a rate of 168 ml / hr and was tolerated well.     IV removed and Ms Hobbs was discharged in stable condition.

## 2025-04-04 ENCOUNTER — OFFICE VISIT (OUTPATIENT)
Dept: MEDICAL GROUP | Facility: MEDICAL CENTER | Age: 55
End: 2025-04-04
Attending: FAMILY MEDICINE
Payer: MEDICARE

## 2025-04-04 VITALS
BODY MASS INDEX: 30.18 KG/M2 | RESPIRATION RATE: 16 BRPM | HEART RATE: 70 BPM | WEIGHT: 164 LBS | SYSTOLIC BLOOD PRESSURE: 122 MMHG | TEMPERATURE: 98.7 F | OXYGEN SATURATION: 96 % | DIASTOLIC BLOOD PRESSURE: 80 MMHG | HEIGHT: 62 IN

## 2025-04-04 DIAGNOSIS — R23.2 HOT FLASHES: ICD-10-CM

## 2025-04-04 DIAGNOSIS — L98.9 SKIN LESION: ICD-10-CM

## 2025-04-04 PROCEDURE — 3074F SYST BP LT 130 MM HG: CPT | Performed by: FAMILY MEDICINE

## 2025-04-04 PROCEDURE — 99213 OFFICE O/P EST LOW 20 MIN: CPT | Performed by: FAMILY MEDICINE

## 2025-04-04 PROCEDURE — 3079F DIAST BP 80-89 MM HG: CPT | Performed by: FAMILY MEDICINE

## 2025-04-04 ASSESSMENT — PATIENT HEALTH QUESTIONNAIRE - PHQ9: CLINICAL INTERPRETATION OF PHQ2 SCORE: 0

## 2025-04-04 ASSESSMENT — FIBROSIS 4 INDEX: FIB4 SCORE: 0.95

## 2025-04-05 NOTE — PROGRESS NOTES
"Verbal consent was acquired by the patient to use [employer] ambient listening note generation during this visit   Subjective:     HPI:   History of Present Illness  The patient presents for evaluation of hot flashes and health maintenance. She is accompanied by an .    Hot Flashes  She reports experiencing persistent hot flashes, which have been disrupting her sleep. She has attempted to alleviate her symptoms by using fans and dressing in layers, but these measures have proven ineffective. Last period was in 2021 and has experienced severe hot flashes, all day and night and disrupts her sleep.    Health Maintenance  She is currently on a regimen of potent steroids for a chronic nerve condition. She has no history of breast cancer, stroke, or liver disease. She had a recent eye examination last month, the results of which were normal. She is scheduled for a colonoscopy on Monday. She has a past medical history of renal cancer, which was successfully treated with complete resection.      Objective:     Exam:  /80 (BP Location: Left arm, Patient Position: Sitting, BP Cuff Size: Adult)   Pulse 70   Temp 37.1 °C (98.7 °F) (Temporal)   Resp 16   Ht 1.575 m (5' 2\")   Wt 74.4 kg (164 lb)   LMP 03/08/2021   SpO2 96%   BMI 30.00 kg/m²  Body mass index is 30 kg/m².    Constitutional: Alert, no distress, well-groomed.  Respiratory: Unlabored respiratory effort, no cough.  MSK: moves all extremities.  Derm - R nare with raised nevus  Psych: normal affect and mood.      Data:   None new    Assessment & Plan:     1. Skin lesion  Referral to Dermatology      2. Hot flashes  Referral to Gynecology    TSH WITH REFLEX TO FT4          Assessment & Plan  1. Hot flashes: Persistent.  - Hormonal therapy with estrogen and progesterone discussed as potential treatment.  - Non-hormonal strategies recommended: using fans, staying cool, dressing in layers, avoiding spicy foods, managing stress.  - Referral to " gynecologist for further discussion regarding hormonal therapy.  - Thyroid function tests to be ordered to rule out other underlying conditions.    2. Health maintenance.  - Shingles vaccine recommended at local pharmacy or [place] pharmacy at the [facility] hospital.  - Annual wellness visit to be scheduled during next appointment in approximately 3 months.  - Colonoscopy scheduled on Monday.    Follow-up  - Annual wellness visit in approximately 3 months.              Return in about 3 months (around 7/4/2025) for awv.      Please note that this dictation was created using voice recognition software. I have made every reasonable attempt to correct obvious errors, but I expect that there are errors of grammar and possibly content that I did not discover before finalizing the note.

## 2025-04-17 ENCOUNTER — APPOINTMENT (OUTPATIENT)
Dept: ONCOLOGY | Facility: MEDICAL CENTER | Age: 55
End: 2025-04-17
Attending: NURSE PRACTITIONER
Payer: MEDICARE

## 2025-04-18 ENCOUNTER — APPOINTMENT (OUTPATIENT)
Dept: ONCOLOGY | Facility: MEDICAL CENTER | Age: 55
End: 2025-04-18
Attending: NURSE PRACTITIONER
Payer: MEDICARE

## 2025-05-09 ENCOUNTER — APPOINTMENT (OUTPATIENT)
Dept: DERMATOLOGY | Facility: IMAGING CENTER | Age: 55
End: 2025-05-09
Payer: MEDICARE

## 2025-05-16 DIAGNOSIS — E11.9 TYPE 2 DIABETES MELLITUS WITHOUT COMPLICATION, WITHOUT LONG-TERM CURRENT USE OF INSULIN (HCC): ICD-10-CM

## 2025-05-16 NOTE — TELEPHONE ENCOUNTER
Received request via: Pharmacy    Was the patient seen in the last year in this department? Yes    Does the patient have an active prescription (recently filled or refills available) for medication(s) requested? No    Pharmacy Name: [facility]    Does the patient have senior living Plus and need 100-day supply? (This applies to ALL medications) Yes, quantity updated to 100 days

## 2025-05-18 RX ORDER — LISINOPRIL 2.5 MG/1
2.5 TABLET ORAL DAILY
Qty: 90 TABLET | Refills: 1 | Status: SHIPPED | OUTPATIENT
Start: 2025-05-18

## 2025-05-25 DIAGNOSIS — E11.9 TYPE 2 DIABETES MELLITUS WITHOUT COMPLICATION, WITHOUT LONG-TERM CURRENT USE OF INSULIN (HCC): ICD-10-CM

## 2025-05-27 NOTE — TELEPHONE ENCOUNTER
Received request via: Pharmacy    Was the patient seen in the last year in this department? Yes    Does the patient have an active prescription (recently filled or refills available) for medication(s) requested? No    Ph[facility]    Does the patient have senior living Plus and need 100-day supply? (This applies to ALL medications) Patient does not have SCP    Future Appointments         Provider Department Center    6/4/2025 10:00 AM (Arrive by 8:45 AM) [address] [facility] - [place] [address] [facility]    6/9/2025 11:30 AM Wendie Stein M.D. [facility]    6/25/2025 9:30 AM (Arrive by 9:15 AM) [facility] PHARMACIST [facility]    7/9/2025 9:40 AM (Arrive by 9:25 AM) Jacque Whyte M.D. [facility]

## 2025-05-30 RX ORDER — SEMAGLUTIDE 0.68 MG/ML
INJECTION, SOLUTION SUBCUTANEOUS
Qty: 9 ML | Refills: 1 | Status: SHIPPED | OUTPATIENT
Start: 2025-05-30

## 2025-05-31 DIAGNOSIS — L29.9 ITCHING: ICD-10-CM

## 2025-06-03 RX ORDER — HYDROXYZINE HYDROCHLORIDE 25 MG/1
25 TABLET, FILM COATED ORAL 3 TIMES DAILY PRN
Qty: 90 TABLET | Refills: 2 | Status: SHIPPED | OUTPATIENT
Start: 2025-06-03

## 2025-06-04 ENCOUNTER — HOSPITAL ENCOUNTER (OUTPATIENT)
Dept: LAB | Facility: MEDICAL CENTER | Age: 55
End: 2025-06-04
Attending: FAMILY MEDICINE
Payer: MEDICARE

## 2025-06-04 ENCOUNTER — HOSPITAL ENCOUNTER (OUTPATIENT)
Dept: RADIOLOGY | Facility: MEDICAL CENTER | Age: 55
End: 2025-06-04
Payer: MEDICARE

## 2025-06-04 DIAGNOSIS — C64.1 MALIGNANT NEOPLASM OF RIGHT KIDNEY, EXCEPT RENAL PELVIS (HCC): ICD-10-CM

## 2025-06-04 DIAGNOSIS — R23.2 HOT FLASHES: ICD-10-CM

## 2025-06-04 DIAGNOSIS — E11.9 TYPE 2 DIABETES MELLITUS WITHOUT COMPLICATION, WITHOUT LONG-TERM CURRENT USE OF INSULIN (HCC): ICD-10-CM

## 2025-06-04 LAB
25(OH)D3 SERPL-MCNC: 28 NG/ML (ref 30–100)
ALBUMIN SERPL BCP-MCNC: 4.3 G/DL (ref 3.2–4.9)
ALBUMIN/GLOB SERPL: 1.6 G/DL
ALP SERPL-CCNC: 161 U/L (ref 30–99)
ALT SERPL-CCNC: 35 U/L (ref 2–50)
ANION GAP SERPL CALC-SCNC: 12 MMOL/L (ref 7–16)
AST SERPL-CCNC: 29 U/L (ref 12–45)
BILIRUB SERPL-MCNC: 0.2 MG/DL (ref 0.1–1.5)
BUN SERPL-MCNC: 19 MG/DL (ref 8–22)
CALCIUM ALBUM COR SERPL-MCNC: 8.8 MG/DL (ref 8.5–10.5)
CALCIUM SERPL-MCNC: 9 MG/DL (ref 8.5–10.5)
CHLORIDE SERPL-SCNC: 107 MMOL/L (ref 96–112)
CHOLEST SERPL-MCNC: 152 MG/DL (ref 100–199)
CO2 SERPL-SCNC: 20 MMOL/L (ref 20–33)
CREAT BLD-MCNC: 0.9 MG/DL (ref 0.5–1.4)
CREAT SERPL-MCNC: 0.89 MG/DL (ref 0.5–1.4)
CREAT UR-MCNC: 65.2 MG/DL
GFR SERPLBLD CREATININE-BSD FMLA CKD-EPI: 77 ML/MIN/1.73 M 2
GLOBULIN SER CALC-MCNC: 2.7 G/DL (ref 1.9–3.5)
GLUCOSE SERPL-MCNC: 95 MG/DL (ref 65–99)
HDLC SERPL-MCNC: 51 MG/DL
LDLC SERPL CALC-MCNC: 74 MG/DL
MICROALBUMIN UR-MCNC: <1.2 MG/DL
MICROALBUMIN/CREAT UR: NORMAL MG/G (ref 0–30)
POTASSIUM SERPL-SCNC: 4.2 MMOL/L (ref 3.6–5.5)
PROT SERPL-MCNC: 7 G/DL (ref 6–8.2)
SODIUM SERPL-SCNC: 139 MMOL/L (ref 135–145)
TRIGL SERPL-MCNC: 134 MG/DL (ref 0–149)
TSH SERPL DL<=0.005 MIU/L-ACNC: 1.08 UIU/ML (ref 0.38–5.33)
VIT B12 SERPL-MCNC: 478 PG/ML (ref 211–911)

## 2025-06-04 PROCEDURE — 71046 X-RAY EXAM CHEST 2 VIEWS: CPT

## 2025-06-04 PROCEDURE — 82565 ASSAY OF CREATININE: CPT | Mod: XU

## 2025-06-04 PROCEDURE — 84443 ASSAY THYROID STIM HORMONE: CPT

## 2025-06-04 PROCEDURE — 80053 COMPREHEN METABOLIC PANEL: CPT

## 2025-06-04 PROCEDURE — 74177 CT ABD & PELVIS W/CONTRAST: CPT

## 2025-06-04 PROCEDURE — 700117 HCHG RX CONTRAST REV CODE 255: Mod: UD

## 2025-06-04 PROCEDURE — 80061 LIPID PANEL: CPT

## 2025-06-04 PROCEDURE — 82570 ASSAY OF URINE CREATININE: CPT

## 2025-06-04 PROCEDURE — 82043 UR ALBUMIN QUANTITATIVE: CPT

## 2025-06-04 PROCEDURE — 36415 COLL VENOUS BLD VENIPUNCTURE: CPT

## 2025-06-04 PROCEDURE — 82607 VITAMIN B-12: CPT

## 2025-06-04 PROCEDURE — 82306 VITAMIN D 25 HYDROXY: CPT

## 2025-06-04 RX ADMIN — IOHEXOL 75 ML: 350 INJECTION, SOLUTION INTRAVENOUS at 10:14

## 2025-06-09 ENCOUNTER — APPOINTMENT (OUTPATIENT)
Dept: DERMATOLOGY | Facility: IMAGING CENTER | Age: 55
End: 2025-06-09
Payer: MEDICARE

## 2025-06-09 DIAGNOSIS — D48.5 NEOPLASM OF UNCERTAIN BEHAVIOR OF SKIN: ICD-10-CM

## 2025-06-09 DIAGNOSIS — D22.62 MELANOCYTIC NEVI OF LEFT UPPER LIMB, INCLUDING SHOULDER: ICD-10-CM

## 2025-06-09 DIAGNOSIS — D22.61 MELANOCYTIC NEVI OF RIGHT UPPER LIMB, INCLUDING SHOULDER: ICD-10-CM

## 2025-06-09 DIAGNOSIS — D22.30 NEVUS OF FACE: ICD-10-CM

## 2025-06-09 PROCEDURE — 11102 TANGNTL BX SKIN SINGLE LES: CPT | Performed by: STUDENT IN AN ORGANIZED HEALTH CARE EDUCATION/TRAINING PROGRAM

## 2025-06-09 PROCEDURE — 99203 OFFICE O/P NEW LOW 30 MIN: CPT | Mod: 25 | Performed by: STUDENT IN AN ORGANIZED HEALTH CARE EDUCATION/TRAINING PROGRAM

## 2025-06-09 NOTE — PROGRESS NOTES
[facility] DERMATOLOGY CLINIC NOTE         HPI:    Noelle Hobbs is a 55 y.o. female here for evaluation of Mole    HPI/location: Mole on R nose   Time present: whole life  Painful lesion: No  Itching lesion: Yes  Enlarging lesion: Yes  Anything make it better or worse?     Also moles on the face, arms.     No other symptomatic (itching, painful, burning) or changing lesions.       Visit conducted with     Review of Systems: No fevers, chill. Pertinent positives and negatives above.       Medications, Medical History, Surgical History, Family History & Allergies:  Reviewed in the chart, relevant history noted above.       PHYSICAL EXAM  Focused skin exam of face, neck, arms, and hands    - R nasal ala with 6mm brown papule  - melanocytic brown macules and papules on the face, arms    ASSESSMENT & PLAN    # Neoplasm of uncertain behavior  Shave biopsy performed as below:  Location: R nasal ala  Ddx: r/o atypical nevus   After informed written consent was obtained, using alcohol pad for cleansing and 1% Lidocaine with epinephrine for anesthetic, with sterile technique and Dermablade, a shave biopsy was used to obtain a biopsy specimen of the lesion. Hemostasis was obtained by pressure and aluminum chloride. Dressing is applied, and wound care instructions provided. Be alert for any signs of cutaneous infection. The specimen is labeled and sent to pathology for evaluation. The procedure was well tolerated without complications.    # Melanotic nevi  - clinically benign appearing today        Return pending biopsy result.        Wendie Stein MD  [facility]

## 2025-06-11 ENCOUNTER — RESULTS FOLLOW-UP (OUTPATIENT)
Dept: DERMATOLOGY | Facility: IMAGING CENTER | Age: 55
End: 2025-06-11

## 2025-06-14 DIAGNOSIS — E78.49 OTHER HYPERLIPIDEMIA: ICD-10-CM

## 2025-06-17 RX ORDER — ATORVASTATIN CALCIUM 20 MG/1
20 TABLET, FILM COATED ORAL EVERY EVENING
Qty: 100 TABLET | Refills: 3 | Status: SHIPPED | OUTPATIENT
Start: 2025-06-17 | End: 2025-06-25 | Stop reason: SDUPTHER

## 2025-06-25 ENCOUNTER — OFFICE VISIT (OUTPATIENT)
Dept: MEDICAL GROUP | Facility: MEDICAL CENTER | Age: 55
End: 2025-06-25
Attending: FAMILY MEDICINE
Payer: MEDICARE

## 2025-06-25 ENCOUNTER — RESULTS FOLLOW-UP (OUTPATIENT)
Dept: MEDICAL GROUP | Facility: MEDICAL CENTER | Age: 55
End: 2025-06-25

## 2025-06-25 VITALS
BODY MASS INDEX: 30.6 KG/M2 | HEART RATE: 86 BPM | SYSTOLIC BLOOD PRESSURE: 126 MMHG | DIASTOLIC BLOOD PRESSURE: 86 MMHG | HEIGHT: 62 IN | WEIGHT: 166.3 LBS

## 2025-06-25 DIAGNOSIS — E78.49 OTHER HYPERLIPIDEMIA: ICD-10-CM

## 2025-06-25 DIAGNOSIS — E11.9 TYPE 2 DIABETES MELLITUS WITHOUT COMPLICATION, WITHOUT LONG-TERM CURRENT USE OF INSULIN (HCC): Primary | ICD-10-CM

## 2025-06-25 LAB
HBA1C MFR BLD: 6.3 % (ref ?–5.8)
POCT INT CON NEG: NEGATIVE
POCT INT CON POS: POSITIVE

## 2025-06-25 PROCEDURE — 83036 HEMOGLOBIN GLYCOSYLATED A1C: CPT

## 2025-06-25 PROCEDURE — 99213 OFFICE O/P EST LOW 20 MIN: CPT | Performed by: PHARMACIST

## 2025-06-25 RX ORDER — SEMAGLUTIDE 1.34 MG/ML
1 INJECTION, SOLUTION SUBCUTANEOUS
Qty: 3 ML | Refills: 5 | Status: SHIPPED | OUTPATIENT
Start: 2025-06-25

## 2025-06-25 RX ORDER — LISINOPRIL 5 MG/1
5 TABLET ORAL DAILY
Qty: 100 TABLET | Refills: 3 | Status: SHIPPED | OUTPATIENT
Start: 2025-06-25 | End: 2026-07-30

## 2025-06-25 RX ORDER — ATORVASTATIN CALCIUM 20 MG/1
20 TABLET, FILM COATED ORAL EVERY EVENING
Qty: 100 TABLET | Refills: 3 | Status: SHIPPED | OUTPATIENT
Start: 2025-06-25

## 2025-06-25 RX ORDER — ERGOCALCIFEROL 1.25 MG/1
50000 CAPSULE ORAL
Qty: 8 CAPSULE | Refills: 0 | Status: SHIPPED | OUTPATIENT
Start: 2025-06-25

## 2025-06-25 RX ORDER — CALCIUM CITRATE/VITAMIN D3 200MG-6.25
TABLET ORAL
Qty: 50 STRIP | Refills: 11 | Status: SHIPPED | OUTPATIENT
Start: 2025-06-25

## 2025-06-25 RX ORDER — EMPAGLIFLOZIN, METFORMIN HYDROCHLORIDE 25; 1000 MG/1; MG/1
1 TABLET, EXTENDED RELEASE ORAL DAILY
Qty: 30 TABLET | Refills: 5 | Status: SHIPPED | OUTPATIENT
Start: 2025-06-25

## 2025-06-25 ASSESSMENT — FIBROSIS 4 INDEX: FIB4 SCORE: 0.93

## 2025-06-25 NOTE — PROGRESS NOTES
New Patient Consult Note  Primary care physician: Jacque Whyte M.D.    Reason for consult: Management of Controlled Type 2 Diabetes    HPI:  Noelle Hobbs is a 55 y.o. old patient who comes in today for evaluation of above stated problem.    Pt started Vyvgart and has been getting sick more often (side effect of medication). During the summer she finds herself eating more fruits such as mangoes and watermelons. She also has been having banana with her cereal for breakfast. She gets assistance from a food pantry which gives her a lot of fruits so she got confused and though fruit intake was healthy. She even started making strawberry banana milkshakes/smoothies.     Discussed health diet today and importance of moderation with fruits as they still contain sugar. Discussed alternatives to breakfast and fruits that are lower in sugar.        Most Recent HbA1c:   Lab Results   Component Value Date/Time    HBA1C 6.3 (A) 06/25/2025 09:37 AM    HBA1C 5.8 02/26/2025 09:37 AM    HBA1C 5.8 11/20/2024 03:21 PM          Current Diabetes Regimen:  GLP-1 Agent: ozempic 0.5mg weekly   Metformin + SGLT-2 Inhibitor: Synjardy XR 25/1000mg QDAY   Basal Insulin:      SMBG  Before Breakfast: usually <115 but isnt testing often   Before Lunch:  Before Dinner:  Before Bedtime:  Other times:  Hypoglycemia:  None      ROS:  Constitutional: No weight loss  Cardiac: No palpitations or racing heart  Resp: No shortness of breath  Neuro: No numbness or tinging in feet  Endo: No heat or cold intolerance, no polyuria or polydipsia  All other systems were reviewed and were negative.    Past Medical History:  Patient Active Problem List    Diagnosis Date Noted    Toe infection 06/15/2022    Type 2 diabetes mellitus without complication, without long-term current use of insulin ([facility]) 05/17/2022    Gastroesophageal reflux disease 05/17/2022    Other hyperlipidemia 05/17/2022    CIDP (chronic inflammatory demyelinating polyneuropathy) ([facility])  Problem: Discharge Planning  Goal: Discharge to home or other facility with appropriate resources  Outcome: Progressing     Problem: Pain  Goal: Verbalizes/displays adequate comfort level or baseline comfort level  Outcome: Progressing     Problem: Safety - Adult  Goal: Free from fall injury  Outcome: Progressing     Problem: Breathing Pattern - Ineffective  Goal: Use of effective breathing techniques  Description: Patient  will indicate an effective breathing pattern as evidenced by the absence of respiratory distress each shift during the inpatient hospice stay. Outcome: Progressing     Problem: Nausea/Vomiting (Adult)  Goal: Control of nausea/vomiting  Description: Patient  will exhibit decreased or eliminated episodes of nausea/vomiting as evidenced by less than 2 PRN antiemetic medication administrations each shift during the inpatient hospice stay.     Outcome: Progressing 03/24/2022    H/O right radical nephrectomy 10/23/2021    LFT elevation 10/23/2021    Mixed stress and urge urinary incontinence 09/14/2021    Vitamin D deficiency 04/01/2019    Anemia 04/01/2019    Vitamin B12 deficiency 04/01/2019    Smoker 04/01/2019    Neck pain 02/12/2019    BMI 28.0-28.9,adult 02/12/2019       Past Surgical History:  Past Surgical History[1]    Allergies:  Latex    Social History:  Social History     Socioeconomic History    Marital status:      Spouse name: Not on file    Number of children: Not on file    Years of education: Not on file    Highest education level: 8th grade   Occupational History    Not on file   Tobacco Use    Smoking status: Some Days     Current packs/day: 0.25     Average packs/day: 0.3 packs/day for 34.0 years (8.5 ttl pk-yrs)     Types: Cigarettes    Smokeless tobacco: Never    Tobacco comments:     3 cigarettes/day   Vaping Use    Vaping status: Never Used   Substance and Sexual Activity    Alcohol use: No    Drug use: No    Sexual activity: Not Currently     Partners: Male     Comment:    Other Topics Concern    Not on file   Social History Narrative    Not on file     Social Drivers of Health     Financial Resource Strain: Low Risk  (3/24/2022)    Overall Financial Resource Strain (CARDIA)     Difficulty of Paying Living Expenses: Not very hard   Food Insecurity: No Food Insecurity (3/24/2022)    Hunger Vital Sign     Worried About Running Out of Food in the Last Year: Never true     Ran Out of Food in the Last Year: Never true   Transportation Needs: No Transportation Needs (3/24/2022)    PRAPARE - Transportation     Lack of Transportation (Medical): No     Lack of Transportation (Non-Medical): No   Physical Activity: Inactive (3/24/2022)    Exercise Vital Sign     Days of Exercise per Week: 0 days     Minutes of Exercise per Session: 0 min   Stress: Stress Concern Present (3/24/2022)    [place] [employer] - Occupational Stress  "Questionnaire     Feeling of Stress : To some extent   Social Connections: Socially Isolated (3/24/2022)    Social Connection and Isolation Panel [NHANES]     Frequency of Communication with Friends and Family: More than three times a week     Frequency of Social Gatherings with Friends and Family: More than three times a week     Attends [employer] Services: Never     Active Member of Clubs or Organizations: No     Attends Club or Organization Meetings: Never     Marital Status:    Intimate Partner Violence: Not on file   Housing Stability: Low Risk  (3/24/2022)    Housing Stability Vital Sign     Unable to Pay for Housing in the Last Year: No     Number of Places Lived in the Last Year: 2     Unstable Housing in the Last Year: No       Family History:  Family History   Problem Relation Age of Onset    Osteoporosis Mother     Alzheimer's Disease Mother     Arthritis Mother     Diabetes Father     Heart Disease Father     Stroke Father         TIA    Cancer Other         one nephew leukemia, the other liver ca       Medications:  Current Medications[2]    Labs: Reviewed    Physical Examination:  Vital signs: /86   Pulse 86   Ht 1.575 m (5' 2\")   Wt 75.4 kg (166 lb 4.8 oz)   LMP 03/08/2021   BMI 30.42 kg/m²  Body mass index is 30.42 kg/m².  General: No apparent distress, cooperative  Eyes: No scleral icterus or discharge  ENMT: Normal on external inspection of nose, lips, normal thyroid exam  Neck: No abnormal masses on inspection  Resp: Normal effort, clear to auscultation bilaterally   CVS: Regular rate and rhythm, S1 S2 normal, no murmur   Extremities: No edema  Abdomen: abdominal obesity present  Neuro: Alert and oriented  Skin: No rash  Psych: Normal mood and affect, intact memory and able to make informed decisions    Plan:    There are no diagnoses linked to this encounter.    Return in about 19 weeks (around 11/5/2025).    DM - Patients A1c increased a bit but is still at goal. It is likely " due to the increased fruit intake. She also talks about being hungry and needing food to take with her medicine or she doesn't feel well. She doesn't think ozempic reduces her appetite at all.   - Increase ozempic to 1mg weekly   - continue other therapy   - ergocalciferol 50,000 u weekly x 8 weeks   - Increase lisinopril to 5mg daily     Thank you for allowing me to participate in the care of this patient.    Niranjan Schulte, PharmD   06/25/25    CC:   Jacque Whyte M.D.           [1]   Past Surgical History:  Procedure Laterality Date    NEPHRECTOMY ROBOTIC XI Right 10/20/2021    Procedure: NEPHRECTOMY, ROBOT-ASSISTED, USING DA CORNELIUS XI - RADICAL;  Surgeon: Jay Painting M.D.;  Location: SURGERY [facility];  Service: Gen Robotic    ORIF, ANKLE Right 09/09/2021    Procedure: ORIF, ANKLE;  Surgeon: Alexander Castillo M.D.;  Location: SURGERY [facility];  Service: Orthopedics    CHOLECYSTECTOMY  2005    TUBAL LIGATION     [2]   Current Outpatient Medications:     ergocalciferol (DRISDOL) 96672 UNIT capsule, Take 1 Capsule by mouth every 7 days., Disp: 8 Capsule, Rfl: 0    Semaglutide, 1 MG/DOSE, (OZEMPIC, 1 MG/DOSE,) 4 MG/3ML Solution Pen-injector, Inject 1 mg under the skin every 7 days., Disp: 3 mL, Rfl: 5    lisinopril (PRINIVIL) 5 MG Tab, Take 1 Tablet by mouth every day., Disp: 100 Tablet, Rfl: 3    atorvastatin (LIPITOR) 20 MG Tab, Take 1 Tablet by mouth every evening., Disp: 100 Tablet, Rfl: 3    Empagliflozin-metFORMIN HCl ER (SYNJARDY XR)  MG TABLET SR 24 HR, Take 1 tablet by mouth every day., Disp: 30 Tablet, Rfl: 5    glucose blood (TRUE METRIX BLOOD GLUCOSE TEST) strip, Use to test twice daily, Disp: 50 Strip, Rfl: 11    hydrOXYzine HCl (ATARAX) 25 MG Tab, TAKE 1 TABLET BY MOUTH THREE TIMES DAILY AS NEEDED FOR ITCHING, Disp: 90 Tablet, Rfl: 2    NON SPECIFIED, Shingrix #1 (Patient not taking: Reported on 10/1/2024), Disp: 1 Each, Rfl: 0    glucose blood strip, True Metrix Glucose Test Strip,  Disp: , Rfl:     predniSONE (DELTASONE) 5 MG Tab, , Disp: , Rfl:     Blood Glucose Monitoring Suppl (BLOOD GLUCOSE MONITOR SYSTEM) w/Device Kit, Use to test 3 times a day and as needed, Disp: 1 Kit, Rfl: 0    primidone (MYSOLINE) 50 MG Tab, , Disp: , Rfl:     omeprazole (PRILOSEC OTC) 20 MG tablet, Take 1 Tablet by mouth every day., Disp: 30 Tablet, Rfl: 5

## 2025-07-09 ENCOUNTER — OFFICE VISIT (OUTPATIENT)
Dept: MEDICAL GROUP | Facility: MEDICAL CENTER | Age: 55
End: 2025-07-09
Attending: FAMILY MEDICINE
Payer: MEDICARE

## 2025-07-09 VITALS
DIASTOLIC BLOOD PRESSURE: 70 MMHG | WEIGHT: 168 LBS | HEART RATE: 82 BPM | TEMPERATURE: 97.8 F | RESPIRATION RATE: 16 BRPM | BODY MASS INDEX: 30.91 KG/M2 | SYSTOLIC BLOOD PRESSURE: 116 MMHG | OXYGEN SATURATION: 96 % | HEIGHT: 62 IN

## 2025-07-09 DIAGNOSIS — F17.200 SMOKER: ICD-10-CM

## 2025-07-09 DIAGNOSIS — Z00.00 MEDICARE ANNUAL WELLNESS VISIT, SUBSEQUENT: ICD-10-CM

## 2025-07-09 DIAGNOSIS — F43.9 STRESS AT HOME: Primary | ICD-10-CM

## 2025-07-09 DIAGNOSIS — E11.9 TYPE 2 DIABETES MELLITUS WITHOUT COMPLICATION, WITHOUT LONG-TERM CURRENT USE OF INSULIN (HCC): ICD-10-CM

## 2025-07-09 PROCEDURE — G0402 INITIAL PREVENTIVE EXAM: HCPCS | Performed by: FAMILY MEDICINE

## 2025-07-09 PROCEDURE — 3074F SYST BP LT 130 MM HG: CPT | Performed by: FAMILY MEDICINE

## 2025-07-09 PROCEDURE — 99213 OFFICE O/P EST LOW 20 MIN: CPT | Performed by: FAMILY MEDICINE

## 2025-07-09 PROCEDURE — 3078F DIAST BP <80 MM HG: CPT | Performed by: FAMILY MEDICINE

## 2025-07-09 RX ORDER — GABAPENTIN 100 MG/1
CAPSULE ORAL
COMMUNITY

## 2025-07-09 RX ORDER — EMPAGLIFLOZIN, METFORMIN HYDROCHLORIDE 25; 1000 MG/1; MG/1
1 TABLET, EXTENDED RELEASE ORAL DAILY
Qty: 100 TABLET | Refills: 3 | Status: SHIPPED | OUTPATIENT
Start: 2025-07-09

## 2025-07-09 RX ORDER — TRAZODONE HYDROCHLORIDE 50 MG/1
TABLET ORAL
COMMUNITY

## 2025-07-09 ASSESSMENT — ANXIETY QUESTIONNAIRES
IF YOU CHECKED OFF ANY PROBLEMS ON THIS QUESTIONNAIRE, HOW DIFFICULT HAVE THESE PROBLEMS MADE IT FOR YOU TO DO YOUR WORK, TAKE CARE OF THINGS AT HOME, OR GET ALONG WITH OTHER PEOPLE: NOT DIFFICULT AT ALL
GAD7 TOTAL SCORE: 0
3. WORRYING TOO MUCH ABOUT DIFFERENT THINGS: NOT AT ALL
6. BECOMING EASILY ANNOYED OR IRRITABLE: NOT AT ALL
4. TROUBLE RELAXING: NOT AT ALL
2. NOT BEING ABLE TO STOP OR CONTROL WORRYING: NOT AT ALL
7. FEELING AFRAID AS IF SOMETHING AWFUL MIGHT HAPPEN: NOT AT ALL
5. BEING SO RESTLESS THAT IT IS HARD TO SIT STILL: NOT AT ALL
1. FEELING NERVOUS, ANXIOUS, OR ON EDGE: NOT AT ALL

## 2025-07-09 ASSESSMENT — FIBROSIS 4 INDEX: FIB4 SCORE: 0.93

## 2025-07-09 ASSESSMENT — PATIENT HEALTH QUESTIONNAIRE - PHQ9: CLINICAL INTERPRETATION OF PHQ2 SCORE: 0

## 2025-07-09 NOTE — PROGRESS NOTES
Chief Complaint   Patient presents with    Medicare Annual Wellness       HPI:  Noelle Hobbs is a 55 y.o. here for Medicare Annual Wellness Visit     Patient Active Problem List    Diagnosis Date Noted    Toe infection 06/15/2022    Type 2 diabetes mellitus without complication, without long-term current use of insulin ([facility]) 05/17/2022    Gastroesophageal reflux disease 05/17/2022    Other hyperlipidemia 05/17/2022    CIDP (chronic inflammatory demyelinating polyneuropathy) ([facility]) 03/24/2022    H/O right radical nephrectomy 10/23/2021    LFT elevation 10/23/2021    Mixed stress and urge urinary incontinence 09/14/2021    Vitamin D deficiency 04/01/2019    Anemia 04/01/2019    Vitamin B12 deficiency 04/01/2019    Smoker 04/01/2019    Neck pain 02/12/2019    BMI 28.0-28.9,adult 02/12/2019       Current Medications[1]       Current supplements as per medication list.     Allergies: Latex    Current social contact/activities: visits mom and runs errands every day.      She  reports that she has been smoking cigarettes. She has a 8.5 pack-year smoking history. She has never used smokeless tobacco. She reports that she does not drink alcohol and does not use drugs.  Ready to quit: Not Answered  Counseling given: Not Answered  Tobacco comments: 3 cigarettes/day      ROS:    Gait: Uses a cane  Ostomy: No  Other tubes: No  Amputations: No  Chronic oxygen use: No  Last eye exam: 01/01/2025  Wears hearing aids: No   : Denies any urinary leakage during the last 6 months    Screening:  Depression Screening  Little interest or pleasure in doing things?  0 - not at all  Feeling down, depressed , or hopeless? 0 - not at all  Trouble falling or staying asleep, or sleeping too much?     Feeling tired or having little energy?     Poor appetite or overeating?     Feeling bad about yourself - or that you are a failure or have let yourself or your family down?    Trouble concentrating on things, such as reading the newspaper or  watching television?    Moving or speaking so slowly that other people could have noticed.  Or the opposite - being so fidgety or restless that you have been moving around a lot more than usual?     Thoughts that you would be better off dead, or of hurting yourself?     Patient Health Questionnaire Score:      If depressive symptoms identified deferred to follow up visit unless specifically addressed in assessment and plan.    Interpretation of PHQ-9 Total Score   Score Severity   1-4 No Depression   5-9 Mild Depression   10-14 Moderate Depression   15-19 Moderately Severe Depression   20-27 Severe Depression    Screening for Cognitive Impairment  Do you or any of your friends or family members have any concern about your memory?  No. Mom has alzheimer's    Three Minute Recall (Village, Kitchen, Baby)  3/3    Jamey clock face with all 12 numbers and set the hands to show 10 minutes past 11.       Cognitive concerns identified deferred for follow up unless specifically addressed in assessment and plan.    Fall Risk Assessment  Has the patient had two or more falls in the last year or any fall with injury in the last year?       Safety Assessment  Do you always wear your seatbelt?  Yes    Any changes to home needed to function safely?  NO  Difficulty hearing.   No  Patient counseled about all safety risks that were identified. NO    Functional Assessment ADLs  Are there any barriers preventing you from cooking for yourself or meeting nutritional needs?   .  No   Are there any barriers preventing you from driving safely or obtaining transportation?   . No   Are there any barriers preventing you from using a telephone or calling for help?     No  Are there any barriers preventing you from shopping?   .No    Are there any barriers preventing you from taking care of your own finances?   No    Are there any barriers preventing you from managing your medications?   No    Are there any barriers preventing you from showering,  bathing or dressing yourself?  No   Are there any barriers preventing you from doing housework or laundry?    No  Are there any barriers preventing you from using the toilet?  No   Are you currently engaging in any exercise or physical activity?   .  Yes walking    Self-Assessment of Health  What is your perception of your health?   States getting better   Do you sleep more than six hours a night?   No  has significant stress   In the past 7 days, how much did pain keep you from doing your normal work?  Yes      Do you spend quality time with family or friends (virtually or in person)? yes     Do you usually eat a heart healthy diet that constists of a variety of fruits, vegetables, whole grains and fiber?  Yes   Do you eat foods high in fat and/or Fast Food more than three times per week? NO     How concerned are you that your medical conditions are not being well managed? States, No being well taken care of      Are you worried that in the next 2 months, you may not have stable housing that you own, rent, or stay in as part of a household? No         Advance Care Planning  Do you have an Advance Directive, Living Will, Durable Power of , or POLST?      No             Health Maintenance Summary            Current Care Gaps       COVID-19 Vaccine (3 - 2024-25 season) Overdue since 9/1/2024 05/09/2021  Imm Admin: PFIZER PURPLE CAP SARS-COV-2 VACCINATION (12+)    04/18/2021  Imm Admin: PFIZER PURPLE CAP SARS-COV-2 VACCINATION (12+)              Diabetes: Monofilament / LE Exam (Yearly) Overdue since 3/5/2025      03/05/2024  Diabetic Monofilament LE Exam              Hepatitis B Vaccine (Hep B) (1 of 3 - 19+ 3-dose series) Never done     No completion history exists for this topic.              Zoster (Shingles) Vaccines (1 of 2) Never done     No completion history exists for this topic.                      Needs Review       Hepatitis C Screening  Tentatively Complete      10/23/2021  Hepatitis C  Antibody component of HEPATITIS PANEL ACUTE(4 COMPONENTS)              Colorectal Cancer Screening (Colonoscopy - Every 10 Years) Tentatively due on 4/9/2035 04/09/2025  COLONOSCOPY RESULTS                      Upcoming       Diabetes: Retinopathy Screening (Yearly) Next due on 8/13/2025 08/13/2024  POCT Retinal Eye Exam    05/11/2023  AMB EXTERNAL RETINAL SCREENING RESULTS    05/05/2022  AMB EXTERNAL RETINAL SCREENING RESULTS    12/22/2020  AMB EXTERNAL RETINAL SCREENING RESULTS    04/26/2019  AMB EXTERNAL RETINAL SCREENING RESULTS     Only the first 5 history entries have been loaded, but more history exists.            Influenza Vaccine (1) Next due on 9/1/2025 12/12/2023  Imm Admin: Influenza Vaccine Quad Inj (Pf)    11/15/2021  Imm Admin: Influenza Vaccine Quad Inj (Pf)    02/22/2021  Imm Admin: Influenza Vaccine Quad Inj (Pf)    02/12/2019  Imm Admin: Influenza Vaccine Quad Inj (Pf)    11/03/2015  Imm Admin: Influenza split virus trivalent (PF)      Only the first 5 history entries have been loaded, but more history exists.              Mammogram (Yearly) Next due on 10/28/2025      10/28/2024  MA-DIAGNOSTIC MAMMO BILAT W/TOMOSYNTHESIS W/CAD    04/20/2023  MA-SCREENING MAMMO BILAT W/TOMOSYNTHESIS W/CAD    02/01/2019  MA-SCREEN MAMMO W/CAD-BILAT    01/23/2012  MA-SCREEING MAMMOGRAM W/ CAD              A1c Screening (Every 6 Months) Next due on 12/25/2025 06/25/2025  POCT  A1C    02/26/2025  POCT  A1C    11/20/2024  POCT  A1C    07/10/2024  POCT Hemoglobin A1C    02/06/2024  POCT  A1C      Only the first 5 history entries have been loaded, but more history exists.              Fasting Lipid Profile (Yearly) Next due on 6/4/2026 06/04/2025  Lipid Profile    07/09/2024  Lipid Profile    10/21/2022  Lipid Profile    03/29/2022  Lipid Profile    03/07/2019  LIPID PANEL      Only the first 5 history entries have been loaded, but more history exists.              Diabetes: Urine Protein  Screening (Yearly) Next due on 6/4/2026 06/04/2025  MICROALBUMIN CREAT RATIO URINE    07/09/2024  MICROALBUMIN CREAT RATIO URINE    10/21/2022  MICROALBUMIN CREAT RATIO URINE              SERUM CREATININE (Yearly) Next due on 6/4/2026 06/04/2025  Comp Metabolic Panel    11/12/2024  Comp Metabolic Panel    07/09/2024  Comp Metabolic Panel    09/08/2023  COMP METABOLIC PANEL    11/04/2022  Basic Metabolic Panel      Only the first 5 history entries have been loaded, but more history exists.              Annual Wellness Visit (Yearly) Next due on 7/9/2026 07/09/2025  Visit Dx: Medicare annual wellness visit, subsequent    07/09/2025  Level of Service: [place] ANNUAL WELLNESS VISIT-INCLUDES PPPS SUBSEQUE*              IMM DTaP/Tdap/Td Vaccine (2 - Td or Tdap) Next due on 2/12/2029 02/12/2019  Imm Admin: Tdap Vaccine              Cervical Cancer Screening (Every 5 Years) Next due on 10/11/2029      10/11/2024  THINPREP PAP W/HPV AND CTNG    01/31/2019  THINPREP PAP WITH HPV    01/30/2012  PAP IG, RFX HPV ASCU    01/24/2012  PAP IG, RFX HPV ALL PTH                      Completed or No Longer Recommended       Pneumococcal Vaccine: 50+ Years (Series Information) Completed      04/18/2023  Imm Admin: Pneumococcal Conjugate Vaccine (PCV20)    12/03/2021  Imm Admin: Pneumococcal polysaccharide vaccine (PPSV-23)              Hepatitis A Vaccine (Hep A) (Series Information) Aged Out      No completion history exists for this topic.              HPV Vaccines (Series Information) Aged Out     No completion history exists for this topic.              Polio Vaccine (Inactivated Polio) (Series Information) Aged Out     No completion history exists for this topic.              Meningococcal Immunization (Series Information) Aged Out     No completion history exists for this topic.              Meningococcal B Vaccine (Series Information) Aged Out     No completion history exists for this topic.                       "      Patient Care Team:  Jacque Whyte M.D. as PCP - General (Family Medicine)      Social History[2]  Family History   Problem Relation Age of Onset    Osteoporosis Mother     Alzheimer's Disease Mother     Arthritis Mother     Diabetes Father     Heart Disease Father     Stroke Father         TIA    Cancer Other         one nephew leukemia, the other liver ca     She  has a past medical history of Ankle fracture, lateral malleolus, closed (9/1/2021), Chronic low back pain, CIDP (chronic inflammatory demyelinating polyneuropathy) ([facility]), Hypokalemia (10/24/2021), Ileus ([facility]) (10/23/2021), Myopia, Renal cancer, right ([facility]), Renal disorder, and Weight gain.    She has no past medical history of Anxiety, Breast cancer ([facility]), or Depression.   Past Surgical History[3]    Exam:   /70 (BP Location: Left arm, Patient Position: Sitting, BP Cuff Size: Adult)   Pulse 82   Temp 36.6 °C (97.8 °F) (Temporal)   Resp 16   Ht 1.575 m (5' 2\")   Wt 76.2 kg (168 lb)   SpO2 96%  Body mass index is 30.73 kg/m².    Hearing excellent.    Dentition good   Alert, oriented in no acute distress.  Eye contact is good, speech goal directed, affect calm    Assessment and Plan. The following treatment and monitoring plan is recommended:    1. Stress at home  - Referral to Behavioral Health    2. Type 2 diabetes mellitus without complication, without long-term current use of insulin ([facility])  - Empagliflozin-metFORMIN HCl ER (SYNJARDY XR)  MG TABLET SR 24 HR; Take 1 tablet by mouth every day.  Dispense: 100 Tablet; Refill: 3    3. Smoker  - Referral to Tobacco Cessation Program    4. Medicare annual wellness visit, subsequent    Other orders  - NON SPECIFIED; Vygart Hytrulo, one injection per week  - gabapentin (NEURONTIN) 100 MG Cap; TAKE 1 TO 2 CAPSULES BY MOUTH TWICE DAILY IN THE MORNING AND IN THE EVENING AS NEEDED FOR PAIN  - traZODone (DESYREL) 50 MG Tab; TAKE 1 TO 2 TABLETS BY MOUTH NIGHTLY AS NEEDED FOR " INSOMNIA      Services suggested: No services needed at this time  Health Care Screening: Age-appropriate preventive services recommended by USPTF and ACIP covered by Medicare were discussed today. Services ordered if indicated and agreed upon by the patient.  Referrals offered: Community-based lifestyle interventions to reduce health risks and promote self-management and wellness, fall prevention, nutrition, physical activity, tobacco-use cessation, weight loss, and mental health services as per orders if indicated.    Discussion today about general wellness and lifestyle habits:    Prevent falls and reduce trip hazards; Cautioned about securing or removing rugs.  Have a working fire alarm and carbon monoxide detector;   Engage in regular physical activity and social activities     Follow-up: Return in about 3 months (around 10/9/2025).         [1]   Current Outpatient Medications   Medication Sig Dispense Refill    Empagliflozin-metFORMIN HCl ER (SYNJARDY XR)  MG TABLET SR 24 HR Take 1 tablet by mouth every day. 100 Tablet 3    NON SPECIFIED Vygart Hytrulo, one injection per week      ergocalciferol (DRISDOL) 63394 UNIT capsule Take 1 Capsule by mouth every 7 days. 8 Capsule 0    Semaglutide, 1 MG/DOSE, (OZEMPIC, 1 MG/DOSE,) 4 MG/3ML Solution Pen-injector Inject 1 mg under the skin every 7 days. 3 mL 5    lisinopril (PRINIVIL) 5 MG Tab Take 1 Tablet by mouth every day. 100 Tablet 3    atorvastatin (LIPITOR) 20 MG Tab Take 1 Tablet by mouth every evening. 100 Tablet 3    glucose blood (TRUE METRIX BLOOD GLUCOSE TEST) strip Use to test twice daily 50 Strip 11    hydrOXYzine HCl (ATARAX) 25 MG Tab TAKE 1 TABLET BY MOUTH THREE TIMES DAILY AS NEEDED FOR ITCHING 90 Tablet 2    glucose blood strip True Metrix Glucose Test Strip      Blood Glucose Monitoring Suppl (BLOOD GLUCOSE MONITOR SYSTEM) w/Device Kit Use to test 3 times a day and as needed 1 Kit 0    gabapentin (NEURONTIN) 100 MG Cap TAKE 1 TO 2 CAPSULES BY  MOUTH TWICE DAILY IN THE MORNING AND IN THE EVENING AS NEEDED FOR PAIN      traZODone (DESYREL) 50 MG Tab TAKE 1 TO 2 TABLETS BY MOUTH NIGHTLY AS NEEDED FOR INSOMNIA       No current facility-administered medications for this visit.   [2]   Social History  Tobacco Use    Smoking status: Some Days     Current packs/day: 0.25     Average packs/day: 0.3 packs/day for 34.0 years (8.5 ttl pk-yrs)     Types: Cigarettes    Smokeless tobacco: Never    Tobacco comments:     3 cigarettes/day   Vaping Use    Vaping status: Never Used   Substance Use Topics    Alcohol use: No    Drug use: No   [3]   Past Surgical History:  Procedure Laterality Date    NEPHRECTOMY ROBOTIC XI Right 10/20/2021    Procedure: NEPHRECTOMY, ROBOT-ASSISTED, USING DA CORNELIUS XI - RADICAL;  Surgeon: Jay Painting M.D.;  Location: SURGERY [facility];  Service: Gen Robotic    ORIF, ANKLE Right 09/09/2021    Procedure: ORIF, ANKLE;  Surgeon: Alexander Castillo M.D.;  Location: SURGERY [facility];  Service: Orthopedics    CHOLECYSTECTOMY  2005    TUBAL LIGATION

## 2025-07-10 ENCOUNTER — TELEPHONE (OUTPATIENT)
Dept: VASCULAR LAB | Facility: MEDICAL CENTER | Age: 55
End: 2025-07-10
Payer: MEDICARE

## 2025-07-10 NOTE — TELEPHONE ENCOUNTER
[facility]     Received smoking cessation referral from Dr. Whyte on 7/9/25.     Called and spoke to patient. Initial visit scheduled on 8/7/25 at [facility].     Insurance: [facility] & Medicaid  PCP: [facility]  Locations to be seen: [facility]     If no response by 8/9/25 (1 month from referral date) OR 2 no shows/cancellations, will remove from referral list and send FYI to referring provider    Ross Shaw, PharmD, BCACP  [facility]  Phone: [phone], Fax [phone]

## 2025-07-14 NOTE — Clinical Note
REFERRAL APPROVAL NOTICE         Sent on July 14, 2025                   Noelle Hobbs  [address]  [place] [address]                   Dear Ms. Hobbs,    After a careful review of the medical information and benefit coverage, [facility] has processed your referral. See below for additional details.    If applicable, you must be actively enrolled with your insurance for coverage of the authorized service. If you have any questions regarding your coverage, please contact your insurance directly.    REFERRAL INFORMATION   Referral #:  [id number]  Referred-To Provider    Referred-By Provider:  Behavioral Health    Jacque Whyte M.D.   [facility]      [address]  [place] [address]  [phone] [address]  [place] [address]  [phone]    Referral Start Date:  07/09/2025  Referral End Date:   07/09/2026             SCHEDULING  If you do not already have an appointment, please call [phone] to make an appointment.     MORE INFORMATION  If you do not already have a [employer] account, sign up at: [email or web address]  You can access your medical information, make appointments, see lab results, billing information, and more.  If you have questions regarding this referral, please contact  the [facility] Referrals department at:             [phone]. Monday - Friday 8:00AM - 5:00PM.     Sincerely,    [facility]

## 2025-07-14 NOTE — Clinical Note
REFERRAL APPROVAL NOTICE         Sent on July 14, 2025                   Noelle Hobbs  [address]  [place] [address]                   Dear Ms. Hobbs,    After a careful review of the medical information and benefit coverage, [facility] has processed your referral. See below for additional details.    If applicable, you must be actively enrolled with your insurance for coverage of the authorized service. If you have any questions regarding your coverage, please contact your insurance directly.    REFERRAL INFORMATION   Referral #:  [id number]  Referred-To Department    Referred-By Provider:  Smoking Cessation Program    Jacque Whyte M.D.   [facility]      [address] [address] [phone] [address] [facility] [id number] [phone]    Referral Start Date:  07/09/2025  Referral End Date:   07/09/2026             SCHEDULING  If you do not already have an appointment, please call [phone] to make an appointment.     MORE INFORMATION  If you do not already have a [employer] account, sign up at: [email or web address]  You can access your medical information, make appointments, see lab results, billing information, and more.  If you have questions regarding this referral, please contact  the [facility] Referrals department at:             [phone]. Monday - Friday 8:00AM - 5:00PM.     Sincerely,    [facility]

## 2025-07-28 ENCOUNTER — PHARMACY VISIT (OUTPATIENT)
Dept: PHARMACY | Facility: MEDICAL CENTER | Age: 55
End: 2025-07-28
Payer: COMMERCIAL

## 2025-07-28 PROCEDURE — RXMED WILLOW AMBULATORY MEDICATION CHARGE: Performed by: FAMILY MEDICINE

## 2025-07-29 DIAGNOSIS — E55.9 VITAMIN D DEFICIENCY: Primary | ICD-10-CM

## 2025-08-07 ENCOUNTER — DOCUMENTATION (OUTPATIENT)
Dept: VASCULAR LAB | Facility: MEDICAL CENTER | Age: 55
End: 2025-08-07
Payer: MEDICARE

## 2025-08-21 ENCOUNTER — TELEPHONE (OUTPATIENT)
Dept: VASCULAR LAB | Facility: MEDICAL CENTER | Age: 55
End: 2025-08-21
Payer: MEDICARE

## (undated) DEVICE — DRAPE 36X28IN RAD CARM BND BG - (25/CA) O

## (undated) DEVICE — CANISTER SUCTION RIGID RED 1500CC (40EA/CA)

## (undated) DEVICE — SUTURE 0 VICRYL PLUS CT-1 - 8 X 18 INCH (12/BX)

## (undated) DEVICE — TUBING INSUFFLATION - (10/BX)

## (undated) DEVICE — TUBING CLEARLINK DUO-VENT - C-FLO (48EA/CA)

## (undated) DEVICE — ARMREST CRADLE FOAM - (2PR/PK 12PR/CA)

## (undated) DEVICE — GLOVE BIOGEL PI ULTRATOUCH SZ 7.5 SURGICAL PF LF -(50/BX 4BX/CA)

## (undated) DEVICE — TROCAR Z THREAD12MM OPTICAL - NON BLADED (6/BX)

## (undated) DEVICE — STAPLER SKIN DISP - (6/BX 10BX/CA) VISISTAT

## (undated) DEVICE — SEAL 5MM-8MM UNIVERSAL  BOX OF 10

## (undated) DEVICE — SUTURE 3-0 VICRYL PLUS SH - 27 INCH (36/BX)

## (undated) DEVICE — DERMABOND ADVANCED - (12EA/BX)

## (undated) DEVICE — PROTECTOR ULNA NERVE - (36PR/CA)

## (undated) DEVICE — DRAPE C ARMOR (12EA/CA)

## (undated) DEVICE — SUTURE 2-0 VICRYL PLUS CT-1 - 8 X 18 INCH(12/BX)

## (undated) DEVICE — KIT ANESTHESIA W/CIRCUIT & 3/LT BAG W/FILTER (20EA/CA)

## (undated) DEVICE — CLIP HEM-O-LOC GREEN - (14EA/BX)

## (undated) DEVICE — HEAD HOLDER JUNIOR/ADULT

## (undated) DEVICE — COVER TIP ENDOWRIST HOT SHEAR - (10EA/BX) DA VINCI

## (undated) DEVICE — MASK ANESTHESIA ADULT  - (100/CA)

## (undated) DEVICE — GLOVE BIOGEL PI ORTHO SZ 8.5 PF LF (40/BX)

## (undated) DEVICE — SUTURE 4-0 MONOCRYL PLUS PS-1 - 27 INCH (36/BX)

## (undated) DEVICE — GLOVE BIOGEL PI INDICATOR SZ 6.5 SURGICAL PF LF - (50/BX 4BX/CA)

## (undated) DEVICE — SLEEVE, VASO, THIGH, MED

## (undated) DEVICE — SUCTION INSTRUMENT YANKAUER BULBOUS TIP W/O VENT (50EA/CA)

## (undated) DEVICE — DRILL BIT 2.8MM X 155MM CALIBRATED (8TX2=16)

## (undated) DEVICE — PEN SKIN MARKER W/RULER - (50EA/BX)

## (undated) DEVICE — SPECIMEN BAG ENDOCATCH II15MM 15MM SPECIMEN RETRIEVAL (3EA/CA)

## (undated) DEVICE — BOVIE BLADE COATED - (50/PK)

## (undated) DEVICE — DRAPE MAYO STAND - (30/CA)

## (undated) DEVICE — NEEDLE INSFL 120MM 14GA VRRS - (20/BX)

## (undated) DEVICE — TOWEL STOP TIMEOUT SAFETY FLAG (40EA/CA)

## (undated) DEVICE — DRILL BIT 3.5X110 QC OIC - (10TX2=20)

## (undated) DEVICE — TROCAR SEPARATOR 15MMZTHREAD - (6/BX)

## (undated) DEVICE — TUBING LAPAROSCOPIC PLUME DEVICE (10EA/CA)

## (undated) DEVICE — SENSOR SPO2 NEO LNCS ADHESIVE (20/BX) SEE USER NOTES

## (undated) DEVICE — SUTURE 1 PDS II PLUS TP-1 - (12PK/BX)

## (undated) DEVICE — LACTATED RINGERS INJ 1000 ML - (14EA/CA 60CA/PF)

## (undated) DEVICE — HUMID-VENT HEAT AND MOISTURE EXCHANGE- (50/BX)

## (undated) DEVICE — GOWN WARMING STANDARD FLEX - (30/CA)

## (undated) DEVICE — TOWELS CLOTH SURGICAL - (4/PK 20PK/CA)

## (undated) DEVICE — WRAP COBAN SELF-ADHERENT 6 IN X  5YDS STERILE TAN (12/CA)

## (undated) DEVICE — BANDAGE ELASTIC STERILE MATRIX 6 X 10 (20EA/CA)

## (undated) DEVICE — GLOVE BIOGEL PI ULTRATOUCH SZ 8.0 SURGICAL PF LF - (50/BX 4BX/CA)

## (undated) DEVICE — DRAPE COLUMN  BOX OF 20

## (undated) DEVICE — SET EXTENSION WITH 2 PORTS (48EA/CA) ***PART #2C8610 IS A SUBSTITUTE*****

## (undated) DEVICE — ELECTRODE 850 FOAM ADHESIVE - HYDROGEL RADIOTRNSPRNT (50/PK)

## (undated) DEVICE — DRAPE ARM  BOX OF 20

## (undated) DEVICE — STAPLER 45MM ARTICULATING - ENDO (3EA/BX)

## (undated) DEVICE — SUTURE 2-0 SILK FS (12EA/BX)

## (undated) DEVICE — CANISTER SUCTION 3000ML MECHANICAL FILTER AUTO SHUTOFF MEDI-VAC NONSTERILE LF DISP  (40EA/CA)

## (undated) DEVICE — ELECTRODE DUAL RETURN W/ CORD - (50/PK)

## (undated) DEVICE — SYRINGE 30 ML LL (56/BX)

## (undated) DEVICE — SET LEADWIRE 5 LEAD BEDSIDE DISPOSABLE ECG (1SET OF 5/EA)

## (undated) DEVICE — NEPTUNE 4 PORT MANIFOLD - (20/PK)

## (undated) DEVICE — GLOVE SZ 6.5 BIOGEL PI MICRO - PF LF (50PR/BX)

## (undated) DEVICE — TROCAR 5X100 NON BLADED Z-TH - READ KII (6/BX)

## (undated) DEVICE — PACK LOWER EXTREMITY - (2/CA)

## (undated) DEVICE — PACK MAJOR BASIN - (2EA/CA)

## (undated) DEVICE — BLADE SURGICAL #10 - (50/BX)

## (undated) DEVICE — DRILL BIT 1.8MMX80MM CALIBRATED (4TX2=8)

## (undated) DEVICE — SUTURE 3-0 ETHILON PS-1 (36PK/BX)

## (undated) DEVICE — CLIP HEMOLOCK PURPLE - (14/BX)

## (undated) DEVICE — STAPLE 45MM VASCULAR WHITE 2.5MM (12EA/BX)

## (undated) DEVICE — DRAPE LARGE 3 QUARTER - (20/CA)

## (undated) DEVICE — PADDING CAST 6 IN STERILE - 6 X 4 YDS (24/CA)

## (undated) DEVICE — GLOVE BIOGEL PI INDICATOR SZ 7.5 SURGICAL PF LF -(50/BX 4BX/CA)

## (undated) DEVICE — PAD LAP STERILE 18 X 18 - (5/PK 40PK/CA)

## (undated) DEVICE — SUTURE GENERAL

## (undated) DEVICE — TUBE CONNECTING SUCTION - CLEAR PLASTIC STERILE 72 IN (50EA/CA)

## (undated) DEVICE — SODIUM CHL IRRIGATION 0.9% 1000ML (12EA/CA)

## (undated) DEVICE — GLOVE BIOGEL ECLIPSE PF LATEX SIZE 8.5 (50PR/BX)

## (undated) DEVICE — SYSTEM CLEARIFY VISUALIZATION (10EA/PK)

## (undated) DEVICE — OBTURATOR BLADELESS STANDARD 8MM (6EA/BX)

## (undated) DEVICE — ROBOTIC SURGERY SERVICES

## (undated) DEVICE — DRAPE U ORTHOPEDIC - (10/BX)